# Patient Record
Sex: MALE | Race: WHITE | NOT HISPANIC OR LATINO | Employment: OTHER | URBAN - METROPOLITAN AREA
[De-identification: names, ages, dates, MRNs, and addresses within clinical notes are randomized per-mention and may not be internally consistent; named-entity substitution may affect disease eponyms.]

---

## 2017-04-12 DIAGNOSIS — Z12.5 ENCOUNTER FOR SCREENING FOR MALIGNANT NEOPLASM OF PROSTATE: ICD-10-CM

## 2017-04-12 DIAGNOSIS — E78.00 PURE HYPERCHOLESTEROLEMIA: ICD-10-CM

## 2017-04-12 DIAGNOSIS — I10 ESSENTIAL (PRIMARY) HYPERTENSION: ICD-10-CM

## 2017-04-12 DIAGNOSIS — M25.572 PAIN IN LEFT ANKLE: ICD-10-CM

## 2017-04-12 DIAGNOSIS — J45.909 UNCOMPLICATED ASTHMA: ICD-10-CM

## 2017-04-12 DIAGNOSIS — M25.571 PAIN IN RIGHT ANKLE: ICD-10-CM

## 2017-04-18 ENCOUNTER — APPOINTMENT (OUTPATIENT)
Dept: LAB | Facility: HOSPITAL | Age: 67
End: 2017-04-18
Attending: INTERNAL MEDICINE
Payer: MEDICARE

## 2017-04-18 ENCOUNTER — TRANSCRIBE ORDERS (OUTPATIENT)
Dept: ADMINISTRATIVE | Facility: HOSPITAL | Age: 67
End: 2017-04-18

## 2017-04-18 DIAGNOSIS — J45.909 UNCOMPLICATED ASTHMA: ICD-10-CM

## 2017-04-18 DIAGNOSIS — Z12.5 ENCOUNTER FOR SCREENING FOR MALIGNANT NEOPLASM OF PROSTATE: ICD-10-CM

## 2017-04-18 DIAGNOSIS — E78.00 PURE HYPERCHOLESTEROLEMIA: ICD-10-CM

## 2017-04-18 DIAGNOSIS — I10 ESSENTIAL (PRIMARY) HYPERTENSION: ICD-10-CM

## 2017-04-18 LAB
ALBUMIN SERPL BCP-MCNC: 4 G/DL (ref 3.5–5)
ALP SERPL-CCNC: 56 U/L (ref 46–116)
ALT SERPL W P-5'-P-CCNC: 35 U/L (ref 12–78)
ANION GAP SERPL CALCULATED.3IONS-SCNC: 8 MMOL/L (ref 4–13)
AST SERPL W P-5'-P-CCNC: 24 U/L (ref 5–45)
BASOPHILS # BLD AUTO: 0.02 THOUSANDS/ΜL (ref 0–0.1)
BASOPHILS NFR BLD AUTO: 0 % (ref 0–1)
BILIRUB SERPL-MCNC: 0.8 MG/DL (ref 0.2–1)
BUN SERPL-MCNC: 11 MG/DL (ref 5–25)
CALCIUM SERPL-MCNC: 8.8 MG/DL (ref 8.3–10.1)
CHLORIDE SERPL-SCNC: 102 MMOL/L (ref 100–108)
CHOLEST SERPL-MCNC: 173 MG/DL (ref 50–200)
CO2 SERPL-SCNC: 30 MMOL/L (ref 21–32)
CREAT SERPL-MCNC: 0.82 MG/DL (ref 0.6–1.3)
EOSINOPHIL # BLD AUTO: 0.24 THOUSAND/ΜL (ref 0–0.61)
EOSINOPHIL NFR BLD AUTO: 4 % (ref 0–6)
ERYTHROCYTE [DISTWIDTH] IN BLOOD BY AUTOMATED COUNT: 12.4 % (ref 11.6–15.1)
GFR SERPL CREATININE-BSD FRML MDRD: >60 ML/MIN/1.73SQ M
GLUCOSE P FAST SERPL-MCNC: 99 MG/DL (ref 65–99)
HCT VFR BLD AUTO: 43.2 % (ref 36.5–49.3)
HDLC SERPL-MCNC: 48 MG/DL (ref 40–60)
HGB BLD-MCNC: 15 G/DL (ref 12–17)
LDLC SERPL CALC-MCNC: 96 MG/DL (ref 0–100)
LYMPHOCYTES # BLD AUTO: 1.86 THOUSANDS/ΜL (ref 0.6–4.47)
LYMPHOCYTES NFR BLD AUTO: 33 % (ref 14–44)
MCH RBC QN AUTO: 28.2 PG (ref 26.8–34.3)
MCHC RBC AUTO-ENTMCNC: 34.7 G/DL (ref 31.4–37.4)
MCV RBC AUTO: 81 FL (ref 82–98)
MONOCYTES # BLD AUTO: 0.65 THOUSAND/ΜL (ref 0.17–1.22)
MONOCYTES NFR BLD AUTO: 11 % (ref 4–12)
NEUTROPHILS # BLD AUTO: 2.93 THOUSANDS/ΜL (ref 1.85–7.62)
NEUTS SEG NFR BLD AUTO: 52 % (ref 43–75)
PLATELET # BLD AUTO: 227 THOUSANDS/UL (ref 149–390)
PMV BLD AUTO: 8.5 FL (ref 8.9–12.7)
POTASSIUM SERPL-SCNC: 3.7 MMOL/L (ref 3.5–5.3)
PROT SERPL-MCNC: 7.5 G/DL (ref 6.4–8.2)
PSA SERPL-MCNC: 2.9 NG/ML (ref 0–4)
RBC # BLD AUTO: 5.32 MILLION/UL (ref 3.88–5.62)
SODIUM SERPL-SCNC: 140 MMOL/L (ref 136–145)
TRIGL SERPL-MCNC: 143 MG/DL
WBC # BLD AUTO: 5.7 THOUSAND/UL (ref 4.31–10.16)

## 2017-04-18 PROCEDURE — 85025 COMPLETE CBC W/AUTO DIFF WBC: CPT

## 2017-04-18 PROCEDURE — 80061 LIPID PANEL: CPT

## 2017-04-18 PROCEDURE — 36415 COLL VENOUS BLD VENIPUNCTURE: CPT

## 2017-04-18 PROCEDURE — G0103 PSA SCREENING: HCPCS

## 2017-04-18 PROCEDURE — 80053 COMPREHEN METABOLIC PANEL: CPT

## 2017-04-20 ENCOUNTER — ALLSCRIPTS OFFICE VISIT (OUTPATIENT)
Dept: OTHER | Facility: OTHER | Age: 67
End: 2017-04-20

## 2017-10-26 ENCOUNTER — EMERGENCY (EMERGENCY)
Facility: HOSPITAL | Age: 67
LOS: 1 days | Discharge: HOME | End: 2017-10-26
Attending: EMERGENCY MEDICINE | Admitting: EMERGENCY MEDICINE
Payer: MEDICARE

## 2017-10-26 VITALS
RESPIRATION RATE: 18 BRPM | DIASTOLIC BLOOD PRESSURE: 80 MMHG | TEMPERATURE: 98 F | HEART RATE: 55 BPM | OXYGEN SATURATION: 97 % | SYSTOLIC BLOOD PRESSURE: 148 MMHG

## 2017-10-26 VITALS
SYSTOLIC BLOOD PRESSURE: 183 MMHG | WEIGHT: 190.04 LBS | DIASTOLIC BLOOD PRESSURE: 100 MMHG | HEIGHT: 69 IN | HEART RATE: 72 BPM | RESPIRATION RATE: 20 BRPM | OXYGEN SATURATION: 97 % | TEMPERATURE: 97 F

## 2017-10-26 DIAGNOSIS — Z87.19 PERSONAL HISTORY OF OTHER DISEASES OF THE DIGESTIVE SYSTEM: Chronic | ICD-10-CM

## 2017-10-26 DIAGNOSIS — Z98.890 OTHER SPECIFIED POSTPROCEDURAL STATES: Chronic | ICD-10-CM

## 2017-10-26 DIAGNOSIS — N23 UNSPECIFIED RENAL COLIC: ICD-10-CM

## 2017-10-26 DIAGNOSIS — G56.00 CARPAL TUNNEL SYNDROME, UNSPECIFIED UPPER LIMB: Chronic | ICD-10-CM

## 2017-10-26 DIAGNOSIS — Z88.8 ALLERGY STATUS TO OTHER DRUGS, MEDICAMENTS AND BIOLOGICAL SUBSTANCES STATUS: ICD-10-CM

## 2017-10-26 DIAGNOSIS — I10 ESSENTIAL (PRIMARY) HYPERTENSION: ICD-10-CM

## 2017-10-26 LAB
ALBUMIN SERPL ELPH-MCNC: 4 G/DL — SIGNIFICANT CHANGE UP (ref 3.3–5)
ALP SERPL-CCNC: 54 U/L — SIGNIFICANT CHANGE UP (ref 40–120)
ALT FLD-CCNC: 36 U/L — SIGNIFICANT CHANGE UP (ref 12–78)
ANION GAP SERPL CALC-SCNC: 8 MMOL/L — SIGNIFICANT CHANGE UP (ref 5–17)
APPEARANCE UR: ABNORMAL
APTT BLD: 31 SEC — SIGNIFICANT CHANGE UP (ref 27.5–37.4)
AST SERPL-CCNC: 24 U/L — SIGNIFICANT CHANGE UP (ref 15–37)
BASOPHILS # BLD AUTO: 0.1 K/UL — SIGNIFICANT CHANGE UP (ref 0–0.2)
BASOPHILS NFR BLD AUTO: 0.7 % — SIGNIFICANT CHANGE UP (ref 0–2)
BILIRUB SERPL-MCNC: 0.5 MG/DL — SIGNIFICANT CHANGE UP (ref 0.2–1.2)
BILIRUB UR-MCNC: NEGATIVE — SIGNIFICANT CHANGE UP
BUN SERPL-MCNC: 14 MG/DL — SIGNIFICANT CHANGE UP (ref 7–23)
CALCIUM SERPL-MCNC: 8.8 MG/DL — SIGNIFICANT CHANGE UP (ref 8.5–10.1)
CHLORIDE SERPL-SCNC: 104 MMOL/L — SIGNIFICANT CHANGE UP (ref 96–108)
CO2 SERPL-SCNC: 28 MMOL/L — SIGNIFICANT CHANGE UP (ref 22–31)
COLOR SPEC: YELLOW — SIGNIFICANT CHANGE UP
CREAT SERPL-MCNC: 0.91 MG/DL — SIGNIFICANT CHANGE UP (ref 0.5–1.3)
DIFF PNL FLD: ABNORMAL
EOSINOPHIL # BLD AUTO: 0.1 K/UL — SIGNIFICANT CHANGE UP (ref 0–0.5)
EOSINOPHIL NFR BLD AUTO: 1.8 % — SIGNIFICANT CHANGE UP (ref 0–6)
GLUCOSE SERPL-MCNC: 102 MG/DL — HIGH (ref 70–99)
GLUCOSE UR QL: NEGATIVE — SIGNIFICANT CHANGE UP
HCT VFR BLD CALC: 44.1 % — SIGNIFICANT CHANGE UP (ref 39–50)
HGB BLD-MCNC: 15 G/DL — SIGNIFICANT CHANGE UP (ref 13–17)
INR BLD: 1.29 RATIO — HIGH (ref 0.88–1.16)
KETONES UR-MCNC: NEGATIVE — SIGNIFICANT CHANGE UP
LEUKOCYTE ESTERASE UR-ACNC: ABNORMAL
LYMPHOCYTES # BLD AUTO: 1.1 K/UL — SIGNIFICANT CHANGE UP (ref 1–3.3)
LYMPHOCYTES # BLD AUTO: 13.4 % — SIGNIFICANT CHANGE UP (ref 13–44)
MCHC RBC-ENTMCNC: 29.2 PG — SIGNIFICANT CHANGE UP (ref 27–34)
MCHC RBC-ENTMCNC: 33.9 GM/DL — SIGNIFICANT CHANGE UP (ref 32–36)
MCV RBC AUTO: 86.2 FL — SIGNIFICANT CHANGE UP (ref 80–100)
MONOCYTES # BLD AUTO: 0.7 K/UL — SIGNIFICANT CHANGE UP (ref 0–0.9)
MONOCYTES NFR BLD AUTO: 8.5 % — SIGNIFICANT CHANGE UP (ref 1–9)
NEUTROPHILS # BLD AUTO: 6.1 K/UL — SIGNIFICANT CHANGE UP (ref 1.8–7.4)
NEUTROPHILS NFR BLD AUTO: 75.5 % — SIGNIFICANT CHANGE UP (ref 43–77)
NITRITE UR-MCNC: NEGATIVE — SIGNIFICANT CHANGE UP
PH UR: 6 — SIGNIFICANT CHANGE UP (ref 5–8)
PLATELET # BLD AUTO: 235 K/UL — SIGNIFICANT CHANGE UP (ref 150–400)
POTASSIUM SERPL-MCNC: 3.5 MMOL/L — SIGNIFICANT CHANGE UP (ref 3.5–5.3)
POTASSIUM SERPL-SCNC: 3.5 MMOL/L — SIGNIFICANT CHANGE UP (ref 3.5–5.3)
PROT SERPL-MCNC: 7.6 G/DL — SIGNIFICANT CHANGE UP (ref 6–8.3)
PROT UR-MCNC: 25 MG/DL
PROTHROM AB SERPL-ACNC: 14.1 SEC — HIGH (ref 9.8–12.7)
RBC # BLD: 5.12 M/UL — SIGNIFICANT CHANGE UP (ref 4.2–5.8)
RBC # FLD: 10.9 % — SIGNIFICANT CHANGE UP (ref 10.3–14.5)
RBC CASTS # UR COMP ASSIST: ABNORMAL /HPF (ref 0–4)
SODIUM SERPL-SCNC: 140 MMOL/L — SIGNIFICANT CHANGE UP (ref 135–145)
SP GR SPEC: 1.02 — SIGNIFICANT CHANGE UP (ref 1.01–1.02)
UROBILINOGEN FLD QL: NEGATIVE — SIGNIFICANT CHANGE UP
WBC # BLD: 8.1 K/UL — SIGNIFICANT CHANGE UP (ref 3.8–10.5)
WBC # FLD AUTO: 8.1 K/UL — SIGNIFICANT CHANGE UP (ref 3.8–10.5)
WBC UR QL: SIGNIFICANT CHANGE UP

## 2017-10-26 PROCEDURE — 80053 COMPREHEN METABOLIC PANEL: CPT

## 2017-10-26 PROCEDURE — 36000 PLACE NEEDLE IN VEIN: CPT

## 2017-10-26 PROCEDURE — 85730 THROMBOPLASTIN TIME PARTIAL: CPT

## 2017-10-26 PROCEDURE — 74176 CT ABD & PELVIS W/O CONTRAST: CPT | Mod: 26

## 2017-10-26 PROCEDURE — 85027 COMPLETE CBC AUTOMATED: CPT

## 2017-10-26 PROCEDURE — 87086 URINE CULTURE/COLONY COUNT: CPT

## 2017-10-26 PROCEDURE — 85610 PROTHROMBIN TIME: CPT

## 2017-10-26 PROCEDURE — 74176 CT ABD & PELVIS W/O CONTRAST: CPT

## 2017-10-26 PROCEDURE — 81001 URINALYSIS AUTO W/SCOPE: CPT

## 2017-10-26 PROCEDURE — 99284 EMERGENCY DEPT VISIT MOD MDM: CPT

## 2017-10-26 PROCEDURE — 99284 EMERGENCY DEPT VISIT MOD MDM: CPT | Mod: 25

## 2017-10-26 RX ORDER — SIMVASTATIN 20 MG/1
1 TABLET, FILM COATED ORAL
Qty: 0 | Refills: 0 | COMMUNITY

## 2017-10-26 RX ORDER — LOSARTAN/HYDROCHLOROTHIAZIDE 100MG-25MG
1 TABLET ORAL
Qty: 0 | Refills: 0 | COMMUNITY

## 2017-10-26 RX ORDER — SODIUM CHLORIDE 9 MG/ML
1000 INJECTION INTRAMUSCULAR; INTRAVENOUS; SUBCUTANEOUS ONCE
Qty: 0 | Refills: 0 | Status: COMPLETED | OUTPATIENT
Start: 2017-10-26 | End: 2017-10-26

## 2017-10-26 RX ORDER — SODIUM CHLORIDE 9 MG/ML
3 INJECTION INTRAMUSCULAR; INTRAVENOUS; SUBCUTANEOUS ONCE
Qty: 0 | Refills: 0 | Status: COMPLETED | OUTPATIENT
Start: 2017-10-26 | End: 2017-10-26

## 2017-10-26 RX ADMIN — SODIUM CHLORIDE 3 MILLILITER(S): 9 INJECTION INTRAMUSCULAR; INTRAVENOUS; SUBCUTANEOUS at 15:23

## 2017-10-26 RX ADMIN — SODIUM CHLORIDE 2000 MILLILITER(S): 9 INJECTION INTRAMUSCULAR; INTRAVENOUS; SUBCUTANEOUS at 15:23

## 2017-10-26 NOTE — ED ADULT NURSE NOTE - PSH
Carpal tunnel syndrome    H/O arthroscopic knee surgery  bilat  H/O hiatal hernia    H/O shoulder surgery  bilat  History of back surgery

## 2017-10-26 NOTE — ED PROVIDER NOTE - PROGRESS NOTE DETAILS
results reviewed with patient. He's been pain free since arrival. here working in area til sunday. has a urologist at home that he hasn't seen in years. instructed if fever, vomiting, to return immediately.

## 2017-10-26 NOTE — ED PROVIDER NOTE - OBJECTIVE STATEMENT
Patient came into the ED c/o right flank pain around 1p.m. +N/V, sweaty. after vomiting patient now feels better. states it feels similar to previous kidney stones. no fever. no abdominal pain. no FUD.

## 2017-10-26 NOTE — ED ADULT NURSE NOTE - OBJECTIVE STATEMENT
pt states he developed right flank pain this morning. pt also with nausea and vomiting and one episode of diarrhea. pt states he is voiding well.

## 2017-10-27 LAB
CULTURE RESULTS: NO GROWTH — SIGNIFICANT CHANGE UP
SPECIMEN SOURCE: SIGNIFICANT CHANGE UP

## 2017-12-01 ENCOUNTER — TRANSCRIBE ORDERS (OUTPATIENT)
Dept: ADMINISTRATIVE | Facility: HOSPITAL | Age: 67
End: 2017-12-01

## 2017-12-01 ENCOUNTER — ALLSCRIPTS OFFICE VISIT (OUTPATIENT)
Dept: OTHER | Facility: OTHER | Age: 67
End: 2017-12-01

## 2017-12-01 DIAGNOSIS — R10.12 LEFT UPPER QUADRANT PAIN: ICD-10-CM

## 2017-12-01 DIAGNOSIS — R10.12 ABDOMINAL PAIN, LEFT UPPER QUADRANT: Primary | ICD-10-CM

## 2017-12-01 DIAGNOSIS — M54.50 LOW BACK PAIN: ICD-10-CM

## 2017-12-01 DIAGNOSIS — N20.0 CALCULUS OF KIDNEY: ICD-10-CM

## 2017-12-02 ENCOUNTER — GENERIC CONVERSION - ENCOUNTER (OUTPATIENT)
Dept: FAMILY MEDICINE CLINIC | Facility: HOSPITAL | Age: 67
End: 2017-12-02

## 2017-12-02 ENCOUNTER — HOSPITAL ENCOUNTER (OUTPATIENT)
Dept: MRI IMAGING | Facility: HOSPITAL | Age: 67
Discharge: HOME | End: 2017-12-02
Attending: INTERNAL MEDICINE
Payer: MEDICARE

## 2017-12-02 DIAGNOSIS — R10.12 ABDOMINAL PAIN, LEFT UPPER QUADRANT: ICD-10-CM

## 2017-12-02 PROCEDURE — 72148 MRI LUMBAR SPINE W/O DYE: CPT

## 2017-12-04 ENCOUNTER — TRANSCRIBE ORDERS (OUTPATIENT)
Dept: ADMINISTRATIVE | Facility: HOSPITAL | Age: 67
End: 2017-12-04

## 2017-12-04 ENCOUNTER — HOSPITAL ENCOUNTER (OUTPATIENT)
Dept: RADIOLOGY | Facility: HOSPITAL | Age: 67
Discharge: HOME | End: 2017-12-04
Attending: UROLOGY
Payer: MEDICARE

## 2017-12-04 ENCOUNTER — LAB REQUISITION (OUTPATIENT)
Dept: LAB | Facility: HOSPITAL | Age: 67
End: 2017-12-04
Payer: MEDICARE

## 2017-12-04 ENCOUNTER — ALLSCRIPTS OFFICE VISIT (OUTPATIENT)
Dept: OTHER | Facility: OTHER | Age: 67
End: 2017-12-04

## 2017-12-04 DIAGNOSIS — N20.0 CALCULUS OF KIDNEY: ICD-10-CM

## 2017-12-04 LAB
CLARITY UR: NORMAL
COLOR UR: YELLOW
GLUCOSE (HISTORICAL): NORMAL
HGB UR QL STRIP.AUTO: NORMAL
KETONES UR STRIP-MCNC: NORMAL MG/DL
LEUKOCYTE ESTERASE UR QL STRIP: NORMAL
NITRITE UR QL STRIP: NORMAL
PH UR STRIP.AUTO: 6.5 [PH]
PROT UR STRIP-MCNC: NORMAL MG/DL
SP GR UR STRIP.AUTO: 1.02

## 2017-12-04 PROCEDURE — 74000 HB X-RAY EXAM OF ABDOMEN (SINGLE ANTEROPOSTERIOR VIEW): CPT

## 2017-12-04 PROCEDURE — 87086 URINE CULTURE/COLONY COUNT: CPT | Performed by: UROLOGY

## 2017-12-05 ENCOUNTER — GENERIC CONVERSION - ENCOUNTER (OUTPATIENT)
Dept: OTHER | Facility: OTHER | Age: 67
End: 2017-12-05

## 2017-12-05 LAB — BACTERIA UR CULT: NORMAL
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
## 2017-12-05 NOTE — CONSULTS
Assessment  1  Kidney stone (592 0) (N20 0)    Plan  Benign nodular prostatic hyperplasia with lower urinary tract symptoms    · Tamsulosin HCl - 0 4 MG Oral Capsule; take 1 capsule by mouth at bedtime   Rx By: Aleta Heredia; Dispense: 30 Days ; #:30 Capsule; Refill: 1;Benign nodular prostatic hyperplasia with lower urinary tract symptoms; MEHRDAD = N; Verified Transmission to [facility]; Last Updated By: System, [employer]Script[employer]; 12/4/2017 8:59:35 AM  Kidney stone    · (1) URINE CULTURE; Source:Urine, Clean Catch; Status:Active - Retrospective ByProtocol Authorization; Requested for:[id number];    Perform:[facility]; WOU:82TDB7442; Last Updated By:Mary Varghese; 12/4/2017 8:58:48 AM;Ordered; For:Kidney stone; Ordered By:Isidro Musa;   · * XR ABDOMEN 1 VIEW KUB; Status:Active; Requested for:13Pms5314;    Perform:[facility]; EYM:14LQQ8189; Ordered;stone; Ordered By:Gilbert Musa;   · Urine Dip Non-Automated- POC; Status:Complete - Retrospective By ProtocolAuthorization;   Done: 79XAI8323 08:39AM   Performed: In Office; [phone]; Last Updated By:Kailey Anderson; 12/4/2017 8:39:37 AM;Ordered;stone; Anup Robes By:Gilbert Musa;   · Schedule Surgery Treatment  Procedure left ESWL  Standard PATs  Status: Hold For -Scheduling  Requested for: 44DQK2925   Ordered;Kidney stone; Ordered By: Aleta Heredia Performed:  Due: 39CRX0903    Discussion/Summary  Discussion Summary:   56-year-old male with nephrolithiasis and BPH  reviewed the imaging results with the patient  Unfortunately I do not have the actual images  We discussed options for the left intra pelvic kidney stone including ESWL and ureteroscopy  Patient wishes to proceed with ESWL of possible  I will obtain a KUB to make sure we can see the stone  Risks and benefits of the procedure were discussed and he was consented  I will start him on tamsulosin for his BPH  Side effects of the medication were discussed   We can follow up with him at the time of surgery to see how well this medication is working  Self Referrals:   Self Referrals: No Ref by Abdirahman Kaur      Chief Complaint  Chief Complaint Free Text Note Form: Kidney stones      History of Present Illness  HPI: 47-year-old male presents for evaluation of nephrolithiasis  The patient states that he had an episode of severe right-sided flank pain with emesis in [place] in the end of October  He went to the emergency department where CT scan was performed that showed some residual right-sided hydronephrosis but that there was no stone  He did have bilateral intrarenal calculi including a 6 mm right lower pole calculus and a 7 mm left renal pelvis calculus  The patient does admit to some intermittent left-sided testicular pain but denies any severe flank pain  He did have a couple more episodes of nausea and vomiting around [facility] but without any associated pain  He denies any dysuria or gross hematuria  He denies any fevers or chills  He is having lower urinary tract symptoms which have been going on for years  He admits to poor stream quality, urinary frequency, and a feeling that he is not completely empty  Review of Systems  Complete-Male Urology:  Constitutional: No fever or chills, feels well, no tiredness, no recent weight gain or weight loss  Respiratory: No complaints of shortness of breath, no wheezing, no cough, no SOB on exertion, no orthopnea or PND  Cardiovascular: No complaints of slow heart rate, no fast heart rate, no chest pain, no palpitations, no leg claudication, no lower extremity  Gastrointestinal: No complaints of abdominal pain, no constipation, no nausea or vomiting, no diarrhea or bloody stools  Genitourinary: stream quality fair, but-- no hematuria,-- no empty sensation,-- no incontinence-- and-- no feelings of urinary urgency--   The patient presents with complaints of dysuria (occ)    The patient presents with complaints of urinary hesitancy (occ)  The patient presents with complaints of nocturia (occ)  Musculoskeletal: No complaints of arthralgia, no myalgias, no joint swelling or stiffness, no limb pain or swelling  Integumentary: No complaints of skin rash or skin lesions, no itching, no skin wound, no dry skin  Hematologic/Lymphatic: No complaints of swollen glands, no swollen glands in the neck, does not bleed easily, no easy bruising  Neurological: No compliants of headache, no confusion, no convulsions, no numbness or tingling, no dizziness or fainting, no limb weakness, no difficulty walking  ROS Reviewed:   ROS reviewed  Active Problems  1  Abdominal pain, LUQ (789 02) (R10 12)   2  Allergic rhinitis (477 9) (J30 9)   3  Asthma (493 90) (J45 909)   4  Benign essential hypertension (401 1) (I10)   5  Benign nodular prostatic hyperplasia with lower urinary tract symptoms (600 10) (N40 1)   6  Chronic pain of both ankles (719 47,338 29) (M25 571,M25 572,G89 29)   7  Eczema of both external ears (380 22) (H60 543)   8  Encounter for prostate cancer screening (V76 44) (Z12 5)   9  GERD (gastroesophageal reflux disease) (530 81) (K21 9)   10  Hypercholesterolemia (272 0) (E78 00)   11  Insomnia (780 52) (G47 00)   12  Kidney stone (592 0) (N20 0)   13  Low back pain (724 2) (M54 5)   14  Need for Tdap vaccination (V06 1) (Z23)   15  Pain (780 96) (R52)   16  Pain in joint of right shoulder (719 41) (M25 511)   17  Renal lithiasis (592 0) (N20 0)   18  Screening for genitourinary condition (V81 6) (Z13 89)   19  Sinusitis, bacterial (473 9,041 9) (J32 9,B96 89)   20  Trigger finger (727 03) (M65 30)    Past Medical History  1  History of arthritis (V13 4) (Z87 39)   2  History of hypertension (V12 59) (Z86 79)   3  Personal history of asthma (V12 69) (Z87 09)  Active Problems And Past Medical History Reviewed: The active problems and past medical history were reviewed and updated today  Surgical History  1  History of Arthroscopy Shoulder   2  History of Complete Colonoscopy   3  History of Hallux Rigidus Correction W/ Cheilectomy 1st MTP Joint   4  History of Hernia Repair   5  History of Hernia Repair   6  History of Knee Arthroscopy (Therapeutic)   7  History of Lower Back Surgery   8  History of Neuroplasty Left Middle Finger   9  History of Rotator Cuff Repair  Surgical History Reviewed: The surgical history was reviewed and updated today  Family History  Mother    1  Family history of malignant neoplasm of brain (V16 8) (Z80 8)  Father    2  Family history of chronic obstructive pulmonary disease (V17 6) (Z82 5)  Maternal Grandmother    3  Family history of malignant neoplasm (V16 9) (Z80 9)  Maternal Uncle    4  Family history of Abdominal aortic aneurysm, not a candidate for repair   5  Family history of abdominal aortic aneurysm (V17 49) (Z82 49)  Family History Reviewed: The family history was reviewed and updated today  Social History     · Dental care, regularly   · Denied: History of Drug use   · Lives with spouse   · Living Situation: Supportive and safe   ·    · Never A Smoker   · No advance directive on file (V49 89) (Z78 9)   · No drug use   · Occasional alcohol use  Social History Reviewed: The social history was reviewed and updated today  Current Meds   1  CoQ-10 10 MG CAPS; take 1 capsule daily; Therapy: () to Recorded   2  Diclofenac Potassium 50 MG Oral Tablet; take 1 tablet by mouth twice a day; Therapy: 56HTG8417 to (Evaluate:47Cak1746)  Requested for: 95Jqa4252; Last Rx:25Sym6060 Ordered   3  Hydrocodone-Acetaminophen 5-325 MG Oral Tablet; TAKE 1 TABLET TWICE DAILY PRN for Pain; Therapy: 40EVF3371 to (Evaluate:81Crn0169)  Requested for: 91HVD6409; Last Rx:62Sgj3458 Ordered   4  Losartan Potassium-HCTZ 50-12 5 MG Oral Tablet; Take 1 tablet daily; Therapy: 02HKQ0417 to (Last Rx:15Umw9130)  Requested for: 05OXP3806 Ordered   5   Mometasone Furoate 0 1 % External Cream; APPLY SPARINGLY TO AFFECTED AREAS TWICE DAILY  (AM AND PM); Therapy: 23RJT0378 to (Emporia Pencil)  Requested for: 54CCS2248; Last Rx:02Oct2015 Ordered   6  Mometasone Furoate 50 MCG/ACT Nasal Suspension; USE 2 SPRAYS IN EACH NOSTRIL ONCE DAILY; Therapy: 40HYU9708 to (Last Rx:09Jun2016)  Requested for: 10HJK1859 Ordered   7  ProAir  (90 Base) MCG/ACT Inhalation Aerosol Solution; INHALE 2 PUFFS 4 times daily PRN  Requested for: 98PYF1915; Last Rx:02Oct2015 Ordered   8  Simvastatin 40 MG Oral Tablet; Take 1 tablet daily; Therapy: 70ZUK1541 to (Last Rx:12Ckg7524)  Requested for: 75FCF7918 Ordered   9  Zolpidem Tartrate 10 MG Oral Tablet; TAKE 1 TABLET AT BEDTIME AS NEEDED FOR SLEEP; Last Rx:94Zew9977 Ordered  Medication List Reviewed: The medication list was reviewed and updated today  Allergies  1  Demerol TABS   2  Nasarel   3  Prinivil TABS  4  No Known Environmental Allergies   5  No Known Food Allergies    Vitals  Vital Signs    Recorded: 89PRE0850 08:27AM   Heart Rate 60   Systolic 342   Diastolic 72   Height 5 ft 7 5 in   Weight 191 lb    BMI Calculated 29 47   BSA Calculated 1 99       Physical Exam   Constitutional  General appearance: No acute distress, well appearing and well nourished  Pulmonary  Respiratory effort: No increased work of breathing or signs of respiratory distress  Cardiovascular  Examination of extremities for edema and/or varicosities: Normal    Abdomen  Abdomen: Non-tender, no masses  Liver and spleen: No hepatomegaly or splenomegaly  Genitourinary  Anus and perineum: Normal    Scrotum contents: Normal size, no masses  Epididymis: Normal, no masses  Testes: Normal testes, no masses  Urethral meatus: Normal, no lesions  Penis: Normal, no lesions  Digital rectal exam of prostate: Abnormal  -- 45 g, smooth, no nodules, nontender  Digital rectal exam of seminal vesicles: Normal size, no masses     Anus, perineum, and rectum: Normal    Musculoskeletal  Gait and station: Normal  Lymphatic  Palpation of lymph nodes in groin: Normal    Additional Exam:  Neuro exam nonfocal       Results/Data  Urine Dip Non-Automated- POC 81UIX0228 08:39AM Milton Beatris     Test Name Result Flag Reference   Color Yellow       Clarity Transparent     Leukocytes -     Nitrite -     Blood trace     Protein -     Ph 6 5     Specific Gravity 1 020     Ketone -     Glucose -       (1) PSA (SCREEN) (Dx V76 44 Screen for Prostate Cancer) 18Apr2017 07:48AM Eric Abdullahi    Order Number: [id number]     Test Name Result Flag Reference   PROSTATE SPECIFIC ANTIGEN 2 9 ng/mL  0 0-4 0     American Urological Association Guidelines define biochemical recurrence of prostate cancer as a detectable or rising PSA value post-radical prostatectomy that is greater than or equal to 0 2 ng/mL with a second confirmatory level of greater than or equal to 0 2 ng/mL  - Patient Instructions: This test is non-fasting  Please drink two glasses of water morning of bloodwork         Signatures   Electronically signed by : SRINI Velarde ; Dec  4 2017  9:09AM EST                       (Author)

## 2017-12-13 ENCOUNTER — GENERIC CONVERSION - ENCOUNTER (OUTPATIENT)
Dept: OTHER | Facility: OTHER | Age: 67
End: 2017-12-13

## 2017-12-13 ENCOUNTER — GENERIC CONVERSION - ENCOUNTER (OUTPATIENT)
Dept: FAMILY MEDICINE CLINIC | Facility: HOSPITAL | Age: 67
End: 2017-12-13

## 2017-12-15 ENCOUNTER — GENERIC CONVERSION - ENCOUNTER (OUTPATIENT)
Dept: FAMILY MEDICINE CLINIC | Facility: HOSPITAL | Age: 67
End: 2017-12-15

## 2017-12-19 ENCOUNTER — GENERIC CONVERSION - ENCOUNTER (OUTPATIENT)
Dept: OTHER | Facility: OTHER | Age: 67
End: 2017-12-19

## 2017-12-19 ENCOUNTER — ALLSCRIPTS OFFICE VISIT (OUTPATIENT)
Dept: OTHER | Facility: OTHER | Age: 67
End: 2017-12-19
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
## 2017-12-21 ENCOUNTER — ALLSCRIPTS OFFICE VISIT (OUTPATIENT)
Dept: RADIOLOGY | Facility: CLINIC | Age: 67
End: 2017-12-21
Payer: MEDICARE

## 2017-12-27 ENCOUNTER — GENERIC CONVERSION - ENCOUNTER (OUTPATIENT)
Dept: OTHER | Facility: OTHER | Age: 67
End: 2017-12-27

## 2018-01-02 ENCOUNTER — APPOINTMENT (EMERGENCY)
Dept: CT IMAGING | Facility: HOSPITAL | Age: 68
DRG: 666 | End: 2018-01-02
Payer: MEDICARE

## 2018-01-02 ENCOUNTER — HOSPITAL ENCOUNTER (INPATIENT)
Facility: HOSPITAL | Age: 68
LOS: 2 days | Discharge: HOME | DRG: 666 | End: 2018-01-04
Attending: INTERNAL MEDICINE | Admitting: INTERNAL MEDICINE
Payer: MEDICARE

## 2018-01-02 DIAGNOSIS — N20.0 KIDNEY STONE ON LEFT SIDE: Primary | ICD-10-CM

## 2018-01-02 DIAGNOSIS — R52 INTRACTABLE PAIN: ICD-10-CM

## 2018-01-02 DIAGNOSIS — M54.50 LOW BACK PAIN: ICD-10-CM

## 2018-01-02 DIAGNOSIS — M51.26 HERNIATED LUMBAR INTERVERTEBRAL DISC: ICD-10-CM

## 2018-01-02 PROBLEM — K21.9 GASTROESOPHAGEAL REFLUX DISEASE WITHOUT ESOPHAGITIS: Chronic | Status: ACTIVE | Noted: 2018-01-02

## 2018-01-02 PROBLEM — M51.9 LUMBAR DISC DISEASE: Chronic | Status: ACTIVE | Noted: 2018-01-02

## 2018-01-02 PROBLEM — K44.9 LARGE HIATAL HERNIA: Chronic | Status: ACTIVE | Noted: 2018-01-02

## 2018-01-02 PROBLEM — E87.6 HYPOKALEMIA: Status: ACTIVE | Noted: 2018-01-02

## 2018-01-02 PROBLEM — N20.1 URETEROLITHIASIS: Status: ACTIVE | Noted: 2018-01-02

## 2018-01-02 PROBLEM — R10.12 LEFT UPPER QUADRANT PAIN: Chronic | Status: ACTIVE | Noted: 2018-01-02

## 2018-01-02 LAB
ALBUMIN SERPL BCP-MCNC: 4.3 G/DL (ref 3.5–5)
ALP SERPL-CCNC: 56 U/L (ref 46–116)
ALT SERPL W P-5'-P-CCNC: 49 U/L (ref 12–78)
ANION GAP SERPL CALCULATED.3IONS-SCNC: 10 MMOL/L (ref 4–13)
AST SERPL W P-5'-P-CCNC: 18 U/L (ref 5–45)
BACTERIA UR QL AUTO: ABNORMAL /HPF
BASOPHILS # BLD AUTO: 0.03 THOUSANDS/ΜL (ref 0–0.1)
BASOPHILS NFR BLD AUTO: 0 % (ref 0–1)
BILIRUB SERPL-MCNC: 0.8 MG/DL (ref 0.2–1)
BILIRUB UR QL STRIP: NEGATIVE
BUN SERPL-MCNC: 13 MG/DL (ref 5–25)
CALCIUM SERPL-MCNC: 9.2 MG/DL (ref 8.3–10.1)
CHLORIDE SERPL-SCNC: 101 MMOL/L (ref 100–108)
CLARITY UR: CLEAR
CLARITY, POC: CLEAR
CO2 SERPL-SCNC: 29 MMOL/L (ref 21–32)
COLOR UR: YELLOW
COLOR, POC: YELLOW
CREAT SERPL-MCNC: 0.9 MG/DL (ref 0.6–1.3)
EOSINOPHIL # BLD AUTO: 0.09 THOUSAND/ΜL (ref 0–0.61)
EOSINOPHIL NFR BLD AUTO: 1 % (ref 0–6)
ERYTHROCYTE [DISTWIDTH] IN BLOOD BY AUTOMATED COUNT: 13.1 % (ref 11.6–15.1)
EXT BILIRUBIN, UA: NEGATIVE
EXT BLOOD URINE: NORMAL
EXT GLUCOSE, UA: NEGATIVE
EXT KETONES: NEGATIVE
EXT NITRITE, UA: NEGATIVE
EXT PH, UA: 6
EXT PROTEIN, UA: 300
EXT SPECIFIC GRAVITY, UA: 1.01
EXT UROBILINOGEN: 0.2
GFR SERPL CREATININE-BSD FRML MDRD: 87 ML/MIN/1.73SQ M
GLUCOSE SERPL-MCNC: 151 MG/DL (ref 65–140)
GLUCOSE UR STRIP-MCNC: NEGATIVE MG/DL
HCT VFR BLD AUTO: 47.5 % (ref 36.5–49.3)
HGB BLD-MCNC: 16.7 G/DL (ref 12–17)
HGB UR QL STRIP.AUTO: ABNORMAL
KETONES UR STRIP-MCNC: NEGATIVE MG/DL
LEUKOCYTE ESTERASE UR QL STRIP: ABNORMAL
LIPASE SERPL-CCNC: 120 U/L (ref 73–393)
LYMPHOCYTES # BLD AUTO: 1.73 THOUSANDS/ΜL (ref 0.6–4.47)
LYMPHOCYTES NFR BLD AUTO: 17 % (ref 14–44)
MCH RBC QN AUTO: 28.6 PG (ref 26.8–34.3)
MCHC RBC AUTO-ENTMCNC: 35.2 G/DL (ref 31.4–37.4)
MCV RBC AUTO: 81 FL (ref 82–98)
MONOCYTES # BLD AUTO: 0.74 THOUSAND/ΜL (ref 0.17–1.22)
MONOCYTES NFR BLD AUTO: 7 % (ref 4–12)
NEUTROPHILS # BLD AUTO: 7.48 THOUSANDS/ΜL (ref 1.85–7.62)
NEUTS SEG NFR BLD AUTO: 75 % (ref 43–75)
NITRITE UR QL STRIP: NEGATIVE
NON-SQ EPI CELLS URNS QL MICRO: ABNORMAL /HPF
OTHER STN SPEC: ABNORMAL
PH UR STRIP.AUTO: 6 [PH] (ref 4.5–8)
PLATELET # BLD AUTO: 257 THOUSANDS/UL (ref 149–390)
PMV BLD AUTO: 8.4 FL (ref 8.9–12.7)
POTASSIUM SERPL-SCNC: 3.3 MMOL/L (ref 3.5–5.3)
PROT SERPL-MCNC: 8.1 G/DL (ref 6.4–8.2)
PROT UR STRIP-MCNC: ABNORMAL MG/DL
RBC # BLD AUTO: 5.84 MILLION/UL (ref 3.88–5.62)
RBC #/AREA URNS AUTO: ABNORMAL /HPF
SODIUM SERPL-SCNC: 140 MMOL/L (ref 136–145)
SP GR UR STRIP.AUTO: 1.02 (ref 1–1.03)
UROBILINOGEN UR QL STRIP.AUTO: 0.2 E.U./DL
WBC # BLD AUTO: 10.07 THOUSAND/UL (ref 4.31–10.16)
WBC # BLD EST: POSITIVE 10*3/UL
WBC #/AREA URNS AUTO: ABNORMAL /HPF

## 2018-01-02 PROCEDURE — 96375 TX/PRO/DX INJ NEW DRUG ADDON: CPT

## 2018-01-02 PROCEDURE — 99285 EMERGENCY DEPT VISIT HI MDM: CPT

## 2018-01-02 PROCEDURE — 96374 THER/PROPH/DIAG INJ IV PUSH: CPT

## 2018-01-02 PROCEDURE — 85025 COMPLETE CBC W/AUTO DIFF WBC: CPT | Performed by: PHYSICIAN ASSISTANT

## 2018-01-02 PROCEDURE — 81001 URINALYSIS AUTO W/SCOPE: CPT | Performed by: PHYSICIAN ASSISTANT

## 2018-01-02 PROCEDURE — 81002 URINALYSIS NONAUTO W/O SCOPE: CPT | Performed by: PHYSICIAN ASSISTANT

## 2018-01-02 PROCEDURE — 87086 URINE CULTURE/COLONY COUNT: CPT | Performed by: INTERNAL MEDICINE

## 2018-01-02 PROCEDURE — 36415 COLL VENOUS BLD VENIPUNCTURE: CPT | Performed by: PHYSICIAN ASSISTANT

## 2018-01-02 PROCEDURE — 80053 COMPREHEN METABOLIC PANEL: CPT | Performed by: PHYSICIAN ASSISTANT

## 2018-01-02 PROCEDURE — 87040 BLOOD CULTURE FOR BACTERIA: CPT | Performed by: PHYSICIAN ASSISTANT

## 2018-01-02 PROCEDURE — 83690 ASSAY OF LIPASE: CPT | Performed by: PHYSICIAN ASSISTANT

## 2018-01-02 PROCEDURE — 74177 CT ABD & PELVIS W/CONTRAST: CPT

## 2018-01-02 RX ORDER — PANTOPRAZOLE SODIUM 40 MG/1
40 TABLET, DELAYED RELEASE ORAL
Status: DISCONTINUED | OUTPATIENT
Start: 2018-01-03 | End: 2018-01-04 | Stop reason: HOSPADM

## 2018-01-02 RX ORDER — DOCUSATE SODIUM 100 MG/1
100 CAPSULE, LIQUID FILLED ORAL 2 TIMES DAILY
Status: DISCONTINUED | OUTPATIENT
Start: 2018-01-02 | End: 2018-01-04 | Stop reason: HOSPADM

## 2018-01-02 RX ORDER — ZOLPIDEM TARTRATE 5 MG/1
5 TABLET ORAL
Status: DISCONTINUED | OUTPATIENT
Start: 2018-01-02 | End: 2018-01-04 | Stop reason: HOSPADM

## 2018-01-02 RX ORDER — ALBUTEROL SULFATE 90 UG/1
AEROSOL, METERED RESPIRATORY (INHALATION) EVERY 4 HOURS PRN
COMMUNITY
End: 2021-10-19 | Stop reason: SDUPTHER

## 2018-01-02 RX ORDER — KETOROLAC TROMETHAMINE 30 MG/ML
15 INJECTION, SOLUTION INTRAMUSCULAR; INTRAVENOUS ONCE
Status: COMPLETED | OUTPATIENT
Start: 2018-01-02 | End: 2018-01-02

## 2018-01-02 RX ORDER — ZOLPIDEM TARTRATE 10 MG/1
TABLET ORAL
COMMUNITY
End: 2019-04-29 | Stop reason: SDUPTHER

## 2018-01-02 RX ORDER — SIMVASTATIN 40 MG
TABLET ORAL
COMMUNITY
Start: 2015-02-02 | End: 2018-07-25 | Stop reason: SDUPTHER

## 2018-01-02 RX ORDER — DICLOFENAC POTASSIUM 50 MG/1
TABLET, FILM COATED ORAL AS NEEDED
COMMUNITY
Start: 2017-08-25 | End: 2018-05-18 | Stop reason: ALTCHOICE

## 2018-01-02 RX ORDER — ATORVASTATIN CALCIUM 40 MG/1
40 TABLET, FILM COATED ORAL
Status: DISCONTINUED | OUTPATIENT
Start: 2018-01-02 | End: 2018-01-04 | Stop reason: HOSPADM

## 2018-01-02 RX ORDER — ALBUTEROL SULFATE 90 UG/1
2 AEROSOL, METERED RESPIRATORY (INHALATION) EVERY 6 HOURS PRN
Status: DISCONTINUED | OUTPATIENT
Start: 2018-01-02 | End: 2018-01-04 | Stop reason: HOSPADM

## 2018-01-02 RX ORDER — LEVOFLOXACIN 5 MG/ML
500 INJECTION, SOLUTION INTRAVENOUS ONCE
Status: COMPLETED | OUTPATIENT
Start: 2018-01-02 | End: 2018-01-02

## 2018-01-02 RX ORDER — ACETAMINOPHEN 160 MG/5ML
650 SOLUTION ORAL EVERY 6 HOURS PRN
Status: DISCONTINUED | OUTPATIENT
Start: 2018-01-02 | End: 2018-01-04 | Stop reason: HOSPADM

## 2018-01-02 RX ORDER — TRAMADOL HYDROCHLORIDE 50 MG/1
50 TABLET ORAL EVERY 6 HOURS PRN
Status: DISCONTINUED | OUTPATIENT
Start: 2018-01-02 | End: 2018-01-04 | Stop reason: HOSPADM

## 2018-01-02 RX ORDER — HYDROCODONE BITARTRATE AND ACETAMINOPHEN 5; 325 MG/1; MG/1
TABLET ORAL
COMMUNITY
Start: 2014-10-03 | End: 2018-01-09

## 2018-01-02 RX ORDER — ONDANSETRON 2 MG/ML
4 INJECTION INTRAMUSCULAR; INTRAVENOUS EVERY 6 HOURS PRN
Status: DISCONTINUED | OUTPATIENT
Start: 2018-01-02 | End: 2018-01-04 | Stop reason: HOSPADM

## 2018-01-02 RX ORDER — ONDANSETRON 2 MG/ML
4 INJECTION INTRAMUSCULAR; INTRAVENOUS ONCE
Status: COMPLETED | OUTPATIENT
Start: 2018-01-02 | End: 2018-01-02

## 2018-01-02 RX ORDER — MORPHINE SULFATE 2 MG/ML
2 INJECTION, SOLUTION INTRAMUSCULAR; INTRAVENOUS
Status: DISCONTINUED | OUTPATIENT
Start: 2018-01-02 | End: 2018-01-04 | Stop reason: HOSPADM

## 2018-01-02 RX ORDER — TAMSULOSIN HYDROCHLORIDE 0.4 MG/1
CAPSULE ORAL
COMMUNITY
Start: 2017-12-04 | End: 2018-02-08 | Stop reason: SDUPTHER

## 2018-01-02 RX ORDER — ACETAMINOPHEN 160 MG/5ML
650 SUSPENSION, ORAL (FINAL DOSE FORM) ORAL EVERY 6 HOURS PRN
Status: DISCONTINUED | OUTPATIENT
Start: 2018-01-02 | End: 2018-01-02 | Stop reason: RX

## 2018-01-02 RX ORDER — NICOTINE POLACRILEX 4 MG/1
GUM, CHEWING ORAL DAILY
COMMUNITY
Start: 2017-12-14 | End: 2019-04-29 | Stop reason: SDUPTHER

## 2018-01-02 RX ORDER — LOSARTAN POTASSIUM AND HYDROCHLOROTHIAZIDE 12.5; 5 MG/1; MG/1
TABLET ORAL DAILY
COMMUNITY
Start: 2015-05-08 | End: 2018-07-25 | Stop reason: SDUPTHER

## 2018-01-02 RX ORDER — SODIUM CHLORIDE AND POTASSIUM CHLORIDE .9; .15 G/100ML; G/100ML
125 SOLUTION INTRAVENOUS CONTINUOUS
Status: DISCONTINUED | OUTPATIENT
Start: 2018-01-02 | End: 2018-01-03

## 2018-01-02 RX ORDER — TAMSULOSIN HYDROCHLORIDE 0.4 MG/1
0.4 CAPSULE ORAL
Status: DISCONTINUED | OUTPATIENT
Start: 2018-01-02 | End: 2018-01-04 | Stop reason: HOSPADM

## 2018-01-02 RX ORDER — HYDROCODONE BITARTRATE AND ACETAMINOPHEN 5; 325 MG/1; MG/1
1 TABLET ORAL EVERY 4 HOURS PRN
Status: DISCONTINUED | OUTPATIENT
Start: 2018-01-02 | End: 2018-01-04 | Stop reason: HOSPADM

## 2018-01-02 RX ADMIN — KETOROLAC TROMETHAMINE 15 MG: 30 INJECTION, SOLUTION INTRAMUSCULAR at 15:09

## 2018-01-02 RX ADMIN — ONDANSETRON 4 MG: 2 INJECTION INTRAMUSCULAR; INTRAVENOUS at 15:09

## 2018-01-02 RX ADMIN — IOHEXOL 100 ML: 350 INJECTION, SOLUTION INTRAVENOUS at 15:51

## 2018-01-02 RX ADMIN — DOCUSATE SODIUM 100 MG: 100 CAPSULE, LIQUID FILLED ORAL at 19:53

## 2018-01-02 RX ADMIN — TAMSULOSIN HYDROCHLORIDE 0.4 MG: 0.4 CAPSULE ORAL at 19:53

## 2018-01-02 RX ADMIN — KETOROLAC TROMETHAMINE 15 MG: 30 INJECTION, SOLUTION INTRAMUSCULAR at 18:14

## 2018-01-02 RX ADMIN — SODIUM CHLORIDE AND POTASSIUM CHLORIDE 125 ML/HR: .9; .15 SOLUTION INTRAVENOUS at 19:53

## 2018-01-02 RX ADMIN — LEVOFLOXACIN 500 MG: 5 INJECTION, SOLUTION INTRAVENOUS at 18:14

## 2018-01-02 NOTE — ED PROVIDER NOTES
History  Chief Complaint   Patient presents with    Abdominal Pain     Patient has been having pain in his L lower anterior rib cage since the begining of December  Pt has seen Dr Esvin Guevara for same, she refered him to Dr Amy Gracia  Patient states there is a lump on his rib cage and has been having increased pain in the area  Pt is currently seeing Dr Rosamaria Zapata for chronic back pain, patient has a knowm kidney stone and being treated  Patient presents to the ED with LUQ and LLQ abdominal pain that started 1 month ago  He was seen by PCP for this problem and was referred to Dr Fidencio Avitia for possible hernia  Patient has an appointment on 1/12, but the pain has been worsening  He states pain is worse when walking and better when sitting  He has had nausea, but denies vomiting, diarrhea, or constipation  He denies fevers/chills  History provided by:  Patient  Abdominal Pain   Pain location:  LUQ and LLQ  Pain quality: aching    Pain radiates to:  Does not radiate  Pain severity:  Moderate  Onset quality:  Gradual  Duration:  4 weeks  Timing:  Constant  Progression:  Worsening  Chronicity:  New  Context: not sick contacts, not suspicious food intake and not trauma    Relieved by: sitting down  Worsened by: Movement (ambulation)  Associated symptoms: nausea    Associated symptoms: no chest pain, no chills, no constipation, no diarrhea, no dysuria, no fever, no hematuria and no vomiting        Prior to Admission Medications   Prescriptions Last Dose Informant Patient Reported? Taking?    Coenzyme Q10 (COQ-10) 10 MG CAPS   Yes No   Sig: Take by mouth   Coenzyme Q10 (COQ-10) 10 MG CAPS   Yes No   Sig: Take 1 capsule by mouth daily   HYDROcodone-acetaminophen (NORCO) 5-325 mg per tablet   Yes Yes   Sig: Take by mouth   Omeprazole 20 MG TBEC   Yes Yes   Sig: Take by mouth   albuterol (PROAIR HFA) 90 mcg/act inhaler   Yes No   Sig: Inhale   diclofenac potassium (CATAFLAM) 50 mg tablet   Yes Yes   Sig: Take by mouth losartan-hydrochlorothiazide (HYZAAR) 50-12 5 mg per tablet   Yes Yes   Sig: Take by mouth   simvastatin (ZOCOR) 40 mg tablet   Yes Yes   Sig: Take by mouth   tamsulosin (FLOMAX) 0 4 mg   Yes Yes   Sig: Take by mouth   zolpidem (AMBIEN) 10 mg tablet   Yes No   Sig: Take by mouth      Facility-Administered Medications: None       Past Medical History:   Diagnosis Date    Chronic pain     back    Kidney stone        Past Surgical History:   Procedure Laterality Date    BACK SURGERY      CARPAL TUNNEL RELEASE      HERNIA REPAIR      KNEE SURGERY Bilateral     SHOULDER SURGERY         History reviewed  No pertinent family history  I have reviewed and agree with the history as documented  Social History   Substance Use Topics    Smoking status: Never Smoker    Smokeless tobacco: Never Used    Alcohol use Yes        Review of Systems   Constitutional: Negative for chills and fever  HENT: Negative for facial swelling and trouble swallowing  Cardiovascular: Negative for chest pain  Gastrointestinal: Positive for abdominal pain and nausea  Negative for blood in stool, constipation, diarrhea and vomiting  Genitourinary: Negative for dysuria and hematuria  Skin: Negative for color change and rash  Neurological: Negative for dizziness, weakness and numbness  Psychiatric/Behavioral: Negative for confusion  All other systems reviewed and are negative        Physical Exam  ED Triage Vitals   Temperature Pulse Respirations Blood Pressure SpO2   01/02/18 1204 01/02/18 1204 01/02/18 1204 01/02/18 1204 01/02/18 1204   (!) 96 7 °F (35 9 °C) 56 20 119/77 96 %      Temp src Heart Rate Source Patient Position - Orthostatic VS BP Location FiO2 (%)   -- 01/02/18 1439 01/02/18 1439 01/02/18 1439 --    Monitor Lying Left arm       Pain Score       01/02/18 1204       Worst Possible Pain           Orthostatic Vital Signs  Vitals:    01/02/18 1204 01/02/18 1439   BP: 119/77 115/59   Pulse: 56 56   Patient Position - Orthostatic VS:  Lying       Physical Exam   Constitutional: He is oriented to person, place, and time  He appears well-developed and well-nourished  He is active and cooperative  He does not appear ill  No distress  HENT:   Head: Normocephalic and atraumatic  Right Ear: Hearing normal    Left Ear: Hearing normal    Nose: Nose normal    Mouth/Throat: Oropharynx is clear and moist    Eyes: Conjunctivae are normal    Neck: Normal range of motion  Cardiovascular: Regular rhythm and normal heart sounds  Bradycardia present  No murmur heard  Pulmonary/Chest: Effort normal and breath sounds normal  He has no wheezes  He has no rhonchi  He has no rales  Abdominal: Soft  Normal appearance and bowel sounds are normal  There is tenderness in the left upper quadrant and left lower quadrant  There is no rigidity, no rebound and no guarding  Musculoskeletal: Normal range of motion  He exhibits no edema or deformity  Neurological: He is alert and oriented to person, place, and time  He has normal strength  No sensory deficit  Gait normal    Skin: Skin is warm and dry  No rash noted  He is not diaphoretic  No pallor  Psychiatric: He has a normal mood and affect  His speech is normal    Nursing note and vitals reviewed        ED Medications  Medications   levofloxacin (LEVAQUIN) IVPB (premix) 500 mg (not administered)   ketorolac (TORADOL) injection 15 mg (not administered)   ketorolac (TORADOL) injection 15 mg (15 mg Intravenous Given 1/2/18 1509)   ondansetron (ZOFRAN) injection 4 mg (4 mg Intravenous Given 1/2/18 1509)   iohexol (OMNIPAQUE) 350 MG/ML injection (MULTI-DOSE) 100 mL (100 mL Intravenous Given 1/2/18 1551)       Diagnostic Studies  Results Reviewed     Procedure Component Value Units Date/Time    Blood culture #1 [[id number]]     Lab Status:  No result Specimen:  Blood     Blood culture #2 [[id number]]     Lab Status:  No result Specimen:  Blood     Urine Microscopic [[id number]]  (Abnormal) Collected:  01/02/18 1532    Lab Status:  Final result Specimen:  Urine from Urine, Clean Catch Updated:  01/02/18 1603     RBC, UA 4-10 (A) /hpf      WBC, UA 2-4 (A) /hpf      Epithelial Cells Occasional /hpf      Bacteria, UA Occasional /hpf      OTHER OBSERVATIONS Yeast Cells Present  Renal Epithelial Cells Present    UA w Reflex to Microscopic w Reflex to Culture [[id number]]  (Abnormal) Collected:  01/02/18 1532    Lab Status:  Final result Specimen:  Urine from Urine, Clean Catch Updated:  01/02/18 1546     Color, UA Yellow     Clarity, UA Clear     Specific [place], UA 1 020     pH, UA 6 0     Leukocytes, UA Small (A)     Nitrite, UA Negative     Protein, UA Trace (A) mg/dl      Glucose, UA Negative mg/dl      Ketones, UA Negative mg/dl      Urobilinogen, UA 0 2 E U /dl      Bilirubin, UA Negative     Blood, UA Small (A)    POCT urinalysis dipstick [[id number]]  (Normal) Resulted:  01/02/18 1516    Lab Status:  Final result Specimen:  Urine Updated:  01/02/18 1517     Color, UA yellow     Clarity, UA clear     EXT Glucose, UA negative     EXT Bilirubin, UA (Ref: Negative) negative     EXT Ketones, UA (Ref: Negative) negative     EXT Spec Grav, UA 1 015     EXT Blood, UA (Ref: Negative) Moderate     EXT pH, UA 6 0     EXT Protein, UA (Ref: Negative) 300     EXT Urobilinogen, UA (Ref: 0 2- 1 0) 0 2     EXT Leukocytes, UA (Ref: Negative) Positive     EXT Nitrite, UA (Ref: Negative) negative    Comprehensive metabolic panel [[id number]]  (Abnormal) Collected:  01/02/18 1440    Lab Status:  Final result Specimen:  Blood from Arm, Left Updated:  01/02/18 1511     Sodium 140 mmol/L      Potassium 3 3 (L) mmol/L      Chloride 101 mmol/L      CO2 29 mmol/L      Anion Gap 10 mmol/L      BUN 13 mg/dL      Creatinine 0 90 mg/dL      Glucose 151 (H) mg/dL      Calcium 9 2 mg/dL      AST 18 U/L      ALT 49 U/L      Alkaline Phosphatase 56 U/L      Total Protein 8 1 g/dL      Albumin 4 3 g/dL      Total Bilirubin 0 80 mg/dL eGFR 87 ml/min/1 73sq m     Narrative:         National Kidney Disease Education Program recommendations are as follows:  GFR calculation is accurate only with a steady state creatinine  Chronic Kidney disease less than 60 ml/min/1 73 sq  meters  Kidney failure less than 15 ml/min/1 73 sq  meters  Lipase [[id number]]  (Normal) Collected:  01/02/18 1440    Lab Status:  Final result Specimen:  Blood from Arm, Left Updated:  01/02/18 1511     Lipase 120 u/L     CBC and differential [[id number]]  (Abnormal) Collected:  01/02/18 1440    Lab Status:  Final result Specimen:  Blood from Arm, Left Updated:  01/02/18 1453     WBC 10 07 Thousand/uL      RBC 5 84 (H) Million/uL      Hemoglobin 16 7 g/dL      Hematocrit 47 5 %      MCV 81 (L) fL      MCH 28 6 pg      MCHC 35 2 g/dL      RDW 13 1 %      MPV 8 4 (L) fL      Platelets 055 Thousands/uL      Neutrophils Relative 75 %      Lymphocytes Relative 17 %      Monocytes Relative 7 %      Eosinophils Relative 1 %      Basophils Relative 0 %      Neutrophils Absolute 7 48 Thousands/µL      Lymphocytes Absolute 1 73 Thousands/µL      Monocytes Absolute 0 74 Thousand/µL      Eosinophils Absolute 0 09 Thousand/µL      Basophils Absolute 0 03 Thousands/µL                  CT abdomen pelvis with contrast   Final Result by Jah Peoples MD (01/02 1620)      There is a 7 mm calculus in the left renal collecting system with thickening and enhancement of the adjacent renal collecting system walls  Although there is no hydronephrosis on the current examination, findings suggest intermittent obstruction  Additional bilateral renal calculi as described above  Recurrent hiatal hernia  Hepatic steatosis  Marked prostatomegaly           Workstation performed: [id number]                    Procedures  Procedures       Phone Contacts  ED Phone Contact    ED Course  ED Course as of Jan 02 1716   Tue Jan 02, 2018 1652 SPoke with Dr Valentino Cecil, since patient unable to control pain at home, will admit for pain control and surgery  Will consult Dr Herve Alcantara for admission  MDM  Number of Diagnoses or Management Options  Intractable pain: established and worsening  Kidney stone on left side: established and worsening  Diagnosis management comments: Patient with left sided pain, worsening over the past month, will order CT scan to r/o abdominal abnormality  Patient still uncomfortable after toradol, will admit for pain control and surgery  Amount and/or Complexity of Data Reviewed  Clinical lab tests: ordered and reviewed  Tests in the radiology section of CPT®: ordered and reviewed    Patient Progress  Patient progress: stable    CritCare Time    Disposition  Final diagnoses:   Kidney stone on left side   Intractable pain     Time reflects when diagnosis was documented in both MDM as applicable and the Disposition within this note     Time User Action Codes Description Comment    1/2/2018  5:13 PM Cuauhtemoc iBlly Add [N20 0] Kidney stone on left side     1/2/2018  5:13 PM Cuauhtemoc Billy Add [R52] Intractable pain       ED Disposition     ED Disposition Condition Comment    Admit  Case was discussed with Dr Nirali Palmer and the patient's admission status was agreed to be Admission Status: inpatient status to the service of Dr Herve Alcantara   Follow-up Information    None       Patient's Medications   Discharge Prescriptions    No medications on file     No discharge procedures on file      ED Provider  Electronically Signed by           Kenneth Ivory PA-C  01/02/18 0678

## 2018-01-02 NOTE — H&P
H&P Exam - Herrera Hernandezr 69 y o  male MRN: [id number]    Unit/Bed#: [address] [place] [address] Encounter: [id number]      Assessment/Plan   1  Left ureterolithiasis with intractable pain- admit =iv fluids and pain meds- will give iv levaquin and check urine cx  2  chornic back pain- recent injection with pain management - dr Sanjay Brian  3  Gerd/ large hiatal hernia- had prior nissen fundiplication  4  Mild hypokalemia- will add in iv fluids- repat in am      OBSERVATION  <2 Midnights    History of Present Illness     HPI:  Herrera Hernandezr is a 69 y o  male who presents with intractable back pain and lateral anterior abdominal wall pain which has worsened in recent days  He saw Dr Pilo Waldron 12/4 after undergoing evaluation while in [place] with severe pain at due to renal lithiasis at the  End of October  He also has bph and was started on tamsulosin      Review of Systems   Constitutional: Positive for fatigue  Negative for fever  HENT: Negative  Eyes: Negative  Respiratory: Negative  Cardiovascular: Negative  Gastrointestinal: Positive for abdominal pain and nausea  Had recent egd with [facility]- has recurrent hiatal hernia- prior nissen fundiplication years ago  Has appt to see Dr Meagan Avery this month to evaluate   Genitourinary: Negative for dysuria, frequency, hematuria and penile swelling  Musculoskeletal: Positive for back pain  Neurological: Negative  Psychiatric/Behavioral: Negative  All other systems reviewed and are negative        Historical Information   Past Medical History:   Diagnosis Date    Chronic pain     back    Kidney stone      Past Surgical History:   Procedure Laterality Date    BACK SURGERY      CARPAL TUNNEL RELEASE      HERNIA REPAIR      KNEE SURGERY Bilateral     SHOULDER SURGERY       Social History   History   Alcohol Use    Yes     History   Drug Use No     History   Smoking Status    Never Smoker   Smokeless Tobacco    Never Used     Family History: History reviewed  No pertinent family history  Prescriptions Prior to Admission   Medication    diclofenac potassium (CATAFLAM) 50 mg tablet    HYDROcodone-acetaminophen (NORCO) 5-325 mg per tablet    losartan-hydrochlorothiazide (HYZAAR) 50-12 5 mg per tablet    Omeprazole 20 MG TBEC    simvastatin (ZOCOR) 40 mg tablet    tamsulosin (FLOMAX) 0 4 mg    albuterol (PROAIR HFA) 90 mcg/act inhaler    Coenzyme Q10 (COQ-10) 10 MG CAPS    Coenzyme Q10 (COQ-10) 10 MG CAPS    zolpidem (AMBIEN) 10 mg tablet       Meds/Allergies   Prescriptions Prior to Admission   Medication Sig Dispense Refill Last Dose    diclofenac potassium (CATAFLAM) 50 mg tablet Take by mouth       HYDROcodone-acetaminophen (NORCO) 5-325 mg per tablet Take by mouth       losartan-hydrochlorothiazide (HYZAAR) 50-12 5 mg per tablet Take by mouth       Omeprazole 20 MG TBEC Take by mouth       simvastatin (ZOCOR) 40 mg tablet Take by mouth       tamsulosin (FLOMAX) 0 4 mg Take by mouth       albuterol (PROAIR HFA) 90 mcg/act inhaler Inhale       Coenzyme Q10 (COQ-10) 10 MG CAPS Take by mouth       Coenzyme Q10 (COQ-10) 10 MG CAPS Take 1 capsule by mouth daily       zolpidem (AMBIEN) 10 mg tablet Take by mouth        Allergies   Allergen Reactions    Lisinopril      Other reaction(s): Cough    Meperidine GI Intolerance       Objective   Vitals: Blood pressure 111/72, pulse (!) 51, temperature 99 1 °F (37 3 °C), temperature source Oral, resp  rate 16, height 5' 9" (1 753 m), weight 90 1 kg (198 lb 10 2 oz), SpO2 95 %  No intake or output data in the 24 hours ending 01/02/18 1839    Invasive Devices          No matching active lines, drains, or airways          Physical Exam   Constitutional: He is oriented to person, place, and time  He appears distressed  HENT:   Head: Normocephalic and atraumatic  Eyes: Right eye exhibits no discharge  Left eye exhibits no discharge  No scleral icterus  Neck: No JVD present  Cardiovascular: Normal rate and regular rhythm  Exam reveals no friction rub  No murmur heard  Pulmonary/Chest: Effort normal and breath sounds normal  No respiratory distress  He has no wheezes  Abdominal: Soft  There is tenderness  luq and left anterior abdominal tenderness noted   Musculoskeletal: He exhibits no edema or tenderness  Lymphadenopathy:     He has no cervical adenopathy  Neurological: He is alert and oriented to person, place, and time  Skin: Skin is warm and dry  He is not diaphoretic  No erythema  Psychiatric: He has a normal mood and affect  His behavior is normal  Judgment and thought content normal    Nursing note and vitals reviewed  Study Result     CT ABDOMEN AND PELVIS WITH IV CONTRAST     INDICATION:  Abdominal pain  Left upper and left lower quadrant abdominal pain      COMPARISON: None      TECHNIQUE:  CT examination of the abdomen and pelvis was performed  Reformatted images were created in axial, sagittal, and coronal planes        Radiation dose length product (DLP) for this visit:  411 72 mGy-cm   This examination, like all CT scans performed in the [facility], was performed utilizing techniques to minimize radiation dose exposure, including the use of iterative   reconstruction and automated exposure control      IV Contrast:  100 mL of iohexol (OMNIPAQUE)           Enteric Contrast:  Enteric contrast was not administered      FINDINGS:     ABDOMEN     LOWER CHEST:  There is a recurrent hiatal hernia  Lung bases are clear      LIVER/BILIARY TREE:  Liver is diffusely decreased in density consistent with fatty change  No CT evidence of suspicious hepatic mass  Normal hepatic contours  No biliary dilatation      GALLBLADDER:  No calcified gallstones   No pericholecystic inflammatory change      SPLEEN:  Unremarkable      PANCREAS:  Unremarkable      ADRENAL GLANDS:  Unremarkable      KIDNEYS/URETERS:  Subcentimeter fat density cortical lesion in the posterior lower pole 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  Small exophytic cyst at the medial lower pole the left kidney  No suspicious solid renal mass        There are bilateral renal calculi  On the right, largest calculi measure 8 mm at the upper pole and 6 mm at the lower pole  In the left kidney, there is a 7 mm calculus in the central collecting system which demonstrates associated thickening and   enhancement of the adjacent renal collecting system walls  Smaller renal calculi are noted bilaterally  There is no hydronephrosis      STOMACH AND BOWEL:  Unremarkable      APPENDIX:  No findings to suggest appendicitis      ABDOMINOPELVIC CAVITY:  No ascites or free intraperitoneal air  No lymphadenopathy      VESSELS:  Unremarkable for patient's age      PELVIS     REPRODUCTIVE ORGANS:  Prostate is heterogeneous and significantly enlarged measuring 6 2 x 6 0 x 5 5 cm and demonstrating invagination into the urinary bladder base      URINARY BLADDER:  Diffusely thick-walled possibly related to chronic bladder obstruction in this patient with marked prostatomegaly      ABDOMINAL WALL/INGUINAL REGIONS:  There is a small fat-containing umbilical hernia      OSSEOUS STRUCTURES:  No acute fracture or destructive osseous lesion  Lumbar postsurgical changes at L5-S1      IMPRESSION:     There is a 7 mm calculus in the left renal collecting system with thickening and enhancement of the adjacent renal collecting system walls  Although there is no hydronephrosis on the current examination, findings suggest intermittent obstruction      Additional bilateral renal calculi as described above      Recurrent hiatal hernia  Hepatic steatosis    Marked prostatomegaly           Admission on 01/02/2018   Component Date Value    WBC 01/02/2018 10 07     RBC 01/02/2018 5 84*    Hemoglobin 01/02/2018 16 7     Hematocrit 01/02/2018 47 5     MCV 01/02/2018 81*    MCH 01/02/2018 28 6     MCHC 01/02/2018 35 2     RDW 01/02/2018 13 1     MPV 01/02/2018 8 4*    Platelets 27/11/2748 257     Neutrophils Relative 01/02/2018 75     Lymphocytes Relative 01/02/2018 17     Monocytes Relative 01/02/2018 7     Eosinophils Relative 01/02/2018 1     Basophils Relative 01/02/2018 0     Neutrophils Absolute 01/02/2018 7 48     Lymphocytes Absolute 01/02/2018 1 73     Monocytes Absolute 01/02/2018 0 74     Eosinophils Absolute 01/02/2018 0 09     Basophils Absolute 01/02/2018 0 03     Sodium 01/02/2018 140     Potassium 01/02/2018 3 3*    Chloride 01/02/2018 101     CO2 01/02/2018 29     Anion Gap 01/02/2018 10     BUN 01/02/2018 13     Creatinine 01/02/2018 0 90     Glucose 01/02/2018 151*    Calcium 01/02/2018 9 2     AST 01/02/2018 18     ALT 01/02/2018 49     Alkaline Phosphatase 01/02/2018 56     Total Protein 01/02/2018 8 1     Albumin 01/02/2018 4 3     Total Bilirubin 01/02/2018 0 80     eGFR 01/02/2018 87     Lipase 01/02/2018 120     Color, UA 01/02/2018 yellow     Clarity, UA 01/02/2018 clear     EXT Glucose, UA 01/02/2018 negative     EXT Bilirubin, UA (Ref: * 01/02/2018 negative     EXT Ketones, UA (Ref: Ne* 01/02/2018 negative     EXT Spec Grav, UA 01/02/2018 1 015     EXT Blood, UA (Ref: Nega* 01/02/2018 Moderate     EXT pH, UA 01/02/2018 6 0     EXT Protein, UA (Ref: Ne* 01/02/2018 300     EXT Urobilinogen, UA (Re* 01/02/2018 0 2     EXT Leukocytes, UA (Ref:* 01/02/2018 Positive     EXT Nitrite, UA (Ref: Ne* 01/02/2018 negative     Color, UA 01/02/2018 Yellow     Clarity, UA 01/02/2018 Clear     Specific Gravity, UA 01/02/2018 1 020     pH, UA 01/02/2018 6 0     Leukocytes, UA 01/02/2018 Small*    Nitrite, UA 01/02/2018 Negative     Protein, UA 01/02/2018 Trace*    Glucose, UA 01/02/2018 Negative     Ketones, UA 01/02/2018 Negative     Urobilinogen, UA 01/02/2018 0 2     Bilirubin, UA 01/02/2018 Negative     Blood, UA 01/02/2018 Small*    RBC, UA 01/02/2018 4-10*    WBC, UA 01/02/2018 2-4*    Epithelial Cells 01/02/2018 Occasional     Bacteria, UA 01/02/2018 Occasional     OTHER OBSERVATIONS 01/02/2018 Yeast Cells Present  Renal Epithelial Cells Present      Lab Results: I have personally reviewed pertinent reports  Imaging: I have personally reviewed pertinent reports  EKG, Pathology, and Other Studies: I have personally reviewed pertinent reports  Code Status: Level 1 - Full Code  Advance Directive and Living Will:      Power of :    POLST:      Counseling / Coordination of Care  Total floor / unit time spent today 30 minutes  Greater than 50% of total time was spent with the patient and / or family counseling and / or coordination of care    A description of the counseling / coordination of care: review of records in [facility] system

## 2018-01-03 ENCOUNTER — APPOINTMENT (INPATIENT)
Dept: RADIOLOGY | Facility: HOSPITAL | Age: 68
DRG: 666 | End: 2018-01-03
Attending: UROLOGY
Payer: MEDICARE

## 2018-01-03 ENCOUNTER — ANESTHESIA EVENT (INPATIENT)
Dept: PERIOP | Facility: HOSPITAL | Age: 68
DRG: 666 | End: 2018-01-03
Payer: MEDICARE

## 2018-01-03 ENCOUNTER — ANESTHESIA (INPATIENT)
Dept: PERIOP | Facility: HOSPITAL | Age: 68
DRG: 666 | End: 2018-01-03
Payer: MEDICARE

## 2018-01-03 LAB
ANION GAP SERPL CALCULATED.3IONS-SCNC: 8 MMOL/L (ref 4–13)
BACTERIA UR CULT: NORMAL
BUN SERPL-MCNC: 17 MG/DL (ref 5–25)
CALCIUM SERPL-MCNC: 7.7 MG/DL (ref 8.3–10.1)
CHLORIDE SERPL-SCNC: 105 MMOL/L (ref 100–108)
CO2 SERPL-SCNC: 26 MMOL/L (ref 21–32)
CREAT SERPL-MCNC: 0.97 MG/DL (ref 0.6–1.3)
ERYTHROCYTE [DISTWIDTH] IN BLOOD BY AUTOMATED COUNT: 12.9 % (ref 11.6–15.1)
GFR SERPL CREATININE-BSD FRML MDRD: 79 ML/MIN/1.73SQ M
GLUCOSE SERPL-MCNC: 86 MG/DL (ref 65–140)
HCT VFR BLD AUTO: 42.3 % (ref 36.5–49.3)
HGB BLD-MCNC: 14.4 G/DL (ref 12–17)
MCH RBC QN AUTO: 28.2 PG (ref 26.8–34.3)
MCHC RBC AUTO-ENTMCNC: 34 G/DL (ref 31.4–37.4)
MCV RBC AUTO: 83 FL (ref 82–98)
PLATELET # BLD AUTO: 217 THOUSANDS/UL (ref 149–390)
PMV BLD AUTO: 8.7 FL (ref 8.9–12.7)
POTASSIUM SERPL-SCNC: 4.1 MMOL/L (ref 3.5–5.3)
RBC # BLD AUTO: 5.1 MILLION/UL (ref 3.88–5.62)
SODIUM SERPL-SCNC: 139 MMOL/L (ref 136–145)
WBC # BLD AUTO: 7.82 THOUSAND/UL (ref 4.31–10.16)

## 2018-01-03 PROCEDURE — 80048 BASIC METABOLIC PNL TOTAL CA: CPT | Performed by: INTERNAL MEDICINE

## 2018-01-03 PROCEDURE — 0T5C8ZZ DESTRUCTION OF BLADDER NECK, VIA NATURAL OR ARTIFICIAL OPENING ENDOSCOPIC: ICD-10-PCS | Performed by: INTERNAL MEDICINE

## 2018-01-03 PROCEDURE — 0T778DZ DILATION OF LEFT URETER WITH INTRALUMINAL DEVICE, VIA NATURAL OR ARTIFICIAL OPENING ENDOSCOPIC: ICD-10-PCS | Performed by: INTERNAL MEDICINE

## 2018-01-03 PROCEDURE — BT1F1ZZ FLUOROSCOPY OF LEFT KIDNEY, URETER AND BLADDER USING LOW OSMOLAR CONTRAST: ICD-10-PCS | Performed by: INTERNAL MEDICINE

## 2018-01-03 PROCEDURE — 85027 COMPLETE CBC AUTOMATED: CPT | Performed by: INTERNAL MEDICINE

## 2018-01-03 PROCEDURE — C1769 GUIDE WIRE: HCPCS | Performed by: UROLOGY

## 2018-01-03 PROCEDURE — 0T9B70Z DRAINAGE OF BLADDER WITH DRAINAGE DEVICE, VIA NATURAL OR ARTIFICIAL OPENING: ICD-10-PCS | Performed by: INTERNAL MEDICINE

## 2018-01-03 PROCEDURE — 74450 X-RAY URETHRA/BLADDER: CPT

## 2018-01-03 PROCEDURE — 0V508ZZ DESTRUCTION OF PROSTATE, VIA NATURAL OR ARTIFICIAL OPENING ENDOSCOPIC: ICD-10-PCS | Performed by: INTERNAL MEDICINE

## 2018-01-03 PROCEDURE — C2617 STENT, NON-COR, TEM W/O DEL: HCPCS | Performed by: UROLOGY

## 2018-01-03 DEVICE — INLAY OPTIMA URETERAL STENT W/O GUIDEWIRE
Type: IMPLANTABLE DEVICE | Site: URETER | Status: FUNCTIONAL
Brand: BARD® INLAY OPTIMA® URETERAL STENT

## 2018-01-03 RX ORDER — SODIUM CHLORIDE, SODIUM LACTATE, POTASSIUM CHLORIDE, CALCIUM CHLORIDE 600; 310; 30; 20 MG/100ML; MG/100ML; MG/100ML; MG/100ML
125 INJECTION, SOLUTION INTRAVENOUS CONTINUOUS
Status: ACTIVE | OUTPATIENT
Start: 2018-01-03 | End: 2018-01-03

## 2018-01-03 RX ORDER — MAGNESIUM HYDROXIDE 1200 MG/15ML
LIQUID ORAL AS NEEDED
Status: DISCONTINUED | OUTPATIENT
Start: 2018-01-03 | End: 2018-01-03 | Stop reason: HOSPADM

## 2018-01-03 RX ORDER — FENTANYL CITRATE/PF 50 MCG/ML
50 SYRINGE (ML) INJECTION
Status: DISCONTINUED | OUTPATIENT
Start: 2018-01-03 | End: 2018-01-03 | Stop reason: HOSPADM

## 2018-01-03 RX ORDER — METOCLOPRAMIDE HYDROCHLORIDE 5 MG/ML
10 INJECTION INTRAMUSCULAR; INTRAVENOUS ONCE AS NEEDED
Status: DISCONTINUED | OUTPATIENT
Start: 2018-01-03 | End: 2018-01-03 | Stop reason: HOSPADM

## 2018-01-03 RX ORDER — PROPOFOL 10 MG/ML
INJECTION, EMULSION INTRAVENOUS AS NEEDED
Status: DISCONTINUED | OUTPATIENT
Start: 2018-01-03 | End: 2018-01-03 | Stop reason: SURG

## 2018-01-03 RX ORDER — SODIUM CHLORIDE, SODIUM LACTATE, POTASSIUM CHLORIDE, CALCIUM CHLORIDE 600; 310; 30; 20 MG/100ML; MG/100ML; MG/100ML; MG/100ML
INJECTION, SOLUTION INTRAVENOUS CONTINUOUS PRN
Status: DISCONTINUED | OUTPATIENT
Start: 2018-01-03 | End: 2018-01-03

## 2018-01-03 RX ORDER — KETOROLAC TROMETHAMINE 30 MG/ML
INJECTION, SOLUTION INTRAMUSCULAR; INTRAVENOUS AS NEEDED
Status: DISCONTINUED | OUTPATIENT
Start: 2018-01-03 | End: 2018-01-03 | Stop reason: SURG

## 2018-01-03 RX ORDER — GLYCOPYRROLATE 0.2 MG/ML
INJECTION INTRAMUSCULAR; INTRAVENOUS AS NEEDED
Status: DISCONTINUED | OUTPATIENT
Start: 2018-01-03 | End: 2018-01-03 | Stop reason: SURG

## 2018-01-03 RX ORDER — MIDAZOLAM HYDROCHLORIDE 1 MG/ML
INJECTION INTRAMUSCULAR; INTRAVENOUS AS NEEDED
Status: DISCONTINUED | OUTPATIENT
Start: 2018-01-03 | End: 2018-01-03 | Stop reason: SURG

## 2018-01-03 RX ORDER — FENTANYL CITRATE 50 UG/ML
INJECTION, SOLUTION INTRAMUSCULAR; INTRAVENOUS AS NEEDED
Status: DISCONTINUED | OUTPATIENT
Start: 2018-01-03 | End: 2018-01-03 | Stop reason: SURG

## 2018-01-03 RX ORDER — SODIUM CHLORIDE 9 MG/ML
100 INJECTION, SOLUTION INTRAVENOUS CONTINUOUS
Status: DISCONTINUED | OUTPATIENT
Start: 2018-01-03 | End: 2018-01-04

## 2018-01-03 RX ORDER — GLYCINE 1.5 G/100ML
SOLUTION IRRIGATION AS NEEDED
Status: DISCONTINUED | OUTPATIENT
Start: 2018-01-03 | End: 2018-01-03 | Stop reason: HOSPADM

## 2018-01-03 RX ADMIN — HYDROCODONE BITARTRATE AND ACETAMINOPHEN 1 TABLET: 5; 325 TABLET ORAL at 17:45

## 2018-01-03 RX ADMIN — MORPHINE SULFATE 2 MG: 2 INJECTION, SOLUTION INTRAMUSCULAR; INTRAVENOUS at 03:37

## 2018-01-03 RX ADMIN — KETOROLAC TROMETHAMINE 15 MG: 30 INJECTION, SOLUTION INTRAMUSCULAR at 16:44

## 2018-01-03 RX ADMIN — FENTANYL CITRATE 50 MCG: 50 INJECTION, SOLUTION INTRAMUSCULAR; INTRAVENOUS at 16:40

## 2018-01-03 RX ADMIN — FENTANYL CITRATE 50 MCG: 50 INJECTION INTRAMUSCULAR; INTRAVENOUS at 17:06

## 2018-01-03 RX ADMIN — HYDROCODONE BITARTRATE AND ACETAMINOPHEN 1 TABLET: 5; 325 TABLET ORAL at 21:44

## 2018-01-03 RX ADMIN — SODIUM CHLORIDE AND POTASSIUM CHLORIDE 125 ML/HR: .9; .15 SOLUTION INTRAVENOUS at 03:40

## 2018-01-03 RX ADMIN — DOCUSATE SODIUM 100 MG: 100 CAPSULE, LIQUID FILLED ORAL at 18:41

## 2018-01-03 RX ADMIN — CEFAZOLIN SODIUM 2000 MG: 2 SOLUTION INTRAVENOUS at 16:03

## 2018-01-03 RX ADMIN — SODIUM CHLORIDE AND POTASSIUM CHLORIDE 125 ML/HR: .9; .15 SOLUTION INTRAVENOUS at 13:00

## 2018-01-03 RX ADMIN — GLYCOPYRROLATE 0.2 MG: 0.2 INJECTION, SOLUTION INTRAMUSCULAR; INTRAVENOUS at 15:53

## 2018-01-03 RX ADMIN — TAMSULOSIN HYDROCHLORIDE 0.4 MG: 0.4 CAPSULE ORAL at 18:41

## 2018-01-03 RX ADMIN — MORPHINE SULFATE 2 MG: 2 INJECTION, SOLUTION INTRAMUSCULAR; INTRAVENOUS at 18:41

## 2018-01-03 RX ADMIN — PANTOPRAZOLE SODIUM 40 MG: 40 TABLET, DELAYED RELEASE ORAL at 18:41

## 2018-01-03 RX ADMIN — SODIUM CHLORIDE, SODIUM LACTATE, POTASSIUM CHLORIDE, AND CALCIUM CHLORIDE: .6; .31; .03; .02 INJECTION, SOLUTION INTRAVENOUS at 15:42

## 2018-01-03 RX ADMIN — ONDANSETRON 4 MG: 2 INJECTION INTRAMUSCULAR; INTRAVENOUS at 16:43

## 2018-01-03 RX ADMIN — PROPOFOL 150 MG: 10 INJECTION, EMULSION INTRAVENOUS at 15:58

## 2018-01-03 RX ADMIN — MORPHINE SULFATE 2 MG: 2 INJECTION, SOLUTION INTRAMUSCULAR; INTRAVENOUS at 21:43

## 2018-01-03 RX ADMIN — SODIUM CHLORIDE 125 ML/HR: 0.9 INJECTION, SOLUTION INTRAVENOUS at 18:42

## 2018-01-03 RX ADMIN — MIDAZOLAM HYDROCHLORIDE 2 MG: 1 INJECTION, SOLUTION INTRAMUSCULAR; INTRAVENOUS at 15:53

## 2018-01-03 RX ADMIN — LOSARTAN POTASSIUM: 50 TABLET ORAL at 18:30

## 2018-01-03 RX ADMIN — FENTANYL CITRATE 50 MCG: 50 INJECTION, SOLUTION INTRAMUSCULAR; INTRAVENOUS at 15:53

## 2018-01-03 RX ADMIN — FENTANYL CITRATE 50 MCG: 50 INJECTION INTRAMUSCULAR; INTRAVENOUS at 17:25

## 2018-01-03 NOTE — SOCIAL WORK
Met with patient  Explained role of care management  Patient lives in a two story home with spouse  No steps to enter  He is independent adl's and ambulation, drives  DME - denies  Past services - denies  He plans on returning home at discharge and does not anticipate any discharge needs  States spouse is a nurse  Will follow

## 2018-01-03 NOTE — OP NOTE
OPERATIVE REPORT  PATIENT NAME: Uyen Mancilla    :  1950  MRN: [id number]  Pt Location:  OR ROOM 01    SURGERY DATE: 1/3/2018    Surgeon(s) and Role:     * Analy Napier MD - Primary    Preop Diagnosis:  Kidney stone on left side [N20 0]    Post-Op Diagnosis Codes:     * Kidney stone on left side [N20 0]    Procedure(s) (LRB):  CYSTOSCOPY , RETROGRADE PYELOGRAM AND INSERTION STENT URETERAL, FULGERATION (Left)    Specimen(s):  * No specimens in log *    Estimated Blood Loss:   Minimal    Drains:  Left ureteral stent, 20 Ukrainian Esteban       Anesthesia Type:   General    Operative Indications:  Kidney stone on left side [N20 0]      Operative Findings:  Massively enlarged prostate, friable  J hooking ureter, Left renal calculus  Complications:   None    Procedure and Technique:  The patient was identified, brought to the operating room, and placed on the table in supine position  After induction of general anesthesia, the patient was placed in dorsal lithotomy position and prepped and draped in the usual sterile fashion  A complete formal timeout was performed  The 25 Ukrainian rigid cystoscope was placed per urethra and cystoscopy was performed  A massive prostate was visualized  Great care was taken to passed the scope atraumatically into the bladder, however despite this, the bladder neck began to bleed relatively significantly  Visibility was difficult throughout  I was able to eventually identify the left ureter  Passage of the guidewire was difficult due to J hooking ureter  With the help of a 5 Ukrainian catheter, was able to pass the wire into the renal pelvis passed stone  I did perform a retrograde pyelogram revealing a calculus and hydronephrosis  The wire was replaced the renal pelvis    Due to the J hooking ureter and the difficulty with the procedure thus far, I decided it was reasonable to simply place a stent at this time and plan for a second-stage procedure, either ESWL or ureteroscopy  A 26 cm x 6 Tajik double-J stent was placed without string  Bugbee cautery was used to cauterize the bladder neck area and the roof of the prostate  Regardless there was still some oozing noted  A 20 Tajik Esteban catheter was placed due to the persistent oozing from the prostate  The patient tolerated the procedure well and was transferred to the recovery room awake alert and in stable condition         I was present for the entire procedure    Patient Disposition:  PACU     SIGNATURE: Dunia Dowd MD  DATE: January 3, 2018  TIME: 5:05 PM

## 2018-01-03 NOTE — CASE MANAGEMENT
Initial Clinical Review    Admission: Date/Time/Statement: 1/2/18 @ 1714     Orders Placed This Encounter   Procedures    Inpatient Admission (expected length of stay for this patient is greater than two midnights)     Standing Status:   Standing     Number of Occurrences:   1     Order Specific Question:   Admitting Physician     Answer:   Wandalee Phoenix [[id number]]     Order Specific Question:   Level of Care     Answer:   Med Surg [[id number]]     Order Specific Question:   Estimated length of stay     Answer:   More than 2 Midnights     Order Specific Question:   Certification     Answer:   I certify that inpatient services are medically necessary for this patient for a duration of greater than two midnights  See H&P and MD Progress Notes for additional information about the patient's course of treatment  ED: Date/Time/Mode of Arrival:   ED Arrival Information     Expected Arrival Acuity Means of Arrival Escorted By Service Admission Type    - 1/2/2018 11:46 Urgent Walk-In Spouse General Medicine Urgent    Arrival Complaint    flank pain          Chief Complaint:   Chief Complaint   Patient presents with    Abdominal Pain     Patient has been having pain in his L lower anterior rib cage since the begining of December  Pt has seen Dr Radha Schwab for same, she refered him to [facility]  Patient states there is a lump on his rib cage and has been having increased pain in the area  Pt is currently seeing Dr Marissa Pires for chronic back pain, patient has a knowm kidney stone and being treated  History of Illness: 69 y o  male who presents with intractable back pain and lateral anterior abdominal wall pain which has worsened in recent days  He saw Dr Chepe Cordero 12/4 after undergoing evaluation while in [place] with severe pain at due to renal lithiasis at the  End of October   He also has bph and was started on tamsulosin    ED Vital Signs:   ED Triage Vitals   Temperature Pulse Respirations Blood Pressure SpO2   01/02/18 1204 01/02/18 1204 01/02/18 1204 01/02/18 1204 01/02/18 1204   (!) 96 7 °F (35 9 °C) 56 20 119/77 96 %      Temp Source Heart Rate Source Patient Position - Orthostatic VS BP Location FiO2 (%)   01/02/18 1826 01/02/18 1439 01/02/18 1439 01/02/18 1439 --   Oral Monitor Lying Left arm       Pain Score       01/02/18 1204       Worst Possible Pain        Wt Readings from Last 1 Encounters:   01/03/18 90 7 kg (199 lb 15 3 oz)       Vital Signs (abnormal):  Temperature 96 7  Pulse 50 - 51  Abnormal Labs/Diagnostic Test Results:   UA small leukocytes  Trace protein  Small blood  K 3 3  Glucose 151  Wbc 10 07    Ct abdomen - There is a 7 mm calculus in the left renal collecting system with thickening and enhancement of the adjacent renal collecting system walls   Although there is no hydronephrosis on the current examination, findings suggest intermittent obstruction  Additional bilateral renal calculi as described above  Recurrent hiatal hernia   Hepatic steatosis   Marked prostatomegaly  Labs 1/3 am calcium 7 7  ED Treatment: blood cultures  Urine culture  Medication Administration from 01/02/2018 1146 to 01/02/2018 1824       Date/Time Order Dose Route Action Action by Comments     01/02/2018 1509 ketorolac (TORADOL) injection 15 mg 15 mg Intravenous Given Alban Bernheim, RN      01/02/2018 1509 ondansetron (ZOFRAN) injection 4 mg 4 mg Intravenous Given Alban Bernheim, RN      01/02/2018 1551 iohexol (OMNIPAQUE) 350 MG/ML injection (MULTI-DOSE) 100 mL 100 mL Intravenous Given Sandy Perera [facility] er     01/02/2018 1815 levofloxacin (LEVAQUIN) IVPB (premix) 500 mg 0 mg Intravenous Stopped Lars Man RN      01/02/2018 1814 levofloxacin (LEVAQUIN) IVPB (premix) 500 mg 500 mg Intravenous [address] Yulisa Lopez RN      01/02/2018 1814 ketorolac (TORADOL) injection 15 mg 15 mg Intravenous Given Yulisa Lopez RN           Past Medical/Surgical History:    Active Ambulatory Problems Diagnosis Date Noted    No Active Ambulatory Problems     Resolved Ambulatory Problems     Diagnosis Date Noted    No Resolved Ambulatory Problems     Past Medical History:   Diagnosis Date    Chronic pain     Kidney stone        Admitting Diagnosis: Flank pain [R10 9]  Kidney stone on left side [N20 0]  Intractable pain [R52]    Age/Sex: 69 y o  male    Assessment/Plan: Left ureterolithiasis with intractable pain- admit =iv fluids and pain meds- will give iv levaquin and check urine cx  2  chornic back pain- recent injection with pain management - dr Leonie Kimble  3  Gerd/ large hiatal hernia- had prior nissen fundiplication  4  Mild hypokalemia- will add in iv fluids- repat in am       Admission Orders: 1/2/2018 1715 INPATIENT   Scheduled Meds:   atorvastatin 40 mg Oral Daily With Dinner   docusate sodium 100 mg Oral BID   losartan potassium-hydrochlorothiazide (HYZAAR 50/12  5) combination  Oral Daily   pantoprazole 40 mg Oral Early Morning   tamsulosin 0 4 mg Oral Daily With Dinner     Continuous Infusions:   sodium chloride 0 9 % with KCl 20 mEq/L 125 mL/hr Last Rate: 125 mL/hr (01/03/18 1300)     PRN Meds:   acetaminophen    albuterol    HYDROcodone-acetaminophen    morphine injection 2 mg iv- used x 1      ondansetron    traMADol    Zolpidem    OTHER ORDERS: scds  Npo after midnight  Consult urology    Per urology: 7 mm calculus in the left renal collecting system, no hydronephrosis but finding suggests intermittent obstruction  Will discuss with attending possible surgical intervention today  Urine cultures pending  Tentatively scheduled for cystoscopy, ureteroscopy with lithotripsy, holmium laser, retrograde pyelogram and insertion ureteral stent

## 2018-01-03 NOTE — ANESTHESIA PREPROCEDURE EVALUATION
Review of Systems/Medical History  Patient summary reviewed  Chart reviewed      Cardiovascular  Negative cardio ROS Exercise tolerance: good,     Pulmonary  Negative pulmonary ROS ,        GI/Hepatic    GERD well controlled,  Hiatal hernia,        Kidney stones,        Endo/Other  Negative endo/other ROS      GYN       Hematology  Negative hematology ROS      Musculoskeletal  Back pain , chronic back pain and lumbar pain,        Neurology  Negative neurology ROS      Psychology           Physical Exam    Airway    Mallampati score: I  TM Distance: >3 FB  Neck ROM: full     Dental   No notable dental hx     Cardiovascular  Comment: Negative ROS, Cardiovascular exam normal    Pulmonary  Pulmonary exam normal     Other Findings        Anesthesia Plan  ASA Score- 2     Anesthesia Type- general with ASA Monitors  Additional Monitors:   Airway Plan: LMA  Plan Factors-    Induction- intravenous  Postoperative Plan- Plan for postoperative opioid use  Informed Consent- Anesthetic plan and risks discussed with patient

## 2018-01-03 NOTE — PROGRESS NOTES
Progress Note - Yonis Putnam 69 y o  male MRN: [id number]    Unit/Bed#: [address] [place] [address] Encounter: [id number]      Assessment:  Patient Active Problem List    Diagnosis Date Noted    Ureterolithiasis 01/02/2018    Left upper quadrant pain 01/02/2018    Lumbar disc disease 01/02/2018    Gastroesophageal reflux disease without esophagitis 01/02/2018    Large hiatal hernia 01/02/2018    Hypokalemia 01/02/2018    Kidney stone on left side 01/02/2018       Plan:  Patient still has significant hematuria postprocedure  Will monitor his hemoglobin with serial blood work  Will continue Esteban catheter  Will continue Flomax    Subjective:     Patient complains of penile pain after procedures that he underwent today  Denies nausea, chest pain, abdominal pain, shortness of breath  All other ROS are negative  Objective:     Vitals: Blood pressure 144/73, pulse 65, temperature 97 6 °F (36 4 °C), temperature source Oral, resp  rate 18, height 5' 9" (1 753 m), weight 90 7 kg (199 lb 15 3 oz), SpO2 94 %  ,Body mass index is 29 53 kg/m²  Intake/Output Summary (Last 24 hours) at 01/03/18 1858  Last data filed at 01/03/18 1746   Gross per 24 hour   Intake             2560 ml   Output              675 ml   Net             1885 ml       Physical Exam:    Alert and awake in no acute distress on room air  Head normocephalic, PERRLA   Oral membranes are moist,   Neck supple, no lymphadenopathy  Lungs clear to auscultation bilaterally  Heart regular rate and rythm, normal heart sounds  Abdomen soft, active bowel sounds, non-tender, non-distended  Extremities: there is no joint effusion or warmth  Skin: warm, no hives seen, no cellulitis seen there is no lower extremity edema        Lab, Imaging and other studies: I have personally reviewed pertinent reports   See below         Current Facility-Administered Medications   Medication Dose Route Frequency    acetaminophen (TYLENOL) oral solution 650 mg  650 mg Oral Q6H PRN  albuterol (PROVENTIL HFA,VENTOLIN HFA) inhaler 2 puff  2 puff Inhalation Q6H PRN    atorvastatin (LIPITOR) tablet 40 mg  40 mg Oral Daily With Dinner    docusate sodium (COLACE) capsule 100 mg  100 mg Oral BID    HYDROcodone-acetaminophen (NORCO) 5-325 mg per tablet 1 tablet  1 tablet Oral Q4H PRN    losartan potassium-hydrochlorothiazide (HYZAAR 50/12  5) combination   Oral Daily    morphine injection 2 mg  2 mg Intravenous Q3H PRN    ondansetron (ZOFRAN) injection 4 mg  4 mg Intravenous Q6H PRN    pantoprazole (PROTONIX) EC tablet 40 mg  40 mg Oral Early Morning    sodium chloride 0 9 % infusion  100 mL/hr Intravenous Continuous    tamsulosin (FLOMAX) capsule 0 4 mg  0 4 mg Oral Daily With Dinner    traMADol (ULTRAM) tablet 50 mg  50 mg Oral Q6H PRN    zolpidem (AMBIEN) tablet 5 mg  5 mg Oral HS PRN       Admission on 01/02/2018   Component Date Value    WBC 01/02/2018 10 07     RBC 01/02/2018 5 84*    Hemoglobin 01/02/2018 16 7     Hematocrit 01/02/2018 47 5     MCV 01/02/2018 81*    MCH 01/02/2018 28 6     MCHC 01/02/2018 35 2     RDW 01/02/2018 13 1     MPV 01/02/2018 8 4*    Platelets 89/75/4709 257     Neutrophils Relative 01/02/2018 75     Lymphocytes Relative 01/02/2018 17     Monocytes Relative 01/02/2018 7     Eosinophils Relative 01/02/2018 1     Basophils Relative 01/02/2018 0     Neutrophils Absolute 01/02/2018 7 48     Lymphocytes Absolute 01/02/2018 1 73     Monocytes Absolute 01/02/2018 0 74     Eosinophils Absolute 01/02/2018 0 09     Basophils Absolute 01/02/2018 0 03     Sodium 01/02/2018 140     Potassium 01/02/2018 3 3*    Chloride 01/02/2018 101     CO2 01/02/2018 29     Anion Gap 01/02/2018 10     BUN 01/02/2018 13     Creatinine 01/02/2018 0 90     Glucose 01/02/2018 151*    Calcium 01/02/2018 9 2     AST 01/02/2018 18     ALT 01/02/2018 49     Alkaline Phosphatase 01/02/2018 56     Total Protein 01/02/2018 8 1     Albumin 01/02/2018 4 3     Total Bilirubin 01/02/2018 0 80     eGFR 01/02/2018 87     Lipase 01/02/2018 120     Color, UA 01/02/2018 yellow     Clarity, UA 01/02/2018 clear     EXT Glucose, UA 01/02/2018 negative     EXT Bilirubin, UA (Ref: * 01/02/2018 negative     EXT Ketones, UA (Ref: Ne* 01/02/2018 negative     EXT Spec Grav, UA 01/02/2018 1 015     EXT Blood, UA (Ref: Nega* 01/02/2018 Moderate     EXT pH, UA 01/02/2018 6 0     EXT Protein, UA (Ref: Ne* 01/02/2018 300     EXT Urobilinogen, UA (Re* 01/02/2018 0 2     EXT Leukocytes, UA (Ref:* 01/02/2018 Positive     EXT Nitrite, UA (Ref: Ne* 01/02/2018 negative     Color, UA 01/02/2018 Yellow     Clarity, UA 01/02/2018 Clear     Specific Gravity, UA 01/02/2018 1 020     pH, UA 01/02/2018 6 0     Leukocytes, UA 01/02/2018 Small*    Nitrite, UA 01/02/2018 Negative     Protein, UA 01/02/2018 Trace*    Glucose, UA 01/02/2018 Negative     Ketones, UA 01/02/2018 Negative     Urobilinogen, UA 01/02/2018 0 2     Bilirubin, UA 01/02/2018 Negative     Blood, UA 01/02/2018 Small*    RBC, UA 01/02/2018 4-10*    WBC, UA 01/02/2018 2-4*    Epithelial Cells 01/02/2018 Occasional     Bacteria, UA 01/02/2018 Occasional     OTHER OBSERVATIONS 01/02/2018 Yeast Cells Present  Renal Epithelial Cells Present     Urine Culture 01/02/2018 No Growth <1000 cfu/mL     Sodium 01/03/2018 139     Potassium 01/03/2018 4 1     Chloride 01/03/2018 105     CO2 01/03/2018 26     Anion Gap 01/03/2018 8     BUN 01/03/2018 17     Creatinine 01/03/2018 0 97     Glucose 01/03/2018 86     Calcium 01/03/2018 7 7*    eGFR 01/03/2018 79     WBC 01/03/2018 7 82     RBC 01/03/2018 5 10     Hemoglobin 01/03/2018 14 4     Hematocrit 01/03/2018 42 3     MCV 01/03/2018 83     MCH 01/03/2018 28 2     MCHC 01/03/2018 34 0     RDW 01/03/2018 12 9     Platelets 83/96/5489 217     MPV 01/03/2018 8 7*       Prior to Admission Medications   Prescriptions Last Dose Informant Patient Reported? Taking?    Coenzyme Q10 (COQ-10) 10 MG CAPS   Yes No   Sig: Take by mouth   Coenzyme Q10 (COQ-10) 10 MG CAPS   Yes No   Sig: Take 1 capsule by mouth daily   HYDROcodone-acetaminophen (NORCO) 5-325 mg per tablet   Yes Yes   Sig: Take by mouth   Omeprazole 20 MG TBEC   Yes Yes   Sig: Take by mouth   albuterol (PROAIR HFA) 90 mcg/act inhaler   Yes No   Sig: Inhale   diclofenac potassium (CATAFLAM) 50 mg tablet   Yes Yes   Sig: Take by mouth   losartan-hydrochlorothiazide (HYZAAR) 50-12 5 mg per tablet   Yes Yes   Sig: Take by mouth   simvastatin (ZOCOR) 40 mg tablet   Yes Yes   Sig: Take by mouth   tamsulosin (FLOMAX) 0 4 mg   Yes Yes   Sig: Take by mouth   zolpidem (AMBIEN) 10 mg tablet   Yes No   Sig: Take by mouth      Facility-Administered Medications: None         Kolby Pantoja MD

## 2018-01-03 NOTE — CONSULTS
Consultation - Urology   Lary Bryan 69 y o  male MRN: [id number]  Unit/Bed#: [address] [place] [address] Encounter: [id number]      Assessment/Plan      Assessment:  Left ureterolithiasis with intractable pain  Chronic back pain  GERD/hiatal hernia  Hypertension  Hyperlipidemia  BPH    Plan:  7 mm calculus in the left renal collecting system, no hydronephrosis but finding suggests intermittent obstruction  Will discuss with attending possible surgical intervention today  Urine cultures pending  Tentatively scheduled for cystoscopy, ureteroscopy with lithotripsy, holmium laser, retrograde pyelogram and insertion ureteral stent    History of Present Illness   Attending: Ana Duran DO  Reason for Consult / Principal Problem:  Intractable pain  HPI: Lary Bryan is a 69y o  year old male who presents with known history of ureterolithiasis complaining of left abdominal wall/flank pain  Pain has progressively worsened over the past several days  Patient denies any associated symptoms  Patient seen by Dr Wesly Mariano in early December due to previous episode of similar attack  Patient was scheduled for surgical intervention later this month  Patient has known BPH and was started on Flomax at outpatient evaluation with some improvement  Patient admits to decreased urine output  Denies hematuria, increased frequency, pain with urination, fevers, chills or shortness of breath  Inpatient consult to Urology  Consult performed by: Jennifer Willoughby  Consult ordered by: Stu Hearing          Review of Systems   Constitutional: Negative for appetite change, chills, fatigue and fever  HENT: Negative for sore throat, trouble swallowing and voice change  Eyes: Negative for photophobia and visual disturbance  Respiratory: Negative for cough, chest tightness and shortness of breath  Cardiovascular: Negative for chest pain and leg swelling  Gastrointestinal: Positive for abdominal pain   Negative for blood in stool, constipation, nausea and vomiting  Genitourinary: Positive for decreased urine volume, difficulty urinating and flank pain  Negative for frequency, hematuria and urgency  Musculoskeletal: Positive for back pain  Negative for arthralgias  Skin: Negative for color change  Neurological: Negative for dizziness, light-headedness and numbness  Hematological: Negative for adenopathy  Psychiatric/Behavioral: Negative for agitation  Historical Information   Past Medical History:   Diagnosis Date    Chronic pain     back    Kidney stone      Past Surgical History:   Procedure Laterality Date    BACK SURGERY      CARPAL TUNNEL RELEASE      HERNIA REPAIR      KNEE SURGERY Bilateral     SHOULDER SURGERY       Social History   History   Alcohol Use    Yes     History   Drug Use No     History   Smoking Status    Never Smoker   Smokeless Tobacco    Never Used     Family History: non-contributory    Meds/Allergies   all current active meds have been reviewed  Allergies   Allergen Reactions    Lisinopril      Other reaction(s): Cough    Meperidine GI Intolerance       Objective   Vitals: Blood pressure 118/67, pulse (!) 50, temperature 97 7 °F (36 5 °C), temperature source Oral, resp  rate 18, height 5' 9" (1 753 m), weight 90 7 kg (199 lb 15 3 oz), SpO2 95 %  I/O last 24 hours: In: 980 [I V :980]  Out: -     Invasive Devices     Peripheral Intravenous Line            Peripheral IV 01/02/18 Left Antecubital less than 1 day                Physical Exam   Constitutional: He appears well-developed and well-nourished  HENT:   Head: Normocephalic and atraumatic  Eyes: Conjunctivae are normal    Neck: Normal range of motion  Neck supple  Cardiovascular: Normal rate and regular rhythm  Pulmonary/Chest: Effort normal and breath sounds normal  No respiratory distress  Abdominal: Soft  Bowel sounds are normal  There is tenderness (LLQ/flank)  There is rebound  There is no CVA tenderness  No hernia         Lab Results: I have personally reviewed pertinent reports  Imaging Studies: I have personally reviewed pertinent reports  EKG, Pathology, and Other Studies: I have personally reviewed pertinent reports  VTE Prophylaxis: Sequential compression device (Venodyne)     Code Status: Level 1 - Full Code  Advance Directive and Living Will:      Power of :    POLST:      Counseling / Coordination of Care  Total floor / unit time spent today 5 minutes

## 2018-01-04 VITALS
HEART RATE: 70 BPM | RESPIRATION RATE: 20 BRPM | OXYGEN SATURATION: 95 % | HEIGHT: 69 IN | DIASTOLIC BLOOD PRESSURE: 69 MMHG | WEIGHT: 194 LBS | BODY MASS INDEX: 28.73 KG/M2 | TEMPERATURE: 98.1 F | SYSTOLIC BLOOD PRESSURE: 147 MMHG

## 2018-01-04 PROBLEM — E87.6 HYPOKALEMIA: Status: RESOLVED | Noted: 2018-01-02 | Resolved: 2018-01-04

## 2018-01-04 LAB
ERYTHROCYTE [DISTWIDTH] IN BLOOD BY AUTOMATED COUNT: 12.6 % (ref 11.6–15.1)
HCT VFR BLD AUTO: 39.6 % (ref 36.5–49.3)
HGB BLD-MCNC: 13.4 G/DL (ref 12–17)
MCH RBC QN AUTO: 28.4 PG (ref 26.8–34.3)
MCHC RBC AUTO-ENTMCNC: 33.8 G/DL (ref 31.4–37.4)
MCV RBC AUTO: 84 FL (ref 82–98)
PLATELET # BLD AUTO: 175 THOUSANDS/UL (ref 149–390)
PMV BLD AUTO: 8.4 FL (ref 8.9–12.7)
RBC # BLD AUTO: 4.72 MILLION/UL (ref 3.88–5.62)
WBC # BLD AUTO: 15.52 THOUSAND/UL (ref 4.31–10.16)

## 2018-01-04 PROCEDURE — 85027 COMPLETE CBC AUTOMATED: CPT | Performed by: INTERNAL MEDICINE

## 2018-01-04 RX ORDER — PROMETHAZINE HYDROCHLORIDE 25 MG/1
25 TABLET ORAL EVERY 8 HOURS PRN
Qty: 20 TABLET | Refills: 0 | Status: SHIPPED | OUTPATIENT
Start: 2018-01-04 | End: 2018-05-11

## 2018-01-04 RX ORDER — PROMETHAZINE HYDROCHLORIDE 25 MG/1
25 TABLET ORAL EVERY 6 HOURS PRN
Status: DISCONTINUED | OUTPATIENT
Start: 2018-01-04 | End: 2018-01-04 | Stop reason: HOSPADM

## 2018-01-04 RX ORDER — LEVOFLOXACIN 500 MG/1
500 TABLET, FILM COATED ORAL EVERY 24 HOURS
Qty: 5 TABLET | Refills: 0 | Status: SHIPPED | OUTPATIENT
Start: 2018-01-04 | End: 2018-01-09

## 2018-01-04 RX ORDER — PHENAZOPYRIDINE HYDROCHLORIDE 100 MG/1
100 TABLET, FILM COATED ORAL
Qty: 10 TABLET | Refills: 0 | Status: ON HOLD | OUTPATIENT
Start: 2018-01-04 | End: 2018-01-18 | Stop reason: ALTCHOICE

## 2018-01-04 RX ORDER — PHENAZOPYRIDINE HYDROCHLORIDE 100 MG/1
100 TABLET, FILM COATED ORAL
Status: DISCONTINUED | OUTPATIENT
Start: 2018-01-04 | End: 2018-01-04 | Stop reason: HOSPADM

## 2018-01-04 RX ORDER — CALCIUM CARBONATE 200(500)MG
500 TABLET,CHEWABLE ORAL DAILY PRN
Status: DISCONTINUED | OUTPATIENT
Start: 2018-01-04 | End: 2018-01-04 | Stop reason: HOSPADM

## 2018-01-04 RX ADMIN — PHENAZOPYRIDINE 100 MG: 100 TABLET ORAL at 13:18

## 2018-01-04 RX ADMIN — PANTOPRAZOLE SODIUM 40 MG: 40 TABLET, DELAYED RELEASE ORAL at 06:27

## 2018-01-04 RX ADMIN — MORPHINE SULFATE 2 MG: 2 INJECTION, SOLUTION INTRAMUSCULAR; INTRAVENOUS at 06:26

## 2018-01-04 RX ADMIN — PROMETHAZINE HYDROCHLORIDE 25 MG: 25 TABLET ORAL at 13:17

## 2018-01-04 RX ADMIN — DOCUSATE SODIUM 100 MG: 100 CAPSULE, LIQUID FILLED ORAL at 09:53

## 2018-01-04 RX ADMIN — SODIUM CHLORIDE 100 ML/HR: 0.9 INJECTION, SOLUTION INTRAVENOUS at 13:20

## 2018-01-04 RX ADMIN — ONDANSETRON 4 MG: 2 INJECTION INTRAMUSCULAR; INTRAVENOUS at 09:53

## 2018-01-04 RX ADMIN — CALCIUM CARBONATE (ANTACID) CHEW TAB 500 MG 1000 MG: 500 CHEW TAB at 02:07

## 2018-01-04 RX ADMIN — MORPHINE SULFATE 2 MG: 2 INJECTION, SOLUTION INTRAMUSCULAR; INTRAVENOUS at 01:53

## 2018-01-04 RX ADMIN — TRAMADOL HYDROCHLORIDE 50 MG: 50 TABLET, FILM COATED ORAL at 01:54

## 2018-01-04 RX ADMIN — HYDROCODONE BITARTRATE AND ACETAMINOPHEN 1 TABLET: 5; 325 TABLET ORAL at 01:53

## 2018-01-04 RX ADMIN — HYDROCODONE BITARTRATE AND ACETAMINOPHEN 1 TABLET: 5; 325 TABLET ORAL at 06:25

## 2018-01-04 RX ADMIN — LOSARTAN POTASSIUM: 50 TABLET ORAL at 09:53

## 2018-01-04 NOTE — DISCHARGE SUMMARY
Discharge Summary - Uyen Mancilla 69 y o  male MRN: [id number]    Unit/Bed#: [address] [place] [address] Encounter: [id number]    Admission Date: 1/2/2018     Admitting Diagnosis: Flank pain [R10 9]  Kidney stone on left side [N20 0]  Intractable pain [R52]    HPI:  20-year-old male presented with left flank pain and found to have ureteral lithiasis        Consults    Procedures Performed: No orders of the defined types were placed in this encounter  Hospital Course:  Patient was admitted and placed on IV fluid hydration and IV pain meds for control of his intractable pain  He was also placed empirically on IV antibiotics with Levaquin although culture then did not show any significant growth  underwent urologic procedure with attempt at stone removal and stent placement on 1/3/18  He then had postoperative clots in bleeding and Esteban was maintained  It was pulled on the a m  of January 4, 2018 and after some initial hesitancy did have voiding  He does have significantly elevated prostate and will need to have this followed by Urology as well  Patient had elevated [place] of [address] was discharged on a 5 day course of Levaquin  He will need to be followed with lithotripsy as per the Urology team     Significant Findings, Care, Treatment and Services Provided:     IMPRESSION:     There is a 7 mm calculus in the left renal collecting system with thickening and enhancement of the adjacent renal collecting system walls  Although there is no hydronephrosis on the current examination, findings suggest intermittent obstruction      Additional bilateral renal calculi as described above      Recurrent hiatal hernia  Hepatic steatosis    Marked prostatomegaly             Complications: none    Discharge Diagnosis: Principal Problem:    Ureterolithiasis  Active Problems:    Kidney stone on left side    Left upper quadrant pain    Lumbar disc disease    Gastroesophageal reflux disease without esophagitis    Large hiatal hernia        Condition at Discharge: good     Discharge instructions/Information to patient and family:   See after visit summary for information provided to patient and family  Provisions for Follow-Up Care:  See after visit summary for information related to follow-up care and any pertinent home health orders  Disposition: Home    Planned Readmission: No    Discharge Statement   I spent 30 minutes discharging the patient  This time was spent on the day of discharge  I had direct contact with the patient on the day of discharge    Discharge Medications:  See after visit summary for reconciled discharge medications provided to patient and family          Ubaldo Garcia DO

## 2018-01-04 NOTE — PROGRESS NOTES
Progress Note - Mariza Polanco 69 y o  male MRN: [id number]    Unit/Bed#: [address] [place] [address] [id number] Encounter: [id number]      Assessment:  Principal Problem:    Ureterolithiasis  Active Problems:    Left upper quadrant pain    Lumbar disc disease    Gastroesophageal reflux disease without esophagitis    Large hiatal hernia    Hypokalemia    Kidney stone on left side  Resolved Problems:    * No resolved hospital problems  *        Plan:  · Left ureterolithiasis-Esteban was removed at 0 600 he has not yet voided-had bleeding after the procedure followed by clearing of the urine this morning prior to Esteban be removed  If Stable may be able to discharge later today  · Nausea vomiting  Patient had episode of vomiting approximately 0 700 is now eating a small amount for breakfast  · GERD without esophagitis  · BPH-now on Flomax-await Urology opinion  · Leukocytosis-white count up to 15,000-patient had been given Levaquin in the ER but then switched to preoperative cefazolin x1 dose yesterday with his urologic procedure urine culture not showing any growth from January 2, 2018-will repeat UA today given recent manipulation  Subjective:   Patient had episode of vomiting at 0 700  Denies abdominal pain currently and is eating a small amount of breakfast   He still has some ongoing flank pain on the left  He reports he will need lithotripsy for the stone on the left and does have a follow-up appointment scheduled for this month with Dr Aundrea RASCON  Comprehensive system review negative other than noted above    Objective:     Vitals: Blood pressure 141/71, pulse 58, temperature 98 6 °F (37 °C), temperature source Oral, resp  rate 18, height 5' 9" (1 753 m), weight 88 kg (194 lb 0 1 oz), SpO2 93 %  ,Body mass index is 28 65 kg/m²    Current Facility-Administered Medications   Medication Dose Route Frequency Provider Last Rate Last Dose    acetaminophen (TYLENOL) oral solution 650 mg  650 mg Oral Q6H PRN Caitie Fly, DO        albuterol (PROVENTIL HFA,VENTOLIN HFA) inhaler 2 puff  2 puff Inhalation Q6H PRN Caitie Fly, DO        atorvastatin (LIPITOR) tablet 40 mg  40 mg Oral Daily With Dex Howard,         calcium carbonate (TUMS) chewable tablet 500 mg  500 mg Oral Daily PRN Shahid Musa, CRNP   1,000 mg at 01/04/18 0207    docusate sodium (COLACE) capsule 100 mg  100 mg Oral BID Caitie Fly, DO   100 mg at 01/03/18 1841    HYDROcodone-acetaminophen (NORCO) 5-325 mg per tablet 1 tablet  1 tablet Oral Q4H PRN Humera Lummi, DO   1 tablet at 01/04/18 0625    losartan potassium-hydrochlorothiazide (HYZAAR 50/12  5) combination   Oral Daily Caitie Fly, DO        morphine injection 2 mg  2 mg Intravenous Q3H PRN Caitie Fly, DO   2 mg at 01/04/18 0626    ondansetron (ZOFRAN) injection 4 mg  4 mg Intravenous Q6H PRN Caitie Fly, DO   4 mg at 01/03/18 1643    pantoprazole (PROTONIX) EC tablet 40 mg  40 mg Oral Early Morning Caitie Fly, DO   40 mg at 01/04/18 5029    sodium chloride 0 9 % infusion  100 mL/hr Intravenous Continuous Ivette Hinkle  mL/hr at 01/03/18 2018 100 mL/hr at 01/03/18 2018    tamsulosin (FLOMAX) capsule 0 4 mg  0 4 mg Oral Daily With Dex Howard, DO   0 4 mg at 01/03/18 1841    traMADol (ULTRAM) tablet 50 mg  50 mg Oral Q6H PRN Caitie Fly, DO   50 mg at 01/04/18 0154    zolpidem (AMBIEN) tablet 5 mg  5 mg Oral HS PRN Humera Lummi, DO         Prescriptions Prior to Admission   Medication    diclofenac potassium (CATAFLAM) 50 mg tablet    HYDROcodone-acetaminophen (NORCO) 5-325 mg per tablet    losartan-hydrochlorothiazide (HYZAAR) 50-12 5 mg per tablet    Omeprazole 20 MG TBEC    simvastatin (ZOCOR) 40 mg tablet    tamsulosin (FLOMAX) 0 4 mg    albuterol (PROAIR HFA) 90 mcg/act inhaler    Coenzyme Q10 (COQ-10) 10 MG CAPS    Coenzyme Q10 (COQ-10) 10 MG CAPS    zolpidem (AMBIEN) 10 mg tablet         Intake/Output Summary (Last 24 hours) at 01/04/18 0909  Last data filed at 01/04/18 0601   Gross per 24 hour   Intake 1580 ml   Output             2550 ml   Net             -970 ml       Physical Exam:  General appearance: alert, cooperative and mild distress  Neck: no adenopathy, no JVD, supple, symmetrical, trachea midline and thyroid not enlarged, symmetric, no tenderness/mass/nodules  Lungs: clear to auscultation bilaterally  Heart: regular rate and rhythm, S1, S2 normal, no murmur, click, rub or gallop  Abdomen: Some tenderness over the left lateral abdominal wall and the upper quadrant  Minimal flank tenderness  Extremities: extremities normal, atraumatic, no cyanosis or edema  Skin: Skin color, texture, turgor normal  No rashes or lesions  Neurologic: Grossly normal       Lab, Imaging and other studies: I have personally reviewed pertinent reports  Results from last 7 days  Lab Units 01/04/18  0523 01/03/18  0522 01/02/18  1440   WBC Thousand/uL 15 52* 7 82 10 07   HEMOGLOBIN g/dL 13 4 14 4 16 7   HEMATOCRIT % 39 6 42 3 47 5   PLATELETS Thousands/uL 175 217 257   NEUTROS PCT %  --   --  75   LYMPHS PCT %  --   --  17   MONOS PCT %  --   --  7   EOS PCT %  --   --  1       Results from last 7 days  Lab Units 01/03/18  0522 01/02/18  1440   SODIUM mmol/L 139 140   POTASSIUM mmol/L 4 1 3 3*   CHLORIDE mmol/L 105 101   CO2 mmol/L 26 29   BUN mg/dL 17 13   CREATININE mg/dL 0 97 0 90   CALCIUM mg/dL 7 7* 9 2   TOTAL PROTEIN g/dL  --  8 1   BILIRUBIN TOTAL mg/dL  --  0 80   ALK PHOS U/L  --  56   ALT U/L  --  49   AST U/L  --  18   GLUCOSE RANDOM mg/dL 86 151*     No results found for: TROPONINI, CKMB, CKTOTAL      Lab Results   Component Value Date    BLOODCX No Growth at 24 hrs  01/02/2018    BLOODCX No Growth at 24 hrs  01/02/2018    URINECX No Growth <1000 cfu/mL 01/02/2018    URINECX No Growth <1000 cfu/mL 12/04/2017       Imaging:  No results found for this or any previous visit  No results found for this or any previous visit      PATIENT CENTERED ROUNDS: I have performed rounds with the nursing staff           Janeth Victor DO

## 2018-01-04 NOTE — PHYSICIAN ADVISOR
Current patient class: Inpatient  The patient is currently on Hospital Day: 3      The patient was admitted to the hospital at  on 1/2/18 for the following diagnosis:  Flank pain [R10 9]  Kidney stone on left side [N20 0]  Intractable pain [R52]       There is documentation in the medical record of an expected length of stay of at least 2 midnights  The patient is therefore expected to satisfy the 2 midnight benchmark and given the 2 midnight presumption is appropriate for INPATIENT ADMISSION  Given this expectation of a satisfying stay, CMS instructs us that the patient is most often appropriate for inpatient admission under part A provided medical necessity is documented in the chart  After review of the relevant documentation, labs, vital signs and test results, the patient is appropriate for INPATIENT ADMISSION  Admission to the hospital as an inpatient is a complex decision making process which requires the practitioner to consider the patients presenting complaint, history and physical examination and all relevant testing  With this in mind, in this case, the patient was deemed appropriate for INPATIENT ADMISSION  After review of the documentation and testing available at the time of the admission I concur with this clinical determination of medical necessity  Rationale is as follows: The patient is a 69 yrs old Male who presented to the ED at 1/2/2018  2:02 PM with a chief complaint of Abdominal Pain (Patient has been having pain in his L lower anterior rib cage since the begining of December  Pt has seen Dr Adi Glover for same, she refered him to Dr Aries Giles  Patient states there is a lump on his rib cage and has been having increased pain in the area  Pt is currently seeing Dr Chio Palma for chronic back pain, patient has a knowm kidney stone and being treated )     Patient was admitted to the hospital with intractable pain secondary to a left ureterolithiasis    Patient was admitted for intractable pain with IV fluids, pain medications  Patient was also to be placed on IV antibiotics  Urology was consulted, and the patient at present time continues to remain hospitalized postprocedure  The patient will require at least a 2nd midnight in the hospital for continued management  Given the patient's significant amount of discomfort, need for parental narcotics, the patient is appropriate for inpatient admission      The patients vitals on arrival were ED Triage Vitals   Temperature Pulse Respirations Blood Pressure SpO2   01/02/18 1204 01/02/18 1204 01/02/18 1204 01/02/18 1204 01/02/18 1204   (!) 96 7 °F (35 9 °C) 56 20 119/77 96 %      Temp Source Heart Rate Source Patient Position - Orthostatic VS BP Location FiO2 (%)   01/02/18 1826 01/02/18 1439 01/02/18 1439 01/02/18 1439 --   Oral Monitor Lying Left arm       Pain Score       01/02/18 1204       Worst Possible Pain           Past Medical History:   Diagnosis Date    Chronic pain     back    Kidney stone      Past Surgical History:   Procedure Laterality Date    BACK SURGERY      CARPAL TUNNEL RELEASE      HERNIA REPAIR      KNEE SURGERY Bilateral     SHOULDER SURGERY             Consults have been placed to:   IP CONSULT TO INTERNAL MEDICINE  IP CONSULT TO UROLOGY  IP CONSULT TO CASE MANAGEMENT    Vitals:    01/03/18 1944 01/03/18 2017 01/03/18 2116 01/03/18 2225   BP: 101/62 114/67 113/66 111/66   Pulse: (!) 51 (!) 49 (!) 54 (!) 53   Resp: 18 16 16 16   Temp: 97 5 °F (36 4 °C) 97 5 °F (36 4 °C) 97 5 °F (36 4 °C) (!) 97 4 °F (36 3 °C)   TempSrc: Oral Oral Oral Oral   SpO2: 95% 93% 95% 96%   Weight:       Height:           Most recent labs:    Recent Labs      01/02/18   1440  01/03/18   0522   WBC  10 07  7 82   HGB  16 7  14 4   HCT  47 5  42 3   PLT  257  217   K  3 3*  4 1   NA  140  139   CALCIUM  9 2  7 7*   BUN  13  17   CREATININE  0 90  0 97   LIPASE  120   --    AST  18   --    ALT  49   --    ALKPHOS  56   --    BILITOT  0 80   -- Scheduled Meds:  atorvastatin 40 mg Oral Daily With Dinner   docusate sodium 100 mg Oral BID   losartan potassium-hydrochlorothiazide (HYZAAR 50/12  5) combination  Oral Daily   pantoprazole 40 mg Oral Early Morning   tamsulosin 0 4 mg Oral Daily With Dinner     Continuous Infusions:  sodium chloride 100 mL/hr Last Rate: 100 mL/hr (01/03/18 2018)     PRN Meds:   acetaminophen    albuterol    HYDROcodone-acetaminophen    morphine injection    ondansetron    traMADol    zolpidem    Surgical procedures (if appropriate):  Procedure(s):  CYSTOSCOPY , RETROGRADE PYELOGRAM AND INSERTION STENT URETERAL, Tristen Cirri

## 2018-01-05 ENCOUNTER — ANESTHESIA EVENT (OUTPATIENT)
Dept: PERIOP | Facility: AMBULARY SURGERY CENTER | Age: 68
End: 2018-01-05
Payer: MEDICARE

## 2018-01-05 NOTE — PLAN OF CARE

## 2018-01-07 LAB
BACTERIA BLD CULT: NORMAL
BACTERIA BLD CULT: NORMAL

## 2018-01-08 ENCOUNTER — GENERIC CONVERSION - ENCOUNTER (OUTPATIENT)
Dept: OTHER | Facility: OTHER | Age: 68
End: 2018-01-08

## 2018-01-09 ENCOUNTER — TRANSCRIBE ORDERS (OUTPATIENT)
Dept: ADMINISTRATIVE | Facility: HOSPITAL | Age: 68
End: 2018-01-09

## 2018-01-09 DIAGNOSIS — N20.0 URIC ACID NEPHROLITHIASIS: Primary | ICD-10-CM

## 2018-01-09 NOTE — PERIOPERATIVE NURSING NOTE
Patient's spouse is a nurse who reports that the patients Heart rate has a history of going down during anesthesia

## 2018-01-09 NOTE — PRE-PROCEDURE INSTRUCTIONS
Pre-Surgery Instructions:   Medication Instructions    albuterol (PROAIR HFA) 90 mcg/act inhaler Instructed patient per Anesthesia Guidelines   Coenzyme Q10 (COQ-10) 10 MG CAPS Instructed patient per Anesthesia Guidelines   diclofenac potassium (CATAFLAM) 50 mg tablet Instructed patient per Anesthesia Guidelines   losartan-hydrochlorothiazide (HYZAAR) 50-12 5 mg per tablet Instructed patient per Anesthesia Guidelines   Omeprazole 20 MG TBEC Instructed patient per Anesthesia Guidelines   simvastatin (ZOCOR) 40 mg tablet Instructed patient per Anesthesia Guidelines   tamsulosin (FLOMAX) 0 4 mg Instructed patient per Anesthesia Guidelines   zolpidem (AMBIEN) 10 mg tablet Instructed patient per Anesthesia Guidelines      Bathing and pre-op instructions given

## 2018-01-11 ENCOUNTER — GENERIC CONVERSION - ENCOUNTER (OUTPATIENT)
Dept: OTHER | Facility: OTHER | Age: 68
End: 2018-01-11

## 2018-01-11 ENCOUNTER — ALLSCRIPTS OFFICE VISIT (OUTPATIENT)
Dept: OTHER | Facility: OTHER | Age: 68
End: 2018-01-11

## 2018-01-11 ENCOUNTER — APPOINTMENT (OUTPATIENT)
Dept: LAB | Facility: HOSPITAL | Age: 68
End: 2018-01-11
Attending: UROLOGY
Payer: MEDICARE

## 2018-01-11 DIAGNOSIS — N20.0 URIC ACID NEPHROLITHIASIS: ICD-10-CM

## 2018-01-11 LAB
APTT PPP: 28 SECONDS (ref 23–35)
INR PPP: 1.04 (ref 0.86–1.16)
PROTHROMBIN TIME: 13.4 SECONDS (ref 12.1–14.4)

## 2018-01-11 PROCEDURE — 36415 COLL VENOUS BLD VENIPUNCTURE: CPT

## 2018-01-11 PROCEDURE — 85730 THROMBOPLASTIN TIME PARTIAL: CPT

## 2018-01-11 PROCEDURE — 85610 PROTHROMBIN TIME: CPT

## 2018-01-12 ENCOUNTER — ALLSCRIPTS OFFICE VISIT (OUTPATIENT)
Dept: OTHER | Facility: OTHER | Age: 68
End: 2018-01-12

## 2018-01-12 ENCOUNTER — GENERIC CONVERSION - ENCOUNTER (OUTPATIENT)
Dept: OTHER | Facility: OTHER | Age: 68
End: 2018-01-12

## 2018-01-13 NOTE — CONSULTS
Assessment   1  Large hiatal hernia (553 3) (K44 9)   2  Renal lithiasis (592 0) (N20 0)   3  Lipoma of abdominal wall (214 1) (D17 1)   4  Left inguinal hernia (550 90) (K40 90)   5  Obesity (BMI 30-39 9) (278 00) (E66 9)   6  Multiple pigmented nevi (216 9) (D22 9)   7  Seborrhea (706 3) (L21 9)   8  Hemangioma of skin (228 01) (D18 01)   9  Cutaneous skin tags (701 9) (L91 8)    Discussion/Summary   Discussion Summary:    Jessica Perales is a 69year old male who presents today for constant abdominal pain for a recurrent hiatal hernia  EGD showed he has a hiatal hernia  Discussed risks, benefits, and alternatives to laparoscopic hiatal hernia repair  After he has his kidney stone treated he will call back to discuss scheduling surgery  He knows to contact our office if any concerns or problems arise  lesion- The bump he feels is not a hiatal hernia  5 x 6 cm subcutaneous lesion to left abdomen  Smooth, mobile, likely a lipoma  As it does not bother him he should continue to monitor it  inguinal hernia- Physical exam revealed a left inguinal hernia  As it is asymptomatic, he should monitor it  He knows the symptoms to watch for and will call back if it becomes symptomatic  Encouraged him to have an annual skin exam performed by his dermatologist or PCP for continued monitoring of his small skin tags, multiple seborrhea of neck and scattered hemangiomas, pigmented nevi, and seborrhea of back  Goals and Barriers: The patient has the current Goals: Follow up as needed  The patent has the current Barriers: No barriers  Patient's Capacity to Self-Care: Patient is able to Self-Care  Chief Complaint   Chief Complaint Free Text Note Form: Left flank bulge      History of Present Illness   HPI: Jessica Perales is a 69year old male who presents today for constant abdominal pain for a recurrent hiatal hernia  EGD showed he has a hiatal hernia  He takes omeprazole and is able to eat well  He denies acid reflux   He has no chest pain  Sometimes feels like a bubble is in his throat  He also reports he can feel a lump in his left side  It has caused him pain before but it does not hurt him now   had episodes of nausea and vomiting  has a kidney stone  has a stent  It will be removed in February  Review of Systems   Complete-Male:      Constitutional: No fever or chills, feels well, no tiredness, no recent weight gain or weight loss  Eyes: No complaints of eye pain, no red eyes, no discharge from eyes, no itchy eyes  ENT: no complaints of earache, no hearing loss, no nosebleeds, no nasal discharge, no sore throat, no hoarseness  Cardiovascular: No complaints of slow heart rate, no fast heart rate, no chest pain, no palpitations, no leg claudication, no lower extremity  Respiratory: No complaints of shortness of breath, no wheezing, no cough, no SOB on exertion, no orthopnea or PND  Gastrointestinal: nausea-- and-- vomiting, but-- no abdominal pain,-- no constipation,-- no diarrhea-- and-- no blood in stools  Genitourinary: No complaints of dysuria, no incontinence, no hesitancy, no nocturia, no genital lesion, no testicular pain  Musculoskeletal: No complaints of arthralgia, no myalgias, no joint swelling or stiffness, no limb pain or swelling  Integumentary: skin lesion, but-- as noted in HPI  Neurological: No compliants of headache, no confusion, no convulsions, no numbness or tingling, no dizziness or fainting, no limb weakness, no difficulty walking  Psychiatric: Is not suicidal, no sleep disturbances, no anxiety or depression, no change in personality, no emotional problems  Endocrine: No complaints of proptosis, no hot flashes, no muscle weakness, no erectile dysfunction, no deepening of the voice, no feelings of weakness  Hematologic/Lymphatic: No complaints of swollen glands, no swollen glands in the neck, does not bleed easily, no easy bruising      ROS Reviewed:    ROS reviewed  Active Problems   1  Allergic rhinitis (477 9) (J30 9)   2  Bulging lumbar disc (722 10) (M51 26)   3  Chronic GERD (530 81) (K21 9)   4  Enlarged prostate (600 00) (N40 0)   5  Large hiatal hernia (553 3) (K44 9)   6  Low back pain (724 2) (M54 5)   7  LUQ discomfort (789 02) (R10 12)   8  Need for Tdap vaccination (V06 1) (Z23)   9  Pain (780 96) (R52)   10  Renal lithiasis (592 0) (N20 0)    Past Medical History   1  History of arthritis (V13 4) (Z87 39)   2  History of gastroesophageal reflux (GERD) (V12 79) (Z87 19)   3  History of hypercholesterolemia (V12 29) (Z86 39)   4  History of hypertension (V12 59) (Z86 79)   5  Personal history of asthma (V12 69) (Z87 09)  Active Problems And Past Medical History Reviewed: The active problems and past medical history were reviewed and updated today  Surgical History   1  History of Arthroscopy Shoulder   2  History of Complete Colonoscopy   3  History of Hallux Rigidus Correction W/ Cheilectomy 1st MTP Joint   4  History of Hernia Repair   5  History of Hernia Repair   6  History of Knee Arthroscopy (Therapeutic)   7  History of Lower Back Surgery   8  History of Neuroplasty Left Middle Finger   9  History of Rotator Cuff Repair  Surgical History Reviewed: The surgical history was reviewed and updated today  Family History   Mother    1  Family history of malignant neoplasm of brain (V16 8) (Z80 8)  Father    2  Family history of chronic obstructive pulmonary disease (V17 6) (Z82 5)  Maternal Grandmother    3  Family history of malignant neoplasm (V16 9) (Z80 9)  Maternal Uncle    4  Family history of Abdominal aortic aneurysm, not a candidate for repair   5  Family history of abdominal aortic aneurysm (V17 49) (Z82 49)  Family History Reviewed: The family history was reviewed and updated today         Social History    · Dental care, regularly   · Denied: History of Drug use   · Lives with spouse   · Living Situation: Supportive and safe   ·    · Never A Smoker   · No advance directive on file (V49 89) (Z78 9)   · No drug use   · Occasional alcohol use  Social History Reviewed: The social history was reviewed and updated today  The social history was reviewed and is unchanged  Current Meds    1  Co Q 10 10 MG Oral Capsule; TAKE 1 CAPSULE 2-3 TIMES DAILY AS DIRECTED; Therapy: ([phone]) to Recorded   2  Diclofenac Potassium 50 MG Oral Tablet; take 1 tablet by mouth twice a day; Therapy: 99YEA3899 to (Evaluate:13Rjb7973)  Requested for: 09Hru1759; Last     Rx:[id number] Ordered   3  Hydrocodone-Acetaminophen 5-325 MG Oral Tablet; TAKE 1 TABLET TWICE DAILY PRN     for Pain; Therapy: 74LIK4944 to (Evaluate:48Cri6450)  Requested for: 31UKS3728; Last     Rx:[id number] Ordered   4  Losartan Potassium-HCTZ 50-12 5 MG Oral Tablet; Take 1 tablet daily; Therapy: 70HKP2498 to (Last Rx:78Pcc0403)  Requested for: 79QXI3898 Ordered   5  Omeprazole 20 MG Oral Capsule Delayed Release; take 1 capsule daily; Therapy: 74CIC4340 to (Evaluate:93Qcl6095) Recorded   6  Phenazopyridine HCl - 100 MG Oral Tablet; TAKE 1 TABLET 3 TIMES DAILY AFTER     MEALS AS NEEDED; Therapy: 11MDA0774 to Recorded   7  ProAir  (90 Base) MCG/ACT Inhalation Aerosol Solution; INHALE 2 PUFFS 4     times daily PRN  Requested for: 19ECZ1117; Last Rx:10Zfq0017 Ordered   8  Simvastatin 40 MG Oral Tablet; Take 1 tablet daily; Therapy: 35GBO8327 to (Last Rx:63Qck1441)  Requested for: 82BIF9948 Ordered   9  Tamsulosin HCl - 0 4 MG Oral Capsule; take 1 capsule by mouth at bedtime; Therapy: 54RXK3817 to (0487 72 23 66)  Requested for: 07NNP8396; Last     Rx:[id number] Ordered   10  Zolpidem Tartrate 10 MG Oral Tablet; TAKE 1 TABLET AT BEDTIME AS NEEDED FOR      SLEEP; Last Rx:[id number] Ordered  Medication List Reviewed: The medication list was reviewed and updated today  Allergies   1  Demerol TABS   2  Nasarel   3  Prinivil TABS  4  No Known Environmental Allergies   5  No Known Food Allergies    Vitals   Vital Signs    Recorded: 25FWT8813 11:08AM   Temperature 98 4 F, Tympanic   Heart Rate 76, L Radial   Pulse Quality Normal, L Radial   Respiration Quality Normal   Respiration 16   Systolic 274, LUE, Sitting   Diastolic 80, LUE, Sitting   Height 5 ft 7 5 in   Weight 193 lb    BMI Calculated 29 78   BSA Calculated 2     Physical Exam        Constitutional      General appearance: No acute distress, well appearing and well nourished  Eyes      Conjunctiva and lids: No swelling, erythema, or discharge  Pupils and irises: Equal, round and reactive to light  Sclera non-icteric  Ears, Nose, Mouth, and Throat      External inspection of ears and nose: Normal        Neck      Supple, symmetric, trachea midline, no masses      Pulmonary      Respiratory effort: No increased work of breathing or signs of respiratory distress  Auscultation of lungs: Clear to auscultation, equal breath sounds bilaterally, no wheezes, no rales, no rhonci  Cardiovascular      Auscultation of heart: Normal rate and rhythm, normal S1 and S2, without murmurs  Examination of extremities for edema and/or varicosities: Normal        Carotid pulses: Normal        Abdomen      Abdomen: Abnormal  -- Left inguinal hernia  Liver and spleen: No hepatomegaly or splenomegaly  Lymphatic      Palpation of lymph nodes in neck: No lymphadenopathy  Musculoskeletal      Digits and nails: Normal without clubbing or cyanosis  Extremities: No clubbing, no cyanosis, no edema      Skin      Skin and subcutaneous tissue: Normal without rashes or lesions  -- Small skin tags, multiple seborrhea of neck  Scattered hemangiomas, pigmented nevi, and seborrhea of back  Examination of the skin for lesions: Abnormal  -- 5 x 6 cm subcutaneous lesion to left abdomen  Smooth, mobile, likely a lipoma        Neurologic Sensation: Motor and sensory grossly intact  Psychiatric      Orientation to person, place and time: Normal        Mood and affect: Normal        Attending Note   Scribe Attestation:      Scribe Attestation Jerrod WATKINS am acting as a scribe in the presence of the attending physician while the attending physician examines the patient  Physician Attestation:      Mary Smith personally performed the services described in this documentation as scribed in my presence, and it is both accurate and complete        Future Appointments      Date/Time Provider Specialty Site   01/18/2018 07:30 AM Gavin Jacob MD Urology [address] [place] [address]    Electronically signed by : Sourav Landrum MD; Jan 12 2018  2:04PM EST                       (Author)
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
## 2018-01-17 ENCOUNTER — GENERIC CONVERSION - ENCOUNTER (OUTPATIENT)
Dept: FAMILY MEDICINE CLINIC | Facility: HOSPITAL | Age: 68
End: 2018-01-17

## 2018-01-18 ENCOUNTER — ANESTHESIA (OUTPATIENT)
Dept: PERIOP | Facility: AMBULARY SURGERY CENTER | Age: 68
End: 2018-01-18
Payer: MEDICARE

## 2018-01-18 ENCOUNTER — HOSPITAL ENCOUNTER (OUTPATIENT)
Facility: AMBULARY SURGERY CENTER | Age: 68
Setting detail: OUTPATIENT SURGERY
Discharge: HOME | End: 2018-01-18
Attending: UROLOGY | Admitting: UROLOGY
Payer: MEDICARE

## 2018-01-18 VITALS
HEIGHT: 69 IN | RESPIRATION RATE: 18 BRPM | SYSTOLIC BLOOD PRESSURE: 123 MMHG | WEIGHT: 195 LBS | HEART RATE: 65 BPM | TEMPERATURE: 97.4 F | OXYGEN SATURATION: 97 % | DIASTOLIC BLOOD PRESSURE: 69 MMHG | BODY MASS INDEX: 28.88 KG/M2

## 2018-01-18 RX ORDER — ONDANSETRON 2 MG/ML
INJECTION INTRAMUSCULAR; INTRAVENOUS AS NEEDED
Status: DISCONTINUED | OUTPATIENT
Start: 2018-01-18 | End: 2018-01-18 | Stop reason: SURG

## 2018-01-18 RX ORDER — HYDROCODONE BITARTRATE AND ACETAMINOPHEN 5; 325 MG/1; MG/1
2 TABLET ORAL EVERY 6 HOURS PRN
Qty: 10 TABLET | Refills: 0 | Status: SHIPPED | OUTPATIENT
Start: 2018-01-18 | End: 2018-01-28

## 2018-01-18 RX ORDER — SODIUM CHLORIDE 9 MG/ML
INJECTION, SOLUTION INTRAVENOUS CONTINUOUS PRN
Status: DISCONTINUED | OUTPATIENT
Start: 2018-01-18 | End: 2018-01-18 | Stop reason: SURG

## 2018-01-18 RX ORDER — LIDOCAINE HYDROCHLORIDE 10 MG/ML
INJECTION, SOLUTION INFILTRATION; PERINEURAL AS NEEDED
Status: DISCONTINUED | OUTPATIENT
Start: 2018-01-18 | End: 2018-01-18 | Stop reason: SURG

## 2018-01-18 RX ORDER — CEPHALEXIN 500 MG/1
500 CAPSULE ORAL 3 TIMES DAILY
Qty: 9 CAPSULE | Refills: 0 | Status: SHIPPED | OUTPATIENT
Start: 2018-01-18 | End: 2018-01-21

## 2018-01-18 RX ORDER — SODIUM CHLORIDE 9 MG/ML
100 INJECTION, SOLUTION INTRAVENOUS CONTINUOUS
Status: CANCELLED | OUTPATIENT
Start: 2018-01-18 | End: 2018-01-18

## 2018-01-18 RX ORDER — PROPOFOL 10 MG/ML
INJECTION, EMULSION INTRAVENOUS AS NEEDED
Status: DISCONTINUED | OUTPATIENT
Start: 2018-01-18 | End: 2018-01-18 | Stop reason: SURG

## 2018-01-18 RX ORDER — ONDANSETRON 2 MG/ML
4 INJECTION INTRAMUSCULAR; INTRAVENOUS ONCE AS NEEDED
Status: DISCONTINUED | OUTPATIENT
Start: 2018-01-18 | End: 2018-01-18 | Stop reason: HOSPADM

## 2018-01-18 RX ORDER — FENTANYL CITRATE/PF 50 MCG/ML
25 SYRINGE (ML) INJECTION AS NEEDED
Status: DISCONTINUED | OUTPATIENT
Start: 2018-01-18 | End: 2018-01-18 | Stop reason: HOSPADM

## 2018-01-18 RX ORDER — EPHEDRINE SULFATE 50 MG/ML
INJECTION, SOLUTION INTRAVENOUS AS NEEDED
Status: DISCONTINUED | OUTPATIENT
Start: 2018-01-18 | End: 2018-01-18 | Stop reason: SURG

## 2018-01-18 RX ORDER — HYDROCODONE BITARTRATE AND ACETAMINOPHEN 5; 325 MG/1; MG/1
2 TABLET ORAL EVERY 4 HOURS PRN
Status: DISCONTINUED | OUTPATIENT
Start: 2018-01-18 | End: 2018-01-18 | Stop reason: HOSPADM

## 2018-01-18 RX ORDER — FENTANYL CITRATE 50 UG/ML
INJECTION, SOLUTION INTRAMUSCULAR; INTRAVENOUS AS NEEDED
Status: DISCONTINUED | OUTPATIENT
Start: 2018-01-18 | End: 2018-01-18 | Stop reason: SURG

## 2018-01-18 RX ADMIN — ONDANSETRON 4 MG: 2 INJECTION INTRAMUSCULAR; INTRAVENOUS at 10:29

## 2018-01-18 RX ADMIN — EPHEDRINE SULFATE 10 MG: 50 INJECTION, SOLUTION INTRAMUSCULAR; INTRAVENOUS; SUBCUTANEOUS at 10:36

## 2018-01-18 RX ADMIN — LIDOCAINE HYDROCHLORIDE 50 MG: 10 INJECTION, SOLUTION INFILTRATION; PERINEURAL at 10:24

## 2018-01-18 RX ADMIN — PROPOFOL 200 MG: 10 INJECTION, EMULSION INTRAVENOUS at 10:24

## 2018-01-18 RX ADMIN — FENTANYL CITRATE 50 MCG: 50 INJECTION, SOLUTION INTRAMUSCULAR; INTRAVENOUS at 10:29

## 2018-01-18 RX ADMIN — FENTANYL CITRATE 50 MCG: 50 INJECTION, SOLUTION INTRAMUSCULAR; INTRAVENOUS at 10:42

## 2018-01-18 RX ADMIN — EPHEDRINE SULFATE 5 MG: 50 INJECTION, SOLUTION INTRAMUSCULAR; INTRAVENOUS; SUBCUTANEOUS at 10:32

## 2018-01-18 RX ADMIN — SODIUM CHLORIDE: 0.9 INJECTION, SOLUTION INTRAVENOUS at 07:19

## 2018-01-18 RX ADMIN — CEFAZOLIN SODIUM 2000 MG: 2 SOLUTION INTRAVENOUS at 10:27

## 2018-01-18 NOTE — ANESTHESIA PREPROCEDURE EVALUATION
Review of Systems/Medical History  Patient summary reviewed    No history of anesthetic complications     Cardiovascular  Exercise tolerance: good,  Hyperlipidemia, Hypertension controlled,    Pulmonary  Asthma: well controlled/ stable ,        GI/Hepatic    GERD well controlled,  Hiatal hernia,        Kidney stones,        Endo/Other  Negative endo/other ROS      GYN  Negative gynecology ROS          Hematology  Negative hematology ROS      Musculoskeletal  Back pain , lumbar pain,   Arthritis     Neurology    Neuromuscular disease ,    Psychology   Negative psychology ROS              Physical Exam    Airway    Mallampati score: I  TM Distance: >3 FB  Neck ROM: full     Dental       Cardiovascular  Rhythm: regular, Rate: normal, Cardiovascular exam normal    Pulmonary  Pulmonary exam normal     Other Findings        Anesthesia Plan  ASA Score- 2     Anesthesia Type- general with ASA Monitors  Additional Monitors:   Airway Plan: LMA  Comment: GA with LMA, IV discussed risks and benefits with patient and spouse, time allotted for all questions        Plan Factors- Instructed to abstain from smoking on day of procedure: non smoker      Induction- intravenous  Postoperative Plan-     Informed Consent- Anesthetic plan and risks discussed with patient and spouse  I personally reviewed this patient with the CRNA  Discussed and agreed on the Anesthesia Plan with the CRNA  Neetu Horta
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
## 2018-01-18 NOTE — ANESTHESIA POSTPROCEDURE EVALUATION
Post-Op Assessment Note      CV Status:  Stable    Mental Status:  Alert and awake    Hydration Status:  Euvolemic    PONV Controlled:  Controlled    Airway Patency:  Patent    Post Op Vitals Reviewed: Yes          Staff: CRNA           BP   150/76   Temp   97 4   Pulse  58   Resp   16   SpO2   99

## 2018-01-18 NOTE — DISCHARGE INSTRUCTIONS
Today you underwent left-sided lithotripsy  There was excellent fragmentation of year stone  Please call [phone] to arrange follow-up with Dr Aviva Langston for left stent removal     Lithotripsy   WHAT YOU NEED TO KNOW:   Lithotripsy is a procedure that uses sound waves to break up stones in the kidney, ureter, or bladder  The stone pieces then pass out of your body through your urine  You may have blood in your urine for 1 to 2 days after the procedure  You may also have bruising and discomfort in your back or abdomen  You may have pain whenever you pass pieces of your kidney stone  This may happen over a few weeks  DISCHARGE INSTRUCTIONS:   Call 911 for any of the following:   · You have chest pain  · You have trouble thinking clearly  · You have severe lower back pain  Seek care immediately if:   · You urinate bright red blood  · You have severe vomiting  · You cannot urinate  Contact your healthcare provider if:   · You have a fever and chills  · You feel burning when you urinate  · You feel the urge to urinate often and immediately  · You have questions or concerns about your condition or care  Medicines:   · Medicines  can help decrease pain or prevent an infection  Medicines may also help you pass the stones or prevent more stones from forming  · Take your medicine as directed  Contact your healthcare provider if you think your medicine is not helping or if you have side effects  Tell him or her if you are allergic to any medicine  Keep a list of the medicines, vitamins, and herbs you take  Include the amounts, and when and why you take them  Bring the list or the pill bottles to follow-up visits  Carry your medicine list with you in case of an emergency  Self-care:   · Strain your urine every time you go to the bathroom  Urinate through a strainer or a piece of thin cloth to catch the stones  Take the pieces to your follow-up visits             · If you have a stent, do not pull on it  This can cause pain and bleeding  · Apply heat  on your lower back for 20 to 30 minutes every 2 hours for as many days as directed  Heat helps decrease pain and muscle spasms  · Drink liquids as directed  You may need to drink more liquid than usual  This will help flush any remaining small pieces of stone  Liquids can also help prevent more stones from forming  Ask how much liquid to drink each day and which liquids are best for you  Do not drink caffeine  · Rest  when you feel it is needed  Slowly start to do more each day  · Eat a variety of healthy foods  Ask if you need to make changes to the foods you eat  Healthy foods include fruits, vegetables, whole-grain breads, low-fat dairy products, beans, and fish  You may need to limit nuts, chocolate, coffee, and certain green leafy vegetables  You may also need to limit meat and salt  Follow up with your healthcare provider as directed: You may need to return to have your stent removed  Write down your questions so you remember to ask them during your visits  © 2017 [facility] [facility] Information is for End User's use only and may not be sold, redistributed or otherwise used for commercial purposes  All illustrations and images included in CareNotes® are the copyrighted property of A D A M , Inc  or Reymundo Whitehead  The above information is an  only  It is not intended as medical advice for individual conditions or treatments  Talk to your doctor, nurse or pharmacist before following any medical regimen to see if it is safe and effective for you

## 2018-01-22 ENCOUNTER — HOSPITAL ENCOUNTER (OUTPATIENT)
Dept: RADIOLOGY | Facility: CLINIC | Age: 68
Discharge: HOME | End: 2018-01-22
Attending: ANESTHESIOLOGY
Payer: MEDICARE

## 2018-01-22 ENCOUNTER — APPOINTMENT (OUTPATIENT)
Dept: RADIOLOGY | Facility: CLINIC | Age: 68
End: 2018-01-22
Payer: MEDICARE

## 2018-01-22 ENCOUNTER — GENERIC CONVERSION - ENCOUNTER (OUTPATIENT)
Dept: OTHER | Facility: OTHER | Age: 68
End: 2018-01-22

## 2018-01-22 VITALS
TEMPERATURE: 98 F | HEIGHT: 68 IN | BODY MASS INDEX: 29.74 KG/M2 | SYSTOLIC BLOOD PRESSURE: 122 MMHG | WEIGHT: 196.25 LBS | HEART RATE: 60 BPM | DIASTOLIC BLOOD PRESSURE: 80 MMHG

## 2018-01-22 VITALS
HEART RATE: 64 BPM | HEIGHT: 68 IN | SYSTOLIC BLOOD PRESSURE: 128 MMHG | BODY MASS INDEX: 29.7 KG/M2 | DIASTOLIC BLOOD PRESSURE: 76 MMHG | WEIGHT: 196 LBS

## 2018-01-22 VITALS
SYSTOLIC BLOOD PRESSURE: 122 MMHG | HEIGHT: 68 IN | HEART RATE: 76 BPM | BODY MASS INDEX: 29.25 KG/M2 | WEIGHT: 193 LBS | DIASTOLIC BLOOD PRESSURE: 80 MMHG | TEMPERATURE: 98.4 F | RESPIRATION RATE: 16 BRPM

## 2018-01-22 VITALS
OXYGEN SATURATION: 98 % | TEMPERATURE: 97.8 F | SYSTOLIC BLOOD PRESSURE: 129 MMHG | DIASTOLIC BLOOD PRESSURE: 82 MMHG | RESPIRATION RATE: 18 BRPM | HEART RATE: 57 BPM

## 2018-01-22 DIAGNOSIS — M51.26 DISPLACEMENT OF LUMBAR INTERVERTEBRAL DISC WITHOUT MYELOPATHY: ICD-10-CM

## 2018-01-22 DIAGNOSIS — N20.0 CALCULUS OF KIDNEY: ICD-10-CM

## 2018-01-22 DIAGNOSIS — M54.50 LOW BACK PAIN: ICD-10-CM

## 2018-01-22 RX ORDER — LIDOCAINE HYDROCHLORIDE 10 MG/ML
5 INJECTION, SOLUTION EPIDURAL; INFILTRATION; INTRACAUDAL; PERINEURAL ONCE
Status: COMPLETED | OUTPATIENT
Start: 2018-01-22 | End: 2018-01-22

## 2018-01-22 RX ORDER — METHYLPREDNISOLONE ACETATE 80 MG/ML
120 INJECTION, SUSPENSION INTRA-ARTICULAR; INTRALESIONAL; INTRAMUSCULAR; SOFT TISSUE ONCE
Status: COMPLETED | OUTPATIENT
Start: 2018-01-22 | End: 2018-01-22

## 2018-01-22 RX ADMIN — IOHEXOL 1 ML: 300 INJECTION, SOLUTION INTRAVENOUS at 10:33

## 2018-01-22 RX ADMIN — METHYLPREDNISOLONE ACETATE 120 MG: 80 INJECTION, SUSPENSION INTRA-ARTICULAR; INTRALESIONAL; INTRAMUSCULAR; SOFT TISSUE at 10:34

## 2018-01-22 RX ADMIN — LIDOCAINE HYDROCHLORIDE 4 ML: 10 INJECTION, SOLUTION EPIDURAL; INFILTRATION; INTRACAUDAL; PERINEURAL at 10:33
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
## 2018-01-22 NOTE — DISCHARGE INSTRUCTIONS
Epidural Steroid Injection   WHAT YOU NEED TO KNOW:   An epidural steroid injection (CHRISTIAN) is a procedure to inject steroid medicine into the epidural space  The epidural space is between your spinal cord and vertebrae  Steroids reduce inflammation and fluid buildup in your spine that may be causing pain  You may be given pain medicine along with the steroids  ACTIVITY  · Do not drive or operate machinery today  · No strenuous activity today - bending, lifting, etc   · You may resume normal activites starting tomorrow - start slowly and as tolerated  · You may shower today, but no tub baths or hot tubs  · You may have numbness for several hours from the local anesthetic  Please use caution and common sense, especially with weight-bearing activities  CARE OF THE INJECTION SITE  · If you have soreness or pain, apply ice to the area today (20 minutes on/20 minutes off)  · Starting tomorrow, you may use warm, moist heat or ice if needed  · You may have an increase or change in your discomfort for 36-48 hours after your treatment  · Apply ice and continue with any pain medication you have been prescribed  · Notify [facility] if you have any of the following: redness, drainage, swelling, headache, stiff neck or fever above 100°F     SPECIAL INSTRUCTIONS  · Our office will contact you in approximately 7 days for a progress report  MEDICATIONS  · Continue to take all routine medications  · Our office may have instructed you to hold some medications  If you have a problem specifically related to your procedure, please call our office at [phone]  Problems not related to your procedure should be directed to your primary care physician

## 2018-01-23 VITALS
HEIGHT: 68 IN | BODY MASS INDEX: 29.55 KG/M2 | WEIGHT: 195 LBS | RESPIRATION RATE: 16 BRPM | TEMPERATURE: 97.8 F | SYSTOLIC BLOOD PRESSURE: 128 MMHG | HEART RATE: 61 BPM | DIASTOLIC BLOOD PRESSURE: 78 MMHG

## 2018-01-23 VITALS
BODY MASS INDEX: 28.95 KG/M2 | HEART RATE: 60 BPM | DIASTOLIC BLOOD PRESSURE: 72 MMHG | HEIGHT: 68 IN | WEIGHT: 191 LBS | SYSTOLIC BLOOD PRESSURE: 118 MMHG

## 2018-01-23 NOTE — MISCELLANEOUS
History of Present Illness  TCM Communication [facility]: The patient is being contacted for follow-up after hospitalization and Spoke to PT's spouse - Michael Slater on Monday Jan 8,2018  He was hospitalized at [facility]  The date of admission: Jan 2,2018, date of discharge: Jan 4,2018  Diagnosis: Flank Pain, Kidney stone on left side  He was discharged to home, Ureterolithiasis  Medications reviewed and updated today  He scheduled a follow up appointment  Follow-up appointments with other specialists: Luciana Lawrence, Leslie Barrios  Communication performed and completed by SHAMEKA Longoria   HPI: According to PT's spouse - Terri - PT is doing terrible - he's still in a lot of pain when he urinates  Yesterday PT was passing clots  He did have stent placement  PT has no VNA coming in  Medications were reviewed and chart updated  PT will be following up with urology for the removal of PT's stent and also to review if PT passed that kidney stone or if he will need surgery  PT also has an appointment with Kaylene Barrios due to his left flank pain  No other problems or concerns at this time  Active Problems    1  Allergic rhinitis (477 9) (J30 9)   2  Bulging lumbar disc (722 10) (M51 26)   3  Chronic GERD (530 81) (K21 9)   4  Large hiatal hernia (553 3) (K44 9)   5  Low back pain (724 2) (M54 5)   6  LUQ discomfort (789 02) (R10 12)   7  Need for Tdap vaccination (V06 1) (Z23)   8  Pain (780 96) (R52)   9  Renal lithiasis (592 0) (N20 0)    Past Medical History    1  History of arthritis (V13 4) (Z87 39)   2  History of gastroesophageal reflux (GERD) (V12 79) (Z87 19)   3  History of hypercholesterolemia (V12 29) (Z86 39)   4  History of hypertension (V12 59) (Z86 79)   5  Personal history of asthma (V12 69) (Z87 09)    Surgical History    1  History of Arthroscopy Shoulder   2  History of Complete Colonoscopy   3  History of Hallux Rigidus Correction W/ Cheilectomy 1st MTP Joint   4  History of Hernia Repair   5   History of Hernia Repair   6  History of Knee Arthroscopy (Therapeutic)   7  History of Lower Back Surgery   8  History of Neuroplasty Left Middle Finger   9  History of Rotator Cuff Repair    Family History  Mother    1  Family history of malignant neoplasm of brain (V16 8) (Z80 8)  Father    2  Family history of chronic obstructive pulmonary disease (V17 6) (Z82 5)  Maternal Grandmother    3  Family history of malignant neoplasm (V16 9) (Z80 9)  Maternal Uncle    4  Family history of Abdominal aortic aneurysm, not a candidate for repair   5  Family history of abdominal aortic aneurysm (V17 49) (Z82 49)    Social History    · Dental care, regularly   · Denied: History of Drug use   · Lives with spouse   · Living Situation: Supportive and safe   ·    · Never A Smoker   · No advance directive on file (V48 89) (Z78 9)   · No drug use   · Occasional alcohol use    Current Meds   1  Co Q 10 10 MG Oral Capsule; TAKE 1 CAPSULE 2-3 TIMES DAILY AS DIRECTED; Therapy: ([phone]) to Recorded   2  Diclofenac Potassium 50 MG Oral Tablet; take 1 tablet by mouth twice a day; Therapy: 64IJI9661 to (Evaluate:63Syj9544)  Requested for: 48Grp3906; Last   Rx:53Qbz6098 Ordered   3  Hydrocodone-Acetaminophen 5-325 MG Oral Tablet; TAKE 1 TABLET TWICE DAILY PRN   for Pain; Therapy: 42BFQ5073 to (Evaluate:05Vsi7917)  Requested for: 83UCS1267; Last   Rx:32Bgr3597 Ordered   4  Losartan Potassium-HCTZ 50-12 5 MG Oral Tablet; Take 1 tablet daily; Therapy: 66JDD5072 to (Last Rx:51Ycq4497)  Requested for: 52PZW8736 Ordered   5  Mometasone Furoate 0 1 % External Cream; APPLY SPARINGLY TO AFFECTED AREAS   TWICE DAILY  (AM AND PM); Therapy: 59IDJ4072 to (Shan Caul)  Requested for: 29VRA9529; Last   Rx:02Oct2015 Ordered   6  Mometasone Furoate 50 MCG/ACT Nasal Suspension; USE 2 SPRAYS IN EACH   NOSTRIL ONCE DAILY; Therapy: 00MSQ5269 to (Last Rx:77Zjz1970)  Requested for: 29PBZ6973 Ordered   7   Omeprazole 20 MG Oral Capsule Delayed Release; take 1 capsule daily; Therapy: 06XYJ6862 to (Evaluate:22Aaf1328) Recorded   8  Phenazopyridine HCl - 100 MG Oral Tablet; TAKE 1 TABLET 3 TIMES DAILY AFTER   MEALS AS NEEDED; Therapy: 44RUG6187 to Recorded   9  ProAir  (90 Base) MCG/ACT Inhalation Aerosol Solution; INHALE 2 PUFFS 4   times daily PRN  Requested for: 44TVK1281; Last Rx:55Kmk8474 Ordered   10  Promethazine HCl - 25 MG Oral Tablet; TAKE 1 TABLET EVERY 6 HOURS AS NEEDED    FOR NAUSEA; Therapy: 76VLA7433 to (EXFYQSLK:25NAR7597) Recorded   11  Simvastatin 40 MG Oral Tablet; Take 1 tablet daily; Therapy: 98LVZ5117 to (Last Rx:87Xad0726)  Requested for: 56BTQ2302 Ordered   12  Tamsulosin HCl - 0 4 MG Oral Capsule; take 1 capsule by mouth at bedtime; Therapy: 82KLB5786 to (William Mcwilliams)  Requested for: 21KDL5351; Last    Rx:42Dqd9863 Ordered   13  Zolpidem Tartrate 10 MG Oral Tablet; TAKE 1 TABLET AT BEDTIME AS NEEDED FOR    SLEEP; Last Rx:49Tni2350 Ordered    Allergies    1  Demerol TABS   2  Nasarel   3  Prinivil TABS    4  No Known Environmental Allergies   5   No Known Food Allergies    Future Appointments    Date/Time Provider Specialty Site   01/11/2018 10:30 AM Rajan Dickinson DO Internal Medicine [facility] INTERNAL MED   01/22/2018 09:00 AM Sherryle Lurie, DO Pain Management [facility] SPINE   01/12/2018 10:45 AM Melanie Mathew MD General Surgery [facility]GICAL [facility]   01/18/2018 07:30 AM Gavin Jacob MD Urology [facility] OR     Signatures   Electronically signed by : Victoria Chanel DO; Jan 9 2018 12:37PM EST                       (Author)
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
## 2018-01-23 NOTE — RESULT NOTES
Message   Recorded as Task   Date: 12/28/2017 09:42 AM, Created By: Toma Lozano   Task Name: Follow Up   Assigned To: [facility] [place] procedure,Team   Regarding Patient: CED RODARTE, Status: Active   [facility] - 28 Dec 2017 9:42 AM     TASK CREATED  S/P LESI #1 on 12/21/2017 w/SL Allison Arnold  LESI #2 scheduled on 01/22/2018    Please call patient on 12/28/2017   Toma Lozano - 28 Dec 2017 9:45 AM     TASK EDITED  Patient states he is doing good  Patient got 75-80% relief  Conf next injeciton scheduled on 01/22/2018     Solomon Glasgow - 28 Dec 2017 10:14 AM     TASK REPLIED TO: Previously Assigned To Solomon Glasgow  aware

## 2018-01-24 VITALS
BODY MASS INDEX: 28.95 KG/M2 | HEART RATE: 65 BPM | WEIGHT: 191 LBS | HEIGHT: 68 IN | OXYGEN SATURATION: 97 % | SYSTOLIC BLOOD PRESSURE: 128 MMHG | DIASTOLIC BLOOD PRESSURE: 80 MMHG

## 2018-01-24 VITALS
SYSTOLIC BLOOD PRESSURE: 120 MMHG | OXYGEN SATURATION: 94 % | BODY MASS INDEX: 29.1 KG/M2 | HEIGHT: 68 IN | DIASTOLIC BLOOD PRESSURE: 70 MMHG | TEMPERATURE: 97.5 F | RESPIRATION RATE: 18 BRPM | HEART RATE: 60 BPM | WEIGHT: 192 LBS

## 2018-01-25 ENCOUNTER — HOSPITAL ENCOUNTER (OUTPATIENT)
Dept: RADIOLOGY | Facility: HOSPITAL | Age: 68
Discharge: HOME | End: 2018-01-25
Attending: UROLOGY
Payer: MEDICARE

## 2018-01-25 DIAGNOSIS — N20.0 CALCULUS OF KIDNEY: ICD-10-CM

## 2018-01-25 PROCEDURE — 74018 RADEX ABDOMEN 1 VIEW: CPT

## 2018-01-26 ENCOUNTER — PROCEDURE VISIT (OUTPATIENT)
Dept: UROLOGY | Facility: AMBULATORY SURGERY CENTER | Age: 68
End: 2018-01-26
Payer: MEDICARE

## 2018-01-26 VITALS
HEART RATE: 80 BPM | HEIGHT: 69 IN | WEIGHT: 197.6 LBS | DIASTOLIC BLOOD PRESSURE: 86 MMHG | SYSTOLIC BLOOD PRESSURE: 140 MMHG | BODY MASS INDEX: 29.27 KG/M2

## 2018-01-26 DIAGNOSIS — N20.0 NEPHROLITHIASIS: Primary | ICD-10-CM

## 2018-01-26 PROCEDURE — 52310 CYSTOSCOPY AND TREATMENT: CPT | Performed by: UROLOGY

## 2018-01-26 RX ORDER — CEPHALEXIN 500 MG/1
500 CAPSULE ORAL EVERY 8 HOURS SCHEDULED
Qty: 9 CAPSULE | Refills: 0 | Status: SHIPPED | OUTPATIENT
Start: 2018-01-26 | End: 2018-01-29

## 2018-01-26 NOTE — PROGRESS NOTES
Juanjo Paulino is a 66-year-old male with a left renal calculus  He previously underwent left ureteral stent insertion by Dr Dieudonne Blanchard   He then underwent left ESWL with myself  He was scheduled for left stent removal with Dr Lakshmi Keith, however, based on appointment availability, the patient followed in [place] for stent removal today  Cystoscopy with stent removal procedure note: The patient returns to the office today to undergo cystoscopy with stent removal  Risk and benefits of the procedure were discussed and informed consent was obtained  The patient was placed in the modified supine position  Genitalia was prepped and draped in a sterile fashion  Viscous lidocaine was used for local anesthesia  The flexible cystoscope was passed  The bladder was inspected  The stent was identified coming from the left ureteral orifice  The stent was grasped and easily removed without difficulty  Overall the patient tolerated the procedure

## 2018-02-08 DIAGNOSIS — N13.8 ENLARGED PROSTATE WITH URINARY OBSTRUCTION: Primary | ICD-10-CM

## 2018-02-08 DIAGNOSIS — N40.1 ENLARGED PROSTATE WITH URINARY OBSTRUCTION: Primary | ICD-10-CM

## 2018-02-08 RX ORDER — TAMSULOSIN HYDROCHLORIDE 0.4 MG/1
CAPSULE ORAL
Qty: 90 CAPSULE | Refills: 1 | Status: SHIPPED | OUTPATIENT
Start: 2018-02-08 | End: 2018-08-28 | Stop reason: SDUPTHER

## 2018-03-07 NOTE — PROCEDURES
Procedure    Procedure: Lumbar epidural steroid injection under fluoroscopic guidance  L3-4 directed towards the left  Diagnosis: Lumbar disc herniation with low back pain  Indication for Fluoroscopy: This procedure requires the precise placement of the Tuohy needle into the epidural space  It is the only way to accurately perform the injection  Medical Necessity: Failure of conservative management  Procedure Note: After fully informed written consent was obtained, the patient was brought into the Procedure Room and placed prone on the fluoroscopy table  A pillow was placed under the abdomen to lessen the lumbar lordosis  After Chloraprep and sterile drapes, C-arm fluoroscopy was utilized to identify the proper interspace  Using a 25 gauge needle 1% Lidocaine was used for local anesthetic  A 20 gauge Tuohy needle was then placed by loss of resistance technique on the first attempt  After negative aspiration for heme and CSF, Omnipaque 300 was injected confirming proper placement in the epidural space  AP projections were obtained  Again, after negative aspiration methylprednisolone diluted in preservative free saline was then injected  The needle was then withdrawn, the back cleansed and a band-aid placed  Complications: None  Disposition: Motor function was intact  Vital signs stable  The patient was discharged home with a   The discharge instruction sheet was given to the patient  The patient was discharged with instructions to call immediately if there are any complications        Signatures   Electronically signed by : Mariana Corral DO; Dec 21 2017 10:03AM EST                       (Author)

## 2018-04-09 ENCOUNTER — TELEPHONE (OUTPATIENT)
Dept: RADIOLOGY | Facility: CLINIC | Age: 68
End: 2018-04-09

## 2018-04-09 NOTE — TELEPHONE ENCOUNTER
Pt calling states he just returned from [place] and would like to get another back injection  Pt was seen in consult 12/19/2017 then had an LESI on 12/21/17, pt calling c/o LBP more on the right as he states he has hardware on that side and the pain is right above that area, Non-radiating  Advised pt will need to discuss w/provider and rtc  Pt denies bld thinners, denies antbx, states he only takes the Diclofenac as needed

## 2018-04-10 NOTE — TELEPHONE ENCOUNTER
Proc scheduled for 4/13/2018, pt aware need for , npo 1 hr prior, wear sweatpants, if becomes ill/fever/antbx call to r/s, pt verbalized understanding

## 2018-04-13 ENCOUNTER — HOSPITAL ENCOUNTER (OUTPATIENT)
Dept: RADIOLOGY | Facility: CLINIC | Age: 68
Discharge: HOME | End: 2018-04-13
Attending: ANESTHESIOLOGY | Admitting: ANESTHESIOLOGY
Payer: MEDICARE

## 2018-04-13 VITALS
TEMPERATURE: 98.7 F | HEART RATE: 60 BPM | SYSTOLIC BLOOD PRESSURE: 119 MMHG | DIASTOLIC BLOOD PRESSURE: 76 MMHG | RESPIRATION RATE: 20 BRPM | OXYGEN SATURATION: 96 %

## 2018-04-13 DIAGNOSIS — M25.511 CHRONIC RIGHT SHOULDER PAIN: Primary | ICD-10-CM

## 2018-04-13 DIAGNOSIS — M54.50 LOW BACK PAIN: ICD-10-CM

## 2018-04-13 DIAGNOSIS — M51.26 DISPLACEMENT OF LUMBAR INTERVERTEBRAL DISC WITHOUT MYELOPATHY: ICD-10-CM

## 2018-04-13 DIAGNOSIS — G89.29 CHRONIC RIGHT SHOULDER PAIN: Primary | ICD-10-CM

## 2018-04-13 PROCEDURE — 73030 X-RAY EXAM OF SHOULDER: CPT

## 2018-04-13 PROCEDURE — 62282 TREAT SPINAL CANAL LESION: CPT | Performed by: ANESTHESIOLOGY

## 2018-04-13 RX ORDER — LIDOCAINE HYDROCHLORIDE 10 MG/ML
5 INJECTION, SOLUTION EPIDURAL; INFILTRATION; INTRACAUDAL; PERINEURAL ONCE
Status: COMPLETED | OUTPATIENT
Start: 2018-04-13 | End: 2018-04-13

## 2018-04-13 RX ORDER — METHYLPREDNISOLONE ACETATE 80 MG/ML
160 INJECTION, SUSPENSION INTRA-ARTICULAR; INTRALESIONAL; INTRAMUSCULAR; PARENTERAL; SOFT TISSUE ONCE
Status: COMPLETED | OUTPATIENT
Start: 2018-04-13 | End: 2018-04-13

## 2018-04-13 RX ADMIN — IOHEXOL 1 ML: 300 INJECTION, SOLUTION INTRAVENOUS at 10:45

## 2018-04-13 RX ADMIN — METHYLPREDNISOLONE ACETATE 160 MG: 80 INJECTION, SUSPENSION INTRA-ARTICULAR; INTRALESIONAL; INTRAMUSCULAR; SOFT TISSUE at 10:36

## 2018-04-13 RX ADMIN — LIDOCAINE HYDROCHLORIDE 3 ML: 10 INJECTION, SOLUTION EPIDURAL; INFILTRATION; INTRACAUDAL; PERINEURAL at 10:36

## 2018-04-13 NOTE — H&P
History of Present Illness: The patient is a 69 y o  male who presents with complaints of low back pain      Patient Active Problem List   Diagnosis    Ureterolithiasis    Left upper quadrant pain    Lumbar disc disease    Gastroesophageal reflux disease without esophagitis    Large hiatal hernia    Nephrolithiasis       Past Medical History:   Diagnosis Date    Arthritis     Asthma     Chronic pain     back    GERD (gastroesophageal reflux disease)     Hiatal hernia     Hyperlipidemia     Kidney stone        Past Surgical History:   Procedure Laterality Date    BACK SURGERY      x2    CARPAL TUNNEL RELEASE      COLONOSCOPY      CYSTOSCOPY W/ URETERAL STENT REMOVAL  01/26/2018    KNEE SURGERY Bilateral     LITHOTRIPSY      PERIPHERAL NEUROPLASTY OF FINGER / HAND / ARM      left middle finger    AL CYSTO/URETERO W/LITHOTRIPSY &INDWELL STENT INSRT Left 1/3/2018    Procedure: CYSTOSCOPY , RETROGRADE PYELOGRAM AND INSERTION STENT URETERAL, FULGERATION;  Surgeon: Tim Strickland MD;  [facility];  Service: Urology    AL FRAGMENT KIDNEY STONE/ ESWL Left 1/18/2018    Procedure: ESWL;  Surgeon: Dee Dee Méndez MD;  [facility];  Service: Urology    SHOULDER SURGERY           Current Outpatient Prescriptions:     albuterol (PROAIR HFA) 90 mcg/act inhaler, Inhale every 4 (four) hours as needed  , Disp: , Rfl:     Coenzyme Q10 (COQ-10) 10 MG CAPS, Take by mouth daily  , Disp: , Rfl:     diclofenac potassium (CATAFLAM) 50 mg tablet, Take by mouth as needed  , Disp: , Rfl:     losartan-hydrochlorothiazide (HYZAAR) 50-12 5 mg per tablet, Take by mouth daily  , Disp: , Rfl:     Omeprazole 20 MG TBEC, Take by mouth daily  , Disp: , Rfl:     promethazine (PHENERGAN) 25 mg tablet, Take 1 tablet by mouth every 8 (eight) hours as needed for nausea or vomiting, Disp: 20 tablet, Rfl: 0    simvastatin (ZOCOR) 40 mg tablet, Take by mouth, Disp: , Rfl:     tamsulosin (FLOMAX) 0 4 mg, I capsule daily, Disp: 90 capsule, Rfl: 1    zolpidem (AMBIEN) 10 mg tablet, Take by mouth daily at bedtime as needed  , Disp: , Rfl:     Allergies   Allergen Reactions    Meperidine GI Intolerance    Lisinopril      Other reaction(s): Cough       Physical Exam:   Vitals:    04/13/18 1012   BP: 118/77   Pulse: 73   Resp: 20   Temp: 98 7 °F (37 1 °C)   SpO2: 96%     General: Awake, Alert, Oriented x 3, Mood and affect appropriate  Respiratory: Respirations even and unlabored  Cardiovascular: Peripheral pulses intact; no edema  Musculoskeletal Exam:  Decreased range of motion lumbar spine    ASA Score: II    Assessment:   1  Chronic right shoulder pain    2  Displacement of lumbar intervertebral disc without myelopathy    3   Low back pain        Plan: LESI

## 2018-04-13 NOTE — DISCHARGE INSTRUCTIONS
Epidural Steroid Injection   WHAT YOU NEED TO KNOW:   An epidural steroid injection (CHRISTIAN) is a procedure to inject steroid medicine into the epidural space  The epidural space is between your spinal cord and vertebrae  Steroids reduce inflammation and fluid buildup in your spine that may be causing pain  You may be given pain medicine along with the steroids  ACTIVITY  · Do not drive or operate machinery today  · No strenuous activity today - bending, lifting, etc   · You may resume normal activites starting tomorrow - start slowly and as tolerated  · You may shower today, but no tub baths or hot tubs  · You may have numbness for several hours from the local anesthetic  Please use caution and common sense, especially with weight-bearing activities  CARE OF THE INJECTION SITE  · If you have soreness or pain, apply ice to the area today (20 minutes on/20 minutes off)  · Starting tomorrow, you may use warm, moist heat or ice if needed  · You may have an increase or change in your discomfort for 36-48 hours after your treatment  · Apply ice and continue with any pain medication you have been prescribed  · Notify [facility] if you have any of the following: redness, drainage, swelling, headache, stiff neck or fever above 100°F     SPECIAL INSTRUCTIONS  · Our office will contact you in approximately 7 days for a progress report  MEDICATIONS  · Continue to take all routine medications  · Our office may have instructed you to hold some medications  If you have a problem specifically related to your procedure, please call our office at [phone]  Problems not related to your procedure should be directed to your primary care physician

## 2018-04-16 ENCOUNTER — TELEPHONE (OUTPATIENT)
Dept: PAIN MEDICINE | Facility: CLINIC | Age: 68
End: 2018-04-16

## 2018-04-16 NOTE — TELEPHONE ENCOUNTER
----- Message from Nicole Allen DO sent at 4/16/2018  8:12 AM EDT -----  Patient's shoulder reveals severe degenerative changes, continue to follow-up with orthopedics as planned

## 2018-04-16 NOTE — TELEPHONE ENCOUNTER
S/w pt, advised of above  Pt verbalized understanding and appreciation  Will fu w/ Dr Lakshmi Cordova as discussed

## 2018-04-19 ENCOUNTER — OFFICE VISIT (OUTPATIENT)
Dept: OBGYN CLINIC | Facility: OTHER | Age: 68
End: 2018-04-19
Payer: MEDICARE

## 2018-04-19 VITALS
DIASTOLIC BLOOD PRESSURE: 88 MMHG | SYSTOLIC BLOOD PRESSURE: 138 MMHG | HEART RATE: 55 BPM | WEIGHT: 200.4 LBS | BODY MASS INDEX: 29.59 KG/M2

## 2018-04-19 DIAGNOSIS — M19.011 GLENOHUMERAL ARTHRITIS, RIGHT: Primary | ICD-10-CM

## 2018-04-19 PROCEDURE — 20610 DRAIN/INJ JOINT/BURSA W/O US: CPT | Performed by: ORTHOPAEDIC SURGERY

## 2018-04-19 PROCEDURE — 99204 OFFICE O/P NEW MOD 45 MIN: CPT | Performed by: ORTHOPAEDIC SURGERY

## 2018-04-19 RX ORDER — BUPIVACAINE HYDROCHLORIDE 2.5 MG/ML
2 INJECTION, SOLUTION INFILTRATION; PERINEURAL
Status: COMPLETED | OUTPATIENT
Start: 2018-04-19 | End: 2018-04-19

## 2018-04-19 RX ORDER — HYDROCODONE BITARTRATE AND ACETAMINOPHEN 5; 325 MG/1; MG/1
1 TABLET ORAL EVERY 6 HOURS PRN
COMMUNITY
End: 2019-04-29 | Stop reason: SDUPTHER

## 2018-04-19 RX ORDER — BETAMETHASONE SODIUM PHOSPHATE AND BETAMETHASONE ACETATE 3; 3 MG/ML; MG/ML
6 INJECTION, SUSPENSION INTRA-ARTICULAR; INTRALESIONAL; INTRAMUSCULAR; SOFT TISSUE
Status: COMPLETED | OUTPATIENT
Start: 2018-04-19 | End: 2018-04-19

## 2018-04-19 RX ADMIN — BETAMETHASONE SODIUM PHOSPHATE AND BETAMETHASONE ACETATE 6 MG: 3; 3 INJECTION, SUSPENSION INTRA-ARTICULAR; INTRALESIONAL; INTRAMUSCULAR; SOFT TISSUE at 09:04

## 2018-04-19 RX ADMIN — BUPIVACAINE HYDROCHLORIDE 2 ML: 2.5 INJECTION, SOLUTION INFILTRATION; PERINEURAL at 09:04

## 2018-04-19 NOTE — PATIENT INSTRUCTIONS
Weight bearing as tolerated right upper extremity    Follow up in office in 2 months for repeat exam and further discussion regarding treatment options

## 2018-04-19 NOTE — PROGRESS NOTES
Assessment/Plan:  Assessment/Plan     67-year-old male with right shoulder pain secondary to right glenohumeral osteoarthritis  Patient has significant pain likely related to his osteoarthritis however there is also concern for possible rotator cuff deficiency based on his clinical exam and history of rotator cuff repair with right shoulder pain  Extensive discussion was had with patient what concerning possible future arthroplasty options  Patient was provided with right glenohumeral corticosteroid injection at today's office visit in attempt to relieve his symptoms  This was performed without complication patient tolerated procedure well  Patient will be seen back in office in 2 months if his symptoms persist at which time further discussion will be had regarding further treatment options and advanced imaging such as CT arthrogram     Subjective:   Patient ID: Olvin Umana is a 69 y o  male  HPI    67-year-old right-hand-dominant male presents for evaluation of right shoulder pain  Patient states that he has had pain in her shoulder for many years however his pain has progressed recently  Pain is well localized to the anterior aspect of the shoulder  Pain is described as deep and occasionally sharp  Pain is made worse with direct palpation affected area and with increased activity particularly when riding his motorcycle or reaching for overhead objects  Pain subsides somewhat at rest and with administration of vicodin  Patient denies any numbness or tingling  Patient has a history of open right rotator cuff repair performed in 2002 and multiple subsequent arthroscopic debridements  He has not had any recent corticosteroid injections or physical therapy  Patient is a retired       The following portions of the patient's history were reviewed and updated as appropriate: allergies, current medications, past family history, past medical history, past social history, past surgical history and problem list     Review of Systems   All other systems reviewed and are negative  Objective:  Ortho Exam   Right upper extremity  Skin intact,well-healed shoulder surgical incision, no erythema, no ecchymosis  Tenderness to palpation over anterior shoulder  Active range of motion:  120 degrees forward flexion, 100 degrees abduction, 60 degrees external rotation  4/5 strength with AB duction, 5/5 strength external rotation  Negative Kaya's, negative hornblower's, negative bear hug test  Distal extremity warm sensate    Physical Exam   Constitutional: He is oriented to person, place, and time  He appears well-developed and well-nourished  HENT:   Head: Normocephalic and atraumatic  Eyes: Pupils are equal, round, and reactive to light  Cardiovascular: Normal rate  Pulmonary/Chest: Effort normal and breath sounds normal    Neurological: He is alert and oriented to person, place, and time  Skin: Skin is warm and dry  Psychiatric: He has a normal mood and affect  I have personally reviewed pertinent films in PACS  X-rays of right shoulder taken reviewed at today's visit    They reveal degenerative changes of glenohumeral joint with osteophyte formation along inferior medial humeral head, no superior migration of femoral head    Large joint arthrocentesis  Date/Time: 4/19/2018 9:04 AM  Consent given by: patient  Timeout: Immediately prior to procedure a time out was called to verify the correct patient, procedure, equipment, support staff and site/side marked as required   Supporting Documentation  Indications: pain   Procedure Details  Location: shoulder - R glenohumeral  Preparation: Patient was prepped and draped in the usual sterile fashion  Needle size: 22 G  Ultrasound guidance: no  Approach: posterior  Medications administered: 2 mL bupivacaine 0 25 %; 6 mg betamethasone acetate-betamethasone sodium phosphate 6 (3-3) mg/mL    Patient tolerance: patient tolerated the procedure well with no immediate complications  Dressing:  Sterile dressing applied

## 2018-04-20 ENCOUNTER — TELEPHONE (OUTPATIENT)
Dept: PAIN MEDICINE | Facility: CLINIC | Age: 68
End: 2018-04-20

## 2018-04-20 NOTE — TELEPHONE ENCOUNTER
Patient states he got 50% relief and his pain level is a 0/10   Conf f/u       S/P LESI on 04/13/18 w/SL [place]  4 wk f/u scheduled on 05/11/18

## 2018-05-11 ENCOUNTER — OFFICE VISIT (OUTPATIENT)
Dept: PAIN MEDICINE | Facility: CLINIC | Age: 68
End: 2018-05-11
Payer: MEDICARE

## 2018-05-11 VITALS — WEIGHT: 197 LBS | SYSTOLIC BLOOD PRESSURE: 130 MMHG | BODY MASS INDEX: 29.09 KG/M2 | DIASTOLIC BLOOD PRESSURE: 70 MMHG

## 2018-05-11 DIAGNOSIS — M47.816 LUMBAR SPONDYLOSIS: Primary | ICD-10-CM

## 2018-05-11 PROCEDURE — 99214 OFFICE O/P EST MOD 30 MIN: CPT | Performed by: ANESTHESIOLOGY
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
## 2018-05-18 ENCOUNTER — HOSPITAL ENCOUNTER (OUTPATIENT)
Dept: RADIOLOGY | Facility: CLINIC | Age: 68
Discharge: HOME | End: 2018-05-18
Admitting: ANESTHESIOLOGY
Payer: MEDICARE

## 2018-05-18 VITALS
OXYGEN SATURATION: 97 % | DIASTOLIC BLOOD PRESSURE: 89 MMHG | TEMPERATURE: 97.6 F | HEART RATE: 58 BPM | RESPIRATION RATE: 18 BRPM | SYSTOLIC BLOOD PRESSURE: 138 MMHG

## 2018-05-18 DIAGNOSIS — M47.816 LUMBAR SPONDYLOSIS: ICD-10-CM

## 2018-05-18 PROCEDURE — 64493 INJ PARAVERT F JNT L/S 1 LEV: CPT | Performed by: ANESTHESIOLOGY

## 2018-05-18 PROCEDURE — 64494 INJ PARAVERT F JNT L/S 2 LEV: CPT | Performed by: ANESTHESIOLOGY

## 2018-05-18 RX ORDER — BUPIVACAINE HCL/PF 2.5 MG/ML
10 VIAL (ML) INJECTION ONCE
Status: COMPLETED | OUTPATIENT
Start: 2018-05-18 | End: 2018-05-18

## 2018-05-18 RX ADMIN — BUPIVACAINE HYDROCHLORIDE 7 ML: 2.5 INJECTION, SOLUTION EPIDURAL; INFILTRATION; INTRACAUDAL at 10:41

## 2018-05-18 NOTE — H&P
History of Present Illness: The patient is a 69 y o  male who presents with complaints of low back pain      Patient Active Problem List   Diagnosis    Ureterolithiasis    Left upper quadrant pain    Lumbar disc disease    Gastroesophageal reflux disease without esophagitis    Large hiatal hernia    Nephrolithiasis       Past Medical History:   Diagnosis Date    Arthritis     Asthma     Chronic pain     back    GERD (gastroesophageal reflux disease)     Hiatal hernia     Hyperlipidemia     Kidney stone        Past Surgical History:   Procedure Laterality Date    BACK SURGERY      x2    CARPAL TUNNEL RELEASE      COLONOSCOPY      CYSTOSCOPY W/ URETERAL STENT REMOVAL  01/26/2018    KNEE SURGERY Bilateral     LITHOTRIPSY      PERIPHERAL NEUROPLASTY OF FINGER / HAND / ARM      left middle finger    [place] CYSTO/URETERO W/LITHOTRIPSY &INDWELL STENT INSRT Left 1/3/2018    Procedure: CYSTOSCOPY , RETROGRADE PYELOGRAM AND INSERTION STENT URETERAL, FULGERATION;  Surgeon: Beto Andre MD;  [facility];  Service: Urology    [place] FRAGMENT KIDNEY STONE/ ESWL Left 1/18/2018    Procedure: ESWL;  Surgeon: Remington Kwan MD;  [facility];  Service: Urology    SHOULDER SURGERY           Current Outpatient Prescriptions:     albuterol (PROAIR HFA) 90 mcg/act inhaler, Inhale every 4 (four) hours as needed  , Disp: , Rfl:     Coenzyme Q10 (COQ-10) 10 MG CAPS, Take by mouth daily  , Disp: , Rfl:     HYDROcodone-acetaminophen (NORCO) 5-325 mg per tablet, Take 1 tablet by mouth every 6 (six) hours as needed for pain, Disp: , Rfl:     losartan-hydrochlorothiazide (HYZAAR) 50-12 5 mg per tablet, Take by mouth daily  , Disp: , Rfl:     Omeprazole 20 MG TBEC, Take by mouth daily  , Disp: , Rfl:     simvastatin (ZOCOR) 40 mg tablet, Take by mouth, Disp: , Rfl:     tamsulosin (FLOMAX) 0 4 mg, I capsule daily, Disp: 90 capsule, Rfl: 1    zolpidem (AMBIEN) 10 mg tablet, Take by mouth daily at bedtime as needed  , Disp: , Rfl:   No current facility-administered medications for this encounter  Allergies   Allergen Reactions    Meperidine GI Intolerance    Lisinopril      Other reaction(s): Cough       Physical Exam:   Vitals:    05/18/18 1033   BP: 129/77   Pulse: (!) 54   Resp: 18   Temp: 97 6 °F (36 4 °C)   SpO2: 97%     General: Awake, Alert, Oriented x 3, Mood and affect appropriate  Respiratory: Respirations even and unlabored  Cardiovascular: Peripheral pulses intact; no edema  Musculoskeletal Exam:  Decreased range of motion lumbar spine    ASA Score: II    Assessment:   1   Lumbar spondylosis        Plan: Bilateral L3, L4, and L5 dorsal ramus medial branch block #1

## 2018-05-18 NOTE — DISCHARGE INSTRUCTIONS

## 2018-05-22 ENCOUNTER — TELEPHONE (OUTPATIENT)
Dept: RADIOLOGY | Facility: MEDICAL CENTER | Age: 68
End: 2018-05-22

## 2018-05-23 NOTE — TELEPHONE ENCOUNTER
Left message for patient to call back to schedule; please transfer to [facility] procedure scheduling line

## 2018-05-23 NOTE — TELEPHONE ENCOUNTER
Pt returned call, scheduled B/L L3-4 MBB#2 on 5/31/18  Went over pre procedure instructions, NPO 1 hr prior, if sick or on abx needs to call to rs, wear loose, comf clothing- no buttons/zippers, needs   Pt verbalized understanding

## 2018-05-24 ENCOUNTER — OFFICE VISIT (OUTPATIENT)
Dept: UROLOGY | Facility: AMBULATORY SURGERY CENTER | Age: 68
End: 2018-05-24
Payer: MEDICARE

## 2018-05-24 VITALS
WEIGHT: 199.8 LBS | HEART RATE: 66 BPM | HEIGHT: 69 IN | BODY MASS INDEX: 29.59 KG/M2 | DIASTOLIC BLOOD PRESSURE: 86 MMHG | SYSTOLIC BLOOD PRESSURE: 130 MMHG

## 2018-05-24 DIAGNOSIS — Z12.5 SCREENING FOR PROSTATE CANCER: Primary | ICD-10-CM

## 2018-05-24 DIAGNOSIS — N20.0 CALCULUS OF KIDNEY: ICD-10-CM

## 2018-05-24 PROCEDURE — 99213 OFFICE O/P EST LOW 20 MIN: CPT | Performed by: UROLOGY

## 2018-05-24 NOTE — PROGRESS NOTES
5/24/2018    Ike Wang  1950  [id number]        Assessment  Status post left ESWL, new onset right-sided flank pain, prostate cancer screening      Discussion  The patient reports right-sided flank pain suspicious for new onset nephrolithiasis  I recommend obtaining a KUB as well as a retroperitoneal ultrasound  I stressed the importance of hydration  He will return in follow-up in the next 2 weeks after the KUB and ultrasound have been performed  I will also obtain a PSA for prostate cancer screening  History of Present Illness  69 y o  male with a history of left-sided nephrolithiasis status post left stent/ESW L  His left stent has since been removed  Yesterday the patient developed mild right-sided flank pain and felt very nauseous  He did not vomit  The sensation is since resolved  He is concerned that he could be passing a right-sided stone  He did not have imaging since January 2018  His last PSA level from April 2017 is 2 9  There is no family history of prostate cancer  AUA Symptom Score      Review of Systems  Review of Systems   Constitutional: Negative  HENT: Negative  Eyes: Negative  Respiratory: Negative  Cardiovascular: Negative  Gastrointestinal: Negative  Endocrine: Negative  Genitourinary: Positive for flank pain  Skin: Negative  Allergic/Immunologic: Negative  Neurological: Negative  Hematological: Negative  Psychiatric/Behavioral: Negative            Past Medical History  Past Medical History:   Diagnosis Date    Arthritis     Asthma     Chronic pain     back    GERD (gastroesophageal reflux disease)     Hiatal hernia     Hyperlipidemia     Kidney stone        Past Social History  Past Surgical History:   Procedure Laterality Date    BACK SURGERY      x2    CARPAL TUNNEL RELEASE      COLONOSCOPY      CYSTOSCOPY W/ URETERAL STENT REMOVAL  01/26/2018    KNEE SURGERY Bilateral     LITHOTRIPSY      PERIPHERAL NEUROPLASTY OF FINGER / HAND / Pinkie Everetterill      left middle finger    NH CYSTO/URETERO W/LITHOTRIPSY &INDWELL STENT INSRT Left 1/3/2018    Procedure: CYSTOSCOPY , RETROGRADE PYELOGRAM AND INSERTION STENT Janett Poole;  Surgeon: Marlene Dela Cruz MD;  [facility];  Service: Urology    NH FRAGMENT KIDNEY STONE/ ESWL Left 1/18/2018    Procedure: ESWL;  Surgeon: Matt Lee MD;  [facility];  Service: Urology    SHOULDER SURGERY         Past Family History  Family History   Problem Relation Age of Onset    Diabetes Mother     Hypertension Mother     Brain cancer Mother     Hypertension Sister     Nephrolithiasis Sister        Past Social history  Social History     Social History    Marital status: /Civil Union     Spouse name: N/A    Number of children: N/A    Years of education: N/A     Occupational History    Not on file  Social History Main Topics    Smoking status: Never Smoker    Smokeless tobacco: Never Used    Alcohol use Yes      Comment: social drinker    Drug use: No    Sexual activity: Yes     Other Topics Concern    Not on file     Social History Narrative    No narrative on file       Current Medications  Current Outpatient Prescriptions   Medication Sig Dispense Refill    albuterol (PROAIR HFA) 90 mcg/act inhaler Inhale every 4 (four) hours as needed        Coenzyme Q10 (COQ-10) 10 MG CAPS Take by mouth daily        HYDROcodone-acetaminophen (NORCO) 5-325 mg per tablet Take 1 tablet by mouth every 6 (six) hours as needed for pain      losartan-hydrochlorothiazide (HYZAAR) 50-12 5 mg per tablet Take by mouth daily        Omeprazole 20 MG TBEC Take by mouth daily        simvastatin (ZOCOR) 40 mg tablet Take by mouth      tamsulosin (FLOMAX) 0 4 mg I capsule daily 90 capsule 1    zolpidem (AMBIEN) 10 mg tablet Take by mouth daily at bedtime as needed         No current facility-administered medications for this visit          Allergies  Allergies Allergen Reactions    Meperidine GI Intolerance    Lisinopril      Other reaction(s): Cough       Past Medical History, Social History, Family History, medications and allergies were reviewed  Vitals  Vitals:    05/24/18 0932   BP: 130/86   BP Location: Left arm   Patient Position: Sitting   Cuff Size: Adult   Pulse: 66   Weight: 90 6 kg (199 lb 12 8 oz)   Height: 5' 9" (1 753 m)       Physical Exam  Physical Exam    On examination he is in no acute distress  His abdomen is soft nontender nondistended   examination reveals no CVA tenderness  Skin is warm  Extremities without edema    Neurologic is grossly intact and nonfocal   Gait normal   Affect normal    Results  Lab Results   Component Value Date    PSA 2 9 04/18/2017    PSA 2 2 10/21/2015    PSA 2 0 09/24/2014     Lab Results   Component Value Date    GLUCOSE 86 01/03/2018    CALCIUM 7 7 (L) 01/03/2018     01/03/2018    K 4 1 01/03/2018    CO2 26 01/03/2018     01/03/2018    BUN 17 01/03/2018    CREATININE 0 97 01/03/2018     Lab Results   Component Value Date    WBC 15 52 (H) 01/04/2018    HGB 13 4 01/04/2018    HCT 39 6 01/04/2018    MCV 84 01/04/2018     01/04/2018         Office Urine Dip  No results found for this or any previous visit (from the past 1 hour(s)) ]

## 2018-05-30 ENCOUNTER — HOSPITAL ENCOUNTER (OUTPATIENT)
Dept: RADIOLOGY | Facility: HOSPITAL | Age: 68
Discharge: HOME | End: 2018-05-30
Attending: UROLOGY
Payer: MEDICARE

## 2018-05-30 ENCOUNTER — TELEPHONE (OUTPATIENT)
Dept: UROLOGY | Facility: MEDICAL CENTER | Age: 68
End: 2018-05-30

## 2018-05-30 ENCOUNTER — TRANSCRIBE ORDERS (OUTPATIENT)
Dept: ADMINISTRATIVE | Facility: HOSPITAL | Age: 68
End: 2018-05-30

## 2018-05-30 ENCOUNTER — HOSPITAL ENCOUNTER (OUTPATIENT)
Dept: ULTRASOUND IMAGING | Facility: HOSPITAL | Age: 68
Discharge: HOME | End: 2018-05-30
Attending: UROLOGY
Payer: MEDICARE

## 2018-05-30 ENCOUNTER — APPOINTMENT (OUTPATIENT)
Dept: LAB | Facility: HOSPITAL | Age: 68
End: 2018-05-30
Attending: UROLOGY
Payer: MEDICARE

## 2018-05-30 DIAGNOSIS — Z12.5 SCREENING FOR PROSTATE CANCER: ICD-10-CM

## 2018-05-30 DIAGNOSIS — N20.0 CALCULUS OF KIDNEY: ICD-10-CM

## 2018-05-30 LAB — PSA SERPL-MCNC: 2.5 NG/ML (ref 0–4)

## 2018-05-30 PROCEDURE — 74018 RADEX ABDOMEN 1 VIEW: CPT

## 2018-05-30 PROCEDURE — 36415 COLL VENOUS BLD VENIPUNCTURE: CPT

## 2018-05-30 PROCEDURE — 76770 US EXAM ABDO BACK WALL COMP: CPT

## 2018-05-30 PROCEDURE — G0103 PSA SCREENING: HCPCS

## 2018-05-30 NOTE — TELEPHONE ENCOUNTER
Spoke with patient  Patient made aware of U/S findings  Per patient, he has been experiencing off and on mild-moderate pain  Reports episode of nausea last week, nothing recent  Patient scheduled for appointment tomorrow with Sae Rodríguez at 3:30 in the [place] office to discuss stone treatment  Patient given ER precautions

## 2018-05-30 NOTE — TELEPHONE ENCOUNTER
Dr Nash Noriega aware of significant findings noted on patient's recent U/S  Attempted to call patient to triage symptoms, if any  Left message for patient to return phone call  Per Dr Nash Noriega patient should be seen sooner to set up surgical intervention for stone  Patient can be schedule with either Dr Nash Noriega or an AP

## 2018-05-31 ENCOUNTER — HOSPITAL ENCOUNTER (OUTPATIENT)
Dept: RADIOLOGY | Facility: CLINIC | Age: 68
Discharge: HOME | End: 2018-05-31
Payer: MEDICARE

## 2018-05-31 ENCOUNTER — OFFICE VISIT (OUTPATIENT)
Dept: UROLOGY | Facility: AMBULATORY SURGERY CENTER | Age: 68
End: 2018-05-31
Payer: MEDICARE

## 2018-05-31 VITALS — HEIGHT: 69 IN | WEIGHT: 201 LBS | BODY MASS INDEX: 29.77 KG/M2

## 2018-05-31 VITALS
TEMPERATURE: 98.7 F | OXYGEN SATURATION: 99 % | RESPIRATION RATE: 18 BRPM | SYSTOLIC BLOOD PRESSURE: 129 MMHG | HEART RATE: 60 BPM | DIASTOLIC BLOOD PRESSURE: 82 MMHG

## 2018-05-31 DIAGNOSIS — N20.0 NEPHROLITHIASIS: Primary | ICD-10-CM

## 2018-05-31 DIAGNOSIS — N20.1 URETERAL CALCULUS: ICD-10-CM

## 2018-05-31 DIAGNOSIS — M47.816 LUMBAR SPONDYLOSIS: ICD-10-CM

## 2018-05-31 PROCEDURE — 64494 INJ PARAVERT F JNT L/S 2 LEV: CPT | Performed by: ANESTHESIOLOGY

## 2018-05-31 PROCEDURE — 99213 OFFICE O/P EST LOW 20 MIN: CPT | Performed by: NURSE PRACTITIONER

## 2018-05-31 PROCEDURE — 64493 INJ PARAVERT F JNT L/S 1 LEV: CPT | Performed by: ANESTHESIOLOGY

## 2018-05-31 RX ORDER — BUPIVACAINE HYDROCHLORIDE 7.5 MG/ML
10 INJECTION, SOLUTION EPIDURAL; RETROBULBAR ONCE
Status: COMPLETED | OUTPATIENT
Start: 2018-05-31 | End: 2018-05-31

## 2018-05-31 RX ADMIN — BUPIVACAINE HYDROCHLORIDE 6 ML: 7.5 INJECTION, SOLUTION EPIDURAL; RETROBULBAR at 11:20

## 2018-05-31 NOTE — H&P
History of Present Illness: The patient is a 69 y o  male who presents with complaints of bilateral low back pain      Patient Active Problem List   Diagnosis    Ureterolithiasis    Left upper quadrant pain    Lumbar disc disease    Gastroesophageal reflux disease without esophagitis    Large hiatal hernia    Nephrolithiasis       Past Medical History:   Diagnosis Date    Arthritis     Asthma     Chronic pain     back    GERD (gastroesophageal reflux disease)     Hiatal hernia     Hyperlipidemia     Kidney stone        Past Surgical History:   Procedure Laterality Date    BACK SURGERY      x2    CARPAL TUNNEL RELEASE      COLONOSCOPY      CYSTOSCOPY W/ URETERAL STENT REMOVAL  01/26/2018    KNEE SURGERY Bilateral     LITHOTRIPSY      PERIPHERAL NEUROPLASTY OF FINGER / HAND / ARM      left middle finger    CT CYSTO/URETERO W/LITHOTRIPSY &INDWELL STENT INSRT Left 1/3/2018    Procedure: CYSTOSCOPY , RETROGRADE PYELOGRAM AND INSERTION STENT URETERAL, FULGERATION;  Surgeon: Khoi Mujica MD;  [facility];  Service: Urology    CT FRAGMENT KIDNEY STONE/ ESWL Left 1/18/2018    Procedure: ESWL;  Surgeon: Suzette Alejandro MD;  [facility];  Service: Urology    SHOULDER SURGERY           Current Outpatient Prescriptions:     albuterol (PROAIR HFA) 90 mcg/act inhaler, Inhale every 4 (four) hours as needed  , Disp: , Rfl:     Coenzyme Q10 (COQ-10) 10 MG CAPS, Take by mouth daily  , Disp: , Rfl:     HYDROcodone-acetaminophen (NORCO) 5-325 mg per tablet, Take 1 tablet by mouth every 6 (six) hours as needed for pain, Disp: , Rfl:     losartan-hydrochlorothiazide (HYZAAR) 50-12 5 mg per tablet, Take by mouth daily  , Disp: , Rfl:     Omeprazole 20 MG TBEC, Take by mouth daily  , Disp: , Rfl:     simvastatin (ZOCOR) 40 mg tablet, Take by mouth, Disp: , Rfl:     tamsulosin (FLOMAX) 0 4 mg, I capsule daily, Disp: 90 capsule, Rfl: 1    zolpidem (AMBIEN) 10 mg tablet, Take by mouth daily at bedtime as needed  , Disp: , Rfl:     Allergies   Allergen Reactions    Meperidine GI Intolerance    Lisinopril      Other reaction(s): Cough       Physical Exam:   Vitals:    05/31/18 1107   BP: 115/76   Pulse: 89   Resp: 20   Temp: 98 7 °F (37 1 °C)   SpO2: 96%     General: Awake, Alert, Oriented x 3, Mood and affect appropriate  Respiratory: Respirations even and unlabored  Cardiovascular: Peripheral pulses intact; no edema  Musculoskeletal Exam:  Decreased range of motion lumbar spine    ASA Score: II    Assessment:   1   Lumbar spondylosis        Plan: ULI L3-L5 DORSAL RAMUS MBB #2

## 2018-05-31 NOTE — DISCHARGE INSTRUCTIONS

## 2018-05-31 NOTE — PROGRESS NOTES
5/31/2018    Best Julien  1950  [id number]      Assessment  -Ureteral calculus  -Nephrolithiasis s/p ESWL and left ureteral stent (1/18/18)    Zeenat Avendaño is a 69 y o  male being managed by Dr Enmanuel Henderson        -We reviewed results of recent KUB and ultrasound, which reveals 9 mm proximal right ureteral calculus with additional nonobstructing 8 mm calculus in right upper pole  Mild hydronephrosis noted  Additional finding of prostatomegaly  We discussed proceeding with ureteroscopy, retrograde pyelogram, laser lithotripsy, and ureteral stent, as patient remains symptomatic  We reviewed procedure in great detail, as well as all risks and benefits  Patient expressed concern, due to previous complications during last cystoscopy 4 months ago  Will review with Dr Enmanuel Henderson  Informed patient that consent will be obtained day of surgery, and will be notified by surgery scheduler     -We reviewed results of recent PSA which remains stable at 2 5  PSA has ranged from 2 0-2 9  -All questions answered, patient agrees with plan      History of Present Illness  69 y o  male with a history of ureteral calculus presents today for follow up  Patient recently seen on 5/24/18 for new onset right sided flank pain  He has since called office stating he has had intermittent mild-moderate pain with associated nausea  Last cystoscopy and LEFT ureteral stent performed by Dr Brigido Pérez on [birth year]/49, complicated by massively, enlarged friable prostate and J hooking ureter  Patient required cautery, and only stent was placed  He subsequently underwent ESWL on 1/18/18 by Dr Enmanuel Henderson, and stent was removed in office on 1/26/18  Patient states having severe stent colic and dysuria at that time  He denies any current fever, chills, gross hematuria, or dysuria  Review of Systems  Review of Systems   Constitutional: Negative  HENT: Negative  Respiratory: Negative  Cardiovascular: Negative  Gastrointestinal: Negative  Genitourinary: Positive for flank pain (right)  Negative for difficulty urinating, dysuria, frequency, hematuria and urgency  Decreased urine volume: weak stream    Skin: Negative  Neurological: Negative  Psychiatric/Behavioral: Negative  AUA SYMPTOM SCORE      Most Recent Value   AUA SYMPTOM SCORE   How often have you had a sensation of not emptying your bladder completely after you finished urinating? 3   How often have you had to urinate again less than two hours after you finished urinating? 3   How often have you found you stopped and started again several times when you urinate? 3   How often have you found it difficult to postpone urination? 3   How often have you had a weak urinary stream?  4   How often have you had to push or strain to begin urination? 3   How many times did you most typically get up to urinate from the time you went to bed at night until the time you got up in the morning?   1   Quality of Life: If you were to spend the rest of your life with your urinary condition just the way it is now, how would you feel about that?  3   AUA SYMPTOM SCORE  20          Past Medical History  Past Medical History:   Diagnosis Date    Arthritis     Asthma     Chronic pain     back    GERD (gastroesophageal reflux disease)     Hiatal hernia     Hyperlipidemia     Kidney stone        Past Social History  Past Surgical History:   Procedure Laterality Date    BACK SURGERY      x2    CARPAL TUNNEL RELEASE      COLONOSCOPY      CYSTOSCOPY W/ URETERAL STENT REMOVAL  01/26/2018    KNEE SURGERY Bilateral     LITHOTRIPSY      PERIPHERAL NEUROPLASTY OF FINGER / HAND / Sandy Belfry      left middle finger    NE CYSTO/URETERO W/LITHOTRIPSY &INDWELL STENT INSRT Left 1/3/2018    Procedure: CYSTOSCOPY , RETROGRADE PYELOGRAM AND INSERTION STENT Alver Sour;  Surgeon: Prashanth Herring MD;  Location:  MAIN OR;  Service: Urology    [address] [place] [address]  ESWL Left 1/18/2018    Procedure: ESWL;  Surgeon: Jamin Burton MD;  [facility];  Service: Urology    SHOULDER SURGERY         Past Family History  Family History   Problem Relation Age of Onset    Diabetes Mother     Hypertension Mother     Brain cancer Mother     Hypertension Sister     Nephrolithiasis Sister        Past Social history  Social History     Social History    Marital status: /Civil Union     Spouse name: N/A    Number of children: N/A    Years of education: N/A     Occupational History    Not on file  Social History Main Topics    Smoking status: Never Smoker    Smokeless tobacco: Never Used    Alcohol use Yes      Comment: social drinker    Drug use: No    Sexual activity: Yes     Other Topics Concern    Not on file     Social History Narrative    No narrative on file       Current Medications  Current Outpatient Prescriptions   Medication Sig Dispense Refill    albuterol (PROAIR HFA) 90 mcg/act inhaler Inhale every 4 (four) hours as needed        Coenzyme Q10 (COQ-10) 10 MG CAPS Take by mouth daily        HYDROcodone-acetaminophen (NORCO) 5-325 mg per tablet Take 1 tablet by mouth every 6 (six) hours as needed for pain      losartan-hydrochlorothiazide (HYZAAR) 50-12 5 mg per tablet Take by mouth daily        Omeprazole 20 MG TBEC Take by mouth daily        simvastatin (ZOCOR) 40 mg tablet Take by mouth      tamsulosin (FLOMAX) 0 4 mg I capsule daily 90 capsule 1    zolpidem (AMBIEN) 10 mg tablet Take by mouth daily at bedtime as needed         No current facility-administered medications for this visit  Allergies  Allergies   Allergen Reactions    Meperidine GI Intolerance    Lisinopril      Other reaction(s): Cough       Past Medical History, Social History, Family History, medications and allergies were reviewed  Vitals  There were no vitals filed for this visit      Physical Exam  Physical Exam   Constitutional: He is oriented to person, place, and time  He appears well-developed and well-nourished  HENT:   Head: Normocephalic  Eyes: Pupils are equal, round, and reactive to light  Neck: Normal range of motion  Cardiovascular: Normal rate, regular rhythm, normal heart sounds and intact distal pulses  Pulmonary/Chest: Effort normal and breath sounds normal    Abdominal: Soft  Normal appearance  There is no CVA tenderness  Genitourinary:   Genitourinary Comments: + RIGHT CVA tenderness     Musculoskeletal: Normal range of motion  Neurological: He is alert and oriented to person, place, and time  He has normal reflexes  Skin: Skin is warm and dry  Psychiatric: He has a normal mood and affect  His behavior is normal  Judgment and thought content normal        Results    I have personally reviewed all pertinent lab results and reviewed with patient  Lab Results   Component Value Date    PSA 2 5 05/30/2018    PSA 2 9 04/18/2017    PSA 2 2 10/21/2015     Lab Results   Component Value Date    GLUCOSE 86 01/03/2018    CALCIUM 7 7 (L) 01/03/2018     01/03/2018    K 4 1 01/03/2018    CO2 26 01/03/2018     01/03/2018    BUN 17 01/03/2018    CREATININE 0 97 01/03/2018     Lab Results   Component Value Date    WBC 15 52 (H) 01/04/2018    HGB 13 4 01/04/2018    HCT 39 6 01/04/2018    MCV 84 01/04/2018     01/04/2018     No results found for this or any previous visit (from the past 1 hour(s))

## 2018-06-01 ENCOUNTER — TELEPHONE (OUTPATIENT)
Dept: PAIN MEDICINE | Facility: CLINIC | Age: 68
End: 2018-06-01

## 2018-06-01 PROBLEM — N20.1 URETERAL CALCULUS: Status: ACTIVE | Noted: 2018-06-01

## 2018-06-01 NOTE — TELEPHONE ENCOUNTER
s/p b/l L3, L4, L5 mbb #2 on 5/31  S/w pt, confirmed no significant relief s/p procedure of 5/31  Advised pt, will d/w Dr Leeann Horn  Anticipate someone from this office will be calling back to schedule an ov w/ DG or SL to discuss the next step  Pt verbalized understanding  Stated that urology has discovered a 9mm kidney stone on the R side, is awaiting a cb to schedule a procedure for that  Advised pt, this office will work around urology appts  Will make SL aware  Pt verbalized understanding and appreciation

## 2018-06-06 NOTE — PRE-PROCEDURE INSTRUCTIONS
Pre-Surgery Instructions:   Medication Instructions    albuterol (PROAIR HFA) 90 mcg/act inhaler Instructed patient per Anesthesia Guidelines   Coenzyme Q10 (COQ-10) 10 MG CAPS Instructed patient per Anesthesia Guidelines   HYDROcodone-acetaminophen (NORCO) 5-325 mg per tablet Instructed patient per Anesthesia Guidelines   losartan-hydrochlorothiazide (HYZAAR) 50-12 5 mg per tablet Instructed patient per Anesthesia Guidelines   Omeprazole 20 MG TBEC Instructed patient per Anesthesia Guidelines   simvastatin (ZOCOR) 40 mg tablet Instructed patient per Anesthesia Guidelines   tamsulosin (FLOMAX) 0 4 mg Instructed patient per Anesthesia Guidelines   zolpidem (AMBIEN) 10 mg tablet Instructed patient per Anesthesia Guidelines  ACE/ARB Med Class     Do not take this medication the day before and the morning of the day of surgery/procedure  Acetaminophen Med Class     Continue to take this medication on your normal schedule  If this is an oral medication and you take it in the morning, then you may take this medicine with a sip of water  Alpha-1 adrenergic blocker Med Class     Continue to take this medication on your normal schedule  If this is an oral medication and you take it in the morning, then you may take this medicine with a sip of water  Herbal Med Class     Stop taking this herbal medications at least one week prior to surgery/procedure  Inhalational Med Class     Continue to take these inhaler medications on your normal schedule up to and including the day of surgery  Opioid Med Class     Continue to take this medication on your normal schedule  If this is an oral medication and you take it in the morning, then you may take this medicine with a sip of water  Statin Med Class     Continue to take this medication on your normal schedule  If this is an oral medication and you take it in the morning, then you may take this medicine with a sip of water    Zolpidem Med Class Continue this medication up to the evening before surgery/procedure, but do not take the morning of the day of surgery

## 2018-06-06 NOTE — PRE-PROCEDURE INSTRUCTIONS
Pre-Surgery Instructions:   Medication Instructions    albuterol (PROAIR HFA) 90 mcg/act inhaler Instructed patient per Anesthesia Guidelines   Coenzyme Q10 (COQ-10) 10 MG CAPS Instructed patient per Anesthesia Guidelines   HYDROcodone-acetaminophen (NORCO) 5-325 mg per tablet Instructed patient per Anesthesia Guidelines   losartan-hydrochlorothiazide (HYZAAR) 50-12 5 mg per tablet Instructed patient per Anesthesia Guidelines   Omeprazole 20 MG TBEC Instructed patient per Anesthesia Guidelines   simvastatin (ZOCOR) 40 mg tablet Instructed patient per Anesthesia Guidelines   tamsulosin (FLOMAX) 0 4 mg Instructed patient per Anesthesia Guidelines   zolpidem (AMBIEN) 10 mg tablet Instructed patient per Anesthesia Guidelines

## 2018-06-07 ENCOUNTER — ANESTHESIA EVENT (OUTPATIENT)
Dept: PERIOP | Facility: HOSPITAL | Age: 68
End: 2018-06-07
Payer: MEDICARE

## 2018-06-07 NOTE — ANESTHESIA PREPROCEDURE EVALUATION
Review of Systems/Medical History  Patient summary reviewed  Chart reviewed  No history of anesthetic complications     Cardiovascular  Hyperlipidemia, Hypertension ,    Pulmonary  Asthma , well controlled/ stable Asthma type of rescue: PRN inhaler,        GI/Hepatic    GERD well controlled,  Hiatal hernia,        Kidney stones, Prostatic disorder, benign prostatic hyperplasia  Comment: Ureteral calculus     Endo/Other  Negative endo/other ROS      GYN  Negative gynecology ROS          Hematology  Negative hematology ROS      Musculoskeletal  Osteoarthritis,        Neurology  Negative neurology ROS      Psychology     Chronic pain (Back),            Physical Exam    Airway    Mallampati score: II  TM Distance: >3 FB       Dental   No notable dental hx     Cardiovascular  Rhythm: regular,     Pulmonary  Breath sounds clear to auscultation,     Other Findings        Anesthesia Plan  ASA Score- 3     Anesthesia Type- general with ASA Monitors  Additional Monitors:   Airway Plan: LMA  Plan Factors-    Induction- intravenous  Postoperative Plan- Plan for postoperative opioid use  Informed Consent- Anesthetic plan and risks discussed with patient  I personally reviewed this patient with the CRNA  Discussed and agreed on the Anesthesia Plan with the CRNA  Renard Blankenship

## 2018-06-08 ENCOUNTER — ANESTHESIA (OUTPATIENT)
Dept: PERIOP | Facility: HOSPITAL | Age: 68
End: 2018-06-08
Payer: MEDICARE

## 2018-06-08 ENCOUNTER — APPOINTMENT (OUTPATIENT)
Dept: RADIOLOGY | Facility: HOSPITAL | Age: 68
End: 2018-06-08
Payer: MEDICARE

## 2018-06-08 ENCOUNTER — HOSPITAL ENCOUNTER (OUTPATIENT)
Facility: HOSPITAL | Age: 68
Setting detail: OUTPATIENT SURGERY
Discharge: HOME | End: 2018-06-08
Attending: UROLOGY | Admitting: UROLOGY
Payer: MEDICARE

## 2018-06-08 VITALS
WEIGHT: 195 LBS | HEIGHT: 69 IN | DIASTOLIC BLOOD PRESSURE: 76 MMHG | TEMPERATURE: 97 F | BODY MASS INDEX: 28.88 KG/M2 | RESPIRATION RATE: 16 BRPM | OXYGEN SATURATION: 99 % | HEART RATE: 62 BPM | SYSTOLIC BLOOD PRESSURE: 146 MMHG

## 2018-06-08 DIAGNOSIS — N20.1 URETERAL CALCULUS: ICD-10-CM

## 2018-06-08 DIAGNOSIS — N20.0 NEPHROLITHIASIS: ICD-10-CM

## 2018-06-08 PROCEDURE — C1769 GUIDE WIRE: HCPCS | Performed by: UROLOGY

## 2018-06-08 PROCEDURE — C1758 CATHETER, URETERAL: HCPCS | Performed by: UROLOGY

## 2018-06-08 PROCEDURE — 87086 URINE CULTURE/COLONY COUNT: CPT | Performed by: UROLOGY

## 2018-06-08 PROCEDURE — 52356 CYSTO/URETERO W/LITHOTRIPSY: CPT | Performed by: UROLOGY

## 2018-06-08 PROCEDURE — 74420 UROGRAPHY RTRGR +-KUB: CPT

## 2018-06-08 PROCEDURE — C2617 STENT, NON-COR, TEM W/O DEL: HCPCS | Performed by: UROLOGY

## 2018-06-08 DEVICE — STENT URETERAL 6 FR 26CM INLAY OPTIMA: Type: IMPLANTABLE DEVICE | Status: FUNCTIONAL

## 2018-06-08 RX ORDER — HYDROCODONE BITARTRATE AND ACETAMINOPHEN 5; 325 MG/1; MG/1
2 TABLET ORAL EVERY 6 HOURS PRN
Qty: 10 TABLET | Refills: 0 | Status: SHIPPED | OUTPATIENT
Start: 2018-06-08 | End: 2018-06-18

## 2018-06-08 RX ORDER — ONDANSETRON 2 MG/ML
4 INJECTION INTRAMUSCULAR; INTRAVENOUS ONCE AS NEEDED
Status: COMPLETED | OUTPATIENT
Start: 2018-06-08 | End: 2018-06-08

## 2018-06-08 RX ORDER — MAGNESIUM HYDROXIDE 1200 MG/15ML
LIQUID ORAL AS NEEDED
Status: DISCONTINUED | OUTPATIENT
Start: 2018-06-08 | End: 2018-06-08 | Stop reason: HOSPADM

## 2018-06-08 RX ORDER — HYDROCODONE BITARTRATE AND ACETAMINOPHEN 5; 325 MG/1; MG/1
2 TABLET ORAL EVERY 4 HOURS PRN
Status: DISCONTINUED | OUTPATIENT
Start: 2018-06-08 | End: 2018-06-08 | Stop reason: HOSPADM

## 2018-06-08 RX ORDER — PROPOFOL 10 MG/ML
INJECTION, EMULSION INTRAVENOUS AS NEEDED
Status: DISCONTINUED | OUTPATIENT
Start: 2018-06-08 | End: 2018-06-08 | Stop reason: SURG

## 2018-06-08 RX ORDER — FENTANYL CITRATE/PF 50 MCG/ML
25 SYRINGE (ML) INJECTION
Status: DISCONTINUED | OUTPATIENT
Start: 2018-06-08 | End: 2018-06-08 | Stop reason: HOSPADM

## 2018-06-08 RX ORDER — ONDANSETRON 2 MG/ML
INJECTION INTRAMUSCULAR; INTRAVENOUS AS NEEDED
Status: DISCONTINUED | OUTPATIENT
Start: 2018-06-08 | End: 2018-06-08 | Stop reason: SURG

## 2018-06-08 RX ORDER — METOCLOPRAMIDE HYDROCHLORIDE 5 MG/ML
10 INJECTION INTRAMUSCULAR; INTRAVENOUS ONCE AS NEEDED
Status: COMPLETED | OUTPATIENT
Start: 2018-06-08 | End: 2018-06-08

## 2018-06-08 RX ORDER — FENTANYL CITRATE 50 UG/ML
INJECTION, SOLUTION INTRAMUSCULAR; INTRAVENOUS AS NEEDED
Status: DISCONTINUED | OUTPATIENT
Start: 2018-06-08 | End: 2018-06-08 | Stop reason: SURG

## 2018-06-08 RX ORDER — KETOROLAC TROMETHAMINE 30 MG/ML
INJECTION, SOLUTION INTRAMUSCULAR; INTRAVENOUS AS NEEDED
Status: DISCONTINUED | OUTPATIENT
Start: 2018-06-08 | End: 2018-06-08

## 2018-06-08 RX ORDER — SODIUM CHLORIDE, SODIUM LACTATE, POTASSIUM CHLORIDE, CALCIUM CHLORIDE 600; 310; 30; 20 MG/100ML; MG/100ML; MG/100ML; MG/100ML
125 INJECTION, SOLUTION INTRAVENOUS CONTINUOUS
Status: DISCONTINUED | OUTPATIENT
Start: 2018-06-08 | End: 2018-06-08 | Stop reason: HOSPADM

## 2018-06-08 RX ORDER — SODIUM CHLORIDE, SODIUM LACTATE, POTASSIUM CHLORIDE, CALCIUM CHLORIDE 600; 310; 30; 20 MG/100ML; MG/100ML; MG/100ML; MG/100ML
50 INJECTION, SOLUTION INTRAVENOUS CONTINUOUS
Status: DISCONTINUED | OUTPATIENT
Start: 2018-06-08 | End: 2018-06-08 | Stop reason: HOSPADM

## 2018-06-08 RX ORDER — MIDAZOLAM HYDROCHLORIDE 1 MG/ML
INJECTION INTRAMUSCULAR; INTRAVENOUS AS NEEDED
Status: DISCONTINUED | OUTPATIENT
Start: 2018-06-08 | End: 2018-06-08 | Stop reason: SURG

## 2018-06-08 RX ORDER — CEPHALEXIN 500 MG/1
500 CAPSULE ORAL 3 TIMES DAILY
Qty: 9 CAPSULE | Refills: 0 | Status: SHIPPED | OUTPATIENT
Start: 2018-06-08 | End: 2018-06-11

## 2018-06-08 RX ORDER — LIDOCAINE HYDROCHLORIDE 10 MG/ML
INJECTION, SOLUTION INFILTRATION; PERINEURAL AS NEEDED
Status: DISCONTINUED | OUTPATIENT
Start: 2018-06-08 | End: 2018-06-08 | Stop reason: SURG

## 2018-06-08 RX ADMIN — PROPOFOL 200 MG: 10 INJECTION, EMULSION INTRAVENOUS at 13:30

## 2018-06-08 RX ADMIN — HYDROCODONE BITARTRATE AND ACETAMINOPHEN 2 TABLET: 5; 325 TABLET ORAL at 15:52

## 2018-06-08 RX ADMIN — METOCLOPRAMIDE 10 MG: 5 INJECTION, SOLUTION INTRAMUSCULAR; INTRAVENOUS at 15:50

## 2018-06-08 RX ADMIN — MIDAZOLAM 2 MG: 1 INJECTION INTRAMUSCULAR; INTRAVENOUS at 13:24

## 2018-06-08 RX ADMIN — LIDOCAINE HYDROCHLORIDE 50 MG: 10 INJECTION, SOLUTION INFILTRATION; PERINEURAL at 13:30

## 2018-06-08 RX ADMIN — SODIUM CHLORIDE, SODIUM LACTATE, POTASSIUM CHLORIDE, AND CALCIUM CHLORIDE: .6; .31; .03; .02 INJECTION, SOLUTION INTRAVENOUS at 13:07

## 2018-06-08 RX ADMIN — DEXAMETHASONE SODIUM PHOSPHATE 10 MG: 10 INJECTION INTRAMUSCULAR; INTRAVENOUS at 13:40

## 2018-06-08 RX ADMIN — PROPOFOL 30 MG: 10 INJECTION, EMULSION INTRAVENOUS at 13:39

## 2018-06-08 RX ADMIN — CEFAZOLIN SODIUM 1000 MG: 1 SOLUTION INTRAVENOUS at 13:25

## 2018-06-08 RX ADMIN — FENTANYL CITRATE 100 MCG: 50 INJECTION, SOLUTION INTRAMUSCULAR; INTRAVENOUS at 13:36

## 2018-06-08 RX ADMIN — ONDANSETRON 4 MG: 2 INJECTION INTRAMUSCULAR; INTRAVENOUS at 13:40

## 2018-06-08 RX ADMIN — SODIUM CHLORIDE, SODIUM LACTATE, POTASSIUM CHLORIDE, AND CALCIUM CHLORIDE 50 ML/HR: .6; .31; .03; .02 INJECTION, SOLUTION INTRAVENOUS at 15:36

## 2018-06-08 RX ADMIN — ONDANSETRON 4 MG: 2 INJECTION INTRAMUSCULAR; INTRAVENOUS at 17:41

## 2018-06-08 NOTE — H&P (VIEW-ONLY)
5/31/2018    Bruno Sen  1950  [id number]      Assessment  -Ureteral calculus  -Nephrolithiasis s/p ESWL and left ureteral stent (1/18/18)    Marylou Cooks is a 69 y o  male being managed by Dr Josias Padilla        -We reviewed results of recent KUB and ultrasound, which reveals 9 mm proximal right ureteral calculus with additional nonobstructing 8 mm calculus in right upper pole  Mild hydronephrosis noted  Additional finding of prostatomegaly  We discussed proceeding with ureteroscopy, retrograde pyelogram, laser lithotripsy, and ureteral stent, as patient remains symptomatic  We reviewed procedure in great detail, as well as all risks and benefits  Patient expressed concern, due to previous complications during last cystoscopy 4 months ago  Will review with Dr Josias Padilla  Informed patient that consent will be obtained day of surgery, and will be notified by surgery scheduler     -We reviewed results of recent PSA which remains stable at 2 5  PSA has ranged from 2 0-2 9  -All questions answered, patient agrees with plan      History of Present Illness  69 y o  male with a history of ureteral calculus presents today for follow up  Patient recently seen on 5/24/18 for new onset right sided flank pain  He has since called office stating he has had intermittent mild-moderate pain with associated nausea  Last cystoscopy and LEFT ureteral stent performed by Dr Jessica Dong on 0/8/29, complicated by massively, enlarged friable prostate and J hooking ureter  Patient required cautery, and only stent was placed  He subsequently underwent ESWL on 1/18/18 by Dr Josias Padilla, and stent was removed in office on 1/26/18  Patient states having severe stent colic and dysuria at that time  He denies any current fever, chills, gross hematuria, or dysuria  Review of Systems  Review of Systems   Constitutional: Negative  HENT: Negative  Respiratory: Negative  Cardiovascular: Negative  Gastrointestinal: Negative  Genitourinary: Positive for flank pain (right)  Negative for difficulty urinating, dysuria, frequency, hematuria and urgency  Decreased urine volume: weak stream    Skin: Negative  Neurological: Negative  Psychiatric/Behavioral: Negative  AUA SYMPTOM SCORE      Most Recent Value   AUA SYMPTOM SCORE   How often have you had a sensation of not emptying your bladder completely after you finished urinating? 3   How often have you had to urinate again less than two hours after you finished urinating? 3   How often have you found you stopped and started again several times when you urinate? 3   How often have you found it difficult to postpone urination? 3   How often have you had a weak urinary stream?  4   How often have you had to push or strain to begin urination? 3   How many times did you most typically get up to urinate from the time you went to bed at night until the time you got up in the morning?   1   Quality of Life: If you were to spend the rest of your life with your urinary condition just the way it is now, how would you feel about that?  3   AUA SYMPTOM SCORE  20          Past Medical History  Past Medical History:   Diagnosis Date    Arthritis     Asthma     Chronic pain     back    GERD (gastroesophageal reflux disease)     Hiatal hernia     Hyperlipidemia     Kidney stone        Past Social History  Past Surgical History:   Procedure Laterality Date    BACK SURGERY      x2    CARPAL TUNNEL RELEASE      COLONOSCOPY      CYSTOSCOPY W/ URETERAL STENT REMOVAL  01/26/2018    KNEE SURGERY Bilateral     LITHOTRIPSY      PERIPHERAL NEUROPLASTY OF FINGER / HAND / Freeman Barbone      left middle finger    [place] CYSTO/URETERO W/LITHOTRIPSY &INDWELL STENT INSRT Left 1/3/2018    Procedure: CYSTOSCOPY , RETROGRADE PYELOGRAM AND INSERTION STENT Deisi Alfaro;  Surgeon: Cristine Mosley MD;  [facility];  Service: Urology    [address]  ESWL Left 1/18/2018    Procedure: ESWL;  Surgeon: Javon Roblero MD;  [facility];  Service: Urology    SHOULDER SURGERY         Past Family History  Family History   Problem Relation Age of Onset    Diabetes Mother     Hypertension Mother     Brain cancer Mother     Hypertension Sister     Nephrolithiasis Sister        Past Social history  Social History     Social History    Marital status: /Civil Union     Spouse name: N/A    Number of children: N/A    Years of education: N/A     Occupational History    Not on file  Social History Main Topics    Smoking status: Never Smoker    Smokeless tobacco: Never Used    Alcohol use Yes      Comment: social drinker    Drug use: No    Sexual activity: Yes     Other Topics Concern    Not on file     Social History Narrative    No narrative on file       Current Medications  Current Outpatient Prescriptions   Medication Sig Dispense Refill    albuterol (PROAIR HFA) 90 mcg/act inhaler Inhale every 4 (four) hours as needed        Coenzyme Q10 (COQ-10) 10 MG CAPS Take by mouth daily        HYDROcodone-acetaminophen (NORCO) 5-325 mg per tablet Take 1 tablet by mouth every 6 (six) hours as needed for pain      losartan-hydrochlorothiazide (HYZAAR) 50-12 5 mg per tablet Take by mouth daily        Omeprazole 20 MG TBEC Take by mouth daily        simvastatin (ZOCOR) 40 mg tablet Take by mouth      tamsulosin (FLOMAX) 0 4 mg I capsule daily 90 capsule 1    zolpidem (AMBIEN) 10 mg tablet Take by mouth daily at bedtime as needed         No current facility-administered medications for this visit  Allergies  Allergies   Allergen Reactions    Meperidine GI Intolerance    Lisinopril      Other reaction(s): Cough       Past Medical History, Social History, Family History, medications and allergies were reviewed  Vitals  There were no vitals filed for this visit      Physical Exam  Physical Exam   Constitutional: He is oriented to person, place, and time  He appears well-developed and well-nourished  HENT:   Head: Normocephalic  Eyes: Pupils are equal, round, and reactive to light  Neck: Normal range of motion  Cardiovascular: Normal rate, regular rhythm, normal heart sounds and intact distal pulses  Pulmonary/Chest: Effort normal and breath sounds normal    Abdominal: Soft  Normal appearance  There is no CVA tenderness  Genitourinary:   Genitourinary Comments: + RIGHT CVA tenderness     Musculoskeletal: Normal range of motion  Neurological: He is alert and oriented to person, place, and time  He has normal reflexes  Skin: Skin is warm and dry  Psychiatric: He has a normal mood and affect  His behavior is normal  Judgment and thought content normal        Results    I have personally reviewed all pertinent lab results and reviewed with patient  Lab Results   Component Value Date    PSA 2 5 05/30/2018    PSA 2 9 04/18/2017    PSA 2 2 10/21/2015     Lab Results   Component Value Date    GLUCOSE 86 01/03/2018    CALCIUM 7 7 (L) 01/03/2018     01/03/2018    K 4 1 01/03/2018    CO2 26 01/03/2018     01/03/2018    BUN 17 01/03/2018    CREATININE 0 97 01/03/2018     Lab Results   Component Value Date    WBC 15 52 (H) 01/04/2018    HGB 13 4 01/04/2018    HCT 39 6 01/04/2018    MCV 84 01/04/2018     01/04/2018     No results found for this or any previous visit (from the past 1 hour(s))

## 2018-06-08 NOTE — OP NOTE
OPERATIVE REPORT  PATIENT NAME: Fausto Malave    :  1950  MRN: [id number]  Pt Location:  CYSTO ROOM 01    SURGERY DATE: 2018    Surgeon(s) and Role:     Christina Burrows MD - Primary    Preop Diagnosis:  Ureteral calculus [N20 1]  Nephrolithiasis [N20 0]    Post-Op Diagnosis Codes:     * Ureteral calculus [N20 1]     * Nephrolithiasis [N20 0]    Procedure(s) (LRB):  CYSTOSCOPY URETEROSCOPY WITH LITHOTRIPSY HOLMIUM LASER, RETROGRADE PYELOGRAM AND INSERTION STENT URETERAL (Right)    Specimen(s):  ID Type Source Tests Collected by Time Destination   A :  Urine Urine, Cystoscopic URINE CULTURE Patt Saleem MD 2018 [id number]        Estimated Blood Loss:   Minimal    Drains:  Ureteral Drain/Stent Left ureter 6 Fr  (Active)   Number of days: 156       Anesthesia Type:   General    Operative Indications:  Ureteral calculus [N20 1]  Nephrolithiasis [N20 0]      Operative Findings:  Two large stones in the right kidney measuring up to 1 cm each  Both stones decimated with holmium laser lithotripsy until fragments were small enough to be passable  Right ureteral stent left in place without a string  Complications:   None    Procedure and Technique:  Bambi Serrano is a 55-year-old male with right-sided nephrolithiasis  He is known to have an enlarged prostate with J hooking of the distal right ureter  He previously had a right ureteral stent placed for ureteral calculus  The stone appeared to be pushed back into the kidney and ultimately had right ESWL performed  More recently he developed flank pain and ultrasound and KUB were concerning for a stone in the right proximal ureter as well as a stone in the kidney  Each stone measured approximately 9 mm  Risk and benefits of right-sided ureteroscopy were discussed and reviewed  Informed consent was obtained  The patient was brought to the operating room on 2018    After the smooth induction of general LMA anesthesia, the patient was placed in the dorsal lithotomy position  His genitalia was prepped and draped in sterile fashion  Intravenous antibiotics were administered  A time-out was performed with all members of the operative team confirming the patient's identity, procedure to be performed, laterality of the case  A 22 Frisian rigid cystoscope with 30 degree lens was inserted  The bladder was thoroughly inspected  There were no stones identified  A large prostate was identified with a median lobe  The right ureteral orifice was difficult to identify  The right ureteral orifice was difficult to cannulate secondary to J hooking  Ultimately I was able to place an angled sensor tip wire into the right renal pelvis  I then passed a dual-lumen catheter followed by a 2nd wire  I then placed a ureteral access sheath  I was then able to pass the Olympus high-definition flexible ureteral scope into the right kidney  There were no stones identified within the ureter  Two stones were identified within the kidney each measuring approximately 9-10 mm  The 2 stones were decimated with holmium laser lithotripsy until fragments were small enough to be passable  Contrast was injected and the kidney was thoroughly re-inspected all stones were deemed to be passable  The scope was slowly withdrawn through the entire length of the ureter  There were no stones remaining  The sheath and the scope were removed in tandem  The wire was backloaded through the cystoscope and a right 26-6 [employer] double-J ureteral stent was then placed in the standard fashion  The proximal coil was appreciated within the right renal pelvis and the distal coil was visualized within the bladder  No string was left in place  The bladder was emptied and the cystoscope was removed  Overall the patient tolerated the procedure well number no complications    Patient was extubated in the operating room and transferred to PACU in stable condition at the conclusion of the case     Patient Disposition:  PACU stable and extubated    SIGNATURE: Angel Moy MD  DATE: June 8, 2018  TIME: 2:39 PM

## 2018-06-08 NOTE — ANESTHESIA POSTPROCEDURE EVALUATION
Post-Op Assessment Note      CV Status:  Stable    Mental Status:  Alert and awake    Hydration Status:  Euvolemic    PONV Controlled:  Controlled    Airway Patency:  Patent    Post Op Vitals Reviewed: Yes          Staff: Anesthesiologist, CRNA           BP   117/89   Temp   97 4   Pulse  47   Resp   18   SpO2   100%

## 2018-06-08 NOTE — DISCHARGE INSTRUCTIONS
Today you underwent right-sided ureteroscopy  You have a stent in place  Please call the office to arrange for follow-up in stent removal   [phone]  Ureteroscopy   WHAT YOU NEED TO KNOW:   A ureteroscopy is a procedure to examine in the inside of your urinary tract, which includes your urethra, bladder, ureters, and kidneys  A ureteroscope is a small, thin tube with a light and camera on the end  Ureteroscopy can help your healthcare provider diagnose and treat problems in your urinary tract, such as kidney stones  DISCHARGE INSTRUCTIONS:   Medicine:   · Antibiotics  may be given to treat or prevent an infection  · Take your medicine as directed  Contact your healthcare provider if you think your medicine is not helping or if you have side effects  Tell him or her if you are allergic to any medicine  Keep a list of the medicines, vitamins, and herbs you take  Include the amounts, and when and why you take them  Bring the list or the pill bottles to follow-up visits  Carry your medicine list with you in case of an emergency  Follow up with your healthcare provider as directed:  Write down your questions so you remember to ask them during your visits  Drink liquids as directed  Liquids can help prevent kidney stones and urinary tract infections  Drink water and limit the amount of caffeine you drink  Caffeine may be found in coffee, tea, soda, sports drinks, and foods  Ask your healthcare provider how much liquid to drink each day  Contact your healthcare provider if:   · You have a fever  · You cannot urinate  · You have blood in your urine  · You are vomiting  · You have pain in your abdomen or side  · You have questions or concerns about your condition or care  © 2017 [employer] Information is for End User's use only and may not be sold, redistributed or otherwise used for commercial purposes   All illustrations and images included in CareNotes® are the copyrighted property of [employer]  or Reymundo Whitehead  The above information is an  only  It is not intended as medical advice for individual conditions or treatments  Talk to your doctor, nurse or pharmacist before following any medical regimen to see if it is safe and effective for you

## 2018-06-08 NOTE — INTERVAL H&P NOTE
H&P reviewed  After examining the patient I find no changes in the patients condition since the H&P had been written  Status post right ESW L  Patient now has a 8-9 mm right proximal ureteral calculus as well as a stone within the right upper pole  Plan for cystoscopy with right retrograde pyelography, right ureteroscopy, holmium laser lithotripsy, right ureteral stent insertion

## 2018-06-10 LAB — BACTERIA UR CULT: NORMAL

## 2018-06-27 ENCOUNTER — OFFICE VISIT (OUTPATIENT)
Dept: PAIN MEDICINE | Facility: CLINIC | Age: 68
End: 2018-06-27
Payer: MEDICARE

## 2018-06-27 VITALS
DIASTOLIC BLOOD PRESSURE: 76 MMHG | HEART RATE: 61 BPM | HEIGHT: 69 IN | BODY MASS INDEX: 29.33 KG/M2 | SYSTOLIC BLOOD PRESSURE: 110 MMHG | WEIGHT: 198 LBS | TEMPERATURE: 98.6 F

## 2018-06-27 DIAGNOSIS — G89.4 CHRONIC PAIN SYNDROME: Primary | ICD-10-CM

## 2018-06-27 DIAGNOSIS — M79.18 MYOFASCIAL PAIN SYNDROME: ICD-10-CM

## 2018-06-27 DIAGNOSIS — M96.1 POSTLAMINECTOMY SYNDROME, LUMBAR: ICD-10-CM

## 2018-06-27 PROBLEM — M25.572 CHRONIC PAIN OF BOTH ANKLES: Status: ACTIVE | Noted: 2017-04-20

## 2018-06-27 PROBLEM — M25.571 CHRONIC PAIN OF BOTH ANKLES: Status: ACTIVE | Noted: 2017-04-20

## 2018-06-27 PROBLEM — D17.1 LIPOMA OF ABDOMINAL WALL: Status: ACTIVE | Noted: 2018-01-12

## 2018-06-27 PROBLEM — N40.0 ENLARGED PROSTATE: Status: ACTIVE | Noted: 2018-01-11

## 2018-06-27 PROBLEM — D18.01 HEMANGIOMA OF SKIN: Status: ACTIVE | Noted: 2018-01-12

## 2018-06-27 PROBLEM — G89.29 CHRONIC PAIN OF BOTH ANKLES: Status: ACTIVE | Noted: 2017-04-20

## 2018-06-27 PROBLEM — L91.8 CUTANEOUS SKIN TAGS: Status: ACTIVE | Noted: 2018-01-12

## 2018-06-27 PROBLEM — K40.90 LEFT INGUINAL HERNIA: Status: ACTIVE | Noted: 2018-01-12

## 2018-06-27 PROBLEM — N40.1 BENIGN NODULAR PROSTATIC HYPERPLASIA WITH LOWER URINARY TRACT SYMPTOMS: Status: ACTIVE | Noted: 2017-04-20

## 2018-06-27 PROCEDURE — 99213 OFFICE O/P EST LOW 20 MIN: CPT | Performed by: ANESTHESIOLOGY

## 2018-06-27 NOTE — PROGRESS NOTES
Assessment:  1  Chronic pain syndrome    2  Myofascial pain syndrome    3  Postlaminectomy syndrome, lumbar        Plan: At this point in time the patient has undergone lumbar spine surgery, has underwent diagnostic medial branch blocks which were negative, underwent epidural steroid injections has tried multiple therapy but his pain persists  We briefly discussed spinal cord stimulation at this point he is not interested in pursuing that at this time  I think this point would be worth an evaluation with Dr France Wong for consideration of trigger point treatments  Patient is in agreement with the plan  My impressions and treatment recommendations were discussed in detail with the patient who verbalized understanding and had no further questions  Discharge instructions were provided  I personally saw and examined the patient and I agree with the above discussed plan of care  Orders Placed This Encounter   Procedures    Ambulatory referral to Neurology     Standing Status:   Future     Standing Expiration Date:   12/27/2018     Referral Priority:   Routine     Referral Type:   Consult - AMB     Referral Reason:   Specialty Services Required     Referred to Provider:   Itz Green     Requested Specialty:   Neurology     Number of Visits Requested:   1     Expiration Date:   6/27/2019     No orders of the defined types were placed in this encounter  History of Present Illness:    Meena Lee is a 69 y o  male who presents after 1 diagnostic medial branch blocks was negative for relief of his symptoms  In review patient has will similar symptoms in his low back without radiation or worse in the morning intermittent described dull achy and shooting sensation  He has recently diagnosed with kidney stones and has a renal stent in place  His pain is worse with standing and walking is interfering with his daily living activities      I have personally reviewed and/or updated the patient's past medical history, past surgical history, family history, social history, current medications, allergies, and vital signs today  Review of Systems:    Review of Systems   Constitutional: Negative for fever and unexpected weight change  HENT: Negative for trouble swallowing  Eyes: Negative for visual disturbance  Respiratory: Negative for shortness of breath and wheezing  Cardiovascular: Negative for chest pain and palpitations  Gastrointestinal: Positive for constipation  Negative for diarrhea, nausea and vomiting  Endocrine: Negative for cold intolerance, heat intolerance and polydipsia  Genitourinary: Negative for difficulty urinating and frequency  Musculoskeletal: Positive for gait problem  Negative for arthralgias, joint swelling and myalgias  Skin: Negative for rash  Neurological: Negative for dizziness, seizures, syncope, weakness and headaches  Hematological: Does not bruise/bleed easily  Psychiatric/Behavioral: Negative for dysphoric mood  All other systems reviewed and are negative        Patient Active Problem List   Diagnosis    Ureterolithiasis    Left upper quadrant pain    Lumbar disc disease    Gastroesophageal reflux disease without esophagitis    Large hiatal hernia    Nephrolithiasis    Ureteral calculus    Benign essential hypertension    Benign nodular prostatic hyperplasia with lower urinary tract symptoms    Chronic pain of both ankles    Cutaneous skin tags    Eczema of both external ears    Enlarged prostate    Hemangioma of skin    Hypercholesterolemia    Insomnia    Left inguinal hernia    Lipoma of abdominal wall    Chronic pain syndrome    Postlaminectomy syndrome, lumbar       Past Medical History:   Diagnosis Date    Arthritis     Asthma     Chronic pain     back    GERD (gastroesophageal reflux disease)     Hiatal hernia     Hyperlipidemia     Hypertension     Kidney stone        Past Surgical History:   Procedure Laterality Date    BACK SURGERY      x2    CARPAL TUNNEL RELEASE      COLONOSCOPY      CYSTOSCOPY W/ URETERAL STENT REMOVAL  01/26/2018    KNEE SURGERY Bilateral     LITHOTRIPSY      PERIPHERAL NEUROPLASTY OF FINGER / HAND / [facility]      left middle finger    [place] CYSTO/URETERO W/LITHOTRIPSY &INDWELL STENT INSRT Left 1/3/2018    Procedure: Abi Roys AND INSERTION STENT Jarold Lori;  Surgeon: Diamond Giles MD;  [facility];  Service: Urology    [place] CYSTO/URETERO W/LITHOTRIPSY &INDWELL STENT INSRT Right 6/8/2018    Procedure: CYSTOSCOPY URETEROSCOPY WITH LITHOTRIPSY HOLMIUM LASER, RETROGRADE PYELOGRAM AND INSERTION STENT URETERAL;  Surgeon: Milly Ceidllo MD;  [facility];  Service: Urology    WI FRAGMENT KIDNEY STONE/ ESWL Left 1/18/2018    Procedure: ESWL;  Surgeon: Milly Cedillo MD;  [facility];  Service: Urology    SHOULDER SURGERY         Family History   Problem Relation Age of Onset    Diabetes Mother     Hypertension Mother     Brain cancer Mother     Hypertension Sister     Nephrolithiasis Sister        Social History     Occupational History    Not on file       Social History Main Topics    Smoking status: Never Smoker    Smokeless tobacco: Never Used    Alcohol use No    Drug use: No    Sexual activity: Yes       Current Outpatient Prescriptions on File Prior to Visit   Medication Sig    albuterol (PROAIR HFA) 90 mcg/act inhaler Inhale every 4 (four) hours as needed      Coenzyme Q10 (COQ-10) 10 MG CAPS Take by mouth daily      HYDROcodone-acetaminophen (NORCO) 5-325 mg per tablet Take 1 tablet by mouth every 6 (six) hours as needed for pain    losartan-hydrochlorothiazide (HYZAAR) 50-12 5 mg per tablet Take by mouth daily      Omeprazole 20 MG TBEC Take by mouth daily      simvastatin (ZOCOR) 40 mg tablet Take by mouth daily at bedtime      tamsulosin (FLOMAX) 0 4 mg I capsule daily (Patient taking differently: daily with dinner I capsule daily )    zolpidem (AMBIEN) 10 mg tablet Take by mouth daily at bedtime as needed       No current facility-administered medications on file prior to visit  Allergies   Allergen Reactions    Meperidine GI Intolerance    Lisinopril      Other reaction(s): Cough       Physical Exam:    /76   Pulse 61   Temp 98 6 °F (37 °C)   Ht 5' 9" (1 753 m)   Wt 89 8 kg (198 lb)   BMI 29 24 kg/m²     Constitutional: normal, well developed, well nourished, alert, in no distress and non-toxic and no overt pain behavior    Eyes: anicteric  HEENT: grossly intact  Neck: supple, symmetric, trachea midline and no masses   Pulmonary:even and unlabored  Cardiovascular:No edema or pitting edema present  Skin:Normal without rashes or lesions and well hydrated  Psychiatric:Mood and affect appropriate  Neurologic:Cranial Nerves II-XII grossly intact  Musculoskeletal:normal

## 2018-06-28 ENCOUNTER — OFFICE VISIT (OUTPATIENT)
Dept: OBGYN CLINIC | Facility: OTHER | Age: 68
End: 2018-06-28
Payer: MEDICARE

## 2018-06-28 VITALS
WEIGHT: 198.8 LBS | SYSTOLIC BLOOD PRESSURE: 120 MMHG | BODY MASS INDEX: 29.36 KG/M2 | DIASTOLIC BLOOD PRESSURE: 77 MMHG | HEART RATE: 54 BPM

## 2018-06-28 DIAGNOSIS — M19.011 GLENOHUMERAL ARTHRITIS, RIGHT: Primary | ICD-10-CM

## 2018-06-28 PROCEDURE — 99213 OFFICE O/P EST LOW 20 MIN: CPT | Performed by: ORTHOPAEDIC SURGERY

## 2018-06-28 NOTE — PROGRESS NOTES
I personally examined the patient and reviewed the history provided  I agree with the note and the assessment and plan by Dr Lorena Dawkins MD    Briefly the patient is a 69 y o  male with a chief complaint of right shoulder pain who presents to the office for evaluation and treatment  Please refer to the documented HPI in the body of the note for details  He states the glenohumeral injection lasted for only a couple weeks and he continues to have pain     Physical Exam: Blood pressure 120/77, pulse (!) 54, weight 90 2 kg (198 lb 12 8 oz)  Right shoulder limited range of motion secondary to pain and crepitation with weakness with abduction external rotation strength testing    Radiology: I have personally reviewed the following images and my read follows  Three views right shoulder show glenohumeral osteoarthritis    Assessment:    Right glenohumeral osteoarthritis    Plan:    Treatment options were discussed including total shoulder arthroplasty as the surgical option for his symptoms and prior to deciding whether an anatomic replacement or reverse replacement is indicated CT arthrogram would be required, he does not wish to proceed forward with arthroplasty at this time so we will defer on any further imaging and I did not provide him with another injection given the limited benefit of the previous injection and he agrees with that  He will contact the office if he wishes to be re-evaluated or to consider a CT arthrogram in preparation for arthroplasty of the right shoulder, the details of the recovery as well as risks and benefits of that procedure were discussed at length today          Assessment  Diagnoses and all orders for this visit:    Glenohumeral arthritis, right      Discussion and Plan:    Extensive discussion had with patient concerning his treatment options  At this point he has failed conservative management including corticosteroid injections   His symptoms would benefit from shoulder arthroplasty given his underlying glenohumeral osteoarthritis  There is concern for compromise of rotator cuff given his shoulder weakness and history of open rotator cuff repair  After discussion with patient, patient would like to hold off on any arthroplasty interventions  He will contact the office in the future if he desires to proceed with shoulder arthroplasty  At that time CT arthrogram will be obtained to further evaluate the integrity of rotator cuff  Subjective:   Patient ID: Olivier Esquivel is a 69 y o  male      78-year-old right-hand-dominant male presents for a follow-up with regards to his right shoulder pain  Patient had been previously seen in office and diagnosed with right glenohumeral osteoarthritis  Patient recently obtained an intra-articular right shoulder corticosteroid injection  He states that this provided him with approximately 2 weeks of pain relief  He is currently endorsing pain over the anterior aspect of the shoulder that is made worse with increased activities such as riding his motorcycle or reaching for overhead objects  Pain subsided somewhat at rest and with administration of anti-inflammatory medication  Patient denies any numbness or tingling  Patient states that he is unsure whether not he would like to proceed with a shoulder arthroplasty options  The following portions of the patient's history were reviewed and updated as appropriate: allergies, current medications, past family history, past medical history, past social history, past surgical history and problem list     Review of Systems   Constitutional: Negative  HENT: Negative  Eyes: Negative  Respiratory: Negative  Cardiovascular: Negative  Gastrointestinal: Negative  Endocrine: Negative  Genitourinary: Negative  Musculoskeletal:        As stated in HPI   Skin: Negative  Allergic/Immunologic: Negative  Neurological:        As stated in HPI   Hematological: Negative  Psychiatric/Behavioral: Negative  Objective:  Right Shoulder Exam     Tenderness   The patient is experiencing tenderness in the anterior shoulder  Range of Motion   Active Abduction:                       150  Forward Flexion:                        160  External Rotation:                      70  Internal Rotation 0 degrees:      Lumbar    Muscle Strength   Abduction:            4/5  External Rotation: 4/5    Comments:  Crepitus with range of motion of shoulder   +jobes, negative bear hugger, negative hornblowers          Physical Exam   Constitutional: He is oriented to person, place, and time  He appears well-developed and well-nourished  HENT:   Head: Normocephalic and atraumatic  Eyes: Pupils are equal, round, and reactive to light  Cardiovascular: Normal rate  Pulmonary/Chest: Effort normal    Abdominal: Soft  Neurological: He is alert and oriented to person, place, and time  Skin: Skin is warm and dry  Psychiatric: He has a normal mood and affect  His behavior is normal          I have personally reviewed pertinent films in PACS and my interpretation is as follows  X-rays of right shoulder reviewed from previous visit    They reveal glenohumeral degenerative changes including inferior osteophyte formation

## 2018-07-09 ENCOUNTER — LAB (OUTPATIENT)
Dept: LAB | Facility: HOSPITAL | Age: 68
End: 2018-07-09
Attending: SURGERY
Payer: MEDICARE

## 2018-07-09 ENCOUNTER — ANESTHESIA EVENT (OUTPATIENT)
Dept: PERIOP | Facility: HOSPITAL | Age: 68
End: 2018-07-09
Payer: MEDICARE

## 2018-07-09 ENCOUNTER — TRANSCRIBE ORDERS (OUTPATIENT)
Dept: ADMINISTRATIVE | Facility: HOSPITAL | Age: 68
End: 2018-07-09

## 2018-07-09 ENCOUNTER — HOSPITAL ENCOUNTER (OUTPATIENT)
Dept: NON INVASIVE DIAGNOSTICS | Facility: HOSPITAL | Age: 68
Discharge: HOME | End: 2018-07-09
Attending: SURGERY
Payer: MEDICARE

## 2018-07-09 ENCOUNTER — HOSPITAL ENCOUNTER (OUTPATIENT)
Dept: RADIOLOGY | Facility: HOSPITAL | Age: 68
Discharge: HOME | End: 2018-07-09
Attending: UROLOGY
Payer: MEDICARE

## 2018-07-09 ENCOUNTER — OFFICE VISIT (OUTPATIENT)
Dept: SURGERY | Facility: HOSPITAL | Age: 68
End: 2018-07-09
Payer: MEDICARE

## 2018-07-09 ENCOUNTER — HOSPITAL ENCOUNTER (OUTPATIENT)
Dept: RADIOLOGY | Facility: HOSPITAL | Age: 68
Discharge: HOME | End: 2018-07-09
Attending: SURGERY
Payer: MEDICARE

## 2018-07-09 VITALS
WEIGHT: 196.5 LBS | BODY MASS INDEX: 29.1 KG/M2 | DIASTOLIC BLOOD PRESSURE: 80 MMHG | TEMPERATURE: 98.7 F | HEIGHT: 69 IN | HEART RATE: 76 BPM | RESPIRATION RATE: 16 BRPM | SYSTOLIC BLOOD PRESSURE: 118 MMHG

## 2018-07-09 DIAGNOSIS — N20.0 URIC ACID NEPHROLITHIASIS: ICD-10-CM

## 2018-07-09 DIAGNOSIS — D18.01 HEMANGIOMA OF SKIN: ICD-10-CM

## 2018-07-09 DIAGNOSIS — K44.9 PARAESOPHAGEAL HERNIA: Primary | ICD-10-CM

## 2018-07-09 DIAGNOSIS — K44.9 PARAESOPHAGEAL HERNIA: ICD-10-CM

## 2018-07-09 DIAGNOSIS — L21.9 SEBORRHEA: ICD-10-CM

## 2018-07-09 DIAGNOSIS — N20.0 NEPHROLITHIASIS: ICD-10-CM

## 2018-07-09 DIAGNOSIS — N20.0 URIC ACID NEPHROLITHIASIS: Primary | ICD-10-CM

## 2018-07-09 DIAGNOSIS — K42.9 UMBILICAL HERNIA WITHOUT OBSTRUCTION AND WITHOUT GANGRENE: ICD-10-CM

## 2018-07-09 DIAGNOSIS — K40.90 LEFT INGUINAL HERNIA: ICD-10-CM

## 2018-07-09 LAB
ANION GAP SERPL CALCULATED.3IONS-SCNC: 6 MMOL/L (ref 4–13)
BASOPHILS # BLD AUTO: 0.03 THOUSANDS/ΜL (ref 0–0.1)
BASOPHILS NFR BLD AUTO: 0 % (ref 0–1)
BUN SERPL-MCNC: 12 MG/DL (ref 5–25)
CALCIUM SERPL-MCNC: 9.1 MG/DL (ref 8.3–10.1)
CHLORIDE SERPL-SCNC: 104 MMOL/L (ref 100–108)
CO2 SERPL-SCNC: 32 MMOL/L (ref 21–32)
CREAT SERPL-MCNC: 0.95 MG/DL (ref 0.6–1.3)
EOSINOPHIL # BLD AUTO: 0.34 THOUSAND/ΜL (ref 0–0.61)
EOSINOPHIL NFR BLD AUTO: 5 % (ref 0–6)
ERYTHROCYTE [DISTWIDTH] IN BLOOD BY AUTOMATED COUNT: 12 % (ref 11.6–15.1)
EST. AVERAGE GLUCOSE BLD GHB EST-MCNC: 108 MG/DL
GFR SERPL CREATININE-BSD FRML MDRD: 81 ML/MIN/1.73SQ M
GLUCOSE SERPL-MCNC: 86 MG/DL (ref 65–140)
HBA1C MFR BLD: 5.4 % (ref 4.2–6.3)
HCT VFR BLD AUTO: 42.9 % (ref 36.5–49.3)
HGB BLD-MCNC: 14.8 G/DL (ref 12–17)
IMM GRANULOCYTES # BLD AUTO: 0.02 THOUSAND/UL (ref 0–0.2)
IMM GRANULOCYTES NFR BLD AUTO: 0 % (ref 0–2)
LYMPHOCYTES # BLD AUTO: 1.81 THOUSANDS/ΜL (ref 0.6–4.47)
LYMPHOCYTES NFR BLD AUTO: 27 % (ref 14–44)
MCH RBC QN AUTO: 28.6 PG (ref 26.8–34.3)
MCHC RBC AUTO-ENTMCNC: 34.5 G/DL (ref 31.4–37.4)
MCV RBC AUTO: 83 FL (ref 82–98)
MONOCYTES # BLD AUTO: 0.83 THOUSAND/ΜL (ref 0.17–1.22)
MONOCYTES NFR BLD AUTO: 12 % (ref 4–12)
NEUTROPHILS # BLD AUTO: 3.78 THOUSANDS/ΜL (ref 1.85–7.62)
NEUTS SEG NFR BLD AUTO: 56 % (ref 43–75)
NRBC BLD AUTO-RTO: 0 /100 WBCS
PLATELET # BLD AUTO: 245 THOUSANDS/UL (ref 149–390)
PMV BLD AUTO: 8.9 FL (ref 8.9–12.7)
POTASSIUM SERPL-SCNC: 3.6 MMOL/L (ref 3.5–5.3)
RBC # BLD AUTO: 5.18 MILLION/UL (ref 3.88–5.62)
SODIUM SERPL-SCNC: 142 MMOL/L (ref 136–145)
WBC # BLD AUTO: 6.81 THOUSAND/UL (ref 4.31–10.16)

## 2018-07-09 PROCEDURE — 74018 RADEX ABDOMEN 1 VIEW: CPT

## 2018-07-09 PROCEDURE — 36415 COLL VENOUS BLD VENIPUNCTURE: CPT

## 2018-07-09 PROCEDURE — 71046 X-RAY EXAM CHEST 2 VIEWS: CPT

## 2018-07-09 PROCEDURE — 80048 BASIC METABOLIC PNL TOTAL CA: CPT

## 2018-07-09 PROCEDURE — 85025 COMPLETE CBC W/AUTO DIFF WBC: CPT

## 2018-07-09 PROCEDURE — 99214 OFFICE O/P EST MOD 30 MIN: CPT | Performed by: SURGERY

## 2018-07-09 PROCEDURE — 83036 HEMOGLOBIN GLYCOSYLATED A1C: CPT

## 2018-07-09 PROCEDURE — 93005 ELECTROCARDIOGRAM TRACING: CPT

## 2018-07-09 NOTE — PRE-PROCEDURE INSTRUCTIONS
Pre-Surgery Instructions:   Medication Instructions    albuterol (PROAIR HFA) 90 mcg/act inhaler Instructed patient per Anesthesia Guidelines   Coenzyme Q10 (CO Q 10) 100 MG CAPS Instructed patient per Anesthesia Guidelines   HYDROcodone-acetaminophen (NORCO) 5-325 mg per tablet Instructed patient per Anesthesia Guidelines   losartan-hydrochlorothiazide (HYZAAR) 50-12 5 mg per tablet Instructed patient per Anesthesia Guidelines   Omeprazole 20 MG TBEC Instructed patient per Anesthesia Guidelines   simvastatin (ZOCOR) 40 mg tablet Instructed patient per Anesthesia Guidelines   tamsulosin (FLOMAX) 0 4 mg Instructed patient per Anesthesia Guidelines   zolpidem (AMBIEN) 10 mg tablet Instructed patient per Anesthesia Guidelines  Before your operation, you play an important role in decreasing your risk for infection by washing with special antiseptic soap  This is an effective way to reduce bacteria on the skin which may help to prevent infections at the surgical site  Please read the following directions in advance  1  In the week before your operation purchase a 4 ounce bottle of antiseptic soap containing chlorhexidine gluconate 4%  Some brand names include: Aplicare, Endure, and Hibiclens  The cost is usually less than $5 00  · For your convenience, the [address] [place] [address] carries the soap  · It may also be available at your doctor's office or pre-admission testing center, and at most retail pharmacies  · If you are allergic or sensitive to soaps containing chlorhexidine gluconate (CHG), please let your doctor know so another antiseptic soap can be suggested  · CHG antiseptic soap is for external use only  2      The day before your operation follow these directions carefully to get ready  · Place clean lines (sheets) on your bed; you should sleep on clean sheets after your evening shower    · Get clean towels and washcloths ready - you need enough for 2 showers  · Set aside clean underwear, pajamas, and clothing to wear after the shower  Reminders:  · DO NOT use any other soap or body rinse on your skin during or after the antiseptic showers  · DO NOT use lotion , powder, deodorant, or perfume/aftershave of any kind on your skin after your antiseptic shower  · DO NOT shave any body parts in the 24 hours/the day before your operation  · DO NOT get the antiseptic soap in your eyes, ears, nose, mouth, or vaginal area  3      You will need to shower the night before AND the morning of your Surgery  Shower 1:  · The evening before your operation, take the fist shower  · First, shampoo your hair with regular shampoo and rinse it completely before you use the anitseptic soap  After washing and rinsing your hair, rinse your body  · Next, use a clean wash cloth to apply the antiseptic soap and wash your body from the neck down to your toes using 1/2 bottle of the antiseptic soap  You will use the other 1/2 bottle for the second shower  · Clean the area where your incision will be; later this area well for about 2 minutes  · If you ar having head or neck surgery, wash areas with the antiseptic soap  · Rinse yourself completely with running water  · Use a clean towel to dry off  · Wear clean underwear and clothing/pajamas  Shower 2:  · The Morning of your operation, take the second shower following the same steps as Shower 1 using the second 1/2 of the bottle of antiseptic soap  · Use clean cloths and towels to was and dry yourself off  · Wear clean underwear and clothing

## 2018-07-09 NOTE — PROGRESS NOTES
Assessment/Plan:    Jessica Hall is a 69 y o  male who presents today as a follow-up regarding scheduling surgery for recurrent paraesophageal hernia repair  Physical examination revealed small umbilical hernia and left inguinal hernia as well as lipoma of the LUQ  Discussed risks, benefits, and alternatives to laparoscopic recurrent hiatal hernia repair along with pre- and postoperative protocol  Discussed the possibility of pooja gastroplasty  Discussed diet restrictions for two weeks after surgery  Post-surgery restrictions were reviewed with patient: No cardiac activity or heavy lifting greater than 15 pounds for the first 2 weeks  For weeks 3 and 4, no heavy lifting greater than 25 pounds, but patient can introduce light cardiac activity, e g   elliptical and stationary bike  Following the fourth week there are no restrictions but the patient should have common sense and be cognizant of pain, reducing activity as needed  Patient should be off of pain medication before driving or going back to work  Patient verbalized understanding and has determined he would like to proceed with hiatal hernia repair  Patient is able to return to our office for reevaluation of his umbilical hernia and left inguinal hernia after this hiatal hernia operation is completed  He knows to contact our office should he have any questions or concerns prior to his surgery  Skin - Of note were multiple nevi, seborrhea, and scattered hemangiomas of the back  Instructions were given to follow with PCP or dermatologist for annual skin examinations  S/P ureteral stent plaement- Patient is scheduled to have his stent removed outpatient on 7/13 with Dr Landon Drafts  There are no diagnoses linked to this encounter  Subjective:      Patient ID: Jessica Hall is a 69 y o  male who presents today as a follow-up regarding scheduling surgery for hiatal hernia repair       He was last seen on 1/12/18 after EGD was performed which confirmed a recurrent hiatal hernia  His first hiatal hernia surgery was performed laparoscopically  He had had kidney stones at that time and wanted to wait to schedule surgery  Kidney stones are now resolved and he wishes to schedule hiatal hernia repair surgery  The hernia has been causing him more significant discomfort and reflux  He does have dysphagia with certain foods  The following portions of the patient's history were reviewed and updated as appropriate: allergies, current medications, past family history, past medical history, past social history, past surgical history and problem list     Review of Systems   Constitutional: Negative  HENT: Negative  Eyes: Negative  Respiratory: Negative  Cardiovascular: Negative  Gastrointestinal: Positive for vomiting  GERD   Endocrine: Negative  Genitourinary: Positive for dysuria  Musculoskeletal: Negative  Skin: Negative  Allergic/Immunologic: Negative  Neurological: Negative  Hematological: Negative  Psychiatric/Behavioral: Negative  All other systems reviewed and are negative  Objective:      /80 (BP Location: Left arm, Patient Position: Sitting, Cuff Size: Standard)   Pulse 76   Temp 98 7 °F (37 1 °C) (Tympanic)   Resp 16   Ht 5' 9" (1 753 m)   Wt 89 1 kg (196 lb 8 oz)   BMI 29 02 kg/m²          Physical Exam   Constitutional: He is oriented to person, place, and time  He appears well-developed and well-nourished  No distress  HENT:   Head: Normocephalic and atraumatic  Right Ear: External ear normal    Left Ear: External ear normal    Nose: Nose normal    Mouth/Throat: Oropharynx is clear and moist  No oropharyngeal exudate  Eyes: Conjunctivae and EOM are normal  Right eye exhibits no discharge  Left eye exhibits no discharge  No scleral icterus  Neck: Normal range of motion  Neck supple  No JVD present  No tracheal deviation present  No thyromegaly present     Cardiovascular: Normal rate, regular rhythm, normal heart sounds and intact distal pulses  Exam reveals no gallop and no friction rub  No murmur heard  Pulmonary/Chest: Effort normal and breath sounds normal  No stridor  No respiratory distress  He has no wheezes  He has no rales  He exhibits no tenderness  Abdominal: Soft  Bowel sounds are normal  He exhibits no distension and no mass  There is no tenderness  There is no rebound and no guarding  small umbilical hernia and left inguinal hernia as well as lipoma of the LUQ   Musculoskeletal: Normal range of motion  He exhibits no edema, tenderness or deformity  Lymphadenopathy:     He has no cervical adenopathy  Neurological: He is alert and oriented to person, place, and time  No cranial nerve deficit  Coordination normal    Skin: Skin is dry  No rash noted  He is not diaphoretic  No erythema  No pallor  multiple nevi, seborrhea, and scattered hemangiomas of the back   Psychiatric: He has a normal mood and affect  His behavior is normal  Thought content normal    Nursing note and vitals reviewed  Attestation:   By signing my name below, Karen Spence, attest that this documentation has been prepared under the direction and in the presence of Emmanuel Khan MD  Electronically Signed: Devaughn Clements  07/09/18     I, Emmanuel Khan, personally performed the services described in this documentation  All medical record entries made by the devaughn were at my direction and in my presence  I have reviewed the chart and discharge instructions and agree that the record reflects my personal performance and is accurate and complete    Emmanuel Khan MD  07/09/18

## 2018-07-10 LAB
ATRIAL RATE: 56 BPM
P AXIS: 49 DEGREES
PR INTERVAL: 168 MS
QRS AXIS: -2 DEGREES
QRSD INTERVAL: 80 MS
QT INTERVAL: 402 MS
QTC INTERVAL: 387 MS
T WAVE AXIS: 3 DEGREES
VENTRICULAR RATE: 56 BPM

## 2018-07-10 PROCEDURE — 93010 ELECTROCARDIOGRAM REPORT: CPT | Performed by: INTERNAL MEDICINE

## 2018-07-10 RX ORDER — SODIUM CHLORIDE, SODIUM LACTATE, POTASSIUM CHLORIDE, CALCIUM CHLORIDE 600; 310; 30; 20 MG/100ML; MG/100ML; MG/100ML; MG/100ML
125 INJECTION, SOLUTION INTRAVENOUS CONTINUOUS
Status: CANCELLED | OUTPATIENT
Start: 2018-07-11 | End: 2019-07-11

## 2018-07-11 ENCOUNTER — HOSPITAL ENCOUNTER (OUTPATIENT)
Facility: HOSPITAL | Age: 68
Setting detail: OUTPATIENT SURGERY
Discharge: HOME | End: 2018-07-12
Attending: SURGERY | Admitting: SURGERY
Payer: MEDICARE

## 2018-07-11 ENCOUNTER — ANESTHESIA (OUTPATIENT)
Dept: PERIOP | Facility: HOSPITAL | Age: 68
End: 2018-07-11
Payer: MEDICARE

## 2018-07-11 DIAGNOSIS — N20.1 URETERAL CALCULUS: ICD-10-CM

## 2018-07-11 DIAGNOSIS — K44.9 PARAESOPHAGEAL HERNIA: Primary | ICD-10-CM

## 2018-07-11 PROCEDURE — 49560 PR REPAIR INCISIONAL HERNIA,REDUCIBLE: CPT | Performed by: SURGERY

## 2018-07-11 PROCEDURE — 49560 PR REPAIR INCISIONAL HERNIA,REDUCIBLE: CPT | Performed by: PHYSICIAN ASSISTANT

## 2018-07-11 PROCEDURE — C1781 MESH (IMPLANTABLE): HCPCS | Performed by: SURGERY

## 2018-07-11 PROCEDURE — 43282 LAP PARAESOPH HER RPR W/MESH: CPT | Performed by: PHYSICIAN ASSISTANT

## 2018-07-11 PROCEDURE — 43282 LAP PARAESOPH HER RPR W/MESH: CPT | Performed by: SURGERY

## 2018-07-11 PROCEDURE — 99024 POSTOP FOLLOW-UP VISIT: CPT | Performed by: SURGERY

## 2018-07-11 DEVICE — BIO-A TISSUE REINFORCEMENT 7CMX10CM
Type: IMPLANTABLE DEVICE | Site: ESOPHAGUS | Status: FUNCTIONAL
Brand: GORE BIO-A TISSUE REINFORCEMENT

## 2018-07-11 RX ORDER — MAGNESIUM HYDROXIDE 1200 MG/15ML
LIQUID ORAL AS NEEDED
Status: DISCONTINUED | OUTPATIENT
Start: 2018-07-11 | End: 2018-07-11 | Stop reason: HOSPADM

## 2018-07-11 RX ORDER — FENTANYL CITRATE 50 UG/ML
INJECTION, SOLUTION INTRAMUSCULAR; INTRAVENOUS AS NEEDED
Status: DISCONTINUED | OUTPATIENT
Start: 2018-07-11 | End: 2018-07-11 | Stop reason: SURG

## 2018-07-11 RX ORDER — MORPHINE SULFATE 2 MG/ML
1 INJECTION, SOLUTION INTRAMUSCULAR; INTRAVENOUS EVERY 2 HOUR PRN
Status: DISCONTINUED | OUTPATIENT
Start: 2018-07-11 | End: 2018-07-12 | Stop reason: HOSPADM

## 2018-07-11 RX ORDER — ROCURONIUM BROMIDE 10 MG/ML
INJECTION, SOLUTION INTRAVENOUS AS NEEDED
Status: DISCONTINUED | OUTPATIENT
Start: 2018-07-11 | End: 2018-07-11 | Stop reason: SURG

## 2018-07-11 RX ORDER — EPHEDRINE SULFATE 50 MG/ML
INJECTION, SOLUTION INTRAVENOUS AS NEEDED
Status: DISCONTINUED | OUTPATIENT
Start: 2018-07-11 | End: 2018-07-11 | Stop reason: SURG

## 2018-07-11 RX ORDER — MIDAZOLAM HYDROCHLORIDE 1 MG/ML
INJECTION INTRAMUSCULAR; INTRAVENOUS AS NEEDED
Status: DISCONTINUED | OUTPATIENT
Start: 2018-07-11 | End: 2018-07-11 | Stop reason: SURG

## 2018-07-11 RX ORDER — SODIUM CHLORIDE, SODIUM LACTATE, POTASSIUM CHLORIDE, CALCIUM CHLORIDE 600; 310; 30; 20 MG/100ML; MG/100ML; MG/100ML; MG/100ML
75 INJECTION, SOLUTION INTRAVENOUS CONTINUOUS
Status: DISCONTINUED | OUTPATIENT
Start: 2018-07-11 | End: 2018-07-11

## 2018-07-11 RX ORDER — ONDANSETRON 2 MG/ML
INJECTION INTRAMUSCULAR; INTRAVENOUS AS NEEDED
Status: DISCONTINUED | OUTPATIENT
Start: 2018-07-11 | End: 2018-07-11 | Stop reason: SURG

## 2018-07-11 RX ORDER — KETOROLAC TROMETHAMINE 30 MG/ML
15 INJECTION, SOLUTION INTRAMUSCULAR; INTRAVENOUS ONCE
Status: COMPLETED | OUTPATIENT
Start: 2018-07-11 | End: 2018-07-11

## 2018-07-11 RX ORDER — PROMETHAZINE HYDROCHLORIDE 25 MG/ML
12.5 INJECTION, SOLUTION INTRAMUSCULAR; INTRAVENOUS EVERY 6 HOURS PRN
Status: DISCONTINUED | OUTPATIENT
Start: 2018-07-11 | End: 2018-07-12 | Stop reason: HOSPADM

## 2018-07-11 RX ORDER — ONDANSETRON 2 MG/ML
4 INJECTION INTRAMUSCULAR; INTRAVENOUS EVERY 6 HOURS PRN
Status: DISCONTINUED | OUTPATIENT
Start: 2018-07-11 | End: 2018-07-12 | Stop reason: HOSPADM

## 2018-07-11 RX ORDER — PANTOPRAZOLE SODIUM 40 MG/1
40 INJECTION, POWDER, FOR SOLUTION INTRAVENOUS
Status: DISCONTINUED | OUTPATIENT
Start: 2018-07-12 | End: 2018-07-12 | Stop reason: HOSPADM

## 2018-07-11 RX ORDER — GLYCOPYRROLATE 0.2 MG/ML
INJECTION INTRAMUSCULAR; INTRAVENOUS AS NEEDED
Status: DISCONTINUED | OUTPATIENT
Start: 2018-07-11 | End: 2018-07-11 | Stop reason: SURG

## 2018-07-11 RX ORDER — FENTANYL CITRATE/PF 50 MCG/ML
25 SYRINGE (ML) INJECTION
Status: DISCONTINUED | OUTPATIENT
Start: 2018-07-11 | End: 2018-07-11 | Stop reason: HOSPADM

## 2018-07-11 RX ORDER — ALBUTEROL SULFATE 90 UG/1
2 AEROSOL, METERED RESPIRATORY (INHALATION) EVERY 4 HOURS PRN
Status: DISCONTINUED | OUTPATIENT
Start: 2018-07-11 | End: 2018-07-12 | Stop reason: HOSPADM

## 2018-07-11 RX ORDER — PROPOFOL 10 MG/ML
INJECTION, EMULSION INTRAVENOUS AS NEEDED
Status: DISCONTINUED | OUTPATIENT
Start: 2018-07-11 | End: 2018-07-11 | Stop reason: SURG

## 2018-07-11 RX ORDER — OXYCODONE HCL 5 MG/5 ML
5 SOLUTION, ORAL ORAL EVERY 4 HOURS PRN
Status: DISCONTINUED | OUTPATIENT
Start: 2018-07-11 | End: 2018-07-12 | Stop reason: HOSPADM

## 2018-07-11 RX ORDER — HEPARIN SODIUM 5000 [USP'U]/ML
5000 INJECTION, SOLUTION INTRAVENOUS; SUBCUTANEOUS EVERY 8 HOURS SCHEDULED
Status: DISCONTINUED | OUTPATIENT
Start: 2018-07-12 | End: 2018-07-12 | Stop reason: HOSPADM

## 2018-07-11 RX ORDER — SODIUM CHLORIDE, SODIUM LACTATE, POTASSIUM CHLORIDE, CALCIUM CHLORIDE 600; 310; 30; 20 MG/100ML; MG/100ML; MG/100ML; MG/100ML
125 INJECTION, SOLUTION INTRAVENOUS CONTINUOUS
Status: DISCONTINUED | OUTPATIENT
Start: 2018-07-11 | End: 2018-07-12 | Stop reason: HOSPADM

## 2018-07-11 RX ADMIN — HYDROMORPHONE HYDROCHLORIDE 0.25 MG: 1 INJECTION, SOLUTION INTRAMUSCULAR; INTRAVENOUS; SUBCUTANEOUS at 17:34

## 2018-07-11 RX ADMIN — MIDAZOLAM HYDROCHLORIDE 1 MG: 1 INJECTION, SOLUTION INTRAMUSCULAR; INTRAVENOUS at 17:09

## 2018-07-11 RX ADMIN — SODIUM CHLORIDE, SODIUM LACTATE, POTASSIUM CHLORIDE, AND CALCIUM CHLORIDE: .6; .31; .03; .02 INJECTION, SOLUTION INTRAVENOUS at 12:20

## 2018-07-11 RX ADMIN — ONDANSETRON 4 MG: 2 INJECTION INTRAMUSCULAR; INTRAVENOUS at 16:41

## 2018-07-11 RX ADMIN — KETOROLAC TROMETHAMINE 15 MG: 30 INJECTION, SOLUTION INTRAMUSCULAR at 20:37

## 2018-07-11 RX ADMIN — ROCURONIUM BROMIDE 50 MG: 10 SOLUTION INTRAVENOUS at 12:45

## 2018-07-11 RX ADMIN — FENTANYL CITRATE 25 MCG: 50 INJECTION, SOLUTION INTRAMUSCULAR; INTRAVENOUS at 15:06

## 2018-07-11 RX ADMIN — PROPOFOL 200 MG: 10 INJECTION, EMULSION INTRAVENOUS at 12:45

## 2018-07-11 RX ADMIN — HYDROMORPHONE HYDROCHLORIDE 0.25 MG: 1 INJECTION, SOLUTION INTRAMUSCULAR; INTRAVENOUS; SUBCUTANEOUS at 17:59

## 2018-07-11 RX ADMIN — SODIUM CHLORIDE, SODIUM LACTATE, POTASSIUM CHLORIDE, AND CALCIUM CHLORIDE: .6; .31; .03; .02 INJECTION, SOLUTION INTRAVENOUS at 13:20

## 2018-07-11 RX ADMIN — FENTANYL CITRATE 50 MCG: 50 INJECTION, SOLUTION INTRAMUSCULAR; INTRAVENOUS at 13:00

## 2018-07-11 RX ADMIN — HYDROMORPHONE HYDROCHLORIDE 0.25 MG: 1 INJECTION, SOLUTION INTRAMUSCULAR; INTRAVENOUS; SUBCUTANEOUS at 17:50

## 2018-07-11 RX ADMIN — FENTANYL CITRATE 25 MCG: 50 INJECTION, SOLUTION INTRAMUSCULAR; INTRAVENOUS at 17:45

## 2018-07-11 RX ADMIN — FENTANYL CITRATE 25 MCG: 50 INJECTION, SOLUTION INTRAMUSCULAR; INTRAVENOUS at 16:05

## 2018-07-11 RX ADMIN — MIDAZOLAM HYDROCHLORIDE 1 MG: 1 INJECTION, SOLUTION INTRAMUSCULAR; INTRAVENOUS at 17:13

## 2018-07-11 RX ADMIN — FENTANYL CITRATE 25 MCG: 50 INJECTION, SOLUTION INTRAMUSCULAR; INTRAVENOUS at 16:47

## 2018-07-11 RX ADMIN — FENTANYL CITRATE 100 MCG: 50 INJECTION, SOLUTION INTRAMUSCULAR; INTRAVENOUS at 12:30

## 2018-07-11 RX ADMIN — METRONIDAZOLE 500 MG: 500 INJECTION, SOLUTION INTRAVENOUS at 12:35

## 2018-07-11 RX ADMIN — GLYCOPYRROLATE 0.2 MG: 0.2 INJECTION, SOLUTION INTRAMUSCULAR; INTRAVENOUS at 12:30

## 2018-07-11 RX ADMIN — SODIUM CHLORIDE, SODIUM LACTATE, POTASSIUM CHLORIDE, AND CALCIUM CHLORIDE 125 ML/HR: .6; .31; .03; .02 INJECTION, SOLUTION INTRAVENOUS at 20:37

## 2018-07-11 RX ADMIN — ONDANSETRON 4 MG: 2 INJECTION, SOLUTION INTRAMUSCULAR; INTRAVENOUS at 21:30

## 2018-07-11 RX ADMIN — HYDROMORPHONE HYDROCHLORIDE 0.25 MG: 1 INJECTION, SOLUTION INTRAMUSCULAR; INTRAVENOUS; SUBCUTANEOUS at 17:39

## 2018-07-11 RX ADMIN — FENTANYL CITRATE 25 MCG: 50 INJECTION, SOLUTION INTRAMUSCULAR; INTRAVENOUS at 17:30

## 2018-07-11 RX ADMIN — HYDROMORPHONE HYDROCHLORIDE 1 MG: 1 INJECTION, SOLUTION INTRAMUSCULAR; INTRAVENOUS; SUBCUTANEOUS at 18:37

## 2018-07-11 RX ADMIN — CEFAZOLIN SODIUM 2000 MG: 2 SOLUTION INTRAVENOUS at 16:35

## 2018-07-11 RX ADMIN — NEOSTIGMINE METHYLSULFATE 3 MG: 1 INJECTION, SOLUTION INTRAMUSCULAR; INTRAVENOUS; SUBCUTANEOUS at 16:45

## 2018-07-11 RX ADMIN — FENTANYL CITRATE 25 MCG: 50 INJECTION, SOLUTION INTRAMUSCULAR; INTRAVENOUS at 17:25

## 2018-07-11 RX ADMIN — MORPHINE SULFATE 1 MG: 2 INJECTION, SOLUTION INTRAMUSCULAR; INTRAVENOUS at 20:38

## 2018-07-11 RX ADMIN — ROCURONIUM BROMIDE 15 MG: 10 SOLUTION INTRAVENOUS at 15:06

## 2018-07-11 RX ADMIN — ROCURONIUM BROMIDE 10 MG: 10 SOLUTION INTRAVENOUS at 16:08

## 2018-07-11 RX ADMIN — FENTANYL CITRATE 25 MCG: 50 INJECTION, SOLUTION INTRAMUSCULAR; INTRAVENOUS at 16:45

## 2018-07-11 RX ADMIN — FENTANYL CITRATE 50 MCG: 50 INJECTION, SOLUTION INTRAMUSCULAR; INTRAVENOUS at 13:15

## 2018-07-11 RX ADMIN — GLYCOPYRROLATE 0.6 MG: 0.2 INJECTION, SOLUTION INTRAMUSCULAR; INTRAVENOUS at 16:45

## 2018-07-11 RX ADMIN — CEFAZOLIN SODIUM 2000 MG: 2 SOLUTION INTRAVENOUS at 12:35

## 2018-07-11 RX ADMIN — ROCURONIUM BROMIDE 20 MG: 10 SOLUTION INTRAVENOUS at 14:13

## 2018-07-11 RX ADMIN — SODIUM CHLORIDE, SODIUM LACTATE, POTASSIUM CHLORIDE, AND CALCIUM CHLORIDE 75 ML/HR: .6; .31; .03; .02 INJECTION, SOLUTION INTRAVENOUS at 10:54

## 2018-07-11 RX ADMIN — MIDAZOLAM HYDROCHLORIDE 2 MG: 1 INJECTION, SOLUTION INTRAMUSCULAR; INTRAVENOUS at 12:30

## 2018-07-11 RX ADMIN — EPHEDRINE SULFATE 10 MG: 50 INJECTION, SOLUTION INTRAMUSCULAR; INTRAVENOUS; SUBCUTANEOUS at 14:15

## 2018-07-11 NOTE — ANESTHESIA PREPROCEDURE EVALUATION
Review of Systems/Medical History  Patient summary reviewed  Chart reviewed  History of anesthetic complications PONV    Cardiovascular  EKG reviewed, Exercise tolerance (METS): >4,  Hyperlipidemia, Hypertension controlled,    Pulmonary  Asthma , well controlled/ stable Asthma type of rescue: PRN inhaler,        GI/Hepatic    GERD poorly controlled,  Hiatal hernia,        Kidney stones,        Endo/Other  Negative endo/other ROS      GYN  Negative gynecology ROS          Hematology  Negative hematology ROS      Musculoskeletal  Back pain , lumbar pain,   Arthritis     Neurology  Negative neurology ROS      Psychology     Chronic pain,            Physical Exam    Airway    Mallampati score: II  TM Distance: >3 FB  Neck ROM: full     Dental   No notable dental hx     Cardiovascular  Rhythm: regular, Rate: normal, Cardiovascular exam normal    Pulmonary  Pulmonary exam normal Breath sounds clear to auscultation,     Other Findings        Anesthesia Plan  ASA Score- 2     Anesthesia Type- general with ASA Monitors  Additional Monitors:   Airway Plan: ETT  Comment: Good ex tolerance, Hx PONV with prev surgeries  Plan Gen Endo  Case d/w Dr Nataliia Babin - no RICARDO/Lung Isolation needed        Plan Factors-    Induction- intravenous  Postoperative Plan- Plan for postoperative opioid use  Informed Consent- Anesthetic plan and risks discussed with patient

## 2018-07-11 NOTE — H&P (VIEW-ONLY)
Assessment/Plan:    Jeny Del Rio is a 69 y o  male who presents today as a follow-up regarding scheduling surgery for recurrent paraesophageal hernia repair  Physical examination revealed small umbilical hernia and left inguinal hernia as well as lipoma of the LUQ  Discussed risks, benefits, and alternatives to laparoscopic recurrent hiatal hernia repair along with pre- and postoperative protocol  Discussed the possibility of pooja gastroplasty  Discussed diet restrictions for two weeks after surgery  Post-surgery restrictions were reviewed with patient: No cardiac activity or heavy lifting greater than 15 pounds for the first 2 weeks  For weeks 3 and 4, no heavy lifting greater than 25 pounds, but patient can introduce light cardiac activity, e g   elliptical and stationary bike  Following the fourth week there are no restrictions but the patient should have common sense and be cognizant of pain, reducing activity as needed  Patient should be off of pain medication before driving or going back to work  Patient verbalized understanding and has determined he would like to proceed with hiatal hernia repair  Patient is able to return to our office for reevaluation of his umbilical hernia and left inguinal hernia after this hiatal hernia operation is completed  He knows to contact our office should he have any questions or concerns prior to his surgery  Skin - Of note were multiple nevi, seborrhea, and scattered hemangiomas of the back  Instructions were given to follow with PCP or dermatologist for annual skin examinations  S/P ureteral stent plaement- Patient is scheduled to have his stent removed outpatient on 7/13 with Dr Cally Reyes  There are no diagnoses linked to this encounter  Subjective:      Patient ID: Jeny Del Rio is a 69 y o  male who presents today as a follow-up regarding scheduling surgery for hiatal hernia repair       He was last seen on 1/12/18 after EGD was performed which confirmed a recurrent hiatal hernia  His first hiatal hernia surgery was performed laparoscopically  He had had kidney stones at that time and wanted to wait to schedule surgery  Kidney stones are now resolved and he wishes to schedule hiatal hernia repair surgery  The hernia has been causing him more significant discomfort and reflux  He does have dysphagia with certain foods  The following portions of the patient's history were reviewed and updated as appropriate: allergies, current medications, past family history, past medical history, past social history, past surgical history and problem list     Review of Systems   Constitutional: Negative  HENT: Negative  Eyes: Negative  Respiratory: Negative  Cardiovascular: Negative  Gastrointestinal: Positive for vomiting  GERD   Endocrine: Negative  Genitourinary: Positive for dysuria  Musculoskeletal: Negative  Skin: Negative  Allergic/Immunologic: Negative  Neurological: Negative  Hematological: Negative  Psychiatric/Behavioral: Negative  All other systems reviewed and are negative  Objective:      /80 (BP Location: Left arm, Patient Position: Sitting, Cuff Size: Standard)   Pulse 76   Temp 98 7 °F (37 1 °C) (Tympanic)   Resp 16   Ht 5' 9" (1 753 m)   Wt 89 1 kg (196 lb 8 oz)   BMI 29 02 kg/m²          Physical Exam   Constitutional: He is oriented to person, place, and time  He appears well-developed and well-nourished  No distress  HENT:   Head: Normocephalic and atraumatic  Right Ear: External ear normal    Left Ear: External ear normal    Nose: Nose normal    Mouth/Throat: Oropharynx is clear and moist  No oropharyngeal exudate  Eyes: Conjunctivae and EOM are normal  Right eye exhibits no discharge  Left eye exhibits no discharge  No scleral icterus  Neck: Normal range of motion  Neck supple  No JVD present  No tracheal deviation present  No thyromegaly present     Cardiovascular: Normal rate, regular rhythm, normal heart sounds and intact distal pulses  Exam reveals no gallop and no friction rub  No murmur heard  Pulmonary/Chest: Effort normal and breath sounds normal  No stridor  No respiratory distress  He has no wheezes  He has no rales  He exhibits no tenderness  Abdominal: Soft  Bowel sounds are normal  He exhibits no distension and no mass  There is no tenderness  There is no rebound and no guarding  small umbilical hernia and left inguinal hernia as well as lipoma of the LUQ   Musculoskeletal: Normal range of motion  He exhibits no edema, tenderness or deformity  Lymphadenopathy:     He has no cervical adenopathy  Neurological: He is alert and oriented to person, place, and time  No cranial nerve deficit  Coordination normal    Skin: Skin is dry  No rash noted  He is not diaphoretic  No erythema  No pallor  multiple nevi, seborrhea, and scattered hemangiomas of the back   Psychiatric: He has a normal mood and affect  His behavior is normal  Thought content normal    Nursing note and vitals reviewed  Attestation:   By signing my name below, Katelynn Alfaro, attest that this documentation has been prepared under the direction and in the presence of Dayday Martinez MD  Electronically Signed: Devaughn Hardwick  07/09/18     I, Dayday Martinez, personally performed the services described in this documentation  All medical record entries made by the devaughn were at my direction and in my presence  I have reviewed the chart and discharge instructions and agree that the record reflects my personal performance and is accurate and complete    Dayday Martinez MD  07/09/18

## 2018-07-11 NOTE — ANESTHESIA POSTPROCEDURE EVALUATION
Post-Op Assessment Note      CV Status:  Stable    Mental Status:  Awake and agitated    Hydration Status:  Stable    PONV Controlled:  None    Airway Patency:  Patent    Post Op Vitals Reviewed: Yes          Staff: Anesthesiologist           BP (!) 191/99 (07/11/18 1719)    Temp 98 °F (36 7 °C) (07/11/18 1719)    Pulse 104 (07/11/18 1719)   Resp 20 (07/11/18 1719)    SpO2 (!) 89 % (07/11/18 1719)

## 2018-07-11 NOTE — INTERIM OP NOTE
REPAIR RECURRENT HERNIA PARAESOPHAGEAL  LAPAROSCOPIC;TOUPE FUNDIPLICATION, ESOPHAGOGASTRODUODENOSCOPY (EGD) INTRAOPERATIVE , LYSIS ADHESIONS EXTENSIVE, REPAIR HERNIA INCISIONAL X2  Postoperative Note  PATIENT NAME: Best Julien  : 1950  MRN: [id number]  [facility] OR ROOM 02    Surgery Date: 2018    Preop Diagnosis:  Paraesophageal hernia [K44 9]    Post-Op Diagnosis Codes:     * Paraesophageal hernia [K44 9]    Procedure(s) (LRB):  REPAIR RECURRENT HERNIA PARAESOPHAGEAL  LAPAROSCOPIC;TOUPE FUNDIPLICATION (N/A)  ESOPHAGOGASTRODUODENOSCOPY (EGD) INTRAOPERATIVE  (N/A)  LYSIS ADHESIONS EXTENSIVE (N/A)  REPAIR HERNIA INCISIONAL X2 (N/A)    Surgeon(s) and Role:     * Abdi Romero MD - Primary     * Katie Ronquillo PA-C - Assisting    Specimens:  * No specimens in log *    Estimated Blood Loss:   Minimal    Anesthesia Type:   General     Findings:    incisional hernias x 2, recurrent paraesophageal hernia  Complications:   None    SIGNATURE: Taran Morrow PA-C   DATE: 2018   TIME: 5:11 PM

## 2018-07-12 VITALS
DIASTOLIC BLOOD PRESSURE: 71 MMHG | OXYGEN SATURATION: 94 % | HEIGHT: 69 IN | SYSTOLIC BLOOD PRESSURE: 122 MMHG | TEMPERATURE: 98.3 F | WEIGHT: 193.78 LBS | HEART RATE: 64 BPM | RESPIRATION RATE: 16 BRPM | BODY MASS INDEX: 28.7 KG/M2

## 2018-07-12 LAB
ANION GAP SERPL CALCULATED.3IONS-SCNC: 7 MMOL/L (ref 4–13)
BUN SERPL-MCNC: 12 MG/DL (ref 5–25)
CALCIUM SERPL-MCNC: 7.7 MG/DL (ref 8.3–10.1)
CHLORIDE SERPL-SCNC: 104 MMOL/L (ref 100–108)
CO2 SERPL-SCNC: 27 MMOL/L (ref 21–32)
CREAT SERPL-MCNC: 1.07 MG/DL (ref 0.6–1.3)
ERYTHROCYTE [DISTWIDTH] IN BLOOD BY AUTOMATED COUNT: 12.1 % (ref 11.6–15.1)
GFR SERPL CREATININE-BSD FRML MDRD: 70 ML/MIN/1.73SQ M
GLUCOSE P FAST SERPL-MCNC: 138 MG/DL (ref 65–99)
GLUCOSE SERPL-MCNC: 138 MG/DL (ref 65–140)
HCT VFR BLD AUTO: 39 % (ref 36.5–49.3)
HGB BLD-MCNC: 13.3 G/DL (ref 12–17)
MCH RBC QN AUTO: 28.3 PG (ref 26.8–34.3)
MCHC RBC AUTO-ENTMCNC: 34.1 G/DL (ref 31.4–37.4)
MCV RBC AUTO: 83 FL (ref 82–98)
PLATELET # BLD AUTO: 195 THOUSANDS/UL (ref 149–390)
PMV BLD AUTO: 8.9 FL (ref 8.9–12.7)
POTASSIUM SERPL-SCNC: 3.8 MMOL/L (ref 3.5–5.3)
RBC # BLD AUTO: 4.7 MILLION/UL (ref 3.88–5.62)
SODIUM SERPL-SCNC: 138 MMOL/L (ref 136–145)
WBC # BLD AUTO: 12.99 THOUSAND/UL (ref 4.31–10.16)

## 2018-07-12 PROCEDURE — 80048 BASIC METABOLIC PNL TOTAL CA: CPT | Performed by: PHYSICIAN ASSISTANT

## 2018-07-12 PROCEDURE — C9113 INJ PANTOPRAZOLE SODIUM, VIA: HCPCS | Performed by: PHYSICIAN ASSISTANT

## 2018-07-12 PROCEDURE — 94760 N-INVAS EAR/PLS OXIMETRY 1: CPT

## 2018-07-12 PROCEDURE — 85027 COMPLETE CBC AUTOMATED: CPT | Performed by: PHYSICIAN ASSISTANT

## 2018-07-12 RX ORDER — HYDROCODONE BITARTRATE AND ACETAMINOPHEN 5; 325 MG/1; MG/1
2 TABLET ORAL EVERY 6 HOURS PRN
Qty: 20 TABLET | Refills: 0 | Status: SHIPPED | OUTPATIENT
Start: 2018-07-12 | End: 2018-07-22

## 2018-07-12 RX ORDER — KETOROLAC TROMETHAMINE 30 MG/ML
15 INJECTION, SOLUTION INTRAMUSCULAR; INTRAVENOUS ONCE
Status: COMPLETED | OUTPATIENT
Start: 2018-07-12 | End: 2018-07-12

## 2018-07-12 RX ADMIN — PANTOPRAZOLE SODIUM 40 MG: 40 INJECTION, POWDER, FOR SOLUTION INTRAVENOUS at 08:56

## 2018-07-12 RX ADMIN — KETOROLAC TROMETHAMINE 15 MG: 30 INJECTION, SOLUTION INTRAMUSCULAR at 09:55

## 2018-07-12 RX ADMIN — HEPARIN SODIUM 5000 UNITS: 5000 INJECTION, SOLUTION INTRAVENOUS; SUBCUTANEOUS at 00:07

## 2018-07-12 RX ADMIN — HYDROMORPHONE HYDROCHLORIDE 1 MG: 1 INJECTION, SOLUTION INTRAMUSCULAR; INTRAVENOUS; SUBCUTANEOUS at 04:35

## 2018-07-12 RX ADMIN — OXYCODONE HYDROCHLORIDE 5 MG: 5 SOLUTION ORAL at 06:37

## 2018-07-12 RX ADMIN — SODIUM CHLORIDE, SODIUM LACTATE, POTASSIUM CHLORIDE, AND CALCIUM CHLORIDE 1000 ML: .6; .31; .03; .02 INJECTION, SOLUTION INTRAVENOUS at 01:40

## 2018-07-12 RX ADMIN — MORPHINE SULFATE 1 MG: 2 INJECTION, SOLUTION INTRAMUSCULAR; INTRAVENOUS at 00:08

## 2018-07-12 RX ADMIN — SODIUM CHLORIDE 1000 MG: 900 INJECTION INTRAVENOUS at 00:06

## 2018-07-12 RX ADMIN — HEPARIN SODIUM 5000 UNITS: 5000 INJECTION, SOLUTION INTRAVENOUS; SUBCUTANEOUS at 06:36

## 2018-07-12 RX ADMIN — HYDROMORPHONE HYDROCHLORIDE 1 MG: 1 INJECTION, SOLUTION INTRAMUSCULAR; INTRAVENOUS; SUBCUTANEOUS at 08:58

## 2018-07-12 RX ADMIN — SODIUM CHLORIDE, SODIUM LACTATE, POTASSIUM CHLORIDE, AND CALCIUM CHLORIDE 125 ML/HR: .6; .31; .03; .02 INJECTION, SOLUTION INTRAVENOUS at 06:36

## 2018-07-12 RX ADMIN — PROMETHAZINE HYDROCHLORIDE 12.5 MG: 25 INJECTION INTRAMUSCULAR; INTRAVENOUS at 00:07

## 2018-07-12 NOTE — CASE MANAGEMENT
Initial Clinical Review    Age/Sex: 69 y o  male    Surgery Date: 7/11/18    Procedure: REPAIR RECURRENT HERNIA PARAESOPHAGEAL  LAPAROSCOPIC;TOUPE FUNDIPLICATION (N/A)  ESOPHAGOGASTRODUODENOSCOPY (EGD) INTRAOPERATIVE  (N/A)  LYSIS ADHESIONS EXTENSIVE (N/A)  REPAIR HERNIA INCISIONAL X2 (N/A)    Anesthesia: GENERAL    Admission Orders: Date/Time/Statement: N/A @ N/A     Vital Signs: /71   Pulse 64   Temp 98 3 °F (36 8 °C)   Resp 16   Ht 5' 9" (1 753 m)   Wt 87 9 kg (193 lb 12 6 oz)   SpO2 95%   BMI 28 62 kg/m²     Diet:        Diet Orders            Start     Ordered    07/11/18 1833  Diet Clear Liquid  Diet effective now     Question Answer Comment   Diet Type Clear Liquid    RD to adjust diet per protocol?  Yes        07/11/18 1832        ADMISSION ORDERS  CONSULT NUTRITION  VENODYNES  HECTOR CATH  INCENTIVE SPIROMETRY    Scheduled Meds:  Current Facility-Administered Medications:  albuterol 2 puff Inhalation Q4H PRN Katie Ronquillo PA-C    heparin (porcine) 5,000 Units Subcutaneous Q8H [address] Katie Ronquillo PA-C    HYDROmorphone 0 5 mg Intravenous Q3H PRN Katie Ronquillo PA-C    HYDROmorphone 1 mg Intravenous Q3H PRN Anette Osborn MD    ketorolac 15 mg Intravenous Once Anette Osborn MD    lactated ringers 125 mL/hr Intravenous Continuous Anette Osborn MD Last Rate: 125 mL/hr (07/12/18 0636)   morphine injection 1 mg Intravenous Q2H PRN Sukhdeep Kimball MD    ondansetron 4 mg Intravenous Q6H PRN Katie Ronquillo PA-C    oxyCODONE 5 mg Oral Q4H PRN Katie Ronquillo PA-C    pantoprazole 40 mg Intravenous Q24H [address] Katie Ronquillo PA-C    promethazine 12 5 mg Intravenous Q6H PRN Anette Osborn MD      Continuous Infusions:  lactated ringers 125 mL/hr Last Rate: 125 mL/hr (07/12/18 0636)     PRN Meds:   albuterol    HYDROmorphone    HYDROmorphone    morphine injection    ondansetron    oxyCODONE    promethazine  Pain Control:   Pain Medications HYDROcodone-acetaminophen (NORCO) 5-325 mg per tablet Take 1 tablet by mouth every 6 (six) hours as needed for pain

## 2018-07-12 NOTE — RESPIRATORY THERAPY NOTE
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     Asthma     Chronic pain     back    GERD (gastroesophageal reflux disease)     Hiatal hernia 1994    Hyperlipidemia     Hypertension     Kidney stone     r stent- suppose to be removed Fri 7/13     Social History     Social History    Marital status: /Civil Union     Spouse name: N/A    Number of children: N/A    Years of education: N/A     Social History Main Topics    Smoking status: Never Smoker    Smokeless tobacco: Never Used    Alcohol use 0 6 oz/week     1 Cans of beer per week      Comment: once a month     Drug use: No    Sexual activity: Not Currently      Comment: flomax is helping      Other Topics Concern    None     Social History Narrative    None       Subjective         Objective    Physical Exam:   Assessment Type: Assess only  General Appearance: Alert, Awake  Respiratory Pattern: Normal  Bilateral Breath Sounds: Clear    Vitals:  Blood pressure 122/71, pulse 64, temperature 98 3 °F (36 8 °C), resp  rate 16, height 5' 9" (1 753 m), weight 87 9 kg (193 lb 12 6 oz), SpO2 94 %  Imaging and other studies: I have personally reviewed pertinent reports              Plan    Respiratory Plan: Home Bronchodilator Patient pathway        Resp Comments: pt tx not needed

## 2018-07-12 NOTE — PROGRESS NOTES
Progress Note - General Surgery   Bruno Sen 69 y o  male MRN: [id number]  Unit/Bed#: -02 Encounter: [id number]    Assessment:  Recurrent paraesophageal hernia POD #1 s/p laparoscopic repair recurrent paraesophageal hernia, intraop EGD, ABBY, repair incisional hernia x2  -pain control  -continue clear liquid diet  -urinary catheter placed for urinary retention  -encourage out of bed, heparin, SCDs      Subjective/Objective     Subjective:  Patient complaining of pain rated at a 7/10 with nausea  Denies vomiting  Denies flatus  He is up out of bed in chair and has ambulated in the halls this morning  He had difficulty urinating and an indwelling urinary catheter was placed  He has a history of nephrolithiasis and urinary stents had been in place for approximately 1 month  He is scheduled for stent removal doctor tomorrow  Objective:    Blood pressure 122/71, pulse 64, temperature 98 3 °F (36 8 °C), resp  rate 16, height 5' 9" (1 753 m), weight 87 9 kg (193 lb 12 6 oz), SpO2 95 %  ,Body mass index is 28 62 kg/m²        Intake/Output Summary (Last 24 hours) at 07/12/18 0859  Last data filed at 07/12/18 0636   Gross per 24 hour   Intake             5380 ml   Output             2150 ml   Net             3230 ml       Invasive Devices     Peripheral Intravenous Line            Peripheral IV 07/11/18 Left Wrist less than 1 day    Peripheral IV 07/11/18 Right Forearm less than 1 day          Drain            Ureteral Drain/Stent Left ureter 6 Fr  189 days    Urethral Catheter Other (Comment) 16 Fr  less than 1 day                Physical Exam: General appearance: WDWN, resting in chair  Lungs: clear to auscultation bilaterally, without rales, rhonchi, or wheezes  Heart: regular rate and rhythm, S1, S2 normal, no murmur  Abdomen: soft, tender to palpation surrounding incisions; bowel sounds scattered; non distended   Extremities: without cyanosis, clubbing, or edema, motor/sensation grossly intact Lab, Imaging and other studies:  Admission on 07/11/2018   Component Date Value    Sodium 07/12/2018 138     Potassium 07/12/2018 3 8     Chloride 07/12/2018 104     CO2 07/12/2018 27     Anion Gap 07/12/2018 7     BUN 07/12/2018 12     Creatinine 07/12/2018 1 07     Glucose 07/12/2018 138     Glucose, Fasting 07/12/2018 138*    Calcium 07/12/2018 7 7*    eGFR 07/12/2018 70     WBC 07/12/2018 12 99*    RBC 07/12/2018 4 70     Hemoglobin 07/12/2018 13 3     Hematocrit 07/12/2018 39 0     MCV 07/12/2018 83     MCH 07/12/2018 28 3     MCHC 07/12/2018 34 1     RDW 07/12/2018 12 1     Platelets 94/14/0298 195     MPV 07/12/2018 8 9      VTE Pharmacologic Prophylaxis: Heparin  VTE Mechanical Prophylaxis: sequential compression device

## 2018-07-12 NOTE — PROGRESS NOTES
2330- Patient requested more water  Re-assessed if patient able to void  Says last time voided was "this morning a few times " Pt attempted to void in urinal unsuccessfully  Pt attempted to void on bedside commode unsuccessful  Bladder non-distended  Bladder scan resulted 370ml  Pt says he does have urge to void  Did not take flomax, pt says "i'm not to take medications"  Ordered clear liquids  0004-Order received on telephone from Dr Adarsh Lr to initiate urinary retention protocol  Attempted straight cath sterile technique observed  Performed with assistance from RN, Ana Umana  Pt visibly uncomfortable during procedure, but catheter did not meet resistance, nor did it curl  36- Dr Adarsh Lr paged to update her on patient's status, and inability to void  0045-Dr Tomlinson returned call  Dr questioned removal of catheter if he hasn't voided  She also questioned if I straight cathed patient, reported that patient had been straight cath'd without success  (2 other licensed professionals at bedside) with no urine return  Supervisor Oxana Harper was also on unit and made aware of patient situation  Explained RN's rationale for why pt just straight cath'd x1 and catheter removed d/t protocol, and also s/p surgery Dr Susana Quevedo not have wanted a garcias in immediately  Dr wanted garcias to be inserted and LR bolus over 2 hours  Dr asked if there were post-op labs, which there were none  Reviewed orders and labs that are ordered for the AM, which no further labs ordered at this time  Dr Adarsh Lr did ask if patient's pain is more managed now  Pain is under control  Dr aware Medicine is not consulted and discussed if she wanted medicine on-board  Discussion went back and forth about whether to consult NP on call, but ultimately decided to hold off on medicine consult  Dr also asked what accomodation code she wanted patient to be  Dr Magnus Garcia will have attending address in the morning    Orders repeated back to Dr Adarsh Lr and carried out

## 2018-07-12 NOTE — PROGRESS NOTES
Dr Cabrera Common called to be updated on patient status  Aware liter bolus was infused, and garcias placed and urine output more than adequate  Pain also managed  Pt medicated with oral Roxicodone- however patient says pain 6-7/10 pretty consistent but "hurts more when I move around" and deep breath with incentive spirometer

## 2018-07-12 NOTE — PROGRESS NOTES
80- Pt's wife, Alma Delia Ran at nurses station requesting pain medication for   RN, Sallie Paul made me aware  Wife remained at nurse's station asking for "Iv pole, and scd pump and can he have something because he's really moaning in there " Addressed her concerns, and pt's needs  Orders reviewed and carried out  Pt was last medication with PRN diluadid at 1837 and PRN Diluadid ordered q3 hours    Teaching done with patient and wife (who is a Nurse) about pain management and the regiment  Also, realistic goal setting for pain  Patient says he verbalizes understanding and just "wants to be less" than a 10 currently  2007-Dr Lara called  Voicemail reached  Answering service called, and Dr Joaquín Khanna paged for orders  Orders received for 1x toradol IV, and Morphine  See orders  See Mar- Medication administered with patient and Wife, Alma Delia Ran understanding about patient's pain may/will not be completely alleviated but hope to break thru 10/10    2130- PRN Zofran administered for nausea  Pt belching  Bowel sounds very hypoactive  Not passing gas at this time  Patient did not vomit  Re-assessed: Alma Delia Ran leaving for evening, states her  the patient is resting comfortable and "he's relaxing and calm almost happy   I told him if he doesn't need pain med every hour don't take it or he'll be blocked up till kingdom come"

## 2018-07-12 NOTE — DISCHARGE INSTRUCTIONS
Emilie Nicholas Instructions                                                                           Dr Hugo GALINDO     1  General: Woody Reynoso will feel pulling sensations around the wound or funny aches and pains around the incisions  This is normal  Even minor surgery is a change in your body and this is your bodys way of reaction to it  If you have had abdominal surgery, it may help to support the incision with a small pillow or blanket for comfort when moving or coughing  2  Wound care:     Glue - Leave glue alone, it will fall off on its own, no need for an additional dressings    3  Water: You may shower over the wound, unless there are drain tubes left in place  Do not bathe or use a pool or hot tub until cleared by the physician  You may shower right over the staples, glue or Steri-Strips and rinse wound with soapy water but do not scrub incision pat dry when you are done  4  Activity: You may go up and down stairs, walk as much as you are comfortable, but walk at least 3 times each day  If you have had abdominal surgery, do not lift anything heavier than 15 pounds for at least 2 weeks, unless cleared by the doctor  5  Diet: You should stay on the Clear liquid diet today, you may start a full liquid diet through Sunday  On Monday you can start the Nissen soft diet given to you by the dietician in the hospital    6  Medications: Resume all of your previous medications, unless told otherwise by the doctor  Avoid aspirin or ibuprofen (Advil, Motrin, etc ) products for 2-3 days after the date of surgery  You may, at that time, began to take them again  Tylenol is always fine, unless you are taking any narcotic pain medication containing Tylenol (such as Percocet, Darvocet, Vicodin, or anything containing acetaminophen)  Do not take Tylenol if you're taking these medications   You do not need to take the narcotic pain medications unless you are having significant pain and discomfort  7  Driving: He will need someone to drive you home on the day of surgery  Do not drive or make any important decisions while on narcotic pain medication or 24 hours and after anesthesia or sedation for surgery  Generally, you may drive when your off all narcotic pain medications  8  Upset Stomach: You may take Maalox, Tums, or similar items for an upset stomach  If your narcotic pain medication causes an upset stomach, do not take it on an empty stomach  Try taking it with at least some crackers or toast      9  Constipation: Patients often experienced constipation after surgery  You may take over-the-counter medication for this, such as Metamucil, Senokot, Dulcolax, milk of magnesia, etc  You may take a suppository unless you have had anorectal surgery such as a procedure on your hemorrhoids  If you experience significant nausea or vomiting after abdominal surgery, call the office before trying any of these medications  10  Call the office: If you are experiencing any of the following, fevers above 101 5°, significant nausea or vomiting, if the wound develops drainage and/or is excessive redness around the wound, or if you have significant diarrhea or other worsening symptoms  11  Pain: You may be given a prescription for pain  This will be given to the hospital, the day of surgery  12  Sexual Activity: You may resume sexual activity when you feel ready and comfortable and your incision is sealed and healed without apparent infection risk      [facility]  Phone: [phone]

## 2018-07-13 ENCOUNTER — PROCEDURE VISIT (OUTPATIENT)
Dept: UROLOGY | Facility: AMBULATORY SURGERY CENTER | Age: 68
End: 2018-07-13
Payer: MEDICARE

## 2018-07-13 VITALS
HEART RATE: 62 BPM | SYSTOLIC BLOOD PRESSURE: 126 MMHG | DIASTOLIC BLOOD PRESSURE: 68 MMHG | WEIGHT: 199 LBS | BODY MASS INDEX: 29.47 KG/M2 | HEIGHT: 69 IN

## 2018-07-13 DIAGNOSIS — R33.9 RETENTION OF URINE: ICD-10-CM

## 2018-07-13 DIAGNOSIS — N20.0 CALCULUS OF KIDNEY: Primary | ICD-10-CM

## 2018-07-13 PROCEDURE — 52310 CYSTOSCOPY AND TREATMENT: CPT | Performed by: UROLOGY

## 2018-07-16 ENCOUNTER — TELEPHONE (OUTPATIENT)
Dept: SURGERY | Facility: HOSPITAL | Age: 68
End: 2018-07-16

## 2018-07-18 NOTE — OP NOTE
OPERATIVE REPORT  PATIENT NAME: Dante Schroeder    :  1950  MRN: [id number]  Pt Location: [facility] OR ROOM 02    SURGERY DATE: 2018    Surgeon(s) and Role:     * Sunny Flores MD - Primary     * Ed Salgado PA-C - Assisting    Preop Diagnosis:  Paraesophageal hernia [K44 9]    Post-Op Diagnosis Codes:     * Paraesophageal hernia [K44 9]    Procedure(s) (LRB):  REPAIR RECURRENT HERNIA PARAESOPHAGEAL  LAPAROSCOPIC;TOUPE FUNDIPLICATION (N/A)  ESOPHAGOGASTRODUODENOSCOPY (EGD) INTRAOPERATIVE  (N/A)  LYSIS ADHESIONS EXTENSIVE (N/A)  REPAIR HERNIA INCISIONAL X2 (N/A)    Specimen(s):  * No specimens in log *    Estimated Blood Loss:   Minimal    Drains:  Urethral Catheter Other (Comment) 16 Fr  (Active)   Amt returned on insertion(mL) 425 mL 2018  1:15 AM   Site Assessment Clean;Skin intact 2018  8:56 AM   Collection Container Standard drainage bag 2018  8:56 AM   Securement Method Securing device (Describe) 2018  8:56 AM   Output (mL) 350 mL 2018 11:01 AM   Number of days: 6       Ureteral Drain/Stent Left ureter 6 Fr  (Active)   Number of days: 196       [REMOVED] NG/OG/Enteral Tube Orogastric 18 Fr Right mouth (Removed)   Number of days: 0       [REMOVED] Urethral Catheter Latex 16 Fr  (Removed)   Output (mL) 300 mL 2018  4:56 PM   Number of days: 0       Anesthesia Type:   General    Operative Indications:  Paraesophageal hernia [K44 9]      Operative Findings:  Recurrent paraesophageal hernia  Extensive adhesions  Incisional hernia x 2    Complications:   None    Procedure and Technique:    The patient was seen again in the Holding Room  The risks, benefits, complications, treatment options, and expected outcomes were discussed with the patient  The patient and/or family concurred with the proposed plan, giving informed consent  The site of surgery properly noted/marked  The patient was taken to Operating Room, identified as Dante Schroeder  and the procedure verified   A Time Out was held after prepping and draping in sterile fashion  The above information was confirmed  Prior to the induction of general anesthesia, antibiotic prophylaxis was administered  General endotracheal anesthesia was then administered and tolerated well  After the induction, the abdomen was prepped in the usual sterile fashion  An incision was made hand's breath below the xiphoid with 11 blade scalpel  Tissues was dissected over clamps down to the fascia  The fascia was elevated and opened with an 11 blade scalpel  The silvia trocar was placed in under direct visualization  A 5mm   Trocar was placed in the right flank  The liver retractor was placed in and the left lobe of liver was elevated revealing the  Diaphragm and the retractor was secured to the bed  An 5 mm trochars placed in the midclavicular line on the left  Last 2 5mm trochars were placed to the right of the xiphoid and left lateral     Extensive adhesions were noted in the upper abdomen of stomach and omentum to liver and abdominal wall  These were lysed sharply with scissors and harmonic cautery  This lysis of adhesions was quite extensive taking over 1 5 hours including taking down the previous nissen wrap  Attention was turned to the stomach where the pars flaccida was opened with Harmonic scalpel to the right marlene  The peritoneum at the edge of the marlene was opened with Harmonic scalpel entering into the avascular plane around the hernia sac  The attachments w dissected along the marlene towards the anterior portion  Using blunt dissection the avascular plane was dissected around the hernia sac dissecting it out of the mediastinum  Then the left marlene was freed up from hernial attachments reducing the hernia sac  Next the avascular plane was dissected under the esophagus across the left side  The nissen wrap was taken down with careful dissection and sharp dissection with scissors   Once this was completed a Penrose drain was passed through the space and wrapped around the esophagus and secured with a Endoloop  Dissection was continued until the gastroesophageal junction was at least 2 cm inside the abdominal cavity  The vagus nerve was preserved  The short gastrics were dissected with Harmonic scalpel from midway down the greater curvature all the way up to the left marlene  Care was taken to ensure hemostasis  Also to ensure careful dissection along the gastro -splenic ligament  Now the dissection was complete  The GE junction was identified  At least 2 cm in the abdominal cavity  The Cruroplasty was performed next  The posterior marlene were reapproximated with interrupted simple 0 Ethibond  Sutures  An intraoperative EGD was performed the esophagus, stomach and duodenum were normal and the GE junction was identified  A guidewire was placed over the scope and then  Bougie was placed over the guidewire  A bougie was in place to prevent closing this defect too tightly  Then a [employer] mesh was brought on and deployed posteriorly and secured with tacking device  The fundus was grasped and pulled posterior to esophagus and the 2 ends were wrapped across and held together  Came together nicely without tension  A 54 Fr  bougie was placed in the esophagus for sizing  The Toupet fundoplication was performed with 3 interrupted 0  Ethibond sutures taking a small bite of the esophagus and stomach on each side of the GEJ  The bougie was carefully removed  The wrap was loose enough to prevent dysphasia  The liver retractor was removed  The 11 mm trochars  Had fascia closed with figure-of-eight 0 Vicryl suture using the RanFlaconi suture passer  Two additional incisional hernias were noted in the mid abdomen and left abdomen separate incisions from the original operation were made and these were closed using figure of eight prolene sutures  The gas was deflated    The skin was closed with interrupted 4-0 Monocryl suture  Histocryl was applied  Please note that the PA was essential for assistance throughout the entire procedure including opening closing retraction and visualization       I was present for the entire procedure    Patient Disposition:  PACU     SIGNATURE: Abiel Zepeda MD  DATE: July 18, 2018  TIME: 2:34 PM

## 2018-07-25 DIAGNOSIS — E78.2 MIXED HYPERLIPIDEMIA: Primary | ICD-10-CM

## 2018-07-25 RX ORDER — SIMVASTATIN 40 MG
TABLET ORAL
Qty: 90 TABLET | Refills: 3 | Status: SHIPPED | OUTPATIENT
Start: 2018-07-25 | End: 2019-09-18 | Stop reason: SDUPTHER

## 2018-07-25 RX ORDER — LOSARTAN POTASSIUM AND HYDROCHLOROTHIAZIDE 12.5; 5 MG/1; MG/1
TABLET ORAL
Qty: 90 TABLET | Refills: 3 | Status: SHIPPED | OUTPATIENT
Start: 2018-07-25 | End: 2019-09-18 | Stop reason: SDUPTHER

## 2018-07-26 ENCOUNTER — OFFICE VISIT (OUTPATIENT)
Dept: SURGERY | Facility: HOSPITAL | Age: 68
End: 2018-07-26

## 2018-07-26 VITALS — TEMPERATURE: 98.8 F | BODY MASS INDEX: 28.08 KG/M2 | HEIGHT: 69 IN | WEIGHT: 189.6 LBS

## 2018-07-26 DIAGNOSIS — Z09 POSTOP CHECK: Primary | ICD-10-CM

## 2018-07-26 PROCEDURE — 99024 POSTOP FOLLOW-UP VISIT: CPT | Performed by: SURGERY

## 2018-07-26 NOTE — PROGRESS NOTES
Assessment/Plan:    Meena Lee is a 69 y o  male who presents today in postoperative follow-up status post laparoscopic recurrent paraesophageal hernia repair with toupet fundoplication, extensive lysis of adhesions, and two incisional hernia repairs performed on 7/11/18  Physical examination revealed well healing abdominal incisions with mild ecchymosis surrounding the LLQ incision site and supraumbilical incision site  Discussed incisional hernias found were due to previous abdominal surgeries  Given that he is having no swallowing issues or reflux, he is able to continue to introduce foods back into his diet  Discussed that there is a possibility for recurrence of reflux and he is able to restart his omeprazole or take Tums at that time  Otherwise, he no longer needs to take omeprazole  Post-surgery restrictions were reviewed with the patient: For the next two weeks, no heavy lifting greater than 25 pounds, but the patient can reintroduce light cardiac activity, e g  elliptical and stationary bike  Following the fourth week from the date of surgery there are no restrictions but the patient should have common sense and be cognizant of pain, reducing activity as needed  For his diarrhea, advised to increase fiber intake and introduce probiotics into the diet  He knows to contact our office should any problems or concerns arise  Otherwise, he will follow-up in one year  Inguinal hernia - he is going to conservatively manage this hernia for now with observation  Should he develop pain in regards to this hernia, he knows to contact our office  There are no diagnoses linked to this encounter  Subjective:      Patient ID: Meena Lee is a 69 y o  male who presents today in postoperative follow-up status post laparoscopic recurrent paraesophageal hernia repair with toupet fundoplication, extensive lysis of adhesions, and two incisional hernia repairs performed on 7/11/18        Patient reports that he is feeling better this week  He is slowly reintroducing foods back into his diet  He has some mild pain but he is not taking any narcotic pain medication at this time  Prior to the surgery, symptoms including vomiting which he is no longer experiencing  He continues to have diarrhea immediately after eating but this is slowly getting better  The following portions of the patient's history were reviewed and updated as appropriate: allergies, current medications, past family history, past medical history, past social history, past surgical history and problem list     Review of Systems   Constitutional: Negative  HENT: Negative  Eyes: Negative  Respiratory: Negative  Cardiovascular: Negative  Gastrointestinal: Positive for diarrhea  Negative for vomiting  Endocrine: Negative  Genitourinary: Negative  Musculoskeletal: Negative  Skin: Negative  Mild tenderness    Allergic/Immunologic: Negative  Neurological: Negative  Hematological: Negative  Psychiatric/Behavioral: Negative  All other systems reviewed and are negative  Objective:      Temp 98 8 °F (37 1 °C) (Tympanic)   Ht 5' 9" (1 753 m)   Wt 86 kg (189 lb 9 6 oz)   BMI 28 00 kg/m²          Physical Exam   Constitutional: He is oriented to person, place, and time  He appears well-developed and well-nourished  No distress  HENT:   Head: Normocephalic and atraumatic  Right Ear: External ear normal    Left Ear: External ear normal    Nose: Nose normal    Mouth/Throat: Oropharynx is clear and moist  No oropharyngeal exudate  Eyes: Conjunctivae and EOM are normal  Right eye exhibits no discharge  Left eye exhibits no discharge  No scleral icterus  Neck: Normal range of motion  Neck supple  No JVD present  No tracheal deviation present  No thyromegaly present  Cardiovascular: Normal rate, regular rhythm, normal heart sounds and intact distal pulses  Exam reveals no gallop and no friction rub      No murmur heard  Pulmonary/Chest: Effort normal and breath sounds normal  No stridor  No respiratory distress  He has no wheezes  He has no rales  He exhibits no tenderness  Abdominal: Soft  Bowel sounds are normal  He exhibits no distension and no mass  There is no tenderness  There is no rebound and no guarding  well healing abdominal incisions with mild ecchymosis surrounding the LLQ incision site and supraumbilical incision site   Musculoskeletal: Normal range of motion  He exhibits no edema, tenderness or deformity  Lymphadenopathy:     He has no cervical adenopathy  Neurological: He is alert and oriented to person, place, and time  No cranial nerve deficit  Coordination normal    Skin: Skin is dry  No rash noted  He is not diaphoretic  No erythema  No pallor  Psychiatric: He has a normal mood and affect  His behavior is normal  Thought content normal    Nursing note and vitals reviewed  Attestation:   By signing my name below, Jonathan Leon, attest that this documentation has been prepared under the direction and in the presence of Peggy Mariano MD  Electronically Signed: Keshia Steel  07/26/18     I, Peggy Mariano, personally performed the services described in this documentation  All medical record entries made by the avniibandrew were at my direction and in my presence  I have reviewed the chart and discharge instructions and agree that the record reflects my personal performance and is accurate and complete    Peggy Mariano MD  07/26/18

## 2018-08-28 ENCOUNTER — OFFICE VISIT (OUTPATIENT)
Dept: UROLOGY | Facility: AMBULATORY SURGERY CENTER | Age: 68
End: 2018-08-28
Payer: MEDICARE

## 2018-08-28 VITALS
SYSTOLIC BLOOD PRESSURE: 122 MMHG | HEART RATE: 72 BPM | WEIGHT: 192 LBS | HEIGHT: 68 IN | BODY MASS INDEX: 29.1 KG/M2 | DIASTOLIC BLOOD PRESSURE: 76 MMHG

## 2018-08-28 DIAGNOSIS — N40.1 ENLARGED PROSTATE WITH URINARY OBSTRUCTION: ICD-10-CM

## 2018-08-28 DIAGNOSIS — N13.8 ENLARGED PROSTATE WITH URINARY OBSTRUCTION: ICD-10-CM

## 2018-08-28 DIAGNOSIS — N20.0 NEPHROLITHIASIS: Primary | ICD-10-CM

## 2018-08-28 PROCEDURE — 99213 OFFICE O/P EST LOW 20 MIN: CPT | Performed by: NURSE PRACTITIONER

## 2018-08-28 PROCEDURE — 51736 URINE FLOW MEASUREMENT: CPT | Performed by: NURSE PRACTITIONER

## 2018-08-28 PROCEDURE — 51798 US URINE CAPACITY MEASURE: CPT | Performed by: NURSE PRACTITIONER

## 2018-08-28 RX ORDER — TAMSULOSIN HYDROCHLORIDE 0.4 MG/1
CAPSULE ORAL
Qty: 90 CAPSULE | Refills: 3 | Status: SHIPPED | OUTPATIENT
Start: 2018-08-28 | End: 2019-12-23 | Stop reason: SDUPTHER

## 2019-04-04 ENCOUNTER — TELEPHONE (OUTPATIENT)
Dept: UROLOGY | Facility: AMBULATORY SURGERY CENTER | Age: 69
End: 2019-04-04

## 2019-04-26 DIAGNOSIS — I10 BENIGN ESSENTIAL HYPERTENSION: Primary | ICD-10-CM

## 2019-04-26 DIAGNOSIS — E78.00 HYPERCHOLESTEROLEMIA: ICD-10-CM

## 2019-04-27 ENCOUNTER — APPOINTMENT (OUTPATIENT)
Dept: LAB | Facility: HOSPITAL | Age: 69
End: 2019-04-27
Attending: INTERNAL MEDICINE
Payer: MEDICARE

## 2019-04-27 DIAGNOSIS — E78.00 HYPERCHOLESTEROLEMIA: ICD-10-CM

## 2019-04-27 DIAGNOSIS — I10 BENIGN ESSENTIAL HYPERTENSION: ICD-10-CM

## 2019-04-27 LAB
ALBUMIN SERPL BCP-MCNC: 3.9 G/DL (ref 3.5–5)
ALP SERPL-CCNC: 60 U/L (ref 46–116)
ALT SERPL W P-5'-P-CCNC: 33 U/L (ref 12–78)
ANION GAP SERPL CALCULATED.3IONS-SCNC: 8 MMOL/L (ref 4–13)
AST SERPL W P-5'-P-CCNC: 20 U/L (ref 5–45)
BASOPHILS # BLD AUTO: 0.03 THOUSANDS/ΜL (ref 0–0.1)
BASOPHILS NFR BLD AUTO: 1 % (ref 0–1)
BILIRUB SERPL-MCNC: 0.7 MG/DL (ref 0.2–1)
BUN SERPL-MCNC: 12 MG/DL (ref 5–25)
CALCIUM SERPL-MCNC: 9 MG/DL (ref 8.3–10.1)
CHLORIDE SERPL-SCNC: 105 MMOL/L (ref 100–108)
CHOLEST SERPL-MCNC: 169 MG/DL (ref 50–200)
CO2 SERPL-SCNC: 29 MMOL/L (ref 21–32)
CREAT SERPL-MCNC: 0.73 MG/DL (ref 0.6–1.3)
EOSINOPHIL # BLD AUTO: 0.3 THOUSAND/ΜL (ref 0–0.61)
EOSINOPHIL NFR BLD AUTO: 5 % (ref 0–6)
ERYTHROCYTE [DISTWIDTH] IN BLOOD BY AUTOMATED COUNT: 12.1 % (ref 11.6–15.1)
GFR SERPL CREATININE-BSD FRML MDRD: 94 ML/MIN/1.73SQ M
GLUCOSE P FAST SERPL-MCNC: 85 MG/DL (ref 65–99)
HCT VFR BLD AUTO: 46.1 % (ref 36.5–49.3)
HDLC SERPL-MCNC: 44 MG/DL (ref 40–60)
HGB BLD-MCNC: 15.6 G/DL (ref 12–17)
IMM GRANULOCYTES # BLD AUTO: 0.02 THOUSAND/UL (ref 0–0.2)
IMM GRANULOCYTES NFR BLD AUTO: 0 % (ref 0–2)
LDLC SERPL CALC-MCNC: 96 MG/DL (ref 0–100)
LYMPHOCYTES # BLD AUTO: 1.84 THOUSANDS/ΜL (ref 0.6–4.47)
LYMPHOCYTES NFR BLD AUTO: 32 % (ref 14–44)
MCH RBC QN AUTO: 28.5 PG (ref 26.8–34.3)
MCHC RBC AUTO-ENTMCNC: 33.8 G/DL (ref 31.4–37.4)
MCV RBC AUTO: 84 FL (ref 82–98)
MONOCYTES # BLD AUTO: 0.56 THOUSAND/ΜL (ref 0.17–1.22)
MONOCYTES NFR BLD AUTO: 10 % (ref 4–12)
NEUTROPHILS # BLD AUTO: 3.05 THOUSANDS/ΜL (ref 1.85–7.62)
NEUTS SEG NFR BLD AUTO: 52 % (ref 43–75)
NONHDLC SERPL-MCNC: 125 MG/DL
NRBC BLD AUTO-RTO: 0 /100 WBCS
PLATELET # BLD AUTO: 252 THOUSANDS/UL (ref 149–390)
PMV BLD AUTO: 9 FL (ref 8.9–12.7)
POTASSIUM SERPL-SCNC: 4.2 MMOL/L (ref 3.5–5.3)
PROT SERPL-MCNC: 7.3 G/DL (ref 6.4–8.2)
RBC # BLD AUTO: 5.48 MILLION/UL (ref 3.88–5.62)
SODIUM SERPL-SCNC: 142 MMOL/L (ref 136–145)
TRIGL SERPL-MCNC: 143 MG/DL
WBC # BLD AUTO: 5.8 THOUSAND/UL (ref 4.31–10.16)

## 2019-04-27 PROCEDURE — 80053 COMPREHEN METABOLIC PANEL: CPT

## 2019-04-27 PROCEDURE — 85025 COMPLETE CBC W/AUTO DIFF WBC: CPT

## 2019-04-27 PROCEDURE — 36415 COLL VENOUS BLD VENIPUNCTURE: CPT

## 2019-04-27 PROCEDURE — 80061 LIPID PANEL: CPT

## 2019-04-29 ENCOUNTER — OFFICE VISIT (OUTPATIENT)
Dept: FAMILY MEDICINE CLINIC | Facility: HOSPITAL | Age: 69
End: 2019-04-29
Payer: MEDICARE

## 2019-04-29 ENCOUNTER — TELEPHONE (OUTPATIENT)
Dept: FAMILY MEDICINE CLINIC | Facility: HOSPITAL | Age: 69
End: 2019-04-29

## 2019-04-29 VITALS
BODY MASS INDEX: 29.25 KG/M2 | DIASTOLIC BLOOD PRESSURE: 70 MMHG | WEIGHT: 193 LBS | SYSTOLIC BLOOD PRESSURE: 112 MMHG | HEIGHT: 68 IN | HEART RATE: 62 BPM

## 2019-04-29 DIAGNOSIS — F51.01 PRIMARY INSOMNIA: ICD-10-CM

## 2019-04-29 DIAGNOSIS — K44.9 PARAESOPHAGEAL HERNIA: ICD-10-CM

## 2019-04-29 DIAGNOSIS — M51.9 LUMBAR DISC DISEASE: Chronic | ICD-10-CM

## 2019-04-29 DIAGNOSIS — Z11.59 NEED FOR HEPATITIS C SCREENING TEST: ICD-10-CM

## 2019-04-29 DIAGNOSIS — Z82.49 FAMILY HISTORY OF ABDOMINAL AORTIC ANEURYSM (AAA): ICD-10-CM

## 2019-04-29 DIAGNOSIS — Z23 NEED FOR SHINGLES VACCINE: ICD-10-CM

## 2019-04-29 DIAGNOSIS — Z00.00 MEDICARE ANNUAL WELLNESS VISIT, SUBSEQUENT: Primary | ICD-10-CM

## 2019-04-29 DIAGNOSIS — M96.1 POSTLAMINECTOMY SYNDROME, LUMBAR: ICD-10-CM

## 2019-04-29 DIAGNOSIS — M25.511 PAIN IN JOINT OF RIGHT SHOULDER: ICD-10-CM

## 2019-04-29 DIAGNOSIS — Z12.5 SCREENING FOR PROSTATE CANCER: ICD-10-CM

## 2019-04-29 PROBLEM — M54.50 LOW BACK PAIN: Status: ACTIVE | Noted: 2017-12-01

## 2019-04-29 PROBLEM — D22.9 MULTIPLE PIGMENTED NEVI: Status: ACTIVE | Noted: 2018-01-12

## 2019-04-29 PROCEDURE — 90750 HZV VACC RECOMBINANT IM: CPT

## 2019-04-29 PROCEDURE — G0439 PPPS, SUBSEQ VISIT: HCPCS | Performed by: INTERNAL MEDICINE

## 2019-04-29 PROCEDURE — 90471 IMMUNIZATION ADMIN: CPT

## 2019-04-29 RX ORDER — DICLOFENAC POTASSIUM 50 MG/1
50 TABLET, FILM COATED ORAL 2 TIMES DAILY
Qty: 180 TABLET | Refills: 1 | Status: SHIPPED | OUTPATIENT
Start: 2019-04-29 | End: 2019-10-07 | Stop reason: SDUPTHER

## 2019-04-29 RX ORDER — HYDROCODONE BITARTRATE AND ACETAMINOPHEN 5; 325 MG/1; MG/1
1 TABLET ORAL EVERY 6 HOURS PRN
Qty: 60 TABLET | Refills: 0 | Status: SHIPPED | OUTPATIENT
Start: 2019-04-29 | End: 2019-10-07 | Stop reason: SDUPTHER

## 2019-04-29 RX ORDER — ZOLPIDEM TARTRATE 10 MG/1
10 TABLET ORAL
Qty: 30 TABLET | Refills: 1 | Status: SHIPPED | OUTPATIENT
Start: 2019-04-29 | End: 2019-10-07 | Stop reason: SDUPTHER

## 2019-04-29 RX ORDER — NICOTINE POLACRILEX 4 MG/1
20 GUM, CHEWING ORAL DAILY
Qty: 30 EACH | Refills: 6 | Status: SHIPPED | OUTPATIENT
Start: 2019-04-29 | End: 2020-06-10 | Stop reason: SDUPTHER

## 2019-05-28 ENCOUNTER — HOSPITAL ENCOUNTER (OUTPATIENT)
Dept: RADIOLOGY | Facility: HOSPITAL | Age: 69
Discharge: HOME | End: 2019-05-28
Payer: MEDICARE

## 2019-05-28 DIAGNOSIS — N20.0 NEPHROLITHIASIS: ICD-10-CM

## 2019-05-28 PROCEDURE — 74018 RADEX ABDOMEN 1 VIEW: CPT

## 2019-05-30 ENCOUNTER — TELEPHONE (OUTPATIENT)
Dept: UROLOGY | Facility: AMBULATORY SURGERY CENTER | Age: 69
End: 2019-05-30

## 2019-05-30 ENCOUNTER — OFFICE VISIT (OUTPATIENT)
Dept: UROLOGY | Facility: AMBULATORY SURGERY CENTER | Age: 69
End: 2019-05-30
Payer: MEDICARE

## 2019-05-30 VITALS
BODY MASS INDEX: 29.7 KG/M2 | HEART RATE: 60 BPM | HEIGHT: 68 IN | SYSTOLIC BLOOD PRESSURE: 122 MMHG | DIASTOLIC BLOOD PRESSURE: 60 MMHG | WEIGHT: 196 LBS

## 2019-05-30 DIAGNOSIS — Z12.5 SCREENING FOR PROSTATE CANCER: ICD-10-CM

## 2019-05-30 DIAGNOSIS — N20.0 CALCULUS OF KIDNEY: Primary | ICD-10-CM

## 2019-05-30 DIAGNOSIS — R33.9 URINARY RETENTION: ICD-10-CM

## 2019-05-30 LAB — POST-VOID RESIDUAL VOLUME, ML POC: 165 ML

## 2019-05-30 PROCEDURE — 51798 US URINE CAPACITY MEASURE: CPT | Performed by: UROLOGY

## 2019-05-30 PROCEDURE — 99213 OFFICE O/P EST LOW 20 MIN: CPT | Performed by: UROLOGY

## 2019-06-14 DIAGNOSIS — I10 BENIGN ESSENTIAL HYPERTENSION: Primary | ICD-10-CM

## 2019-06-17 RX ORDER — LOSARTAN POTASSIUM 50 MG/1
50 TABLET ORAL DAILY
Qty: 90 TABLET | Refills: 3 | Status: SHIPPED | OUTPATIENT
Start: 2019-06-17 | End: 2019-10-07 | Stop reason: ALTCHOICE

## 2019-06-17 RX ORDER — HYDROCHLOROTHIAZIDE 12.5 MG/1
12.5 CAPSULE, GELATIN COATED ORAL DAILY
Qty: 30 CAPSULE | Refills: 2 | Status: SHIPPED | OUTPATIENT
Start: 2019-06-17 | End: 2019-10-07 | Stop reason: ALTCHOICE

## 2019-08-12 ENCOUNTER — OFFICE VISIT (OUTPATIENT)
Dept: SURGERY | Facility: HOSPITAL | Age: 69
End: 2019-08-12
Payer: MEDICARE

## 2019-08-12 VITALS
SYSTOLIC BLOOD PRESSURE: 131 MMHG | WEIGHT: 193.2 LBS | HEIGHT: 67 IN | BODY MASS INDEX: 30.32 KG/M2 | HEART RATE: 57 BPM | RESPIRATION RATE: 16 BRPM | TEMPERATURE: 98.2 F | DIASTOLIC BLOOD PRESSURE: 84 MMHG

## 2019-08-12 DIAGNOSIS — L21.9 SEBORRHEA: ICD-10-CM

## 2019-08-12 DIAGNOSIS — K40.20 BILATERAL INGUINAL HERNIA WITHOUT OBSTRUCTION OR GANGRENE, RECURRENCE NOT SPECIFIED: ICD-10-CM

## 2019-08-12 DIAGNOSIS — K91.1 DUMPING SYNDROME: Primary | ICD-10-CM

## 2019-08-12 DIAGNOSIS — D18.01 HEMANGIOMA OF SKIN: ICD-10-CM

## 2019-08-12 DIAGNOSIS — E66.9 OBESITY (BMI 30-39.9): ICD-10-CM

## 2019-08-12 DIAGNOSIS — D22.9 MULTIPLE PIGMENTED NEVI: ICD-10-CM

## 2019-08-12 PROCEDURE — 99214 OFFICE O/P EST MOD 30 MIN: CPT | Performed by: SURGERY

## 2019-08-12 NOTE — PROGRESS NOTES
Assessment/Plan: Helder Stanley is a 77year old male who presents today status post laparoscopic paraesophageal hernia repair with Toupe fundoplication, lysis of abdominal adhesions, and incisional hernia repair (X2) performed on 7/11/18  Physical exam revealed incisions are healing well  No lifting restrictions  Advised he avoids drinking high sugar beverages due to possible dumping syndrome  If his discomfort becomes worse, we will consider getting a CT scan  He can follow up as needed  He knows to call the office if any questions or concerns arise  Bilateral inguinal hernias - Physical exam revealed bilateral inguinal hernias  Discussed risks, benefits, and alternatives to bilateral inguinal hernia repair  He would not like to schedule the procedure at this time  Skin- Physical exam revealed multiple seborrhea of the face  Pigmented nevi, scattered hemangiomas, and multiple seborrhea of the back  His PCP should monitor these annually  Obesity- Discussed weight loss in the context of diet and exercise  No problem-specific Assessment & Plan notes found for this encounter  There are no diagnoses linked to this encounter  Subjective:      Patient ID: Chasity Curran is a 77 y o  male  Helder Stanley is a 77year old male who presents today status post laparoscopic paraesophageal hernia repair with Toupe fundoplication, lysis of abdominal adhesions, and incisional hernia repair (X2) performed on 7/11/18  Cesilia Becka said that his reflux has been okay  He has omeprazole but hardly uses it  He has not had any significant problems after the surgery  Before the surgery he said he was vomiting a lot and having worse reflux but all of that has resolved after the surgery  He had an hernia repair on 1994  He mentions that sometimes when he sits for a long time he feels irritantion  Denies having any groin pain   After he eats breakfast, at which he drinks orange juice, he goes to the bathroom and has diarrhea since having the surgery  Adalberto Perry occasionally has irritation at the site of his incisions when he drives  He has a history of second hand smoke exposure  Adalberto Perry is up to date on his colonoscopy  The patient is obese with a BMI of 30 26     he might have dumping syndrome  Disccused to avoid drinks that are high sugar  He states that this happens in the morning, he usually has oranhe juice in the morning  If he has any compians cr sacan might be done  Asked abput    Bilaterla inguinal hernia, they go back  Second hand smoker  Multipe sub-  Pultimi, nevi  On back  Small skin lesions  The following portions of the patient's history were reviewed and updated as appropriate: allergies, current medications, past family history, past medical history, past social history, past surgical history and problem list     Review of Systems   Constitutional: Negative  HENT: Negative  Eyes: Negative  Respiratory: Negative  Cardiovascular: Negative  Gastrointestinal:        Reflux   Endocrine: Negative  Genitourinary: Negative  Musculoskeletal: Negative  Skin: Negative  Allergic/Immunologic: Negative  Neurological: Negative  Hematological: Negative  Psychiatric/Behavioral: Negative  Objective: There were no vitals taken for this visit  Physical Exam   Constitutional: He is oriented to person, place, and time  He appears well-developed and well-nourished  No distress  HENT:   Head: Normocephalic and atraumatic  Right Ear: External ear normal    Left Ear: External ear normal    Nose: Nose normal    Mouth/Throat: Oropharynx is clear and moist  No oropharyngeal exudate  Eyes: Pupils are equal, round, and reactive to light  Conjunctivae and EOM are normal    Neck: Normal range of motion  Neck supple  No JVD present  No tracheal deviation present  No thyromegaly present  Cardiovascular: Normal rate, regular rhythm, normal heart sounds and intact distal pulses  Exam reveals no gallop and no friction rub  No murmur heard  Pulmonary/Chest: Effort normal and breath sounds normal  No stridor  No respiratory distress  He has no wheezes  He has no rales  He exhibits no tenderness  Abdominal: Soft  Bowel sounds are normal  He exhibits no distension and no mass  There is no tenderness  There is no rebound and no guarding  A hernia (Bilateral inguinal hernias) is present  Bilateral inguinal hernia   Musculoskeletal: Normal range of motion  He exhibits no edema, tenderness or deformity  Lymphadenopathy:     He has no cervical adenopathy  Neurological: He is alert and oriented to person, place, and time  No cranial nerve deficit  Coordination normal    Skin: Skin is warm and dry  No rash noted  He is not diaphoretic  No erythema  No pallor  Multiple seborrhea of the face  Pigmented nevi, scattered hemangiomas, and multiple seborrhea of the back   Psychiatric: He has a normal mood and affect  His behavior is normal  Judgment and thought content normal    Nursing note and vitals reviewed  By signing my name below, Alexandre Cosby, attest that this documentation has been prepared under the direction and in the presence of Demetrio Cadena MD  Electronically Signed: Devaughn Nixon  8/12/19  Yair Rodriguez, personally performed the services described in this documentation  All medical record entries made by the devaughn were at my direction and in my presence  I have reviewed the chart and discharge instructions and agree that the record reflects my personal performance and is accurate and complete  Demetrio Cadena MD  8/12/19

## 2019-09-18 DIAGNOSIS — E78.2 MIXED HYPERLIPIDEMIA: ICD-10-CM

## 2019-09-18 RX ORDER — LOSARTAN POTASSIUM AND HYDROCHLOROTHIAZIDE 12.5; 5 MG/1; MG/1
1 TABLET ORAL DAILY
Qty: 90 TABLET | Refills: 3 | Status: SHIPPED | OUTPATIENT
Start: 2019-09-18 | End: 2020-06-24 | Stop reason: ALTCHOICE

## 2019-09-18 RX ORDER — SIMVASTATIN 40 MG
40 TABLET ORAL DAILY
Qty: 90 TABLET | Refills: 3 | Status: SHIPPED | OUTPATIENT
Start: 2019-09-18 | End: 2020-09-21

## 2019-10-04 RX ORDER — MELOXICAM 15 MG/1
TABLET ORAL
Refills: 0 | COMMUNITY
Start: 2019-07-22 | End: 2019-10-07 | Stop reason: ALTCHOICE

## 2019-10-07 ENCOUNTER — OFFICE VISIT (OUTPATIENT)
Dept: FAMILY MEDICINE CLINIC | Facility: HOSPITAL | Age: 69
End: 2019-10-07
Payer: MEDICARE

## 2019-10-07 VITALS
SYSTOLIC BLOOD PRESSURE: 138 MMHG | HEART RATE: 68 BPM | DIASTOLIC BLOOD PRESSURE: 80 MMHG | BODY MASS INDEX: 30.29 KG/M2 | WEIGHT: 193 LBS | HEIGHT: 67 IN

## 2019-10-07 DIAGNOSIS — Z83.3 FAMILY HISTORY OF DIABETES MELLITUS IN MOTHER: ICD-10-CM

## 2019-10-07 DIAGNOSIS — F51.01 PRIMARY INSOMNIA: ICD-10-CM

## 2019-10-07 DIAGNOSIS — M51.9 LUMBAR DISC DISEASE: Chronic | ICD-10-CM

## 2019-10-07 DIAGNOSIS — E78.00 HYPERCHOLESTEROLEMIA: ICD-10-CM

## 2019-10-07 DIAGNOSIS — M96.1 POSTLAMINECTOMY SYNDROME, LUMBAR: ICD-10-CM

## 2019-10-07 DIAGNOSIS — N40.1 BENIGN NODULAR PROSTATIC HYPERPLASIA WITH LOWER URINARY TRACT SYMPTOMS: Primary | ICD-10-CM

## 2019-10-07 DIAGNOSIS — N20.0 NEPHROLITHIASIS: ICD-10-CM

## 2019-10-07 DIAGNOSIS — J30.9 ALLERGIC RHINITIS: ICD-10-CM

## 2019-10-07 DIAGNOSIS — M25.511 PAIN IN JOINT OF RIGHT SHOULDER: ICD-10-CM

## 2019-10-07 PROBLEM — R10.12 LEFT UPPER QUADRANT PAIN: Chronic | Status: RESOLVED | Noted: 2018-01-02 | Resolved: 2019-10-07

## 2019-10-07 PROBLEM — K44.9 LARGE HIATAL HERNIA: Chronic | Status: RESOLVED | Noted: 2018-01-02 | Resolved: 2019-10-07

## 2019-10-07 PROCEDURE — 99214 OFFICE O/P EST MOD 30 MIN: CPT | Performed by: INTERNAL MEDICINE

## 2019-10-07 RX ORDER — DICLOFENAC POTASSIUM 50 MG/1
50 TABLET, FILM COATED ORAL AS NEEDED
Qty: 60 TABLET | Refills: 1 | Status: SHIPPED | OUTPATIENT
Start: 2019-10-07 | End: 2021-10-14

## 2019-10-07 RX ORDER — FLUTICASONE PROPIONATE 50 MCG
2 SPRAY, SUSPENSION (ML) NASAL DAILY
Qty: 1 BOTTLE | Refills: 5 | Status: SHIPPED | OUTPATIENT
Start: 2019-10-07

## 2019-10-07 RX ORDER — HYDROCODONE BITARTRATE AND ACETAMINOPHEN 5; 325 MG/1; MG/1
1 TABLET ORAL EVERY 6 HOURS PRN
Qty: 60 TABLET | Refills: 0 | Status: SHIPPED | OUTPATIENT
Start: 2019-10-07 | End: 2020-06-24 | Stop reason: SDUPTHER

## 2019-10-07 RX ORDER — ZOLPIDEM TARTRATE 10 MG/1
10 TABLET ORAL
Qty: 30 TABLET | Refills: 1 | Status: SHIPPED | OUTPATIENT
Start: 2019-10-07

## 2019-10-07 NOTE — PROGRESS NOTES
Assessment/Plan:             Problem List Items Addressed This Visit        Respiratory    Allergic rhinitis     Using otc claritin- will renew fluticasone         Relevant Medications    diclofenac potassium (CATAFLAM) 50 mg tablet    fluticasone (FLONASE) 50 mcg/act nasal spray       Musculoskeletal and Integument    Lumbar disc disease (Chronic)    Relevant Medications    HYDROcodone-acetaminophen (NORCO) 5-325 mg per tablet       Genitourinary    Nephrolithiasis     Seen last month by Urology- no recent problems- Dr Daniel Marks office         Relevant Medications    HYDROcodone-acetaminophen (NORCO) 5-325 mg per tablet    Benign nodular prostatic hyperplasia with lower urinary tract symptoms - Primary     Usually once at night to void            Other    Insomnia    Relevant Medications    zolpidem (AMBIEN) 10 mg tablet    Postlaminectomy syndrome, lumbar     Uses diclofenac and norco prn   saw Dr Cyrus Campos series of injections but only minimal relief  Pain in joint of right shoulder     Had  Rotator cuff repair in 2002- now seeing dr Margie Lujan for possible replacement options         Relevant Medications    diclofenac potassium (CATAFLAM) 50 mg tablet    HYDROcodone-acetaminophen (NORCO) 5-325 mg per tablet            Subjective:      Patient ID: Soumya Juarez is a 68 y o  male    HPI    The following portions of the patient's history were reviewed and updated as appropriate: allergies, current medications and problem list      Review of Systems   Musculoskeletal: Positive for arthralgias  Bilateral shoulder pain- ongoing  Low back pain   All other systems reviewed and are negative          Objective:      Current Outpatient Medications:     albuterol (PROAIR HFA) 90 mcg/act inhaler, Inhale every 4 (four) hours as needed  , Disp: , Rfl:     Coenzyme Q10 (CO Q 10) 100 MG CAPS, Take by mouth, Disp: , Rfl:     diclofenac potassium (CATAFLAM) 50 mg tablet, Take 1 tablet (50 mg total) by mouth as needed (joint pain), Disp: 60 tablet, Rfl: 1    HYDROcodone-acetaminophen (NORCO) 5-325 mg per tablet, Take 1 tablet by mouth every 6 (six) hours as needed for painMax Daily Amount: 4 tablets, Disp: 60 tablet, Rfl: 0    losartan-hydrochlorothiazide (HYZAAR) 50-12 5 mg per tablet, Take 1 tablet by mouth daily, Disp: 90 tablet, Rfl: 3    Omeprazole 20 MG TBEC, Take 1 tablet (20 mg total) by mouth daily (Patient taking differently: Take 20 mg by mouth as needed ), Disp: 30 each, Rfl: 6    simvastatin (ZOCOR) 40 mg tablet, Take 1 tablet (40 mg total) by mouth daily, Disp: 90 tablet, Rfl: 3    tamsulosin (FLOMAX) 0 4 mg, I capsule daily, Disp: 90 capsule, Rfl: 3    zolpidem (AMBIEN) 10 mg tablet, Take 1 tablet (10 mg total) by mouth daily at bedtime as needed for sleep, Disp: 30 tablet, Rfl: 1    fluticasone (FLONASE) 50 mcg/act nasal spray, 2 sprays into each nostril daily, Disp: 1 Bottle, Rfl: 5    Blood pressure 138/80, pulse 68, height 5' 7" (1 702 m), weight 87 5 kg (193 lb)  Physical Exam   Constitutional: He is oriented to person, place, and time  He appears well-developed and well-nourished  HENT:   Head: Normocephalic  Right Ear: External ear normal    Left Ear: External ear normal    Mild injection in posterior pharynx   Eyes: Pupils are equal, round, and reactive to light  EOM are normal  Right eye exhibits no discharge  Left eye exhibits no discharge  Neck: No tracheal deviation present  No thyromegaly present  Cardiovascular: Normal rate and regular rhythm  Exam reveals no friction rub  No murmur heard  Pulmonary/Chest: Effort normal and breath sounds normal  No stridor  No respiratory distress  He has no wheezes  Abdominal: Soft  Bowel sounds are normal  He exhibits no distension  There is no tenderness  Musculoskeletal: He exhibits no edema, tenderness or deformity  Lymphadenopathy:     He has no cervical adenopathy     Neurological: He is alert and oriented to person, place, and time  No cranial nerve deficit  Coordination normal    Nursing note and vitals reviewed

## 2019-12-16 DIAGNOSIS — Z83.3 FAMILY HISTORY OF DIABETES MELLITUS IN MOTHER: Primary | ICD-10-CM

## 2019-12-17 RX ORDER — LOSARTAN POTASSIUM 50 MG/1
50 TABLET ORAL DAILY
Qty: 90 TABLET | Refills: 3 | Status: SHIPPED | OUTPATIENT
Start: 2019-12-17 | End: 2021-01-04 | Stop reason: SDUPTHER

## 2019-12-17 RX ORDER — HYDROCHLOROTHIAZIDE 12.5 MG/1
12.5 TABLET ORAL DAILY
Qty: 90 TABLET | Refills: 1 | Status: ON HOLD | OUTPATIENT
Start: 2019-12-17 | End: 2021-06-01 | Stop reason: ALTCHOICE

## 2019-12-23 DIAGNOSIS — N13.8 ENLARGED PROSTATE WITH URINARY OBSTRUCTION: ICD-10-CM

## 2019-12-23 DIAGNOSIS — N40.1 ENLARGED PROSTATE WITH URINARY OBSTRUCTION: ICD-10-CM

## 2019-12-23 RX ORDER — TAMSULOSIN HYDROCHLORIDE 0.4 MG/1
CAPSULE ORAL
Qty: 90 CAPSULE | Refills: 3 | Status: SHIPPED | OUTPATIENT
Start: 2019-12-23 | End: 2020-12-07

## 2020-01-01 DIAGNOSIS — K21.9 GASTROESOPHAGEAL REFLUX DISEASE WITHOUT ESOPHAGITIS: Primary | Chronic | ICD-10-CM

## 2020-01-02 RX ORDER — OMEPRAZOLE 20 MG/1
CAPSULE, DELAYED RELEASE ORAL
Qty: 30 CAPSULE | Refills: 11 | Status: SHIPPED | OUTPATIENT
Start: 2020-01-02

## 2020-06-09 ENCOUNTER — TELEPHONE (OUTPATIENT)
Dept: UROLOGY | Facility: AMBULATORY SURGERY CENTER | Age: 70
End: 2020-06-09

## 2020-06-09 DIAGNOSIS — Z12.5 SCREENING FOR PROSTATE CANCER: Primary | ICD-10-CM

## 2020-06-09 DIAGNOSIS — N20.0 NEPHROLITHIASIS: ICD-10-CM

## 2020-06-10 ENCOUNTER — APPOINTMENT (OUTPATIENT)
Dept: LAB | Facility: HOSPITAL | Age: 70
End: 2020-06-10
Attending: UROLOGY
Payer: MEDICARE

## 2020-06-10 ENCOUNTER — OFFICE VISIT (OUTPATIENT)
Dept: UROLOGY | Facility: AMBULATORY SURGERY CENTER | Age: 70
End: 2020-06-10
Payer: MEDICARE

## 2020-06-10 ENCOUNTER — HOSPITAL ENCOUNTER (OUTPATIENT)
Dept: RADIOLOGY | Facility: HOSPITAL | Age: 70
Discharge: HOME | End: 2020-06-10
Attending: UROLOGY
Payer: MEDICARE

## 2020-06-10 VITALS
SYSTOLIC BLOOD PRESSURE: 126 MMHG | TEMPERATURE: 98.3 F | BODY MASS INDEX: 30.29 KG/M2 | HEIGHT: 67 IN | WEIGHT: 193 LBS | DIASTOLIC BLOOD PRESSURE: 70 MMHG

## 2020-06-10 DIAGNOSIS — N20.0 CALCULUS OF KIDNEY: ICD-10-CM

## 2020-06-10 DIAGNOSIS — Z12.5 SCREENING FOR PROSTATE CANCER: ICD-10-CM

## 2020-06-10 DIAGNOSIS — R31.0 GROSS HEMATURIA: Primary | ICD-10-CM

## 2020-06-10 PROCEDURE — 3074F SYST BP LT 130 MM HG: CPT | Performed by: UROLOGY

## 2020-06-10 PROCEDURE — 74018 RADEX ABDOMEN 1 VIEW: CPT

## 2020-06-10 PROCEDURE — 3008F BODY MASS INDEX DOCD: CPT | Performed by: UROLOGY

## 2020-06-10 PROCEDURE — 1036F TOBACCO NON-USER: CPT | Performed by: UROLOGY

## 2020-06-10 PROCEDURE — 3078F DIAST BP <80 MM HG: CPT | Performed by: UROLOGY

## 2020-06-10 PROCEDURE — 99214 OFFICE O/P EST MOD 30 MIN: CPT | Performed by: UROLOGY

## 2020-06-10 PROCEDURE — G0103 PSA SCREENING: HCPCS

## 2020-06-10 PROCEDURE — 1160F RVW MEDS BY RX/DR IN RCRD: CPT | Performed by: UROLOGY

## 2020-06-10 PROCEDURE — 4040F PNEUMOC VAC/ADMIN/RCVD: CPT | Performed by: UROLOGY

## 2020-06-10 PROCEDURE — 87086 URINE CULTURE/COLONY COUNT: CPT

## 2020-06-10 PROCEDURE — 36415 COLL VENOUS BLD VENIPUNCTURE: CPT

## 2020-06-11 LAB — BACTERIA UR CULT: NORMAL

## 2020-06-12 LAB
PSA FREE MFR SERPL: 18.8 %
PSA FREE SERPL-MCNC: 0.49 NG/ML
PSA SERPL-MCNC: 2.6 NG/ML (ref 0–4)

## 2020-06-24 ENCOUNTER — OFFICE VISIT (OUTPATIENT)
Dept: FAMILY MEDICINE CLINIC | Facility: HOSPITAL | Age: 70
End: 2020-06-24
Payer: MEDICARE

## 2020-06-24 VITALS
DIASTOLIC BLOOD PRESSURE: 70 MMHG | TEMPERATURE: 98.2 F | WEIGHT: 195.2 LBS | OXYGEN SATURATION: 97 % | BODY MASS INDEX: 30.64 KG/M2 | SYSTOLIC BLOOD PRESSURE: 132 MMHG | HEART RATE: 55 BPM | HEIGHT: 67 IN

## 2020-06-24 DIAGNOSIS — G89.4 CHRONIC PAIN SYNDROME: Primary | ICD-10-CM

## 2020-06-24 DIAGNOSIS — M25.511 PAIN IN JOINT OF RIGHT SHOULDER: ICD-10-CM

## 2020-06-24 DIAGNOSIS — M51.9 LUMBAR DISC DISEASE: Chronic | ICD-10-CM

## 2020-06-24 DIAGNOSIS — N40.0 ENLARGED PROSTATE: ICD-10-CM

## 2020-06-24 PROCEDURE — 1160F RVW MEDS BY RX/DR IN RCRD: CPT | Performed by: INTERNAL MEDICINE

## 2020-06-24 PROCEDURE — 3075F SYST BP GE 130 - 139MM HG: CPT | Performed by: INTERNAL MEDICINE

## 2020-06-24 PROCEDURE — 3078F DIAST BP <80 MM HG: CPT | Performed by: INTERNAL MEDICINE

## 2020-06-24 PROCEDURE — 99214 OFFICE O/P EST MOD 30 MIN: CPT | Performed by: INTERNAL MEDICINE

## 2020-06-24 PROCEDURE — 1036F TOBACCO NON-USER: CPT | Performed by: INTERNAL MEDICINE

## 2020-06-24 PROCEDURE — 3008F BODY MASS INDEX DOCD: CPT | Performed by: INTERNAL MEDICINE

## 2020-06-24 PROCEDURE — 4040F PNEUMOC VAC/ADMIN/RCVD: CPT | Performed by: INTERNAL MEDICINE

## 2020-06-24 RX ORDER — HYDROCODONE BITARTRATE AND ACETAMINOPHEN 5; 325 MG/1; MG/1
1 TABLET ORAL EVERY 6 HOURS PRN
Qty: 60 TABLET | Refills: 0 | Status: SHIPPED | OUTPATIENT
Start: 2020-06-24 | End: 2021-06-02 | Stop reason: HOSPADM

## 2020-07-21 ENCOUNTER — APPOINTMENT (OUTPATIENT)
Dept: LAB | Facility: HOSPITAL | Age: 70
End: 2020-07-21
Attending: UROLOGY
Payer: MEDICARE

## 2020-07-21 ENCOUNTER — LAB (OUTPATIENT)
Dept: LAB | Facility: HOSPITAL | Age: 70
End: 2020-07-21
Attending: INTERNAL MEDICINE
Payer: MEDICARE

## 2020-07-21 DIAGNOSIS — Z83.3 FAMILY HISTORY OF DIABETES MELLITUS IN MOTHER: ICD-10-CM

## 2020-07-21 DIAGNOSIS — R31.0 GROSS HEMATURIA: ICD-10-CM

## 2020-07-21 DIAGNOSIS — E78.00 HYPERCHOLESTEROLEMIA: ICD-10-CM

## 2020-07-21 LAB
ALBUMIN SERPL BCP-MCNC: 4.1 G/DL (ref 3.5–5)
ALP SERPL-CCNC: 52 U/L (ref 46–116)
ALT SERPL W P-5'-P-CCNC: 36 U/L (ref 12–78)
ANION GAP SERPL CALCULATED.3IONS-SCNC: 6 MMOL/L (ref 4–13)
AST SERPL W P-5'-P-CCNC: 18 U/L (ref 5–45)
BASOPHILS # BLD AUTO: 0.03 THOUSANDS/ΜL (ref 0–0.1)
BASOPHILS NFR BLD AUTO: 1 % (ref 0–1)
BILIRUB SERPL-MCNC: 0.71 MG/DL (ref 0.2–1)
BUN SERPL-MCNC: 15 MG/DL (ref 5–25)
CALCIUM SERPL-MCNC: 9 MG/DL (ref 8.3–10.1)
CHLORIDE SERPL-SCNC: 108 MMOL/L (ref 100–108)
CHOLEST SERPL-MCNC: 155 MG/DL (ref 50–200)
CO2 SERPL-SCNC: 27 MMOL/L (ref 21–32)
CREAT SERPL-MCNC: 0.85 MG/DL (ref 0.6–1.3)
EOSINOPHIL # BLD AUTO: 0.26 THOUSAND/ΜL (ref 0–0.61)
EOSINOPHIL NFR BLD AUTO: 5 % (ref 0–6)
ERYTHROCYTE [DISTWIDTH] IN BLOOD BY AUTOMATED COUNT: 11.9 % (ref 11.6–15.1)
EST. AVERAGE GLUCOSE BLD GHB EST-MCNC: 114 MG/DL
GFR SERPL CREATININE-BSD FRML MDRD: 88 ML/MIN/1.73SQ M
GLUCOSE P FAST SERPL-MCNC: 101 MG/DL (ref 65–99)
HBA1C MFR BLD: 5.6 %
HCT VFR BLD AUTO: 43.4 % (ref 36.5–49.3)
HDLC SERPL-MCNC: 45 MG/DL
HGB BLD-MCNC: 14.9 G/DL (ref 12–17)
IMM GRANULOCYTES # BLD AUTO: 0.01 THOUSAND/UL (ref 0–0.2)
IMM GRANULOCYTES NFR BLD AUTO: 0 % (ref 0–2)
LDLC SERPL CALC-MCNC: 91 MG/DL (ref 0–100)
LYMPHOCYTES # BLD AUTO: 1.68 THOUSANDS/ΜL (ref 0.6–4.47)
LYMPHOCYTES NFR BLD AUTO: 32 % (ref 14–44)
MCH RBC QN AUTO: 28.7 PG (ref 26.8–34.3)
MCHC RBC AUTO-ENTMCNC: 34.3 G/DL (ref 31.4–37.4)
MCV RBC AUTO: 84 FL (ref 82–98)
MONOCYTES # BLD AUTO: 0.62 THOUSAND/ΜL (ref 0.17–1.22)
MONOCYTES NFR BLD AUTO: 12 % (ref 4–12)
NEUTROPHILS # BLD AUTO: 2.59 THOUSANDS/ΜL (ref 1.85–7.62)
NEUTS SEG NFR BLD AUTO: 50 % (ref 43–75)
NRBC BLD AUTO-RTO: 0 /100 WBCS
PLATELET # BLD AUTO: 227 THOUSANDS/UL (ref 149–390)
PMV BLD AUTO: 9.1 FL (ref 8.9–12.7)
POTASSIUM SERPL-SCNC: 3.6 MMOL/L (ref 3.5–5.3)
PROT SERPL-MCNC: 7.2 G/DL (ref 6.4–8.2)
RBC # BLD AUTO: 5.19 MILLION/UL (ref 3.88–5.62)
SODIUM SERPL-SCNC: 141 MMOL/L (ref 136–145)
TRIGL SERPL-MCNC: 93 MG/DL
WBC # BLD AUTO: 5.19 THOUSAND/UL (ref 4.31–10.16)

## 2020-07-21 PROCEDURE — 88112 CYTOPATH CELL ENHANCE TECH: CPT | Performed by: PATHOLOGY

## 2020-07-21 PROCEDURE — 80053 COMPREHEN METABOLIC PANEL: CPT

## 2020-07-21 PROCEDURE — 83036 HEMOGLOBIN GLYCOSYLATED A1C: CPT

## 2020-07-21 PROCEDURE — 85025 COMPLETE CBC W/AUTO DIFF WBC: CPT

## 2020-07-21 PROCEDURE — 80061 LIPID PANEL: CPT

## 2020-07-21 PROCEDURE — 36415 COLL VENOUS BLD VENIPUNCTURE: CPT

## 2020-07-21 NOTE — RESULT ENCOUNTER NOTE
Call patient: Michelle Yee normal cholesterol, normal electrolytes, renal and liver function tests    Blood sugar is just above normal at 101

## 2020-07-31 ENCOUNTER — HOSPITAL ENCOUNTER (OUTPATIENT)
Dept: CT IMAGING | Facility: HOSPITAL | Age: 70
Discharge: HOME | End: 2020-07-31
Attending: UROLOGY
Payer: MEDICARE

## 2020-07-31 DIAGNOSIS — R31.0 GROSS HEMATURIA: ICD-10-CM

## 2020-07-31 PROCEDURE — 74178 CT ABD&PLV WO CNTR FLWD CNTR: CPT

## 2020-07-31 RX ADMIN — IOHEXOL 100 ML: 350 INJECTION, SOLUTION INTRAVENOUS at 08:13

## 2020-08-07 ENCOUNTER — PROCEDURE VISIT (OUTPATIENT)
Dept: UROLOGY | Facility: CLINIC | Age: 70
End: 2020-08-07
Payer: MEDICARE

## 2020-08-07 VITALS
WEIGHT: 191 LBS | TEMPERATURE: 97.3 F | HEART RATE: 62 BPM | DIASTOLIC BLOOD PRESSURE: 68 MMHG | HEIGHT: 67 IN | BODY MASS INDEX: 29.98 KG/M2 | SYSTOLIC BLOOD PRESSURE: 110 MMHG

## 2020-08-07 DIAGNOSIS — N40.1 ENLARGED PROSTATE WITH URINARY OBSTRUCTION: ICD-10-CM

## 2020-08-07 DIAGNOSIS — R31.0 GROSS HEMATURIA: Primary | ICD-10-CM

## 2020-08-07 DIAGNOSIS — N13.8 ENLARGED PROSTATE WITH URINARY OBSTRUCTION: ICD-10-CM

## 2020-08-07 DIAGNOSIS — Z12.5 SCREENING FOR PROSTATE CANCER: ICD-10-CM

## 2020-08-07 LAB
SL AMB  POCT GLUCOSE, UA: NORMAL
SL AMB LEUKOCYTE ESTERASE,UA: NORMAL
SL AMB POCT BILIRUBIN,UA: NORMAL
SL AMB POCT BLOOD,UA: NORMAL
SL AMB POCT CLARITY,UA: CLEAR
SL AMB POCT COLOR,UA: YELLOW
SL AMB POCT KETONES,UA: NORMAL
SL AMB POCT NITRITE,UA: NORMAL
SL AMB POCT PH,UA: 5
SL AMB POCT SPECIFIC GRAVITY,UA: 1
SL AMB POCT URINE PROTEIN: NORMAL
SL AMB POCT UROBILINOGEN: 0.2

## 2020-08-07 PROCEDURE — 52000 CYSTOURETHROSCOPY: CPT | Performed by: UROLOGY

## 2020-08-07 PROCEDURE — 81002 URINALYSIS NONAUTO W/O SCOPE: CPT | Performed by: UROLOGY

## 2020-08-07 RX ORDER — FINASTERIDE 5 MG/1
5 TABLET, FILM COATED ORAL DAILY
Qty: 90 TABLET | Refills: 3 | Status: SHIPPED | OUTPATIENT
Start: 2020-08-07 | End: 2021-08-06 | Stop reason: SDUPTHER

## 2020-08-07 NOTE — PROGRESS NOTES
Cystoscopy    Date/Time: 8/7/2020 10:13 AM  Performed by: Bronson Cabrera MD  Authorized by: Bronson Cabrera MD     Procedure details: cystoscopy        Merry Stuart is a 70-year-old male with a history of nephrolithiasis  In 2018 he underwent ureteroscopy with stent insertion followed by stent removal   He was recently evaluated for painless gross hematuria  A KUB at that time revealed no radiopaque stones  He then underwent a CT scan of the abdomen pelvis with and without IV contrast for the gross hematuria  Two simple cysts were identified measuring up to 2 cm in the left kidney  Bilateral renal calculi measuring up to 3 mm were identified with the largest on the left  Bladder wall thickening was noted with severe prostatomegaly  His most recent PSA is 2 6 from June 2020  PSA from April 2017 is noted to be 2 9  He returns in follow-up today for cystoscopy to complete his hematuria workup  Urine cytology is negative for malignant cells  He currently has mild lower urinary tract symptoms on tamsulosin  Male cystoscopy procedure note:    Risk and benefits of flexible cystoscopy were discussed  Informed consent was obtained  The patient was placed in the supine position  His genitalia was prepped and draped in a sterile fashion  Viscous lidocaine jelly was instilled into the urethra and flexible cystoscopy was then performed  The urethra, prostatic urethra, and bladder were all thoroughly inspected there was no evidence of mucosal abnormalities or lesions  Retroflexion revealed a significantly enlarged prostate with large median lobe and large friable blood vessels noted  Otherwise, there was no evidence of urothelial carcinoma  Digital rectal examination reveals a 60 g prostate which is smooth and firm without nodularity  Impression:  Routine prostate cancer screening, BPH, resolved hematuria, history of nephrolithiasis    Plan:  I recommend that he continue on the tamsulosin    I also recommend adding finasteride 5 mg daily based on the appearance of the prostate on cystoscopy today and his prior history of hematuria  Follow-up in 9 months to reassess  PSA at that time

## 2020-09-19 DIAGNOSIS — E78.2 MIXED HYPERLIPIDEMIA: ICD-10-CM

## 2020-09-21 RX ORDER — SIMVASTATIN 40 MG
TABLET ORAL
Qty: 90 TABLET | Refills: 3 | Status: SHIPPED | OUTPATIENT
Start: 2020-09-21 | End: 2021-11-23 | Stop reason: SDUPTHER

## 2020-12-07 DIAGNOSIS — N13.8 ENLARGED PROSTATE WITH URINARY OBSTRUCTION: ICD-10-CM

## 2020-12-07 DIAGNOSIS — N40.1 ENLARGED PROSTATE WITH URINARY OBSTRUCTION: ICD-10-CM

## 2020-12-07 RX ORDER — TAMSULOSIN HYDROCHLORIDE 0.4 MG/1
CAPSULE ORAL
Qty: 90 CAPSULE | Refills: 3 | Status: SHIPPED | OUTPATIENT
Start: 2020-12-07 | End: 2021-01-29 | Stop reason: SDUPTHER

## 2021-01-04 DIAGNOSIS — Z83.3 FAMILY HISTORY OF DIABETES MELLITUS IN MOTHER: ICD-10-CM

## 2021-01-04 RX ORDER — LOSARTAN POTASSIUM 50 MG/1
50 TABLET ORAL DAILY
Qty: 90 TABLET | Refills: 3 | Status: SHIPPED | OUTPATIENT
Start: 2021-01-04 | End: 2022-02-03 | Stop reason: SDUPTHER

## 2021-01-29 DIAGNOSIS — N13.8 ENLARGED PROSTATE WITH URINARY OBSTRUCTION: ICD-10-CM

## 2021-01-29 DIAGNOSIS — N40.1 ENLARGED PROSTATE WITH URINARY OBSTRUCTION: ICD-10-CM

## 2021-01-29 RX ORDER — TAMSULOSIN HYDROCHLORIDE 0.4 MG/1
CAPSULE ORAL
Qty: 90 CAPSULE | Refills: 3 | Status: SHIPPED | OUTPATIENT
Start: 2021-01-29 | End: 2022-01-31 | Stop reason: SDUPTHER

## 2021-04-08 ENCOUNTER — OFFICE VISIT (OUTPATIENT)
Dept: OBGYN CLINIC | Facility: OTHER | Age: 71
End: 2021-04-08
Payer: MEDICARE

## 2021-04-08 VITALS
DIASTOLIC BLOOD PRESSURE: 80 MMHG | WEIGHT: 192.2 LBS | SYSTOLIC BLOOD PRESSURE: 146 MMHG | HEART RATE: 60 BPM | HEIGHT: 67 IN | BODY MASS INDEX: 30.17 KG/M2

## 2021-04-08 DIAGNOSIS — M19.011 GLENOHUMERAL ARTHRITIS, RIGHT: Primary | ICD-10-CM

## 2021-04-08 DIAGNOSIS — G89.29 CHRONIC RIGHT SHOULDER PAIN: ICD-10-CM

## 2021-04-08 DIAGNOSIS — M25.511 CHRONIC RIGHT SHOULDER PAIN: ICD-10-CM

## 2021-04-08 DIAGNOSIS — E78.00 HYPERCHOLESTEROLEMIA: ICD-10-CM

## 2021-04-08 PROCEDURE — 99214 OFFICE O/P EST MOD 30 MIN: CPT | Performed by: ORTHOPAEDIC SURGERY

## 2021-04-08 RX ORDER — CHLORHEXIDINE GLUCONATE 0.12 MG/ML
15 RINSE ORAL ONCE
Status: CANCELLED | OUTPATIENT
Start: 2021-04-08 | End: 2021-04-08

## 2021-04-08 NOTE — PATIENT INSTRUCTIONS
What to Expect Before and After Shoulder Replacement Surgery  You are being scheduled for a shoulder replacement by Dr Lynn Newton to treat your shoulder condition  Here is some information which may help to answer questions that you may have  Please do not hesitate to reach out to our team to answer questions not addressed here  Before Surgery  You will be contacted the evening prior to your surgery to confirm the scheduled time of the procedure and when to arrive at the hospital    Do not eat or drink anything after midnight the night before your surgery so that the anesthesia can be performed safely  If you have been fitted for a sling in the office prior to the surgery please remember to bring it to the hospital   You will meet the anesthesiologist the morning of the surgery  The surgery is performed under a general anesthetic but they will also offer you a regional block (shot to numb the arm) to help control your post-operative pain as well as with a catheter that is left in place after the block  The catheter is connected to a small pump which will continue to provide numbing medicine and help prolong the pain control from the block  Unfortunately this catheter is not as effective as the initial block, but can still be very helpful in managing the pain  After Surgery and in the Hospital  The shoulder replacement surgery typically takes 60-90 minutes  When surgery is completed, your surgeon will update your family and friends on your condition and progress  You will remain in the recovery room for at least an hour or until the anesthesia has worn off and your blood pressure and pulse are stable  If you have pain, the nurses will give you medication  Once out of surgery, your surgeon will decide on how long you will be using a sling in order to protect and position your shoulder  However, this won't keep you from starting physical therapy    Exercises typically begin on the day after surgery with emphasis on moving the shoulder, wrist, and hand  The physical therapist will be provided with a detailed protocol but typically the first 6 weeks are used to regain range of motion and then strengthening is initiated  Starting strengthening exercises too early may lead to complications  When You Are Discharged from the [address] [place] [id number] can expect to be released from the hospital the day after surgery (this may change if you have special needs or medical conditions)  Before you are released, the treatment team (Orthopaedic surgery residents, physician assistants and physical therapists) will talk with you about the importance of limiting any sudden or stressful movements to the arm for several weeks or longer  Activities that involve pushing, pulling, and lifting should not be done until you are given permission from your surgeon  Your First Day at [address] [place] [address] may need help with your daily activities, so it is a good idea to have family and friends prepared to help you  It is okay to remove the sling and let the arm hang at the side so that you can get cleaned and change your clothes  To put on a shirt, place the bad arm in first and then the good arm  Reverse to take it off  Don't forget to wear the sling every night for at least the first month after surgery, and never use your arm to push yourself up in bed or from a chair  The added weight on your shoulder may cause you to re-injure the joint  How to [place] in the First Week  You are encouraged to return to your normal eating and sleeping patterns as soon as possible  It is important for you to be active in order to control your weight and muscle tone  It is ok to increase your activity level and even perform light aerobic exercise (like walking or riding a stationary bike) within the first week or so if you are feeling up to it    If there is concern about these activates best to wait until the first post-operative visit and discuss this with the team    Chen Silverio might be able to return to work within several days if you can perform your job while wearing a sling  Consult with your doctor, as this differs from patient to patient  However, if your job requires heavy lifting or climbing, there may be a delay for several months  Until you are seen for your first follow-up visit, please try and keep wound dry  It is okay to shower but try your best to keep the incision out of direct contact with the water (or consider using a waterproof bandage)  If it does gets wet please dry as best as possible afterwards  If you notice any drainage or a foul odor from your incision or your temperature goes above 101 5 degrees, please contact the office  What You Can Expect in the First Month  Your first post-operative appointment will be with Dr Ida Villarreal physician assistant (PA) around 2 weeks after the surgery  You skin staples will be removed and X-rays will be obtained at that visit  Please understand that it is quite common to still experience pain at that time but the pain should be steadily improving  Hopefully physical therapy has already begun but if not it will be initiated at this visit and will continue for the 8-12 weeks  At about 12 weeks after surgery you will start a progressive strengthening program  Physical therapy is a deliberate process of not only strengthening your shoulder but also altering how you use your arm  It may be many months before your desired results are achieved, so do not get discouraged  Your shoulder will generally continue to improve steadily up to 6-8 months after surgery  After that point further improvement is very slow; although it has been shown that even after a year or more, activity can increase as muscle strength continues to improve  After the First Month at Home   Because each person heals differently, there are different recovery timelines  An average recovery period typically lasts about between 3-6 months  Talk with your surgeon about which activities will be appropriate for you once you have recovered

## 2021-04-08 NOTE — PROGRESS NOTES
Assessment  Diagnoses and all orders for this visit:    Glenohumeral arthritis, right    Chronic right shoulder pain      Discussion and Plan:    The patient has an examination consistent with advanced right shoulder OA which has failed to improve with appropriate nonoperative care in the past   I have discussed with the patient the pathophysiology of this diagnosis and reviewed how the examination correlates with this diagnosis  Surgical vs conservative treatment options were discussed at length and after discussing these treatment options, the patient elected to proceed with right total shoulder arthroplasty  We will set a date today  Patient will have a MRI and CT blue print  I will see him back prior to surgery to discuss the findings of these studies which will determine if we will to an anatomic vs reverse prosthesis is appropriate based on those studies  A thorough discussion was performed with the patient reviewing all operative and nonoperative options as well as the risks of the procedure  Risks discussed include but not limited to persistent pain, dislocation, loosening of the prosthesis, infection, need for further surgery including revision to a reverse total shoulder, rotator cuff rupture , neurovascular injury, as well as the risk of anesthesia  After this discussion all questions were answered and informed consent was obtained for Total Shoulder Arthroplasty of the right shoulder  Subjective:   Patient ID: Mark Anthony Sanchez is a 65 y o  male      HPI  The patient presents with a chief complaint of right shoulder pain  Patient has know severe [facility] OA  He was last seen in 2018  The patient describes the pain as aching, dull and sharp in intensity,  intermittent in timing, and localizes the pain to the  right globally  The pain is worse with movement and relieved by rest   The pain is not associated with numbness and tingling    The pain is not associated with constitutional symptoms  The patient is awoken at night by the pain  The following portions of the patient's history were reviewed and updated as appropriate: allergies, current medications, past family history, past medical history, past social history, past surgical history and problem list     Review of Systems   Constitutional: Negative for chills and fever  HENT: Negative for drooling and hearing loss  Eyes: Negative for visual disturbance  Respiratory: Negative for cough and shortness of breath  Cardiovascular: Negative for chest pain  Gastrointestinal: Negative for abdominal pain  Skin: Negative for rash  Psychiatric/Behavioral: Negative for agitation  Objective:  /80   Pulse 60   Ht 5' 7" (1 702 m)   Wt 87 2 kg (192 lb 3 2 oz)   BMI 30 10 kg/m²       Right Shoulder Exam     Tenderness   Right shoulder tenderness location: generalized  Range of Motion   External rotation: 10   Forward flexion: 90     Muscle Strength   Abduction: 3/5   External rotation: 3/5     Other   Erythema: absent  Sensation: normal  Pulse: present            Physical Exam  Vitals signs reviewed  Constitutional:       Appearance: He is well-developed  HENT:      Head: Normocephalic  Eyes:      Pupils: Pupils are equal, round, and reactive to light  Pulmonary:      Effort: Pulmonary effort is normal    Skin:     General: Skin is warm and dry  I have personally reviewed pertinent films in PACS from 2018 and my interpretation is as follows  Right shoulder x-rays demonstrate severe GH degenerative changes      Scribe Attestation    I,:  Ernst Pruett am acting as a scribe while in the presence of the attending physician :       I,:  Robert Bryant MD personally performed the services described in this documentation    as scribed in my presence :

## 2021-04-25 ENCOUNTER — HOSPITAL ENCOUNTER (OUTPATIENT)
Dept: MRI IMAGING | Facility: HOSPITAL | Age: 71
Discharge: HOME | End: 2021-04-25
Attending: ORTHOPAEDIC SURGERY
Payer: MEDICARE

## 2021-04-25 ENCOUNTER — HOSPITAL ENCOUNTER (OUTPATIENT)
Dept: CT IMAGING | Facility: HOSPITAL | Age: 71
Discharge: HOME | End: 2021-04-25
Attending: ORTHOPAEDIC SURGERY
Payer: MEDICARE

## 2021-04-25 DIAGNOSIS — M19.011 GLENOHUMERAL ARTHRITIS, RIGHT: ICD-10-CM

## 2021-04-25 DIAGNOSIS — M25.511 CHRONIC RIGHT SHOULDER PAIN: ICD-10-CM

## 2021-04-25 DIAGNOSIS — G89.29 CHRONIC RIGHT SHOULDER PAIN: ICD-10-CM

## 2021-04-25 PROCEDURE — 73221 MRI JOINT UPR EXTREM W/O DYE: CPT

## 2021-04-25 PROCEDURE — 73200 CT UPPER EXTREMITY W/O DYE: CPT

## 2021-04-25 PROCEDURE — G1004 CDSM NDSC: HCPCS

## 2021-04-27 ENCOUNTER — OFFICE VISIT (OUTPATIENT)
Dept: OBGYN CLINIC | Facility: HOSPITAL | Age: 71
End: 2021-04-27
Payer: MEDICARE

## 2021-04-27 VITALS
BODY MASS INDEX: 30.35 KG/M2 | HEIGHT: 67 IN | WEIGHT: 193.4 LBS | DIASTOLIC BLOOD PRESSURE: 79 MMHG | SYSTOLIC BLOOD PRESSURE: 135 MMHG | HEART RATE: 57 BPM

## 2021-04-27 DIAGNOSIS — M25.511 CHRONIC RIGHT SHOULDER PAIN: ICD-10-CM

## 2021-04-27 DIAGNOSIS — G89.29 CHRONIC RIGHT SHOULDER PAIN: ICD-10-CM

## 2021-04-27 DIAGNOSIS — M19.011 GLENOHUMERAL ARTHRITIS, RIGHT: Primary | ICD-10-CM

## 2021-04-27 PROCEDURE — 99214 OFFICE O/P EST MOD 30 MIN: CPT | Performed by: ORTHOPAEDIC SURGERY

## 2021-04-27 NOTE — PATIENT INSTRUCTIONS
What to Expect Before and After Shoulder Replacement Surgery  You are being scheduled for a shoulder replacement by Dr Damion Bowden to treat your shoulder condition  Here is some information which may help to answer questions that you may have  Please do not hesitate to reach out to our team to answer questions not addressed here  Before Surgery  You will be contacted the evening prior to your surgery to confirm the scheduled time of the procedure and when to arrive at the hospital    Do not eat or drink anything after midnight the night before your surgery so that the anesthesia can be performed safely  If you have been fitted for a sling in the office prior to the surgery please remember to bring it to the hospital   You will meet the anesthesiologist the morning of the surgery  The surgery is performed under a general anesthetic but they will also offer you a regional block (shot to numb the arm) to help control your post-operative pain as well as with a catheter that is left in place after the block  The catheter is connected to a small pump which will continue to provide numbing medicine and help prolong the pain control from the block  Unfortunately this catheter is not as effective as the initial block, but can still be very helpful in managing the pain  After Surgery and in the Hospital  The shoulder replacement surgery typically takes 60-90 minutes  When surgery is completed, your surgeon will update your family and friends on your condition and progress  You will remain in the recovery room for at least an hour or until the anesthesia has worn off and your blood pressure and pulse are stable  If you have pain, the nurses will give you medication  Once out of surgery, your surgeon will decide on how long you will be using a sling in order to protect and position your shoulder  However, this won't keep you from starting physical therapy    Exercises typically begin on the day after surgery with emphasis on moving the shoulder, wrist, and hand  The physical therapist will be provided with a detailed protocol but typically the first 6 weeks are used to regain range of motion and then strengthening is initiated  Starting strengthening exercises too early may lead to complications  When You Are Discharged from the [address] [place] [address] can expect to be released from the hospital the day after surgery (this may change if you have special needs or medical conditions)  Before you are released, the treatment team (Orthopaedic surgery residents, physician assistants and physical therapists) will talk with you about the importance of limiting any sudden or stressful movements to the arm for several weeks or longer  Activities that involve pushing, pulling, and lifting should not be done until you are given permission from your surgeon  Your First Day at [address] [place] [address] may need help with your daily activities, so it is a good idea to have family and friends prepared to help you  It is okay to remove the sling and let the arm hang at the side so that you can get cleaned and change your clothes  To put on a shirt, place the bad arm in first and then the good arm  Reverse to take it off  Don't forget to wear the sling every night for at least the first month after surgery, and never use your arm to push yourself up in bed or from a chair  The added weight on your shoulder may cause you to re-injure the joint  How to [place] in the First Week  You are encouraged to return to your normal eating and sleeping patterns as soon as possible  It is important for you to be active in order to control your weight and muscle tone  It is ok to increase your activity level and even perform light aerobic exercise (like walking or riding a stationary bike) within the first week or so if you are feeling up to it    If there is concern about these activates best to wait until the first post-operative visit and discuss this with the everardo Philippe Ridbre might be able to return to work within several days if you can perform your job while wearing a sling  Consult with your doctor, as this differs from patient to patient  However, if your job requires heavy lifting or climbing, there may be a delay for several months  Until you are seen for your first follow-up visit, please try and keep wound dry  It is okay to shower but try your best to keep the incision out of direct contact with the water (or consider using a waterproof bandage)  If it does gets wet please dry as best as possible afterwards  If you notice any drainage or a foul odor from your incision or your temperature goes above 101 5 degrees, please contact the office  What You Can Expect in the First Month  Your first post-operative appointment will be with Dr Rhiannon Christy physician assistant (PA) around 2 weeks after the surgery  You skin staples will be removed and X-rays will be obtained at that visit  Please understand that it is quite common to still experience pain at that time but the pain should be steadily improving  Hopefully physical therapy has already begun but if not it will be initiated at this visit and will continue for the 8-12 weeks  At about 12 weeks after surgery you will start a progressive strengthening program  Physical therapy is a deliberate process of not only strengthening your shoulder but also altering how you use your arm  It may be many months before your desired results are achieved, so do not get discouraged  Your shoulder will generally continue to improve steadily up to 6-8 months after surgery  After that point further improvement is very slow; although it has been shown that even after a year or more, activity can increase as muscle strength continues to improve  After the First Month at Home   Because each person heals differently, there are different recovery timelines  An average recovery period typically lasts about between 3-6 months  Talk with your surgeon about which activities will be appropriate for you once you have recovered

## 2021-04-27 NOTE — PROGRESS NOTES
Assessment  Diagnoses and all orders for this visit:    Glenohumeral arthritis, right    Chronic right shoulder pain      Discussion and Plan:  Imaging was reviewed with the patient and does document standard glenohumeral osteoarthritis with an intact rotator cuff in a Garcia B2 glenoid deformity  This is something for which anatomic total shoulder is indicated and he will proceed forward with his scheduled total shoulder arthroplasty June 1st     A thorough discussion was performed with the patient reviewing all operative and nonoperative options as well as the risks of the procedure  Risks discussed include but not limited to persistent pain, dislocation, loosening of the prosthesis, infection, need for further surgery including revision to a reverse total shoulder, rotator cuff rupture , neurovascular injury, as well as the risk of anesthesia  After this discussion all questions were answered and informed consent was obtained for Total Shoulder Arthroplasty of the right shoulder  Subjective:   Patient ID: Sondra Morales is a 65 y o  male      HPI  The patient presents with a chief complaint of right shoulder pain  Patient is scheduled for a total shoulder on 6/01/21  He is here today to discuss the findings of his right shoulder MRI  Patient has know severe [facility] OA  He was last seen in 2018  The patient describes the pain as aching, dull and sharp in intensity,  intermittent in timing, and localizes the pain to the  right globally  The pain is worse with movement and relieved by rest   The pain is not associated with numbness and tingling  The pain is not associated with constitutional symptoms  The patient is awoken at night by the pain            The following portions of the patient's history were reviewed and updated as appropriate: allergies, current medications, past family history, past medical history, past social history, past surgical history and problem list     Review of Systems Constitutional: Negative for chills and fever  HENT: Negative for drooling and hearing loss  Eyes: Negative for visual disturbance  Respiratory: Negative for cough and shortness of breath  Cardiovascular: Negative for chest pain  Gastrointestinal: Negative for abdominal pain  Skin: Negative for rash  Psychiatric/Behavioral: Negative for agitation  Objective:  /79   Pulse 57   Ht 5' 7" (1 702 m)   Wt 87 7 kg (193 lb 6 4 oz)   BMI 30 29 kg/m²       Right Shoulder Exam     Tenderness   Right shoulder tenderness location: generalized  Range of Motion   External rotation: 10   Forward flexion: 90     Muscle Strength   Abduction: 3/5   External rotation: 3/5     Other   Erythema: absent  Sensation: normal  Pulse: present            Physical Exam  Vitals signs and nursing note reviewed  Constitutional:       Appearance: He is well-developed  HENT:      Head: Normocephalic and atraumatic  Eyes:      Pupils: Pupils are equal, round, and reactive to light  Neck:      Musculoskeletal: Normal range of motion and neck supple  Cardiovascular:      Rate and Rhythm: Normal rate and regular rhythm  Heart sounds: Normal heart sounds  Pulmonary:      Effort: Pulmonary effort is normal  No respiratory distress  Breath sounds: Normal breath sounds  Abdominal:      General: There is no distension  Palpations: Abdomen is soft  Skin:     General: Skin is warm and dry  Neurological:      Mental Status: He is alert and oriented to person, place, and time  Psychiatric:         Behavior: Behavior normal            I have personally reviewed pertinent films in PACS and my interpretation is as follows    MRI right shoulder demonstrates severe GH OA with B2 glenoid, intact RTC, with degenerative labrum    Scribe Attestation    I,:  Tahira Diego am acting as a scribe while in the presence of the attending physician :       I,:  Don Higgins MD personally performed the services described in this documentation    as scribed in my presence :

## 2021-05-07 ENCOUNTER — TRANSCRIBE ORDERS (OUTPATIENT)
Dept: ADMINISTRATIVE | Facility: HOSPITAL | Age: 71
End: 2021-05-07

## 2021-05-07 ENCOUNTER — LAB REQUISITION (OUTPATIENT)
Dept: LAB | Facility: HOSPITAL | Age: 71
End: 2021-05-07
Payer: MEDICARE

## 2021-05-07 ENCOUNTER — OFFICE VISIT (OUTPATIENT)
Dept: LAB | Facility: HOSPITAL | Age: 71
End: 2021-05-07
Attending: ORTHOPAEDIC SURGERY
Payer: MEDICARE

## 2021-05-07 ENCOUNTER — LAB (OUTPATIENT)
Dept: LAB | Facility: HOSPITAL | Age: 71
End: 2021-05-07
Attending: UROLOGY
Payer: MEDICARE

## 2021-05-07 DIAGNOSIS — E78.00 HYPERCHOLESTEROLEMIA: ICD-10-CM

## 2021-05-07 DIAGNOSIS — Z01.818 OTHER SPECIFIED PRE-OPERATIVE EXAMINATION: Primary | ICD-10-CM

## 2021-05-07 DIAGNOSIS — M19.011 GLENOHUMERAL ARTHRITIS, RIGHT: ICD-10-CM

## 2021-05-07 DIAGNOSIS — Z01.812 PRE-OPERATIVE LABORATORY EXAMINATION: ICD-10-CM

## 2021-05-07 DIAGNOSIS — Z01.818 ENCOUNTER FOR OTHER PREPROCEDURAL EXAMINATION: ICD-10-CM

## 2021-05-07 DIAGNOSIS — Z12.5 SCREENING FOR PROSTATE CANCER: ICD-10-CM

## 2021-05-07 DIAGNOSIS — Z01.818 OTHER SPECIFIED PRE-OPERATIVE EXAMINATION: ICD-10-CM

## 2021-05-07 LAB
ABO GROUP BLD: NORMAL
ALBUMIN SERPL BCP-MCNC: 4.3 G/DL (ref 3.5–5)
ALP SERPL-CCNC: 63 U/L (ref 46–116)
ALT SERPL W P-5'-P-CCNC: 33 U/L (ref 12–78)
ANION GAP SERPL CALCULATED.3IONS-SCNC: 4 MMOL/L (ref 4–13)
APTT PPP: 30 SECONDS (ref 23–37)
AST SERPL W P-5'-P-CCNC: 17 U/L (ref 5–45)
ATRIAL RATE: 57 BPM
BASOPHILS # BLD AUTO: 0.02 THOUSANDS/ΜL (ref 0–0.1)
BASOPHILS NFR BLD AUTO: 0 % (ref 0–1)
BILIRUB SERPL-MCNC: 0.74 MG/DL (ref 0.2–1)
BLD GP AB SCN SERPL QL: NEGATIVE
BUN SERPL-MCNC: 12 MG/DL (ref 5–25)
CALCIUM SERPL-MCNC: 9.5 MG/DL (ref 8.3–10.1)
CHLORIDE SERPL-SCNC: 106 MMOL/L (ref 100–108)
CO2 SERPL-SCNC: 28 MMOL/L (ref 21–32)
CREAT SERPL-MCNC: 0.79 MG/DL (ref 0.6–1.3)
EOSINOPHIL # BLD AUTO: 0.18 THOUSAND/ΜL (ref 0–0.61)
EOSINOPHIL NFR BLD AUTO: 4 % (ref 0–6)
ERYTHROCYTE [DISTWIDTH] IN BLOOD BY AUTOMATED COUNT: 12 % (ref 11.6–15.1)
EST. AVERAGE GLUCOSE BLD GHB EST-MCNC: 108 MG/DL
GFR SERPL CREATININE-BSD FRML MDRD: 90 ML/MIN/1.73SQ M
GLUCOSE P FAST SERPL-MCNC: 92 MG/DL (ref 65–99)
HBA1C MFR BLD: 5.4 %
HCT VFR BLD AUTO: 46.3 % (ref 36.5–49.3)
HGB BLD-MCNC: 15.7 G/DL (ref 12–17)
IMM GRANULOCYTES # BLD AUTO: 0.01 THOUSAND/UL (ref 0–0.2)
IMM GRANULOCYTES NFR BLD AUTO: 0 % (ref 0–2)
INR PPP: 1.1 (ref 0.84–1.19)
LYMPHOCYTES # BLD AUTO: 1.43 THOUSANDS/ΜL (ref 0.6–4.47)
LYMPHOCYTES NFR BLD AUTO: 31 % (ref 14–44)
MCH RBC QN AUTO: 28.8 PG (ref 26.8–34.3)
MCHC RBC AUTO-ENTMCNC: 33.9 G/DL (ref 31.4–37.4)
MCV RBC AUTO: 85 FL (ref 82–98)
MONOCYTES # BLD AUTO: 0.53 THOUSAND/ΜL (ref 0.17–1.22)
MONOCYTES NFR BLD AUTO: 11 % (ref 4–12)
NEUTROPHILS # BLD AUTO: 2.47 THOUSANDS/ΜL (ref 1.85–7.62)
NEUTS SEG NFR BLD AUTO: 54 % (ref 43–75)
NRBC BLD AUTO-RTO: 0 /100 WBCS
P AXIS: 36 DEGREES
PLATELET # BLD AUTO: 241 THOUSANDS/UL (ref 149–390)
PMV BLD AUTO: 9.4 FL (ref 8.9–12.7)
POTASSIUM SERPL-SCNC: 3.8 MMOL/L (ref 3.5–5.3)
PR INTERVAL: 144 MS
PROT SERPL-MCNC: 7.6 G/DL (ref 6.4–8.2)
PROTHROMBIN TIME: 14.2 SECONDS (ref 11.6–14.5)
PSA SERPL-MCNC: 1.9 NG/ML (ref 0–4)
QRS AXIS: 30 DEGREES
QRSD INTERVAL: 92 MS
QT INTERVAL: 412 MS
QTC INTERVAL: 401 MS
RBC # BLD AUTO: 5.45 MILLION/UL (ref 3.88–5.62)
RH BLD: POSITIVE
SODIUM SERPL-SCNC: 138 MMOL/L (ref 136–145)
SPECIMEN EXPIRATION DATE: NORMAL
T WAVE AXIS: 30 DEGREES
VENTRICULAR RATE: 57 BPM
WBC # BLD AUTO: 4.64 THOUSAND/UL (ref 4.31–10.16)

## 2021-05-07 PROCEDURE — G0103 PSA SCREENING: HCPCS

## 2021-05-07 PROCEDURE — 86900 BLOOD TYPING SEROLOGIC ABO: CPT | Performed by: ORTHOPAEDIC SURGERY

## 2021-05-07 PROCEDURE — 93005 ELECTROCARDIOGRAM TRACING: CPT

## 2021-05-07 PROCEDURE — 83036 HEMOGLOBIN GLYCOSYLATED A1C: CPT

## 2021-05-07 PROCEDURE — 85610 PROTHROMBIN TIME: CPT

## 2021-05-07 PROCEDURE — 86850 RBC ANTIBODY SCREEN: CPT | Performed by: ORTHOPAEDIC SURGERY

## 2021-05-07 PROCEDURE — 85025 COMPLETE CBC W/AUTO DIFF WBC: CPT

## 2021-05-07 PROCEDURE — 80053 COMPREHEN METABOLIC PANEL: CPT

## 2021-05-07 PROCEDURE — 93010 ELECTROCARDIOGRAM REPORT: CPT | Performed by: INTERNAL MEDICINE

## 2021-05-07 PROCEDURE — 86901 BLOOD TYPING SEROLOGIC RH(D): CPT | Performed by: ORTHOPAEDIC SURGERY

## 2021-05-07 PROCEDURE — 36415 COLL VENOUS BLD VENIPUNCTURE: CPT

## 2021-05-07 PROCEDURE — 85730 THROMBOPLASTIN TIME PARTIAL: CPT

## 2021-05-11 ENCOUNTER — ANESTHESIA EVENT (OUTPATIENT)
Dept: PERIOP | Facility: HOSPITAL | Age: 71
End: 2021-05-11
Payer: MEDICARE

## 2021-05-11 NOTE — PRE-PROCEDURE INSTRUCTIONS
Pre-Surgery Instructions:   Medication Instructions    albuterol (PROAIR HFA) 90 mcg/act inhaler Instructed patient per Anesthesia Guidelines  Take  Morning of surgery if needed    Coenzyme Q10 (CO Q 10) 100 MG CAPS Instructed patient per Anesthesia Guidelines  Stop 5/25    diclofenac potassium (CATAFLAM) 50 mg tablet Instructed patient per Anesthesia Guidelines  Stop 5/25    finasteride (PROSCAR) 5 mg tablet Instructed patient per Anesthesia Guidelines  Take morning of surgery with sip water    fluticasone (FLONASE) 50 mcg/act nasal spray Instructed patient per Anesthesia Guidelines  Take morning of surgery if needed    hydrochlorothiazide (HYDRODIURIL) 12 5 mg tablet Instructed patient per Anesthesia Guidelines  Do not take morning of surgery    HYDROcodone-acetaminophen (NORCO) 5-325 mg per tablet Instructed patient per Anesthesia Guidelines  Take morning of surgery if needed    losartan (Cozaar) 50 mg tablet Instructed patient per Anesthesia Guidelines  Do not take morning of surgery    omeprazole (PriLOSEC) 20 mg delayed release capsule Instructed patient per Anesthesia Guidelines  Take morning of surgery with sip water if needed    simvastatin (ZOCOR) 40 mg tablet Instructed patient per Anesthesia Guidelines  Take morning of surgery with sip water    tamsulosin (FLOMAX) 0 4 mg Instructed patient per Anesthesia Guidelines  Takes evenings     zolpidem (AMBIEN) 10 mg tablet Instructed patient per Anesthesia Guidelines  Do not take morning of surgery   Covid screening negative as per patient  Fully vaccinated  Reviewed pre op medicine and showering instructions with patient via phone call, verbalizes understanding  Reinforced need to bring sling to hospital with him on DOS  Offered referral to sleep medicine-patient not interested  Advised NPO after MN and ASC will call with surgical scheduled time  Pt verbalized understanding

## 2021-05-26 ENCOUNTER — OFFICE VISIT (OUTPATIENT)
Dept: FAMILY MEDICINE CLINIC | Facility: HOSPITAL | Age: 71
End: 2021-05-26
Payer: MEDICARE

## 2021-05-26 VITALS
DIASTOLIC BLOOD PRESSURE: 72 MMHG | WEIGHT: 190 LBS | BODY MASS INDEX: 29.82 KG/M2 | HEIGHT: 67 IN | OXYGEN SATURATION: 94 % | HEART RATE: 61 BPM | SYSTOLIC BLOOD PRESSURE: 120 MMHG

## 2021-05-26 DIAGNOSIS — M19.011 GLENOHUMERAL ARTHRITIS, RIGHT: Primary | ICD-10-CM

## 2021-05-26 PROCEDURE — 99214 OFFICE O/P EST MOD 30 MIN: CPT | Performed by: INTERNAL MEDICINE

## 2021-05-26 NOTE — PROGRESS NOTES
Subjective:     Mariza Polanco is a 65 y o  male who presents to the office today for a preoperative consultation at the request of surgeon Lynn Newton who plans on performing shoulder  Replacement on June 1, 2021    Prior anesthesia adverse reactions  -some nausea    Exercise capacity - Able to walk 4 blocks or climb 2 flights of stairs without symptoms - Yes    Easy bleeding/bruising - No    Chest pain - No cp or aplapitations    Dyspnea, wheezing, cough - No    Sleep apnea - No    Here for preop for shoulder replacement with Dr Lynn Newton      Past Medical History:   Diagnosis Date    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     Asthma     Chronic pain     back    GERD (gastroesophageal reflux disease)     Hiatal hernia 1994    Hyperlipidemia     Hypertension     Kidney stone     r stent- suppose to be removed Fri 7/13    PONV (postoperative nausea and vomiting)     Shortness of breath        Past Surgical History:   Procedure Laterality Date    ABDOMINAL ADHESION SURGERY N/A 7/11/2018    Procedure: LYSIS ADHESIONS EXTENSIVE;  Surgeon: Navi Thompson MD;  [facility];  Service: General    ABDOMINAL SURGERY      BACK SURGERY      x2    CARPAL TUNNEL RELEASE      COLONOSCOPY      CYSTOSCOPY  08/07/2020    CYSTOSCOPY W/ URETERAL STENT REMOVAL  01/26/2018    ESOPHAGOGASTRODUODENOSCOPY N/A 7/11/2018    Procedure: ESOPHAGOGASTRODUODENOSCOPY (EGD) INTRAOPERATIVE ;  Surgeon: Navi Thompson MD;  [facility];  Service: General    HALLUX VALGUS CORRECTION Bilateral     1st MTP joint     HERNIA REPAIR      HIATAL HERNIA REPAIR  1994    INCISIONAL HERNIA REPAIR N/A 7/11/2018    Procedure: REPAIR HERNIA INCISIONAL X2;  Surgeon: Navi Thompson MD;  [facility];  Service: General    KNEE SURGERY Bilateral     LITHOTRIPSY      PERIPHERAL NEUROPLASTY OF FINGER / HAND / Corrie High      left middle finger    [place] CYSTO/URETERO W/LITHOTRIPSY &INDWELL STENT INSRT Left 1/3/2018    Procedure: CYSTOSCOPY , RETROGRADE PYELOGRAM AND INSERTION STENT Mika Pitch;  Surgeon: Blade Hussein MD;  [facility];  Service: Urology    [place] CYSTO/URETERO W/LITHOTRIPSY &INDWELL STENT INSRT Right 6/8/2018    Procedure: CYSTOSCOPY URETEROSCOPY WITH LITHOTRIPSY HOLMIUM LASER, RETROGRADE PYELOGRAM AND INSERTION STENT URETERAL;  Surgeon: Ravi Pleitez MD;  [facility];  Service: Urology    WI FRAGMENT KIDNEY STONE/ ESWL Left 1/18/2018    Procedure: ESWL;  Surgeon: Ravi Pleitez MD;  [facility];  Service: Urology    WI LAP, REPAIR PARAESOPHAGEAL HERNIA, INCL FUNDOPLASTY W/ MESH N/A 7/11/2018    Procedure: REPAIR RECURRENT HERNIA PARAESOPHAGEAL  LAPAROSCOPIC;KLAUDIA Chavis;  Surgeon: Sourav Landrum MD;  [facility];  Service: General    ROTATOR CUFF REPAIR Right     SHOULDER ARTHROSCOPY Bilateral     SHOULDER SURGERY         Family History   Problem Relation Age of Onset    Diabetes Mother     Hypertension Mother     Brain cancer Mother     Hypertension Sister     Nephrolithiasis Sister     COPD Father     Cancer Maternal Grandmother     Aortic aneurysm Maternal Uncle         x3 maternal uncles        Social History     Tobacco Use    Smoking status: Never Smoker    Smokeless tobacco: Never Used   Substance Use Topics    Alcohol use:  Yes     Alcohol/week: 1 0 standard drinks     Types: 1 Cans of beer per week     Frequency: Monthly or less     Drinks per session: 1 or 2     Binge frequency: Never     Comment: once a month     Drug use: No       Current Outpatient Medications   Medication Sig Dispense Refill    albuterol (PROAIR HFA) 90 mcg/act inhaler Inhale every 4 (four) hours as needed        Coenzyme Q10 (CO Q 10) 100 MG CAPS Take by mouth      diclofenac potassium (CATAFLAM) 50 mg tablet Take 1 tablet (50 mg total) by mouth as needed (joint pain) 60 tablet 1    finasteride (PROSCAR) 5 mg tablet Take 1 tablet (5 mg total) by mouth daily 90 tablet 3    fluticasone (FLONASE) 50 mcg/act nasal spray 2 sprays into each nostril daily (Patient taking differently: 2 sprays into each nostril daily as needed ) 1 Bottle 5    HYDROcodone-acetaminophen (NORCO) 5-325 mg per tablet Take 1 tablet by mouth every 6 (six) hours as needed for painMax Daily Amount: 4 tablets 60 tablet 0    losartan (Cozaar) 50 mg tablet Take 1 tablet (50 mg total) by mouth daily 90 tablet 3    omeprazole (PriLOSEC) 20 mg delayed release capsule take 1 capsule by mouth once daily (Patient taking differently: No sig reported) 30 capsule 11    simvastatin (ZOCOR) 40 mg tablet Take 1 tablet by mouth once daily 90 tablet 3    tamsulosin (FLOMAX) 0 4 mg take 1 capsule by mouth once daily 90 capsule 3    zolpidem (AMBIEN) 10 mg tablet Take 1 tablet (10 mg total) by mouth daily at bedtime as needed for sleep 30 tablet 1    hydrochlorothiazide (HYDRODIURIL) 12 5 mg tablet Take 1 tablet (12 5 mg total) by mouth daily (Patient not taking: Reported on 5/26/2021) 90 tablet 1     No current facility-administered medications for this visit  Meperidine, Horse-derived products, and Lisinopril        The following portions of the patient's history were reviewed and updated as appropriate: allergies, current medications, past family history, past medical history, past social history, past surgical history and problem list       Review of Systems   HENT: Negative for congestion  Respiratory: Negative for shortness of breath  Gastrointestinal: Negative for abdominal distention and constipation  Musculoskeletal: Negative for arthralgias  Limited right arm raising   All other systems reviewed and are negative  Objective      /72   Pulse 61   Ht 5' 7" (1 702 m)   Wt 86 2 kg (190 lb)   SpO2 94%   BMI 29 76 kg/m²     Physical Exam  Vitals signs and nursing note reviewed  Constitutional:       Appearance: He is well-developed  HENT:      Head: Atraumatic  Right Ear: External ear normal       Left Ear: External ear normal       Nose: Nose normal    Eyes:      Conjunctiva/sclera: Conjunctivae normal       Pupils: Pupils are equal, round, and reactive to light  Neck:      Musculoskeletal: Normal range of motion and neck supple  Cardiovascular:      Rate and Rhythm: Normal rate and regular rhythm  Heart sounds: Normal heart sounds  Pulmonary:      Effort: Pulmonary effort is normal       Breath sounds: Normal breath sounds  Abdominal:      General: Bowel sounds are normal       Palpations: Abdomen is soft  Musculoskeletal:         General: Tenderness present  Comments: Limited right arm ROM    Skin:     General: Skin is warm and dry  Neurological:      Mental Status: He is alert and oriented to person, place, and time  Deep Tendon Reflexes: Reflexes are normal and symmetric  Psychiatric:         Behavior: Behavior normal          Thought Content:  Thought content normal                    Lab Review   Labs reviewed - no concerns     Problem List Items Addressed This Visit     None                   Plan:         Surgical Clearance - Cleared    Risk low     Discussion/Summary: stopped nsaids- to resume prn after surgery           Allan Yepez DO

## 2021-05-28 ENCOUNTER — TELEPHONE (OUTPATIENT)
Dept: OBGYN CLINIC | Facility: HOSPITAL | Age: 71
End: 2021-05-28

## 2021-05-28 NOTE — TELEPHONE ENCOUNTER
Hardik Jiménez, patient's wife is calling to find out what the protocol is with sx  She would like to know if she is just dropping him off the day of sx or can she wait in the waiting room for him  She would also like to know if she is allowed in his room & allowed to visit at this time        [phone]

## 2021-06-01 ENCOUNTER — ANESTHESIA (OUTPATIENT)
Dept: PERIOP | Facility: HOSPITAL | Age: 71
End: 2021-06-01
Payer: MEDICARE

## 2021-06-01 ENCOUNTER — HOSPITAL ENCOUNTER (OUTPATIENT)
Facility: HOSPITAL | Age: 71
Setting detail: OUTPATIENT SURGERY
Discharge: HOME | End: 2021-06-02
Attending: ORTHOPAEDIC SURGERY | Admitting: ORTHOPAEDIC SURGERY
Payer: MEDICARE

## 2021-06-01 ENCOUNTER — APPOINTMENT (OUTPATIENT)
Dept: RADIOLOGY | Facility: HOSPITAL | Age: 71
End: 2021-06-01
Payer: MEDICARE

## 2021-06-01 DIAGNOSIS — Z96.611 STATUS POST TOTAL SHOULDER ARTHROPLASTY, RIGHT: Primary | ICD-10-CM

## 2021-06-01 DIAGNOSIS — M19.011 GLENOHUMERAL ARTHRITIS, RIGHT: ICD-10-CM

## 2021-06-01 LAB — GLUCOSE SERPL-MCNC: 111 MG/DL (ref 65–140)

## 2021-06-01 PROCEDURE — 99204 OFFICE O/P NEW MOD 45 MIN: CPT | Performed by: INTERNAL MEDICINE

## 2021-06-01 PROCEDURE — C1776 JOINT DEVICE (IMPLANTABLE): HCPCS | Performed by: ORTHOPAEDIC SURGERY

## 2021-06-01 PROCEDURE — NC001 PR NO CHARGE: Performed by: ORTHOPAEDIC SURGERY

## 2021-06-01 PROCEDURE — C9290 INJ, BUPIVACAINE LIPOSOME: HCPCS | Performed by: INTERNAL MEDICINE

## 2021-06-01 PROCEDURE — C1713 ANCHOR/SCREW BN/BN,TIS/BN: HCPCS | Performed by: ORTHOPAEDIC SURGERY

## 2021-06-01 PROCEDURE — G9197 ORDER FOR CEPH: HCPCS | Performed by: ORTHOPAEDIC SURGERY

## 2021-06-01 PROCEDURE — 23472 RECONSTRUCT SHOULDER JOINT: CPT | Performed by: ORTHOPAEDIC SURGERY

## 2021-06-01 PROCEDURE — 82948 REAGENT STRIP/BLOOD GLUCOSE: CPT

## 2021-06-01 PROCEDURE — 23472 RECONSTRUCT SHOULDER JOINT: CPT | Performed by: PHYSICIAN ASSISTANT

## 2021-06-01 PROCEDURE — 73020 X-RAY EXAM OF SHOULDER: CPT

## 2021-06-01 DEVICE — SMARTSET HV HIGH VISCOSITY BONE CEMENT 40G
Type: IMPLANTABLE DEVICE | Site: SHOULDER | Status: FUNCTIONAL
Brand: SMARTSET

## 2021-06-01 DEVICE — IMPLANTABLE DEVICE: Type: IMPLANTABLE DEVICE | Site: SHOULDER | Status: FUNCTIONAL

## 2021-06-01 DEVICE — HEAD HUM SZ 2 SIMPLICITI NUCLEUS: Type: IMPLANTABLE DEVICE | Site: SHOULDER | Status: FUNCTIONAL

## 2021-06-01 RX ORDER — LOSARTAN POTASSIUM 50 MG/1
50 TABLET ORAL DAILY
Status: DISCONTINUED | OUTPATIENT
Start: 2021-06-02 | End: 2021-06-01

## 2021-06-01 RX ORDER — ONDANSETRON 2 MG/ML
4 INJECTION INTRAMUSCULAR; INTRAVENOUS ONCE AS NEEDED
Status: DISCONTINUED | OUTPATIENT
Start: 2021-06-01 | End: 2021-06-01 | Stop reason: HOSPADM

## 2021-06-01 RX ORDER — TAMSULOSIN HYDROCHLORIDE 0.4 MG/1
0.4 CAPSULE ORAL
Status: DISCONTINUED | OUTPATIENT
Start: 2021-06-01 | End: 2021-06-02 | Stop reason: HOSPADM

## 2021-06-01 RX ORDER — HYDRALAZINE HYDROCHLORIDE 25 MG/1
25 TABLET, FILM COATED ORAL EVERY 8 HOURS PRN
Status: DISCONTINUED | OUTPATIENT
Start: 2021-06-01 | End: 2021-06-02 | Stop reason: HOSPADM

## 2021-06-01 RX ORDER — OXYCODONE HYDROCHLORIDE 5 MG/1
TABLET ORAL
Qty: 13 TABLET | Refills: 0 | Status: SHIPPED | OUTPATIENT
Start: 2021-06-01 | End: 2021-08-23 | Stop reason: ALTCHOICE

## 2021-06-01 RX ORDER — CHLORHEXIDINE GLUCONATE 0.12 MG/ML
15 RINSE ORAL ONCE
Status: COMPLETED | OUTPATIENT
Start: 2021-06-01 | End: 2021-06-01

## 2021-06-01 RX ORDER — HYDROMORPHONE HCL/PF 1 MG/ML
0.5 SYRINGE (ML) INJECTION
Status: DISCONTINUED | OUTPATIENT
Start: 2021-06-01 | End: 2021-06-01 | Stop reason: HOSPADM

## 2021-06-01 RX ORDER — SODIUM CHLORIDE, SODIUM LACTATE, POTASSIUM CHLORIDE, CALCIUM CHLORIDE 600; 310; 30; 20 MG/100ML; MG/100ML; MG/100ML; MG/100ML
125 INJECTION, SOLUTION INTRAVENOUS CONTINUOUS
Status: DISCONTINUED | OUTPATIENT
Start: 2021-06-01 | End: 2021-06-02 | Stop reason: HOSPADM

## 2021-06-01 RX ORDER — ZOLPIDEM TARTRATE 5 MG/1
10 TABLET ORAL
Status: DISCONTINUED | OUTPATIENT
Start: 2021-06-01 | End: 2021-06-02 | Stop reason: HOSPADM

## 2021-06-01 RX ORDER — OXYCODONE HYDROCHLORIDE 5 MG/1
5 TABLET ORAL EVERY 4 HOURS PRN
Status: DISCONTINUED | OUTPATIENT
Start: 2021-06-01 | End: 2021-06-02 | Stop reason: HOSPADM

## 2021-06-01 RX ORDER — ONDANSETRON 2 MG/ML
4 INJECTION INTRAMUSCULAR; INTRAVENOUS EVERY 6 HOURS PRN
Status: DISCONTINUED | OUTPATIENT
Start: 2021-06-01 | End: 2021-06-02 | Stop reason: HOSPADM

## 2021-06-01 RX ORDER — PANTOPRAZOLE SODIUM 40 MG/1
40 TABLET, DELAYED RELEASE ORAL
Status: DISCONTINUED | OUTPATIENT
Start: 2021-06-02 | End: 2021-06-02 | Stop reason: HOSPADM

## 2021-06-01 RX ORDER — SODIUM CHLORIDE, SODIUM LACTATE, POTASSIUM CHLORIDE, CALCIUM CHLORIDE 600; 310; 30; 20 MG/100ML; MG/100ML; MG/100ML; MG/100ML
100 INJECTION, SOLUTION INTRAVENOUS CONTINUOUS
Status: DISCONTINUED | OUTPATIENT
Start: 2021-06-01 | End: 2021-06-02

## 2021-06-01 RX ORDER — BUPIVACAINE HYDROCHLORIDE 5 MG/ML
INJECTION, SOLUTION PERINEURAL AS NEEDED
Status: DISCONTINUED | OUTPATIENT
Start: 2021-06-01 | End: 2021-06-01

## 2021-06-01 RX ORDER — ROCURONIUM BROMIDE 10 MG/ML
INJECTION, SOLUTION INTRAVENOUS AS NEEDED
Status: DISCONTINUED | OUTPATIENT
Start: 2021-06-01 | End: 2021-06-01

## 2021-06-01 RX ORDER — FLUTICASONE PROPIONATE 50 MCG
2 SPRAY, SUSPENSION (ML) NASAL DAILY PRN
Status: DISCONTINUED | OUTPATIENT
Start: 2021-06-01 | End: 2021-06-02 | Stop reason: HOSPADM

## 2021-06-01 RX ORDER — FINASTERIDE 5 MG/1
5 TABLET, FILM COATED ORAL DAILY
Status: DISCONTINUED | OUTPATIENT
Start: 2021-06-02 | End: 2021-06-02 | Stop reason: HOSPADM

## 2021-06-01 RX ORDER — ALBUTEROL SULFATE 90 UG/1
1 AEROSOL, METERED RESPIRATORY (INHALATION) EVERY 4 HOURS PRN
Status: DISCONTINUED | OUTPATIENT
Start: 2021-06-01 | End: 2021-06-02 | Stop reason: HOSPADM

## 2021-06-01 RX ORDER — PROPOFOL 10 MG/ML
INJECTION, EMULSION INTRAVENOUS AS NEEDED
Status: DISCONTINUED | OUTPATIENT
Start: 2021-06-01 | End: 2021-06-01

## 2021-06-01 RX ORDER — FENTANYL CITRATE/PF 50 MCG/ML
50 SYRINGE (ML) INJECTION
Status: DISCONTINUED | OUTPATIENT
Start: 2021-06-01 | End: 2021-06-01 | Stop reason: HOSPADM

## 2021-06-01 RX ORDER — CHOLECALCIFEROL (VITAMIN D3) 125 MCG
100 CAPSULE ORAL
Status: DISCONTINUED | OUTPATIENT
Start: 2021-06-02 | End: 2021-06-02 | Stop reason: HOSPADM

## 2021-06-01 RX ORDER — EPHEDRINE SULFATE 50 MG/ML
INJECTION INTRAVENOUS AS NEEDED
Status: DISCONTINUED | OUTPATIENT
Start: 2021-06-01 | End: 2021-06-01

## 2021-06-01 RX ORDER — CEFAZOLIN SODIUM 2 G/50ML
2000 SOLUTION INTRAVENOUS ONCE
Status: COMPLETED | OUTPATIENT
Start: 2021-06-01 | End: 2021-06-01

## 2021-06-01 RX ORDER — CALCIUM CARBONATE 200(500)MG
1000 TABLET,CHEWABLE ORAL DAILY PRN
Status: DISCONTINUED | OUTPATIENT
Start: 2021-06-01 | End: 2021-06-02 | Stop reason: HOSPADM

## 2021-06-01 RX ORDER — LIDOCAINE HYDROCHLORIDE 10 MG/ML
INJECTION, SOLUTION EPIDURAL; INFILTRATION; INTRACAUDAL; PERINEURAL AS NEEDED
Status: DISCONTINUED | OUTPATIENT
Start: 2021-06-01 | End: 2021-06-01

## 2021-06-01 RX ORDER — DOCUSATE SODIUM 100 MG/1
100 CAPSULE, LIQUID FILLED ORAL 2 TIMES DAILY
Qty: 10 CAPSULE | Refills: 0 | Status: SHIPPED | OUTPATIENT
Start: 2021-06-01 | End: 2021-10-14

## 2021-06-01 RX ORDER — MIDAZOLAM HYDROCHLORIDE 2 MG/2ML
INJECTION, SOLUTION INTRAMUSCULAR; INTRAVENOUS AS NEEDED
Status: DISCONTINUED | OUTPATIENT
Start: 2021-06-01 | End: 2021-06-01

## 2021-06-01 RX ORDER — FENTANYL CITRATE 50 UG/ML
INJECTION, SOLUTION INTRAMUSCULAR; INTRAVENOUS AS NEEDED
Status: DISCONTINUED | OUTPATIENT
Start: 2021-06-01 | End: 2021-06-01

## 2021-06-01 RX ORDER — OXYCODONE HYDROCHLORIDE 5 MG/1
10 TABLET ORAL EVERY 4 HOURS PRN
Status: DISCONTINUED | OUTPATIENT
Start: 2021-06-01 | End: 2021-06-02 | Stop reason: HOSPADM

## 2021-06-01 RX ORDER — DOCUSATE SODIUM 100 MG/1
100 CAPSULE, LIQUID FILLED ORAL 2 TIMES DAILY
Status: DISCONTINUED | OUTPATIENT
Start: 2021-06-01 | End: 2021-06-02 | Stop reason: HOSPADM

## 2021-06-01 RX ORDER — LIDOCAINE HYDROCHLORIDE 10 MG/ML
0.5 INJECTION, SOLUTION EPIDURAL; INFILTRATION; INTRACAUDAL; PERINEURAL ONCE AS NEEDED
Status: DISCONTINUED | OUTPATIENT
Start: 2021-06-01 | End: 2021-06-01 | Stop reason: HOSPADM

## 2021-06-01 RX ORDER — PRAVASTATIN SODIUM 80 MG/1
80 TABLET ORAL
Status: DISCONTINUED | OUTPATIENT
Start: 2021-06-01 | End: 2021-06-02 | Stop reason: HOSPADM

## 2021-06-01 RX ORDER — ACETAMINOPHEN 325 MG/1
650 TABLET ORAL EVERY 6 HOURS PRN
Status: DISCONTINUED | OUTPATIENT
Start: 2021-06-01 | End: 2021-06-02 | Stop reason: HOSPADM

## 2021-06-01 RX ORDER — CEFAZOLIN SODIUM 2 G/50ML
2000 SOLUTION INTRAVENOUS EVERY 8 HOURS
Status: COMPLETED | OUTPATIENT
Start: 2021-06-01 | End: 2021-06-01

## 2021-06-01 RX ORDER — ONDANSETRON 2 MG/ML
INJECTION INTRAMUSCULAR; INTRAVENOUS AS NEEDED
Status: DISCONTINUED | OUTPATIENT
Start: 2021-06-01 | End: 2021-06-01

## 2021-06-01 RX ADMIN — TAMSULOSIN HYDROCHLORIDE 0.4 MG: 0.4 CAPSULE ORAL at 17:18

## 2021-06-01 RX ADMIN — CEFAZOLIN SODIUM 2000 MG: 2 SOLUTION INTRAVENOUS at 08:16

## 2021-06-01 RX ADMIN — CHLORHEXIDINE GLUCONATE 15 ML: 1.2 SOLUTION ORAL at 07:44

## 2021-06-01 RX ADMIN — DOCUSATE SODIUM 100 MG: 100 CAPSULE, LIQUID FILLED ORAL at 17:18

## 2021-06-01 RX ADMIN — PROPOFOL 200 MG: 10 INJECTION, EMULSION INTRAVENOUS at 08:16

## 2021-06-01 RX ADMIN — CEFAZOLIN SODIUM 2000 MG: 2 SOLUTION INTRAVENOUS at 23:26

## 2021-06-01 RX ADMIN — BUPIVACAINE 20 ML: 13.3 INJECTION, SUSPENSION, LIPOSOMAL INFILTRATION at 08:06

## 2021-06-01 RX ADMIN — EPHEDRINE SULFATE 5 MG: 50 INJECTION, SOLUTION INTRAVENOUS at 09:29

## 2021-06-01 RX ADMIN — MIDAZOLAM 2 MG: 1 INJECTION INTRAMUSCULAR; INTRAVENOUS at 08:04

## 2021-06-01 RX ADMIN — SUGAMMADEX 200 MG: 100 INJECTION, SOLUTION INTRAVENOUS at 09:47

## 2021-06-01 RX ADMIN — PRAVASTATIN SODIUM 80 MG: 80 TABLET ORAL at 17:18

## 2021-06-01 RX ADMIN — ROCURONIUM BROMIDE 40 MG: 50 INJECTION, SOLUTION INTRAVENOUS at 08:17

## 2021-06-01 RX ADMIN — SODIUM CHLORIDE, SODIUM LACTATE, POTASSIUM CHLORIDE, AND CALCIUM CHLORIDE: .6; .31; .03; .02 INJECTION, SOLUTION INTRAVENOUS at 08:10

## 2021-06-01 RX ADMIN — SODIUM CHLORIDE, SODIUM LACTATE, POTASSIUM CHLORIDE, AND CALCIUM CHLORIDE 100 ML/HR: .6; .31; .03; .02 INJECTION, SOLUTION INTRAVENOUS at 11:09

## 2021-06-01 RX ADMIN — ONDANSETRON 4 MG: 2 INJECTION INTRAMUSCULAR; INTRAVENOUS at 12:16

## 2021-06-01 RX ADMIN — OXYCODONE HYDROCHLORIDE 5 MG: 5 TABLET ORAL at 23:34

## 2021-06-01 RX ADMIN — LIDOCAINE HYDROCHLORIDE 50 MG: 10 INJECTION, SOLUTION EPIDURAL; INFILTRATION; INTRACAUDAL; PERINEURAL at 08:16

## 2021-06-01 RX ADMIN — ACETAMINOPHEN 650 MG: 325 TABLET, FILM COATED ORAL at 23:35

## 2021-06-01 RX ADMIN — FENTANYL CITRATE 50 MCG: 50 INJECTION INTRAMUSCULAR; INTRAVENOUS at 08:04

## 2021-06-01 RX ADMIN — ONDANSETRON 4 MG: 2 INJECTION INTRAMUSCULAR; INTRAVENOUS at 09:29

## 2021-06-01 RX ADMIN — EPHEDRINE SULFATE 5 MG: 50 INJECTION, SOLUTION INTRAVENOUS at 09:06

## 2021-06-01 RX ADMIN — CEFAZOLIN SODIUM 2000 MG: 2 SOLUTION INTRAVENOUS at 15:57

## 2021-06-01 RX ADMIN — BUPIVACAINE HYDROCHLORIDE 6 ML: 5 INJECTION, SOLUTION PERINEURAL at 08:06

## 2021-06-01 NOTE — PERIOPERATIVE NURSING NOTE
Report called, no questions, pt denies pain or nausea, assessment unchanged, pt transferred to floor

## 2021-06-01 NOTE — ANESTHESIA PREPROCEDURE EVALUATION
Procedure:  TOTAL SHOULDER ARTHROPLASTY (ANATOMIC VS REVERSE) (Right Shoulder)    Relevant Problems   ANESTHESIA (within normal limits)   (-) History of anesthesia complications      CARDIO   (+) Hypercholesterolemia   (-) Chest pain   (-) SMALLWOOD (dyspnea on exertion)      GI/HEPATIC   (+) Gastroesophageal reflux disease without esophagitis   (+) Paraesophageal hernia      /RENAL   (+) Benign nodular prostatic hyperplasia with lower urinary tract symptoms   (+) Nephrolithiasis      MUSCULOSKELETAL   (+) Glenohumeral arthritis, right   (+) Low back pain      NEURO/PSYCH   (+) Chronic pain syndrome      PULMONARY   (+) Asthma (rare inhaler use)   (-) Shortness of breath   (-) URI (upper respiratory infection)        Physical Exam    Airway    Mallampati score: II  TM Distance: >3 FB  Neck ROM: full     Dental   No notable dental hx     Cardiovascular      Pulmonary      Other Findings        Anesthesia Plan  ASA Score- 2     Anesthesia Type- general and regional with ASA Monitors  Additional Monitors:   Airway Plan: ETT  Plan Factors-Exercise tolerance (METS): >4 METS  Chart reviewed  EKG reviewed  Existing labs reviewed  Induction- intravenous  Postoperative Plan-     Informed Consent- Anesthetic plan and risks discussed with patient  I personally reviewed this patient with the CRNA  Discussed and agreed on the Anesthesia Plan with the CRNA  Jessika Castellon

## 2021-06-01 NOTE — OP NOTE
OPERATIVE REPORT  PATIENT NAME: Herrera Zhou    :  1950  MRN: [id number]  Pt Location:  OR ROOM 15    SURGERY DATE: 2021     SURGEON: Keke Hayes MD     ASSISTANT: Louie Colin PA-C     NOTE: Louie Colin PA-C was present throughout the entire procedure and performed essential assistance with patient prepping, draping, positioning, trial implantation/range of motion, final implant insertion, careful retraction, wound closure, sterile dressing application and sling application, all under my direct supervision  NOTE: No qualified resident physician was available for assistance    PREOPERATIVE DIAGNOSIS:  Right Shoulder Glenohumeral Osteoarthritis    POSTOPERATIVE DIAGNOSIS: Same    PROCEDURES: Right Total Shoulder Arthroplasty with Long Head Biceps Tenodesis    ANESTHESIA STAFF: Juan C Traylor MD     ANESTHESIA TYPE: General with endotracheal tube with ultrasound guided interscalene block (Exparel)  The interscalene block was provided by the anesthesia staff per my request for postoperative pain control and to decrease the use of postoperative narcotic medication for pain control  COMPLICATIONS: None    FINDINGS: Glenohumeral Osteoarthritis    SPECIMEN(S): None    ESTIMATED BLOOD LOSS: Minimal    INDICATIONS FOR PROCEDURE:  The patient is a 65 y o  male presenting with pain and lack of function secondary to glenohumeral osteoarthritis of the right shoulder  Pre-operatvie imaging documented a Garcia B2 glenoid as well as an intact rotator cuff and it was felt that an anatomic prosthesis was appropriate  After a thorough discussion of the risks and benefits of operative and nonoperative care the patient elected for right total shoulder arthroplasty  Informed consent was obtained in the office  OPERATIVE TECHNIQUE:  The day of surgery I identified the patient's right shoulder and marked it with my initials    The patient was taken back to the operating room where anesthesia was induced by the anesthesia staff without complication  The patient was placed in the beachchair position with all bony prominences padded  The right shoulder was prepped and draped in routine sterile fashion and after a time-out for safety and confirming 2 grams of IV Cefazolin were given, a standard deltopectoral approach was performed  The dissection was carried down to the subscapularis and a subscapularis peel was preformed  The long head of biceps had severe tenosynovitis and degeneration, so it was released and tagged for later tenodesis to the anterior humerus at the end of the case  The humeral head was exposed and found to have severe degenerative change with an intact rotator cuff  The humerus was prepared keeping with the surgical technique for a Tornier Simpliciti prosthesis  After preparing the humerus the glenoid was exposed and found to have B2 wear pattern  The glenoid was  prepared for a Tornier Perform Plus A25 Augmented glenoid size Medium  After confirming appropriate size and version the final glenoid component was press fit centrally and peripheral pegs were cemented  The humerus was then finished and a size 2 nucleus 56 x 18 mm size head was trialed and found to have excellent stability with full range of motion and appropriate soft tissue tension  Final implants were placed and the area was irrigated with pulse lavage  The subscapularis was repaired to the anterior humerus and the long head biceps was tenodesed to the anterior humerus with Orthocord sutures  The deltopectoral interval was loosely closed with 0 Vicryl and the subdermal layer with 2-0 Vicryl with staples for skin  Sterile dressings and a sling with abduction pillow was placed and the patient was awoken   The patient was transported to the recovery room in good condition and will be admitted for post-operative care and physical therapy will be initiated following the standard total shoulder arthroplasty protocol      SIGNATURE: Sekou Kidd MD  DATE: June 1, 2021  TIME: 9:53 AM

## 2021-06-01 NOTE — CONSULTS
Internal Medicine  Consultation Note    Patient: Eleanor Bazan  Age/sex: 65 y o  male  Medical Record #: [id number]    Assessment/Plan    Status Post right Total Shoulder Arthroplasty   Continue post op pain control measures as prescribed   Follow bowel regimen to help decrease narcotic induced constipation    Follow post operative hemoglobin with serial CBC and treat accordingly   Monitor WBC and fever curve post op while encouraging use of incentive spirometer   DVT prophylaxis in place and reviewed  HTN   Hold cozaar in the post operative setting to avoid hypotension/MARK ANTHONY   OK to resume on dc   Add hydralazine as needed for SBP >160   Monitor trend    GERD   Cont PPI   +vomiting   Add zofran prn    BPH  · Cont proscar/flomax  · Monitor for retention    Asthma  · Cont albuterol as needed  · Monitor respiratory status      PRE-OP HGB LEVEL: 15 7    Subjective/ HPI: Eleanor Bazan was seen and examined  Hx of shoulder pain failed out patient conservative measures  Elected to undergo total shoulder arthroplasty We are asked to see patient for post op management of underlying medical co-morbidities as outlined above  Pt did well intra and post operatively with good hemodynamics  Pt currently comfortable and without any reported post op nausea  ROS:   A 10 point ROS was performed; negative except as noted above       Social History:    Substance Use History:   Social History     Substance and Sexual Activity   Alcohol Use Yes    Alcohol/week: 1 0 standard drinks    Types: 1 Cans of beer per week    Frequency: Monthly or less    Drinks per session: 1 or 2    Binge frequency: Never    Comment: once a month      Social History     Tobacco Use   Smoking Status Never Smoker   Smokeless Tobacco Never Used     Social History     Substance and Sexual Activity   Drug Use No       Family History:    non-contributory      Review of Scheduled Meds:  Current Facility-Administered Medications Medication Dose Route Frequency Provider Last Rate    acetaminophen  650 mg Oral Q6H PRN William Truong, PA-C      albuterol  1 puff Inhalation Q4H PRN William Truong PA-C      calcium carbonate  1,000 mg Oral Daily PRN William Truong, PA-C      cefazolin  2,000 mg Intravenous Q8H AZ Holley-C      [START ON 6/2/2021] co-enzyme Q-10  100 mg Oral Daily With Breakfast AZ Holley-C      docusate sodium  100 mg Oral BID AZ Holley-C      [START ON 6/2/2021] finasteride  5 mg Oral Daily AZ Holley-C      fluticasone  2 spray Each Nare Daily PRN AZ Holley-C      hydrALAZINE  25 mg Oral Q8H PRN Kuldip Young DO      lactated ringers  1,000 mL Intravenous Once PRN AZ Holley-C      And    lactated ringers  1,000 mL Intravenous Once PRN AZ Holley-C      lactated ringers  125 mL/hr Intravenous Continuous Lesia Ortiz MD Stopped (06/01/21 1008)    lactated ringers  100 mL/hr Intravenous Continuous AZ Holley-C 100 mL/hr (06/01/21 1109)    morphine injection  2 mg Intravenous Q2H PRN AZ Holley-C      ondansetron  4 mg Intravenous Q6H PRN William Truong, PA-C      oxyCODONE  10 mg Oral Q4H PRN AZ Holley-C      oxyCODONE  5 mg Oral Q4H PRN AZ Holley-C      [START ON 6/2/2021] pantoprazole  40 mg Oral Early Morning AZ Holley-C      pravastatin  80 mg Oral After Kaleta Samaria, PA-C      tamsulosin  0 4 mg Oral After Kaleta Samaria, PA-C      zolpidem  10 mg Oral HS PRN [place] Holley-ANITRA         Labs: Invalid input(s): LABGLOM, CMP               Results from last 7 days   Lab Units 06/01/21  1009   POC GLUCOSE mg/dl 111       Lab Results   Component Value Date    BLOODCX No Growth After 5 Days  01/02/2018    BLOODCX No Growth After 5 Days   01/02/2018    URINECX No Growth <1000 cfu/mL 06/10/2020    URINECX No Growth <100 cfu/mL 06/08/2018    URINECX No Growth <1000 cfu/mL 2018       Input and Output Summary (last 24 hours): Intake/Output Summary (Last 24 hours) at 2021 1214  Last data filed at 2021 1008  Gross per 24 hour   Intake 900 ml   Output --   Net 900 ml       Imaging:     XR shoulder right 1 view    (Results Pending)       *Labs /Radiology studiesLabs reviewed  *Medications reviewed and reconciled as needed  *Please refer to order section for additional ordered labs studies  *Case discussed with primary attending during morning huddle case rounds    Vitals:   Temp (24hrs), Av 8 °F (36 °C), Min:96 °F (35 6 °C), Max:97 5 °F (36 4 °C)    Temp:  [96 °F (35 6 °C)-97 5 °F (36 4 °C)] 97 3 °F (36 3 °C)  HR:  [50-60] 52  Resp:  [12-22] 18  BP: (133-158)/(61-80) 151/80  SpO2:  [96 %-100 %] 96 %  Body mass index is 29 76 kg/m²  Physical Exam:   General Appearance: no distress, conversive  HEENT: PERRLA, conjuctiva normal; oropharynx clear; mucous membranes moist;   Neck:  Supple, no lymphadenopathy or thyromegaly  Lungs: CTA, normal respiratory effort, no retractions, expiratory effort normal  CV: regular rate and rhythm , PMI normal   ABD: soft non tender, no masses , no hepatic or splenomegaly  EXT: DP pulses intact, no lymphadenopathy, no edema ;  right shoulder dressing in place  Skin: normal turgor, normal texture, no rash  Psych: affect normal, mood normal  Neuro: AAOx3          Invasive Devices     Peripheral Intravenous Line            Peripheral IV 18 Right Forearm 1056 days    Peripheral IV 18 Left Wrist 1055 days    Peripheral IV 21 Left Hand less than 1 day          Drain            Ureteral Drain/Stent Left ureter 6 Fr  1244 days    Urethral Catheter Other (Comment) 16 Fr  1055 days                   Code Status: Level 1 - Full Code  Current Length of Stay: 0 day(s)    Total floor / unit time spent today 45 minutes with more than 50% spent counseling/coordinating care   Counseling includes discussion with patient re: progress and discussion with patient of his/her current medical state/information  Coordination of patient's care was performed in conjunction with primary service  Time invested included review of patient's labs, vitals, and management of their comorbidities with continued monitoring  In addition, this patient was discussed with medical team including physician and advanced extenders  The care of the patient was extensively discussed and appropriate treatment plan was formulated unique for this patient  ** Please Note: Fluency Direct voice to text software may have been used in the creation of this document   Audio transcription errors may occur**

## 2021-06-01 NOTE — H&P
I identified and marked the patient in the pre-op holding area after confirming the surgical consent  No changes to medical health since the H&P was preformed  The patient's prescription history was queried in the [facility] license of [facility] to ensure compliance with applicable state laws

## 2021-06-01 NOTE — PLAN OF CARE
Problem: Prexisting or High Potential for Compromised Skin Integrity  Goal: Skin integrity is maintained or improved  Description: INTERVENTIONS:  - Identify patients at risk for skin breakdown  - Assess and monitor skin integrity  - Assess and monitor nutrition and hydration status  - Monitor labs   - Assess for incontinence   - Turn and reposition patient  - Assist with mobility/ambulation  - Relieve pressure over bony prominences  - Avoid friction and shearing  - Provide appropriate hygiene as needed including keeping skin clean and dry  - Evaluate need for skin moisturizer/barrier cream  - Collaborate with interdisciplinary team   - Patient/family teaching  - Consider wound care consult   Outcome: Progressing     Problem: Potential for Falls  Goal: Patient will remain free of falls  Description: INTERVENTIONS:  - Assess patient frequently for physical needs  -  Identify cognitive and physical deficits and behaviors that affect risk of falls    -  [place] fall precautions as indicated by assessment   - Educate patient/family on patient safety including physical limitations  - Instruct patient to call for assistance with activity based on assessment  - Modify environment to reduce risk of injury  - Consider OT/PT consult to assist with strengthening/mobility  Outcome: Progressing     Problem: PAIN - ADULT  Goal: Verbalizes/displays adequate comfort level or baseline comfort level  Description: Interventions:  - Encourage patient to monitor pain and request assistance  - Assess pain using appropriate pain scale  - Administer analgesics based on type and severity of pain and evaluate response  - Implement non-pharmacological measures as appropriate and evaluate response  - Consider cultural and social influences on pain and pain management  - Notify physician/advanced practitioner if interventions unsuccessful or patient reports new pain  Outcome: Progressing     Problem: INFECTION - ADULT  Goal: Absence or prevention of progression during hospitalization  Description: INTERVENTIONS:  - Assess and monitor for signs and symptoms of infection  - Monitor lab/diagnostic results  - Monitor all insertion sites, i e  indwelling lines, tubes, and drains  - Monitor endotracheal if appropriate and nasal secretions for changes in amount and color  - [place] appropriate cooling/warming therapies per order  - Administer medications as ordered  - Instruct and encourage patient and family to use good hand hygiene technique  - Identify and instruct in appropriate isolation precautions for identified infection/condition  Outcome: Progressing  Goal: Absence of fever/infection during neutropenic period  Description: INTERVENTIONS:  - Monitor WBC    Outcome: Progressing     Problem: SAFETY ADULT  Goal: Patient will remain free of falls  Description: INTERVENTIONS:  - Assess patient frequently for physical needs  -  Identify cognitive and physical deficits and behaviors that affect risk of falls    -  [place] fall precautions as indicated by assessment   - Educate patient/family on patient safety including physical limitations  - Instruct patient to call for assistance with activity based on assessment  - Modify environment to reduce risk of injury  - Consider OT/PT consult to assist with strengthening/mobility  Outcome: Progressing  Goal: Maintain or return to baseline ADL function  Description: INTERVENTIONS:  -  Assess patient's ability to carry out ADLs; assess patient's baseline for ADL function and identify physical deficits which impact ability to perform ADLs (bathing, care of mouth/teeth, toileting, grooming, dressing, etc )  - Assess/evaluate cause of self-care deficits   - Assess range of motion  - Assess patient's mobility; develop plan if impaired  - Assess patient's need for assistive devices and provide as appropriate  - Encourage maximum independence but intervene and supervise when necessary  - Involve family in performance of ADLs  - Assess for home care needs following discharge   - Consider OT consult to assist with ADL evaluation and planning for discharge  - Provide patient education as appropriate  Outcome: Progressing  Goal: Maintain or return mobility status to optimal level  Description: INTERVENTIONS:  - Assess patient's baseline mobility status (ambulation, transfers, stairs, etc )    - Identify cognitive and physical deficits and behaviors that affect mobility  - Identify mobility aids required to assist with transfers and/or ambulation (gait belt, sit-to-stand, lift, walker, cane, etc )  - [place] fall precautions as indicated by assessment  - Record patient progress and toleration of activity level on Mobility SBAR; progress patient to next Phase/Stage  - Instruct patient to call for assistance with activity based on assessment  - Consider rehabilitation consult to assist with strengthening/weightbearing, etc   Outcome: Progressing     Problem: DISCHARGE PLANNING  Goal: Discharge to home or other facility with appropriate resources  Description: INTERVENTIONS:  - Identify barriers to discharge w/patient and caregiver  - Arrange for needed discharge resources and transportation as appropriate  - Identify discharge learning needs (meds, wound care, etc )  - Arrange for interpretive services to assist at discharge as needed  - Refer to Case Management Department for coordinating discharge planning if the patient needs post-hospital services based on physician/advanced practitioner order or complex needs related to functional status, cognitive ability, or social support system  Outcome: Progressing     Problem: Knowledge Deficit  Goal: Patient/family/caregiver demonstrates understanding of disease process, treatment plan, medications, and discharge instructions  Description: Complete learning assessment and assess knowledge base    Interventions:  - Provide teaching at level of understanding  - Provide teaching via preferred learning methods  Outcome: Progressing

## 2021-06-01 NOTE — ANESTHESIA POSTPROCEDURE EVALUATION
Post-Op Assessment Note    CV Status:  Stable  Pain Score: 0    Pain management: adequate     Mental Status:  Sleepy   Hydration Status:  Stable   PONV Controlled:  None   Airway Patency:  Patent      Post Op Vitals Reviewed: Yes      Staff: CRNA         No complications documented      /72 (06/01/21 1003)    Temp (!) 96 2 °F (35 7 °C) (06/01/21 1003)    Pulse 60 (06/01/21 1003)   Resp 12 (06/01/21 1003)    SpO2 97 % (06/01/21 1003)

## 2021-06-01 NOTE — DISCHARGE INSTRUCTIONS
What to Expect Before and After Shoulder Replacement Surgery  You are being scheduled for a shoulder replacement by Dr Cristine Juan to treat your shoulder condition  Here is some information which may help to answer questions that you may have  Please do not hesitate to reach out to our team to answer questions not addressed here  Before Surgery  You will be contacted the evening prior to your surgery to confirm the scheduled time of the procedure and when to arrive at the hospital    Do not eat or drink anything after midnight the night before your surgery so that the anesthesia can be performed safely  If you have been fitted for a sling in the office prior to the surgery please remember to bring it to the hospital   You will meet the anesthesiologist the morning of the surgery  The surgery is performed under a general anesthetic but they will also offer you a regional block (shot to numb the arm) to help control your post-operative pain as well as with a catheter that is left in place after the block  The catheter is connected to a small pump which will continue to provide numbing medicine and help prolong the pain control from the block  Unfortunately this catheter is not as effective as the initial block, but can still be very helpful in managing the pain  After Surgery and in the Hospital  The shoulder replacement surgery typically takes 60-90 minutes  When surgery is completed, your surgeon will update your family and friends on your condition and progress  You will remain in the recovery room for at least an hour or until the anesthesia has worn off and your blood pressure and pulse are stable  If you have pain, the nurses will give you medication  Once out of surgery, your surgeon will decide on how long you will be using a sling in order to protect and position your shoulder  However, this won't keep you from starting physical therapy    Exercises typically begin on the day after surgery with emphasis on moving the shoulder, wrist, and hand  The physical therapist will be provided with a detailed protocol but typically the first 6 weeks are used to regain range of motion and then strengthening is initiated  Starting strengthening exercises too early may lead to complications  When You Are Discharged from the [address] [place] [address] can expect to be released from the hospital the day after surgery (this may change if you have special needs or medical conditions)  Before you are released, the treatment team (Orthopaedic surgery residents, physician assistants and physical therapists) will talk with you about the importance of limiting any sudden or stressful movements to the arm for several weeks or longer  Activities that involve pushing, pulling, and lifting should not be done until you are given permission from your surgeon  Your First Day at [address] [place] [address] may need help with your daily activities, so it is a good idea to have family and friends prepared to help you  It is okay to remove the sling and let the arm hang at the side so that you can get cleaned and change your clothes  To put on a shirt, place the bad arm in first and then the good arm  Reverse to take it off  Don't forget to wear the sling every night for at least the first month after surgery, and never use your arm to push yourself up in bed or from a chair  The added weight on your shoulder may cause you to re-injure the joint  How to [place] in the First Week  You are encouraged to return to your normal eating and sleeping patterns as soon as possible  It is important for you to be active in order to control your weight and muscle tone  It is ok to increase your activity level and even perform light aerobic exercise (like walking or riding a stationary bike) within the first week or so if you are feeling up to it    If there is concern about these activates best to wait until the first post-operative visit and discuss this with the team    [facility] might be able to return to work within several days if you can perform your job while wearing a sling  Consult with your doctor, as this differs from patient to patient  However, if your job requires heavy lifting or climbing, there may be a delay for several months  Until you are seen for your first follow-up visit, please try and keep wound dry  It is okay to shower but try your best to keep the incision out of direct contact with the water (or consider using a waterproof bandage)  If it does gets wet please dry as best as possible afterwards  If you notice any drainage or a foul odor from your incision or your temperature goes above 101 5 degrees, please contact the office  What You Can Expect in the First Month  Your first post-operative appointment will be with Dr Marylene Cha physician assistant (PA) around 2 weeks after the surgery  You skin staples will be removed and X-rays will be obtained at that visit  Please understand that it is quite common to still experience pain at that time but the pain should be steadily improving  Hopefully physical therapy has already begun but if not it will be initiated at this visit and will continue for the 8-12 weeks  At about 12 weeks after surgery you will start a progressive strengthening program  Physical therapy is a deliberate process of not only strengthening your shoulder but also altering how you use your arm  It may be many months before your desired results are achieved, so do not get discouraged  Your shoulder will generally continue to improve steadily up to 6-8 months after surgery  After that point further improvement is very slow; although it has been shown that even after a year or more, activity can increase as muscle strength continues to improve  After the First Month at Home   Because each person heals differently, there are different recovery timelines  An average recovery period typically lasts about between 3-6 months  Talk with your surgeon about which activities will be appropriate for you once you have recovered

## 2021-06-01 NOTE — ANESTHESIA PROCEDURE NOTES
Peripheral Block    Patient location during procedure: holding area  Start time: 6/1/2021 8:06 AM  Reason for block: at surgeon's request and post-op pain management  Staffing  Anesthesiologist: Paolo Johnson MD  Preanesthetic Checklist  Completed: patient identified, site marked, surgical consent, pre-op evaluation, timeout performed, IV checked, risks and benefits discussed and monitors and equipment checked  Peripheral Block  Patient position: sitting  Prep: ChloraPrep  Patient monitoring: continuous pulse ox and frequent blood pressure checks  Block type: intraclavicular and interscalene  Laterality: right  Injection technique: single-shot  Procedures: ultrasound guided, Ultrasound guidance required for the procedure to increase accuracy and safety of medication placement and decrease risk of complications    Ultrasound permanent image saved  Needle  Needle type: Stimuplex   Needle gauge: 22 G  Needle length: 5 cm  Needle localization: anatomical landmarks and ultrasound guidance  Test dose: negative  Assessment  Injection assessment: incremental injection, local visualized surrounding nerve on ultrasound, negative aspiration for CSF, negative aspiration for heme and no paresthesia on injection  Paresthesia pain: none  Heart rate change: no  Slow fractionated injection: yes  Post-procedure:  site cleaned  patient tolerated the procedure well with no immediate complications

## 2021-06-02 VITALS
RESPIRATION RATE: 18 BRPM | HEART RATE: 59 BPM | TEMPERATURE: 97.6 F | BODY MASS INDEX: 29.82 KG/M2 | DIASTOLIC BLOOD PRESSURE: 78 MMHG | OXYGEN SATURATION: 94 % | WEIGHT: 190 LBS | SYSTOLIC BLOOD PRESSURE: 131 MMHG | HEIGHT: 67 IN

## 2021-06-02 PROBLEM — Z96.611 STATUS POST TOTAL SHOULDER ARTHROPLASTY, RIGHT: Status: ACTIVE | Noted: 2021-06-02

## 2021-06-02 PROBLEM — M19.011 GLENOHUMERAL ARTHRITIS, RIGHT: Status: RESOLVED | Noted: 2018-06-28 | Resolved: 2021-06-02

## 2021-06-02 LAB
ANION GAP SERPL CALCULATED.3IONS-SCNC: 4 MMOL/L (ref 4–13)
BUN SERPL-MCNC: 8 MG/DL (ref 5–25)
CALCIUM SERPL-MCNC: 9 MG/DL (ref 8.3–10.1)
CHLORIDE SERPL-SCNC: 105 MMOL/L (ref 100–108)
CO2 SERPL-SCNC: 30 MMOL/L (ref 21–32)
CREAT SERPL-MCNC: 0.81 MG/DL (ref 0.6–1.3)
ERYTHROCYTE [DISTWIDTH] IN BLOOD BY AUTOMATED COUNT: 12.2 % (ref 11.6–15.1)
GFR SERPL CREATININE-BSD FRML MDRD: 89 ML/MIN/1.73SQ M
GLUCOSE SERPL-MCNC: 108 MG/DL (ref 65–140)
HCT VFR BLD AUTO: 44.1 % (ref 36.5–49.3)
HGB BLD-MCNC: 14.9 G/DL (ref 12–17)
MCH RBC QN AUTO: 29.3 PG (ref 26.8–34.3)
MCHC RBC AUTO-ENTMCNC: 33.8 G/DL (ref 31.4–37.4)
MCV RBC AUTO: 87 FL (ref 82–98)
PLATELET # BLD AUTO: 224 THOUSANDS/UL (ref 149–390)
PMV BLD AUTO: 8.9 FL (ref 8.9–12.7)
POTASSIUM SERPL-SCNC: 3.8 MMOL/L (ref 3.5–5.3)
RBC # BLD AUTO: 5.09 MILLION/UL (ref 3.88–5.62)
SODIUM SERPL-SCNC: 139 MMOL/L (ref 136–145)
WBC # BLD AUTO: 12.09 THOUSAND/UL (ref 4.31–10.16)

## 2021-06-02 PROCEDURE — 97535 SELF CARE MNGMENT TRAINING: CPT

## 2021-06-02 PROCEDURE — 99213 OFFICE O/P EST LOW 20 MIN: CPT | Performed by: INTERNAL MEDICINE

## 2021-06-02 PROCEDURE — 97166 OT EVAL MOD COMPLEX 45 MIN: CPT

## 2021-06-02 PROCEDURE — 97162 PT EVAL MOD COMPLEX 30 MIN: CPT

## 2021-06-02 PROCEDURE — NC001 PR NO CHARGE: Performed by: ORTHOPAEDIC SURGERY

## 2021-06-02 PROCEDURE — 85027 COMPLETE CBC AUTOMATED: CPT | Performed by: NURSE PRACTITIONER

## 2021-06-02 PROCEDURE — 80048 BASIC METABOLIC PNL TOTAL CA: CPT | Performed by: PHYSICIAN ASSISTANT

## 2021-06-02 PROCEDURE — 99024 POSTOP FOLLOW-UP VISIT: CPT | Performed by: PHYSICIAN ASSISTANT

## 2021-06-02 RX ADMIN — FINASTERIDE 5 MG: 5 TABLET, FILM COATED ORAL at 08:11

## 2021-06-02 RX ADMIN — DOCUSATE SODIUM 100 MG: 100 CAPSULE, LIQUID FILLED ORAL at 08:11

## 2021-06-02 RX ADMIN — ACETAMINOPHEN 650 MG: 325 TABLET, FILM COATED ORAL at 06:03

## 2021-06-02 RX ADMIN — PANTOPRAZOLE SODIUM 40 MG: 40 TABLET, DELAYED RELEASE ORAL at 06:03

## 2021-06-02 RX ADMIN — OXYCODONE HYDROCHLORIDE 10 MG: 5 TABLET ORAL at 06:04

## 2021-06-02 RX ADMIN — Medication 100 MG: at 08:11

## 2021-06-02 NOTE — PROGRESS NOTES
Internal Medicine Progress Note  Patient: Jnanet Dunn  Age/sex: 65 y o  male  Medical Record #: [id number]      ASSESSMENT/PLAN: (Interval History)  Jannet Dunn is seen and examined and management for following issues:    Status Post right Total SHOULDER ARTHROPLASTY   Pain controlled   Continue encourage incentive spirometry; monitor fever curve   DVT prophylaxis in place and reviewed  Results from last 7 days   Lab Units 06/02/21  0553   WBC Thousand/uL 12 09*   HEMOGLOBIN g/dL 14 9   HEMATOCRIT % 44 1   PLATELETS Thousands/uL 224          HTN  · Hold cozaar in the post operative setting to avoid hypotension/MARK ANTHONY  · OK to resume on dc  · Add hydralazine as needed for SBP >160  · Monitor trend     GERD  · Cont PPI  · improved  · cont zofran prn     BPH  · Cont proscar/flomax  · Monitor for retention     Asthma  · Cont albuterol as needed  · Monitor respiratory status        PRE-OP HGB LEVEL: 15 7    Patient stable for DC from medical standpoint  The above assessment and plan was reviewed and updated as determined by my evaluation of the patient on 6/2/2021      Labs:   Results from last 7 days   Lab Units 06/02/21  0553   WBC Thousand/uL 12 09*   HEMOGLOBIN g/dL 14 9   HEMATOCRIT % 44 1   PLATELETS Thousands/uL 224     Results from last 7 days   Lab Units 06/02/21  0553   SODIUM mmol/L 139   POTASSIUM mmol/L 3 8   CHLORIDE mmol/L 105   CO2 mmol/L 30   BUN mg/dL 8   CREATININE mg/dL 0 81   CALCIUM mg/dL 9 0             Results from last 7 days   Lab Units 06/01/21  1009   POC GLUCOSE mg/dl 111       Review of Scheduled Meds:  Current Facility-Administered Medications   Medication Dose Route Frequency Provider Last Rate    acetaminophen  650 mg Oral Q6H PRN Sanjay Virk PA-C      albuterol  1 puff Inhalation Q4H PRN Sanjay Virk PA-C      calcium carbonate  1,000 mg Oral Daily PRN Sanjay Virk PA-C      co-enzyme Q-10  100 mg Oral Daily With Breakfast Sanjay Virk PA-C      docusate sodium  100 mg Oral BID Radene Minor, PA-C      finasteride  5 mg Oral Daily Radene Minor, PA-C      fluticasone  2 spray Each Nare Daily PRN Radene Minor, PA-C      hydrALAZINE  25 mg Oral Q8H PRN Kerrie Bran,       lactated ringers  1,000 mL Intravenous Once PRN Radene Minor, PA-C      And    lactated ringers  1,000 mL Intravenous Once PRN Radene Minor, PA-C      lactated ringers  125 mL/hr Intravenous Continuous Cliff Morejon MD Stopped (06/01/21 1008)    lactated ringers  100 mL/hr Intravenous Continuous Radene Minor, PA-C 100 mL/hr (06/01/21 1109)    morphine injection  2 mg Intravenous Q2H PRN Radene Minor, PA-C      ondansetron  4 mg Intravenous Q6H PRN Radene Minor, PA-C      oxyCODONE  10 mg Oral Q4H PRN Radene Minor, PA-C      oxyCODONE  5 mg Oral Q4H PRN Radene Minor, PA-C      pantoprazole  40 mg Oral Early Morning Radene Minor, PA-C      phenol  1 spray Mouth/Throat Q2H PRN Christopher Harris MD      pravastatin  80 mg Oral After Saurabh Branch, PA-C      tamsulosin  0 4 mg Oral After Saurabh Branch, PA-C      zolpidem  10 mg Oral HS PRN Radene Minor, PA-C         Subjective/ HPI: Mariza Polanco seen and examined  Patient's overnight issues or events were reviewed with nursing or staff during rounds or morning huddle session  New or overnight issues include the following:     Pt without any overnight events or reported nursing issues      ROS:   A 10 point ROS was performed; negative except as noted above         Imaging:     XR shoulder right 1 view    (Results Pending)       *Labs /Radiology studies Reviewed  *Medications  reviewed and reconciled as needed  *Please refer to order section for additional ordered labs studies  *Case discussed with primary attending during morning huddle case rounds    Physical Examination:  Vitals:   Vitals:    06/01/21 2307 06/02/21 0035 06/02/21 0255 06/02/21 0745   BP: 125/76 125/77 128/77 131/78   Pulse: 63 59 58 59   Resp: 17 17 17 18   Temp: 99 3 °F (37 4 °C) 98 2 °F (36 8 °C) 97 9 °F (36 6 °C) 97 6 °F (36 4 °C)   TempSrc:       SpO2: 92% 93% 97% 94%   Weight:       Height:           General Appearance: no distress, conversive  HEENT: PERRLA, conjuctiva normal; oropharynx clear; mucous membranes moist;   Neck:  Supple, no lymphadenopathy or thyromegaly  Lungs: CTA, normal respiratory effort, no retractions, expiratory effort normal  CV: regular rate and rhythm , PMI normal   ABD: soft non tender, no masses , no hepatic or splenomegaly  EXT: DP pulses intact, no lymphadenopathy, no edema; right shoulder dressing in place  Skin: normal turgor, normal texture, no rash  Psych: affect normal, mood normal  Neuro: AAOx3      The above physical exam was reviewed and updated as determined by my evaluation of the patient on 6/2/2021  Invasive Devices     Peripheral Intravenous Line            Peripheral IV 06/01/21 Left Hand 1 day                   VTE Pharmacologic Prophylaxis: Sequential pneumatic compression stocking  Code Status: Level 1 - Full Code  Current Length of Stay: 0 day(s)      Total floor / unit time spent today 30 minutes  Coordination of patient's care was performed in conjunction with primary service  Time invested included review of patient's labs, vitals, and management of their comorbidities with continued monitoring, examination of patient as well as answering patient questions, documenting her findings and creating progress note in electronic medical record,  ordering appropriate diagnostic testing  ** Please Note:  voice to text software may have been used in the creation of this document   Although proof errors in transcription or interpretation are a potential of such software**

## 2021-06-02 NOTE — PLAN OF CARE
Problem: OCCUPATIONAL THERAPY ADULT  Goal: Performs self-care activities at highest level of function for planned discharge setting  See evaluation for individualized goals  Description: Treatment Interventions: ADL retraining, Patient/family training, Compensatory technique education          See flowsheet documentation for full assessment, interventions and recommendations  Note: Limitation: Decreased ADL status, Decreased self-care trans, Decreased high-level ADLs  Prognosis: Good  Assessment: RHD, 66 YO Male SEEN FOR INITIAL OCCUPATIONAL THERAPY EVALUATION S/P R TSA WITH LONG HEAD BICEPS TENODESIS ON 6/1/21  PT CURRENTLY HAS THE FOLLOWING RESTRICTIONS;RUE NWB STATUS IN ABDUCTION SLING  PT WITH FALL THIS ADMISSION WITHOUT INJURY  PROBLEMS LIST INCLUDES CHRONIC PAIN, HTN, HIATAL HERNIA, HLD, AND ARTHRITIS  PT IS FROM HOME WITH SUPPORTIVE SPOUSE WHERE HE REPORTS BEING INDEPENDENT WITH ADLS/IADLS/DRIVING PTA  PT CURRENTLY REQUIRES OVERALL MIN-MOD A WITH ADLS AND SUPERVISION WITH TRANSFERS /FUNCTIONAL MOBILITY WITHOUT USE OF AD  PT IS EXPERIENCING EXPECTED LIMITATIONS 2' PAIN, FATIGUE, IMPAIRED BALANCE, FALL RISK , WB RESTRICTIONS, ORTHOPEDIC RESTRICTIONS and OVERALL LIMITED ACTIVITY TOLERANCE  PT EDUCATED ON TSA PRECAUTIONS, SLING MANAGEMENT,  CARRY OVER OF WB STATUS, DEEP BREATHING TECHNIQUES T/O ACTIVITY, SLOWING OF PACE, INCREASED FAMILY SUPPORT and CONTINUE PARTICIPATION IN SELF-CARE/MOBILITY WITH STAFF [address]   The patient's raw score on the AM-PAC Daily Activity inpatient short form is 16, standardized score is 35 96, less than 39 4  Patients at this level are likely to benefit from discharge to post-acute rehabilitation services  Please refer to the recommendation of the Occupational Therapist for safe discharge planning   HOWEVER PT HAS SUPPORTIVE FAMILY AND IS EXPECTED TO PROGRESS QUICKLY, THEREFORE, FROM AN OCCUPATIONAL THERAPY PERSPECTIVE, PT CAN RETURN HOME WITH INCREASED FAMILY SUPPORT WHEN MEDICALLY CLEARED + F/U WITH OUTPT THERAPY SERVICE FOR RUE  WILL CONT TO FOLLOW TO ADDRESS THE BELOW DESCRIBED GOALS  OT Discharge Recommendation: Home with outpatient rehabilitation(OUTPT FOR RUE )  OT - OK to Discharge:  Yes

## 2021-06-02 NOTE — PROGRESS NOTES
Orthopedics   Tila Dill 65 y o  male MRN: [id number]  Unit/Bed#: -01      Subjective:  65 y o male post operative day 1 right total shoulder arthroplasty  Patient doing well  Pain controlled - block wearing off, but PO medications providing relief  Patient had a fall overnight, no complaints this morning  No other events, no acute complaints       Labs:  0   Lab Value Date/Time    HCT 46 3 05/07/2021 0857    HCT 43 4 07/21/2020 0754    HCT 46 1 04/27/2019 0744    HCT 42 1 10/21/2015 0738    HCT 44 0 09/24/2014 0750    HGB 15 7 05/07/2021 0857    HGB 14 9 07/21/2020 0754    HGB 15 6 04/27/2019 0744    HGB 14 7 10/21/2015 0738    HGB 15 1 09/24/2014 0750    INR 1 10 05/07/2021 0857    WBC 4 64 05/07/2021 0857    WBC 5 19 07/21/2020 0754    WBC 5 80 04/27/2019 0744    WBC 5 61 10/21/2015 0738    WBC 5 19 09/24/2014 0750       Meds:    Current Facility-Administered Medications:     acetaminophen (TYLENOL) tablet 650 mg, 650 mg, Oral, Q6H PRN, Sari Doshi PA-C, 650 mg at 06/02/21 0603    albuterol (PROVENTIL HFA,VENTOLIN HFA) inhaler 1 puff, 1 puff, Inhalation, Q4H PRN, Sari Doshi PA-C    calcium carbonate (TUMS) chewable tablet 1,000 mg, 1,000 mg, Oral, Daily PRN, Sari Doshi PA-C    co-enzyme Q-10 capsule 100 mg, 100 mg, Oral, Daily With Breakfast, Sari Doshi PA-C    docusate sodium (COLACE) capsule 100 mg, 100 mg, Oral, BID, Sari Doshi PA-C, 100 mg at 06/01/21 1718    finasteride (PROSCAR) tablet 5 mg, 5 mg, Oral, Daily, Sair Doshi PA-C    fluticasone (FLONASE) 50 mcg/act nasal spray 2 spray, 2 spray, Each Nare, Daily PRN, Sari Doshi PA-C    hydrALAZINE (APRESOLINE) tablet 25 mg, 25 mg, Oral, Q8H PRN, Meagan De Luna,     lactated ringers bolus 1,000 mL, 1,000 mL, Intravenous, Once PRN **AND** lactated ringers bolus 1,000 mL, 1,000 mL, Intravenous, Once PRN, Sari Doshi PA-C    lactated ringers infusion, 125 mL/hr, Intravenous, Continuous, Daina Higginbotham MD, Stopped at 06/01/21 1008    lactated ringers infusion, 100 mL/hr, Intravenous, Continuous, Farnaz Grange, PA-C, Last Rate: 100 mL/hr at 06/01/21 1109, 100 mL/hr at 06/01/21 1109    morphine injection 2 mg, 2 mg, Intravenous, Q2H PRN, Farnaz Grange, PA-C    ondansetron [facility]) injection 4 mg, 4 mg, Intravenous, Q6H PRN, Farnaz Grange, PA-C, 4 mg at 06/01/21 1216    oxyCODONE (ROXICODONE) IR tablet 10 mg, 10 mg, Oral, Q4H PRN, Farnaz Grange, PA-C, 10 mg at 06/02/21 0604    oxyCODONE (ROXICODONE) IR tablet 5 mg, 5 mg, Oral, Q4H PRN, Farnaz Grange, PA-C, 5 mg at 06/01/21 2334    pantoprazole (PROTONIX) EC tablet 40 mg, 40 mg, Oral, Early Morning, Farnaz Grange, PA-C, 40 mg at 06/02/21 0603    phenol (CHLORASEPTIC) 1 4 % mucosal liquid 1 spray, 1 spray, Mouth/Throat, Q2H PRN, Jazlyn Kirkpatrick MD    pravastatin (PRAVACHOL) tablet 80 mg, 80 mg, Oral, After Yomaira Lease, PA-C, 80 mg at 06/01/21 1718    tamsulosin (FLOMAX) capsule 0 4 mg, 0 4 mg, Oral, After Yomaira Lease, PA-C, 0 4 mg at 06/01/21 1718    zolpidem (AMBIEN) tablet 10 mg, 10 mg, Oral, HS PRN, Farnaz Grange, PA-C    Blood Culture:   Lab Results   Component Value Date    BLOODCX No Growth After 5 Days  01/02/2018    BLOODCX No Growth After 5 Days  01/02/2018       Wound Culture:   No results found for: WOUNDCULT    Ins and Outs:  I/O last 24 hours: In: 1700 [P O :800; I V :900]  Out: -           Physical:  Vitals:    06/02/21 0255   BP: 128/77   Pulse: 58   Resp: 17   Temp: 97 9 °F (36 6 °C)   SpO2: 97%     right upper extremity  · Dressings clean dry intact  · Sensation intact to axillary, musculocutaneous, radial, ulna, median nerves  · Motor intact to axillary, musculocutaneous, radial, ulna, median nerves  · 2+ Radial pulse    Assessment: 70 y o male post operative day 1 right total shoulder arthroplasty   Doing well    Plan:  · Nonweight Bearing right upper extremity  · Up and out of bed  · Sling for Comfort, may remove for pendulums and hygiene  · DVT prophylaxis  · Ice and analgesics  · Will continue to assess for acute blood loss anemia      Dorcas Pandya PA-C

## 2021-06-02 NOTE — OCCUPATIONAL THERAPY NOTE
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     Asthma     Chronic pain     back    GERD (gastroesophageal reflux disease)     Hiatal hernia 1994    Hyperlipidemia     Hypertension     Kidney stone     r stent- suppose to be removed Fri 7/13    PONV (postoperative nausea and vomiting)     Shortness of breath      Past Surgical History  Past Surgical History:   Procedure Laterality Date    ABDOMINAL ADHESION SURGERY N/A 7/11/2018    Procedure: LYSIS ADHESIONS EXTENSIVE;  Surgeon: Toñito Kahn MD;  [facility];  Service: General    ABDOMINAL SURGERY      BACK SURGERY      x2    CARPAL TUNNEL RELEASE      COLONOSCOPY      CYSTOSCOPY  08/07/2020    CYSTOSCOPY W/ URETERAL STENT REMOVAL  01/26/2018    ESOPHAGOGASTRODUODENOSCOPY N/A 7/11/2018    Procedure: ESOPHAGOGASTRODUODENOSCOPY (EGD) INTRAOPERATIVE ;  Surgeon: Toñito Kahn MD;  [facility];  Service: General    HALLUX VALGUS CORRECTION Bilateral     1st MTP joint     HERNIA REPAIR      HIATAL HERNIA REPAIR  1994    INCISIONAL HERNIA REPAIR N/A 7/11/2018    Procedure: REPAIR HERNIA INCISIONAL X2;  Surgeon: Toñito Kahn MD;  [facility];  Service: General    KNEE SURGERY Bilateral     LITHOTRIPSY      PERIPHERAL NEUROPLASTY OF FINGER / HAND / Ival Patella      left middle finger    NV CYSTO/URETERO W/LITHOTRIPSY &INDWELL STENT INSRT Left 1/3/2018    Procedure: CYSTOSCOPY , RETROGRADE PYELOGRAM AND INSERTION STENT Marcelle Vel;  Surgeon: Julee Peabody, MD;  [facility];  Service: Urology    NV CYSTO/URETERO W/LITHOTRIPSY &INDWELL STENT INSRT Right 6/8/2018    Procedure: CYSTOSCOPY URETEROSCOPY WITH LITHOTRIPSY HOLMIUM LASER, RETROGRADE PYELOGRAM AND INSERTION STENT URETERAL;  Surgeon: Mihaela Villanueva MD;  [facility];  Service: Urology    Daryl ESWL Left 1/18/2018    Procedure: ESWL;  Surgeon: Mihaela Villanueva MD;  [facility];  Service: Urology    HI LAP, REPAIR PARAESOPHAGEAL HERNIA, INCL FUNDOPLASTY W/ MESH N/A 7/11/2018    Procedure: REPAIR RECURRENT HERNIA PARAESOPHAGEAL  LAPAROSCOPIC;KLAUDIA Pascal;  Surgeon: Jong Yung MD;  [facility];  Service: General    ROTATOR CUFF REPAIR Right     SHOULDER ARTHROSCOPY Bilateral     SHOULDER SURGERY           06/02/21 0855   OT Last Visit   OT Visit Date 06/02/21   Note Type   Note type Evaluation  (EVAL; 9835-3924  F/U TX; 7465-8545)   Restrictions/Precautions   Weight Bearing Precautions Per Order Yes   RUE Weight Bearing Per Order NWB  (IN ABDUCTION SLING )   Braces or Orthoses   (ABDUCTION SLING )   Other Precautions WBS;Pain; Fall Risk; Chair Alarm   Pain Assessment   Pain Assessment Tool 0-10   Pain Score 4   Pain Location/Orientation Orientation: Right;Location: Shoulder   Hospital Pain Intervention(s) Repositioned; Ambulation/increased activity; Emotional support   Home Living   Type of [address] [place] [address] Two level; Able to live on main level with bedroom/bathroom;Stairs to enter with rails  (1 NEETU )   Bathroom Shower/Tub Tub/shower unit   Bathroom Toilet Standard   Bathroom Accessibility Accessible   Home Equipment Walker;Cane   Additional Comments NO USE OF DME AT BASELINE    Prior Function   Level of [place] Independent with ADLs and functional mobility   Lives With [place] Help From Family   ADL Assistance Independent   IADLs Independent   Falls in the last 6 months 1 to 4  (1- DURING THIS ADMISSION )   Vocational Retired   Lifestyle   Autonomy  [facility] ADLS/IADLS/DRIVING PTA    Reciprocal Relationships [id number] TO ASSIST    Service to Others RETIRED; POLICE    Intrinsic Gratification ENJOYS HELPING OUT WITH SECURITY    Psychosocial   Psychosocial (WDL) WDL   ADL   Eating Assistance 5  Supervision/Setup   Grooming Assistance 4  Minimal Assistance   UB Bathing Assistance 4  Minimal Assistance   LB Bathing Assistance 4  Minimal Assistance   UB Dressing Assistance 3  Moderate Assistance   LB Dressing Assistance 3  Moderate Assistance   Toileting Assistance  5  Supervision/Setup   Functional Assistance 5  Supervision/Setup   Transfers   Sit to Stand 5  Supervision   Additional items Increased time required   Stand to Sit 5  Supervision   Additional items Increased time required   Functional Mobility   Functional Mobility 5  Supervision   Balance   Static Sitting Good   Static Standing Fair +   Ambulatory Fair   Activity Tolerance   Activity Tolerance Patient tolerated treatment well   Nurse Made Aware APPROPRIATE TO SEE    RUE Assessment   RUE Assessment X  (RHD; NWB IN SLING )   LUE Assessment   LUE Assessment WFL   Cognition   Overall Cognitive Status WFL   Arousal/Participation Alert; Cooperative   Attention Within functional limits   Orientation Level Oriented X4   Memory Within functional limits   Following Commands Follows all commands and directions without difficulty   Comments PT IS PLEASANT AND COOPERATIVE  Assessment   Limitation Decreased ADL status; Decreased self-care trans;Decreased high-level ADLs   Prognosis Good   Assessment RHD, 64 YO Male SEEN FOR INITIAL OCCUPATIONAL THERAPY EVALUATION S/P R TSA WITH LONG HEAD BICEPS TENODESIS ON 6/1/21  PT CURRENTLY HAS THE FOLLOWING RESTRICTIONS;RUE NWB STATUS IN ABDUCTION SLING  PT WITH FALL THIS ADMISSION WITHOUT INJURY  PROBLEMS LIST INCLUDES CHRONIC PAIN, HTN, HIATAL HERNIA, HLD, AND ARTHRITIS  PT IS FROM HOME WITH SUPPORTIVE SPOUSE WHERE HE REPORTS BEING INDEPENDENT WITH ADLS/IADLS/DRIVING PTA  PT CURRENTLY REQUIRES OVERALL MIN-MOD A WITH ADLS AND SUPERVISION WITH TRANSFERS /FUNCTIONAL MOBILITY WITHOUT USE OF AD   PT IS EXPERIENCING EXPECTED LIMITATIONS 2' PAIN, FATIGUE, IMPAIRED BALANCE, FALL RISK , WB RESTRICTIONS, ORTHOPEDIC RESTRICTIONS and OVERALL LIMITED ACTIVITY TOLERANCE  PT EDUCATED ON TSA PRECAUTIONS, SLING MANAGEMENT,  CARRY OVER OF WB STATUS, DEEP BREATHING TECHNIQUES T/O ACTIVITY, SLOWING OF PACE, INCREASED FAMILY SUPPORT and CONTINUE PARTICIPATION IN SELF-CARE/MOBILITY WITH STAFF Cole Gee   The patient's raw score on the AM-PAC Daily Activity inpatient short form is 16, standardized score is 35 96, less than 39 4  Patients at this level are likely to benefit from discharge to post-acute rehabilitation services  Please refer to the recommendation of the Occupational Therapist for safe discharge planning  HOWEVER PT HAS SUPPORTIVE FAMILY AND IS EXPECTED TO PROGRESS QUICKLY, THEREFORE, FROM AN OCCUPATIONAL THERAPY PERSPECTIVE, PT CAN RETURN HOME WITH INCREASED FAMILY SUPPORT WHEN MEDICALLY CLEARED + F/U WITH OUTPT THERAPY SERVICE FOR RUE  WILL CONT TO FOLLOW TO ADDRESS THE BELOW DESCRIBED GOALS  Goals   Patient Goals TO RETURN HOME    STG Time Frame 1-3   Short Term Goal #1 PT WILL % ATTENTIVE DURING PT EDUCATION IN ORDER TO ASSIST WITH PT SAFETY UPON D/C  Short Term Goal #2 PT WILL INCREASED INDEPENDENCE IN UB/LB ADLS TO S LEVEL WITH G CARRY OVER OF LEARNED PRECAUTIONS  Plan   Treatment Interventions ADL retraining;Patient/family training; Compensatory technique education   Goal Expiration Date 06/05/21   OT Frequency 3-5x/wk   Additional Treatment Session   Start Time 0845   End Time 0855   Treatment Assessment PT SEEN FOR OT TX SESSION FOCUSING ON UB/LB ADLS, SLING MANAGEMENT AND PT EDUCATION  PT EDUCATED ON TSA PRECAUTIONS, ONE HANDED DRESSING TECHNIQUES, AND SLING MANAGEMENT  PT COMPLETED LB DRESSING WITH MOD A INCLUDING THREADING LLE AND PULL UP OVER B/L HIPS   PT ABLE TO COMPLETE UB DRESSING INCLUDING DONNING PULL OVER SHIRT AND DOFFING/DONNING SLING WITH MOD A + MIN CUES FOR CARRY OVER OF LEARNED PRECAUTIONS  PT PROVIDED WITH PT EDUCATIONAL HANDOUT "AFTER YOUR TOTAL SHOULDER" TO ASSIST WITH PT CARRY OVER UPON D/C  PT REPORTS SPOUSE IS ABLE TO PROVIDE CURRENT LEVEL OF ASSIST  ALL QUESTIONS/CONCERNS ADDRESSED  FROM AN OT PERSPECTIVE, PT CAN RETURN HOME WITH FAMILY SUPPORT + F/U WITH OUTPT THERAPY FOR RUE  NO ADDITIONAL ACUTE CARE OT NEEDS  D/C OT      Additional Treatment Day 1   Recommendation   OT Discharge Recommendation Home with outpatient rehabilitation  (OUTPT FOR RUE )   OT - OK to Discharge Yes   AM-PAC Daily Activity Inpatient   Lower Body Dressing 2   Bathing 3   Toileting 3   Upper Body Dressing 2   Grooming 3   Eating 3   Daily Activity Raw Score 16   Daily Activity Standardized Score (Calc for Raw Score >=11) 35 96   AM-PAC Applied Cognition Inpatient   Following a Speech/Presentation 4   Understanding Ordinary Conversation 4   Taking Medications 4   Remembering Where Things Are Placed or Put Away 4   Remembering List of 4-5 Errands 4   Taking Care of Complicated Tasks 4   Applied Cognition Raw Score 24   Applied Cognition Standardized Score 62 21   Modified [place] Scale   Modified [place] Scale 3       Documentation completed by Conrado Ch, MADHAVI, OTR/L

## 2021-06-02 NOTE — CONSULTS
Peripheral Nerve Block Follow-up Note - Acute Pain Service    Dhara Randhawa 65 y o  male MRN: [id number]  Unit/Bed#: -01 Encounter: [id number]      Assessment:   Principal Problem:    Status post total shoulder arthroplasty, right    Dhara Randhawa is a 65y o  year old male s/p R TSA with IS block with exparel in place  Pt overall doing well  Had some increased pain after initial short acting block medication wore off  Pain well controlled with ice, tylenol and prn oxycodone  Plan:   - Right interscalene block is functioning appropriately with expected sensory deficits  - Recommend standard oral opioid regimen with oxycodone 5 mg/10 mg PO q4hrs PRN for moderate/severe pain, Dilaudid 0 5 mg IV q2hrs PRN for breakthrough pain    - Multimodal analgesia with:  - Tylenol 975 mg PO q8hrs standing    APS will sign off at this time  Thank you for the consult  All opioids and other analgesics to be written at discretion of primary team  Please call  / [phone] or [facility] Acute Pain Service - [facility] (/ between [phone] and on weekends) with questions or concerns    Pain History  Current pain location(s): right shoulder  Pain Scale:   4/10  Quality: sharp  24 hour history: as above, no CP, SOB, n/v    Opioid requirement previous 24 hours: oxycodone 15mg    Meds/Allergies   all current active meds have been reviewed    Allergies   Allergen Reactions    Meperidine GI Intolerance    Horse-Derived Products     Lisinopril Cough     Other reaction(s): Cough       Objective     Temp:  [97 6 °F (36 4 °C)-99 4 °F (37 4 °C)] 97 6 °F (36 4 °C)  HR:  [58-71] 59  Resp:  [17-18] 18  BP: (119-136)/(69-78) 131/78    Physical Exam  Vitals signs reviewed  Constitutional:       Appearance: Normal appearance  HENT:      Head: Normocephalic and atraumatic  Mouth/Throat:      Mouth: Mucous membranes are moist    Eyes:      Extraocular Movements: Extraocular movements intact     Cardiovascular:      Rate and Rhythm: Normal rate  Pulmonary:      Effort: Pulmonary effort is normal  No respiratory distress  Musculoskeletal:      Comments: Right arm in sling, block site c/d/i, distal motor and sensory intact   Skin:     General: Skin is warm and dry  Neurological:      General: No focal deficit present  Mental Status: He is alert and oriented to person, place, and time  Psychiatric:         Mood and Affect: Mood normal          Behavior: Behavior normal            Lab Results:   Results from last 7 days   Lab Units 06/02/21  0553   WBC Thousand/uL 12 09*   HEMOGLOBIN g/dL 14 9   HEMATOCRIT % 44 1   PLATELETS Thousands/uL 224      Results from last 7 days   Lab Units 06/02/21  0553   POTASSIUM mmol/L 3 8   CHLORIDE mmol/L 105   CO2 mmol/L 30   BUN mg/dL 8   CREATININE mg/dL 0 81   CALCIUM mg/dL 9 0       Imaging Studies: I have personally reviewed pertinent reports  EKG, Pathology, and Other Studies: I have personally reviewed pertinent reports  Counseling / Coordination of Care  Total floor / unit time spent today 15 minutes  Greater than 50% of total time was spent with the patient and / or family counseling and / or coordination of care  A description of the counseling / coordination of care: chart review, pt exam, discussion block expectations and home pain regimen    Please note that the APS provides consultative services regarding pain management only  With the exception of ketamine, peripheral nerve catheters, and epidural infusions (and except when indicated), final decisions regarding starting or changing doses of analgesic medications are at the discretion of the consulting service  Off hours consultation and/or medication management is generally not available      Carolyn Peña MD  Acute Pain Service

## 2021-06-02 NOTE — DISCHARGE SUMMARY
ORTHOPEDICS DISCHARGE SUMMARY  Lary Bryan 65 y o  male MRN: [id number]  Unit/Bed#: -01    Attending Physician: Rudy Munoz    Admitting diagnosis: Glenohumeral arthritis, right [M19 011]    Discharge diagnosis: Glenohumeral arthritis, right [M19 011]    Date of admission: 6/1/2021    Date of discharge: 06/02/21         Procedure: right total shoulder arthroplasty with long head biceps tenodesis    HPI:  This is a 65y o  year old male that presented to the office with signs and symptoms of right shoulder osteoarthritis and/or other pathology  They tried and failed conservative treatment measures and wished to proceed with surgical intervention  The risks, benefits, and complications of the procedure were discussed with the patient and informed consent was obtained  Hospital Course: The patient was admitted to the hospital on 6/1/2021 and underwent an uncomplicated right total shoulder arthroplasty  They were transferred to the floor after a brief stay in the post-anesthesia care unit  Their pain was well managed with IV and oral pain medications  On discharge date pt was cleared by PT and the medicine team and determined to be safe for discharge    0   Lab Value Date/Time    HGB 14 9 06/02/2021 0553    HGB 15 7 05/07/2021 0857    HGB 14 9 07/21/2020 0754    HGB 15 6 04/27/2019 0744    HGB 13 3 07/12/2018 0521    HGB 14 8 07/09/2018 1555    HGB 13 4 01/04/2018 0523    HGB 14 4 01/03/2018 0522    HGB 16 7 01/02/2018 1440    HGB 15 0 04/18/2017 0748    HGB 14 7 10/21/2015 0738    HGB 15 1 09/24/2014 0750       There was no Greater than 2 gram drop which does not qualify for diagnosis of acute blood loss anemia  Vital signs remained stable and pt was resuscitated with IVF as needed   Body mass index is 29 76 kg/m²  moderately obese  Recommend behavior modifications, nutrition and physical activity  Discharge Instructions: The patient was discharged nonweight bearing to the right upper extremity   Refrain from PT/OT until cleared by surgeon  Take pain medications as instructed  Discharge Medications: For the complete list of discharge medications, please refer to the patient's medication reconciliation

## 2021-06-02 NOTE — CASE MANAGEMENT
Pt is not a bundle patient, his unplanned readmission risk score is currently not available  CM Romayne Sovani met pt at bedside  CM explained her role, and plans of discharge  Pt lives in a 2 floor home, 0 NEETU  Pt lives with his wife, and is independent in ADLs  Pt has a cane and walker at home, but does not use them  Pt emergency contact is his wife Sandra Spangler [phone]  Pt drives a car, and is retired  His pharmacy is located at [address] [place] [address] in [place]  Pt has never had VNA or STR  Pt has not been inpatient for psychiatric of D/A treatment  Sandra Spangler will be picking up pt once discharged  CM reviewed d/c planning process including the following: identifying help at home, patient preference for d/c planning needs, Discharge Lounge, [facility] Meds to Bed program, availability of treatment team to discuss questions or concerns patient and/or family may have regarding understanding medications and recognizing signs and symptoms once discharged  CM also encouraged patient to follow up with all recommended appointments after discharge  Patient advised of importance for patient and family to participate in managing patients medical well being

## 2021-06-02 NOTE — PHYSICAL THERAPY NOTE
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     Asthma     Chronic pain     back    GERD (gastroesophageal reflux disease)     Hiatal hernia 1994    Hyperlipidemia     Hypertension     Kidney stone     r stent- suppose to be removed Fri 7/13    PONV (postoperative nausea and vomiting)     Shortness of breath        Past Surgical History:   Procedure Laterality Date    ABDOMINAL ADHESION SURGERY N/A 7/11/2018    Procedure: LYSIS ADHESIONS EXTENSIVE;  Surgeon: Mary Leon MD;  [facility];  Service: General    ABDOMINAL SURGERY      BACK SURGERY      x2    CARPAL TUNNEL RELEASE      COLONOSCOPY      CYSTOSCOPY  08/07/2020    CYSTOSCOPY W/ URETERAL STENT REMOVAL  01/26/2018    ESOPHAGOGASTRODUODENOSCOPY N/A 7/11/2018    Procedure: ESOPHAGOGASTRODUODENOSCOPY (EGD) INTRAOPERATIVE ;  Surgeon: Mary Leon MD;  [facility];  Service: General    [place] [address] Bilateral     1st MTP joint     HERNIA REPAIR     [address] [place] [address] N/A 7/11/2018    Procedure: REPAIR HERNIA INCISIONAL X2;  Surgeon: Mary Leon MD;  [facility];  Service: General    KNEE SURGERY Bilateral     LITHOTRIPSY      PERIPHERAL NEUROPLASTY OF FINGER / HAND / Elpidio Correa      left middle finger    [place] CYSTO/URETERO W/LITHOTRIPSY &INDWELL STENT INSRT Left 1/3/2018    Procedure: CYSTOSCOPY , RETROGRADE PYELOGRAM AND INSERTION STENT [place] Right;  Surgeon: Naya Avendaño MD;  [facility];  Service: Urology    [place] CYSTO/URETERO W/LITHOTRIPSY &INDWELL STENT INSRT Right 6/8/2018    Procedure: CYSTOSCOPY URETEROSCOPY WITH LITHOTRIPSY HOLMIUM LASER, RETROGRADE PYELOGRAM AND INSERTION STENT URETERAL;  Surgeon: Magdy Feliciano MD;  [facility];  Service: Urology    ND FRAGMENT KIDNEY STONE/ ESWL Left 1/18/2018    Procedure: ESWL;  Surgeon: Isabel Pisano MD;  [facility];  Service: Urology    ND LAP, REPAIR PARAESOPHAGEAL HERNIA, INCL FUNDOPLASTY W/ MESH N/A 7/11/2018    Procedure: REPAIR RECURRENT HERNIA PARAESOPHAGEAL  LAPAROSCOPIC;KLAUDIA Pemberton;  Surgeon: Magy Varghese MD;  Location:  MAIN OR;  Service: General    ROTATOR CUFF REPAIR Right     SHOULDER ARTHROSCOPY Bilateral     SHOULDER SURGERY         Length Of Stay: 0    PHYSICAL THERAPY EVALUATION:        06/02/21 0836   Note Type   Note type Evaluation   Pain Assessment   Pain Assessment Tool Pain Assessment not indicated - pt denies pain   Home Living   Type of [facility] Two level; Able to live on main level with bedroom/bathroom; Bed/bath upstairs;Stairs to enter with rails  (1 NEETU )   Home Equipment Walker;Cane   Additional Comments Pt reports living with supportive spouse who is able to assist pt if needed    Prior Function   Level of [place] Independent with ADLs and functional mobility   Lives With Spouse   Receives Help From Family   ADL Assistance Independent   Falls in the last 6 months 1 to 4  (1- during this admission )   Comments Pt denies the use of an AD for ambulation PTA    Restrictions/Precautions   Weight Bearing Precautions Per Order Yes   RUE Weight Bearing Per Order NWB  (in shoulder abduction sling )   Braces or Orthoses Other (Comment)  (shoulder abduction sling )   Other Precautions WBS; Fall Risk;Pain   General   Additional Pertinent History Pt educated on RUE pendulum exercises during PT eval  Pt reports he is familiar with them from previous R shoulder surgeries    Family/Caregiver Present No   Cognition   Overall Cognitive Status WFL   Arousal/Participation Alert   Orientation Level Oriented to person;Oriented to place;Oriented to time   Memory Within functional limits   Following Commands Follows all commands and directions without difficulty   RUE Assessment   RUE Assessment X   LUE Assessment   LUE Assessment WFL   RLE Assessment   RLE Assessment WFL   LLE Assessment   LLE Assessment WFL   Bed Mobility   Additional Comments NA, Pt seated OOB in chair at time of PT eval    Transfers   Sit to Stand 5  Supervision   Additional items Increased time required   Stand to Sit 5  Supervision   Additional items Increased time required   Ambulation/Elevation   Gait pattern WNL   Gait Assistance 5  Supervision   Assistive Device None   Distance 200ft    Stair Management Assistance 5  Supervision   Stair Management Technique One rail L   Number of Stairs 3   Balance   Static Sitting Good   Static Standing Fair   Ambulatory Fair   Endurance Deficit   Endurance Deficit No   Activity Tolerance   Activity Tolerance Patient tolerated treatment well   Nurse Made Aware Pt appropriate to be seen and mobilize per nsg    Assessment   Prognosis Good   Problem List Decreased strength;Decreased range of motion;Orthopedic restrictions   Assessment Pt is 65 y o  male seen for [facility] on [facility]1/[facility]21  Two pt identifiers were used to confirm  Pt presented for scheduled Right Total Shoulder Arthroplasty with Long Head Biceps Tenodesis which was performed on 6/1/21   Pt with a primary dx of: R glenohumeral arthritis s/p Right Total Shoulder Arthroplasty with Long Head Biceps Tenodesis  PT now consulted for assessment of mobility and d/c needs  Pt with Out of bed orders  Pts current co morbidities affecting treatment include: chronic pain, hiatal hernia, HTN, HLD, arthritis, asthma, and personal factors including NEETU home  Pts current clinical presentation is Evolving (medium complexity) due to Ongoing medical management for primary dx, Fall risk, Current WBS, Continuous pulse oximetry monitoring , s/p surgical intervention    Prior to admission, pt was I with ambulation without the use of an AD as per pt   Upon evaluation, pt currently is requiring ; Supervision for transfers and Supervision for ambulation w/ no AD   Pt denies any lightheadedness or dizziness with ambulation  Pt presents at PT eval functioning below baseline and currently w/ overall mobility deficits 2* to: decreased ROM, fall risk, orthopedic restrictions, RUE weakness, decreased RUE ROM  At conclusion of PT session pt returned back to EOB with phone and call bell within reach  Pt denies any further questions at this time  PT is currently recommending Home with family support and Outpatient PT   D/C acute care PT at this time due to pt being supervision with all mobility and having supportive spouse who is able to assist pt as needed  Pt denies any mobility or safety concerns about returning home at d/c  Recommend pt continues to mobilize with nsg and restorative techs during hospital stay     Barriers to Discharge None   Barriers to Discharge Comments Pt denies any mobility or safety concerns about returning home at time of d/c    Goals   Patient Goals " to go home"   Plan   PT Frequency Other (Comment)  (DC IP PT )   Recommendation   PT Discharge Recommendation Home with outpatient rehabilitation   Equipment Recommended   (none at this time )   PT - OK to Discharge Yes  (When medically cleared )   Additional Comments Pt denies any mobility or safety concerns about returning home at time of d/c    AM-PAC Basic Mobility Inpatient   Turning in Bed Without Bedrails 4   Lying on Back to Sitting on Edge of Flat Bed 4   Moving Bed to Chair 4   Standing Up From Chair 4   Walk in Room 4   Climb 3-5 Stairs 4   Basic Mobility Inpatient Raw Score 24   Basic Mobility Standardized Score 57 68   Modified [place] Scale   Modified Jozef Scale 3   Barthel Index   Feeding 10   Bathing 0   Grooming Score 5   Dressing Score 5   Bladder Score 10   Bowels Score 10   Toilet Use Score 10   Transfers (Bed/Chair) Score 15   Mobility (Level Surface) Score 15   Stairs Score 10   Barthel Index Score 90   Portions of the documentation may have been created using voice recognition software  Occasional wrong word or sound alike substitutions may have occurred due to the inherent limitations of the voice recognition software  Read the chart carefully and recognize, using context, where substitutions have occurred      Dee Dee Washburn, PT, DPT

## 2021-06-08 ENCOUNTER — EVALUATION (OUTPATIENT)
Dept: PHYSICAL THERAPY | Facility: CLINIC | Age: 71
End: 2021-06-08
Payer: MEDICARE

## 2021-06-08 DIAGNOSIS — Z96.611 AFTERCARE FOLLOWING RIGHT SHOULDER JOINT REPLACEMENT SURGERY: Primary | ICD-10-CM

## 2021-06-08 DIAGNOSIS — M19.011 GLENOHUMERAL ARTHRITIS, RIGHT: ICD-10-CM

## 2021-06-08 DIAGNOSIS — Z47.1 AFTERCARE FOLLOWING RIGHT SHOULDER JOINT REPLACEMENT SURGERY: Primary | ICD-10-CM

## 2021-06-08 PROCEDURE — 97161 PT EVAL LOW COMPLEX 20 MIN: CPT | Performed by: PHYSICAL THERAPIST

## 2021-06-08 PROCEDURE — 97110 THERAPEUTIC EXERCISES: CPT | Performed by: PHYSICAL THERAPIST

## 2021-06-08 NOTE — PROGRESS NOTES
PT Evaluation     Today's date: 2021  Patient name: Sanjay Restrepo  : 1950  MRN: [id number]  Referring provider: Lani Adamson MD  Dx:   Encounter Diagnosis     ICD-10-CM    1  Aftercare following right shoulder joint replacement surgery  Z47 1     Z96 611    2  Glenohumeral arthritis, right  M19 011 Ambulatory referral to Physical Therapy       Start Time: 1000  Stop Time: 1045  Total time in clinic (min): 45 minutes    Assessment  Assessment details: Patient is a 65 y o  male with PT prescription for PT following TSA and biceps tenodesis on 21  Patient presents with decreased shoulder and elbow ROM, decreased UE strength, UE swelling, pain, and poor postural awareness  These impairments limit patient with sleeping, bathing, dressing, reaching overhead for ADL completion, returning to recreational weight lifting and riding his motor cycle  To address impairments and improve function, patient would benefit from skilled PT consisting of manual therapy to improve ROM and joint mobility, therapeutic exercises to improve UE strength and flexibility, neuromuscular reeducation to improve shoulder stability, and patient education on home program and precautions associated with surgery  Impairments: abnormal or restricted ROM, impaired physical strength, lacks appropriate home exercise program, pain with function and scapular dyskinesis    Symptom irritability: moderateUnderstanding of Dx/Px/POC: good   Prognosis: good    Goals  Short term goals:  Patient is independent in home program to support plan of care and improve function  - 2 weeks  Patient demonstrates full elbow ROM to reduce difficulty with dressing  - 2 weeks  Patient demonstrates at least 90 deg  shoulder flexion PROM so he can get dressed with less pain  - 3 weeks    Long term goals:  Patient demonstrates improvPatient demonstrates at least ement in community participation with increase in FOTO score from 32 to 59%  -12 weeks  75% shoulder AROM for less difficulty reaching overhead and completing ADLs  -8 weeks  Patient demonstrates at least 4+/5 shoulder strength so he can return to recreational exercise and riding motor cycle  -12 weeks  Patient can sleep without waking from pain  - 4 weeks      Plan  Patient would benefit from: skilled physical therapy  Planned modality interventions: cryotherapy  Planned therapy interventions: activity modification, ADL training, body mechanics training, flexibility, functional ROM exercises, home exercise program, therapeutic exercise, therapeutic activities, stretching, strengthening, patient education, neuromuscular re-education, massage, manual therapy and joint mobilization  Frequency: 2x week  Duration in weeks: 12  Plan of Care beginning date: 2021  Plan of Care expiration date: 2021  Treatment plan discussed with: patient        Subjective Evaluation    History of Present Illness  Date of surgery: 2021  Mechanism of injury: surgery  Mechanism of injury: Patient presents to therapy post op TSA and biceps tenodesis on 21  Patient has next follow up on   Patient has been managing pain with ice and oral medication  He can d/c sling after 4 weeks  Symptoms: shoulder pain, difficulty sleeping (currently sleeping in recliner) getting between 3-4 hours of sleep at a time due to discomfort  Patient needs help to complete showering and dressing  Per protocol, he is not using his R UE or lifting any objects      PMH: Previous history of 2 arthroscopic surgeries on R shoulder  Pain  Current pain ratin  At best pain ratin  At worst pain ratin  Relieving factors: ice and rest    Social Support  Lives with: spouse    Hand dominance: right    Patient Goals  Patient goals for therapy: decreased pain, increased motion, increased strength and return to sport/leisure activities  Patient goal: get back to going to the gym and ride motor cycle        Objective     Tenderness Additional Tenderness Details  Mild tenderness to light palpation over right shoulder and upper arm, medial side    Active Range of Motion   Cervical/Thoracic Spine     Normal active range of motion  Left Shoulder   Flexion: WFL  Abduction: WFL    Right Elbow   Flexion: 120 degrees   Extension: -15 degrees   Forearm supination: 70 degrees   Forearm pronation: 70 degrees     Passive Range of Motion     Right Shoulder   Flexion: 30 degrees   Adduction: 0 degrees   External rotation 0°: 20 degrees   Internal rotation 0°: 30 degrees   Horizontal adduction: 0 degrees     Left Elbow   Flexion: WFL  Extension: WFL    Right Elbow   Flexion: 120 degrees   Extension: -10 degrees   Forearm supination: 70 degrees   Forearm pronation: 70 degrees     Strength/Myotome Testing     Left Shoulder     Planes of Motion   Flexion: 4+   Abduction: 4+   External rotation at 0°: 4+   Internal rotation at 0°: 4+              Precautions: see protocol      Manuals                                                                 Neuro Re-Ed                                                                                                        Ther Ex                                                                                                                     Ther Activity                                       Gait Training                                       Modalities

## 2021-06-10 ENCOUNTER — TELEPHONE (OUTPATIENT)
Dept: OBGYN CLINIC | Facility: HOSPITAL | Age: 71
End: 2021-06-10

## 2021-06-10 ENCOUNTER — OFFICE VISIT (OUTPATIENT)
Dept: PHYSICAL THERAPY | Facility: CLINIC | Age: 71
End: 2021-06-10
Payer: MEDICARE

## 2021-06-10 DIAGNOSIS — Z96.611 AFTERCARE FOLLOWING RIGHT SHOULDER JOINT REPLACEMENT SURGERY: Primary | ICD-10-CM

## 2021-06-10 DIAGNOSIS — Z47.1 AFTERCARE FOLLOWING RIGHT SHOULDER JOINT REPLACEMENT SURGERY: Primary | ICD-10-CM

## 2021-06-10 DIAGNOSIS — M19.011 GLENOHUMERAL ARTHRITIS, RIGHT: ICD-10-CM

## 2021-06-10 PROCEDURE — 97110 THERAPEUTIC EXERCISES: CPT | Performed by: PHYSICAL THERAPIST

## 2021-06-10 PROCEDURE — 97010 HOT OR COLD PACKS THERAPY: CPT | Performed by: PHYSICAL THERAPIST

## 2021-06-10 PROCEDURE — 97140 MANUAL THERAPY 1/> REGIONS: CPT | Performed by: PHYSICAL THERAPIST

## 2021-06-10 NOTE — TELEPHONE ENCOUNTER
Ecchymosis/hematoma is expected  Elbow range of motion will improve the swelling  Coming out of sling when seated to rest the area several times daily recommended  If the hardened area is sensitive to touch, then warm compress will help dissolve the hematoma and improve tension

## 2021-06-10 NOTE — TELEPHONE ENCOUNTER
Spoke to patient's wife  Advised above  Verbalized understanding  Denies warmth to touch or redness, stated the bruising is resolving but the area is still very hard  Encouraged warm compresses 20 mins on 20 mins off and ROM to the elbow  She stated PT gave him a stockinet to help compress the area  Advised that this isn't absolutely necessary but if he would like to wear it that is fine  Verbalized understanding

## 2021-06-10 NOTE — PROGRESS NOTES
Daily Note     Today's date: 6/10/2021  Patient name: Sondra Morales  : 1950  MRN: [id number]  Referring provider: Aissatou Mcknight MD  Dx:   Encounter Diagnosis     ICD-10-CM    1  Aftercare following right shoulder joint replacement surgery  Z47 1     Z96 611    2  Glenohumeral arthritis, right  M19 011                   Subjective: Patient reports no pain today  He felt fine after evaluation and has been doing home program  He reports large area of swelling in underarm, that has been there since surgery  Nurse was notified initially about this while in hospital       Objective: See treatment diary below      Assessment: Upon assessment, patient had large area of edema and swelling in upper medial arm with hardness to palpation of borders  No pain with palpation or significant redness  Yellow and bruising coloration were present  Area of edema improved slightly following elevation during manual PROM and following edema massage  Gave patient tubigrip compression stocking for UE to reduce edema with wearing instructions  Educated patient on not wearing at night and not wearing if it it became painful  Patient tolerated TE's well today  Also instructed patient and spouse on notifying physician of area of increased swelling in case this needed to be addressed and seen prior to follow up appointment on Monday with surgeon  Patient verbalized understanding of all education  Plan: Continue per plan of care        Precautions: see protocol, ER limit of 30 degrees per surgeon recommendation      Manuals 6/8 6/10           Edema massage  10'           Shoulder PROM (IR and flexion)  10           Elbow PROM (extension, supination, pronation)  5                        Neuro Re-Ed                                                                                                        Ther Ex             Pendulums all directions 4' 4'           Elbow flex/ext 20x            Wrist circles             Supine wand IR PROM  5x10"           Table flexion PROM, rolling back on stool  5x10"           Patient education on notifying surgeon about swelling and use of tubigrip  10 min                                     Ther Activity                                       Gait Training                                       Modalities             Cold pack  8'

## 2021-06-10 NOTE — TELEPHONE ENCOUNTER
Patient sees Dr Damion Bowden    Patient's wife called stating the patient has a large purple spot near his bicep that's become hardened  Physical therapy recommended they call and notify Dr Damion Bowden of this      Call back # [phone]

## 2021-06-14 ENCOUNTER — OFFICE VISIT (OUTPATIENT)
Dept: OBGYN CLINIC | Facility: OTHER | Age: 71
End: 2021-06-14

## 2021-06-14 VITALS
BODY MASS INDEX: 29.82 KG/M2 | HEIGHT: 67 IN | DIASTOLIC BLOOD PRESSURE: 81 MMHG | SYSTOLIC BLOOD PRESSURE: 125 MMHG | WEIGHT: 190 LBS | HEART RATE: 49 BPM

## 2021-06-14 DIAGNOSIS — Z96.621 AFTERCARE FOLLOWING RIGHT ELBOW JOINT REPLACEMENT SURGERY: Primary | ICD-10-CM

## 2021-06-14 DIAGNOSIS — Z47.1 AFTERCARE FOLLOWING RIGHT ELBOW JOINT REPLACEMENT SURGERY: Primary | ICD-10-CM

## 2021-06-14 PROCEDURE — 1123F ACP DISCUSS/DSCN MKR DOCD: CPT | Performed by: PHYSICIAN ASSISTANT

## 2021-06-14 PROCEDURE — 99024 POSTOP FOLLOW-UP VISIT: CPT | Performed by: PHYSICIAN ASSISTANT

## 2021-06-14 NOTE — PROGRESS NOTES
Assessment:       1  Aftercare following right elbow joint replacement surgery          Plan:        Patient is doing well postoperatively  Operative note, images, and anatomic total shoulder arthroplasty rehab protocol were discussed  I encouraged him to move his fingers, wrist, and elbow  He is to wear his sling for 2 more weeks  All questions were addressed to the patient's satisfaction  Patient has started PT  Follow-up will be in 2 months to assess patient's progress  Subjective:     Patient ID: Peng Parrish is a 65 y o  male  Chief Complaint:    HPI    Patient presents to the office for follow-up status post right anatomic total shoulder arthroplasty  He states his pain is controlled  He is concerned about the hematoma in his right upper arm near his elbow  Social History     Occupational History    Not on file   Tobacco Use    Smoking status: Never Smoker    Smokeless tobacco: Never Used   Vaping Use    Vaping Use: Never used   Substance and Sexual Activity    Alcohol use: Yes     Alcohol/week: 1 0 standard drinks     Types: 1 Cans of beer per week     Comment: once a month     Drug use: No    Sexual activity: Not Currently     Comment: flomax is helping       Review of Systems   Constitutional: Negative  Respiratory: Negative  Musculoskeletal: Positive for myalgias  Negative for arthralgias  Skin: Positive for wound  Neurological: Positive for weakness  Negative for numbness  Psychiatric/Behavioral: Negative  Objective:     Ortho ExamPhysical Exam  HENT:      Head: Atraumatic  Cardiovascular:      Pulses: Normal pulses  Pulmonary:      Effort: Pulmonary effort is normal    Musculoskeletal:      Comments: Right Arm in sling  Range of motion and strength not tested  Skin:     General: Skin is warm  Capillary Refill: Capillary refill takes less than 2 seconds  Comments: Surgical incision dry and clean  Staples removed, Steri-Strips applied  Neurological:      Mental Status: He is alert and oriented to person, place, and time  Sensory: No sensory deficit  Psychiatric:         Mood and Affect: Mood normal          Behavior: Behavior normal            I have personally reviewed pertinent films in PACS and my interpretation is Consistent with anatomic total shoulder arthroplasty, right shoulder  Prosthesis in excellent position  Glenohumeral joint preserved

## 2021-06-15 ENCOUNTER — OFFICE VISIT (OUTPATIENT)
Dept: PHYSICAL THERAPY | Facility: CLINIC | Age: 71
End: 2021-06-15
Payer: MEDICARE

## 2021-06-15 DIAGNOSIS — Z47.1 AFTERCARE FOLLOWING RIGHT SHOULDER JOINT REPLACEMENT SURGERY: Primary | ICD-10-CM

## 2021-06-15 DIAGNOSIS — Z96.611 AFTERCARE FOLLOWING RIGHT SHOULDER JOINT REPLACEMENT SURGERY: Primary | ICD-10-CM

## 2021-06-15 DIAGNOSIS — M19.011 GLENOHUMERAL ARTHRITIS, RIGHT: ICD-10-CM

## 2021-06-15 PROCEDURE — 97140 MANUAL THERAPY 1/> REGIONS: CPT | Performed by: PHYSICAL THERAPIST

## 2021-06-15 PROCEDURE — 97110 THERAPEUTIC EXERCISES: CPT | Performed by: PHYSICAL THERAPIST

## 2021-06-15 NOTE — PROGRESS NOTES
Daily Note     Today's date: 6/15/2021  Patient name: Alex Akhtar  : 1950  MRN: [id number]  Referring provider: Felix Juarez MD  Dx:   Encounter Diagnosis     ICD-10-CM    1  Aftercare following right shoulder joint replacement surgery  Z47 1     Z96 611    2  Glenohumeral arthritis, right  M19 011                   Subjective: Patient reports no pain today  He saw surgeon's PA-C yesterday  Stiches were removed, he is to wear sling for 2 more weeks  They told him hematoma was not uncommon in arm following surgery and to continue with PT per plan of care  Objective: See treatment diary below      Assessment: Upon assessment, patient had less discoloration and swelling of upper arm compared to last week  He continues to exhibit decreased elbow extension PROM, but this improved with manual stretching and soft tissue mobilization  Patient was able to tolerate flexion PROM on table, rolling back in stool with less discomfort compared to previous session  He also displayed approximately 80 deg  Flexion PROM  Patient would benefit from continued skilled PT per plan of care  Plan: Continue per plan of care  Precautions: see protocol, ER limit of 30 degrees per surgeon recommendation, sling until week of   Manuals 6/8 6/10 6/15          Edema massage  10' 8'          Shoulder PROM (IR, ER to 30 deg   and flexion)  10 12'          Elbow PROM (extension, supination, pronation)  5 6'                       Neuro Re-Ed                                                                                                        Ther Ex             Pendulums all directions 4' 4' 4'          Elbow flex/ext 20x  20x          Wrist circles             Supine wand IR PROM  5x10" 5x10"          Table flexion PROM, rolling back on stool  5x10" 5x10"          Pulley flexion PROM   5x10"                       Ther Activity                                       Gait Training Modalities             Cold pack  8'

## 2021-06-17 ENCOUNTER — OFFICE VISIT (OUTPATIENT)
Dept: PHYSICAL THERAPY | Facility: CLINIC | Age: 71
End: 2021-06-17
Payer: MEDICARE

## 2021-06-17 DIAGNOSIS — Z47.1 AFTERCARE FOLLOWING RIGHT SHOULDER JOINT REPLACEMENT SURGERY: Primary | ICD-10-CM

## 2021-06-17 DIAGNOSIS — M19.011 GLENOHUMERAL ARTHRITIS, RIGHT: ICD-10-CM

## 2021-06-17 DIAGNOSIS — Z96.611 AFTERCARE FOLLOWING RIGHT SHOULDER JOINT REPLACEMENT SURGERY: Primary | ICD-10-CM

## 2021-06-17 PROCEDURE — 97110 THERAPEUTIC EXERCISES: CPT | Performed by: PHYSICAL THERAPIST

## 2021-06-17 PROCEDURE — 97140 MANUAL THERAPY 1/> REGIONS: CPT | Performed by: PHYSICAL THERAPIST

## 2021-06-17 NOTE — PROGRESS NOTES
Daily Note     Today's date: 2021  Patient name: Eleanor Bazan  : 1950  MRN: [id number]  Referring provider: Kaela Aguero MD  Dx:   Encounter Diagnosis     ICD-10-CM    1  Aftercare following right shoulder joint replacement surgery  Z47 1     Z96 611    2  Glenohumeral arthritis, right  M19 011                   Subjective: Patient reports 2/10 soreness today, unsure why  He reported feeling good following last session  Objective: See treatment diary below  50 deg  IR PROM      Assessment: Patient continues to display decreased elbow extension PROM, but improved compared to previous session  Patient demonstrated improvement in IR PROM to 50 degrees today  Patient would benefit from continued skilled PT per plan of care  Plan: Continue per plan of care  Precautions: see protocol, ER limit of 30 degrees per surgeon recommendation, sling until week of   Manuals 6/8 6/10 6/15 6/17         Edema massage  10' 8' 4'         Shoulder PROM (IR, ER to 30 deg   and flexion)  10 12' 12'         Elbow PROM (extension, supination, pronation)  5 6' 6'                      Neuro Re-Ed                                                                                                        Ther Ex             Pendulums all directions 4' 4' 4' 4'         Elbow flex/ext 20x  20x 20x         Wrist circles   20x 20x         Supine wand IR PROM  5x10" 5x10" 5x10"         Table flexion PROM, rolling back on stool  5x10" 5x10" 5x10"         Pulley flexion PROM   10x10" 10x10"         Cross body adduction    3x10"         Ther Activity                                       Gait Training                                       Modalities             Cold pack  8'

## 2021-06-22 ENCOUNTER — OFFICE VISIT (OUTPATIENT)
Dept: PHYSICAL THERAPY | Facility: CLINIC | Age: 71
End: 2021-06-22
Payer: MEDICARE

## 2021-06-22 ENCOUNTER — OFFICE VISIT (OUTPATIENT)
Dept: UROLOGY | Facility: AMBULATORY SURGERY CENTER | Age: 71
End: 2021-06-22
Payer: MEDICARE

## 2021-06-22 VITALS
HEIGHT: 67 IN | HEART RATE: 55 BPM | SYSTOLIC BLOOD PRESSURE: 108 MMHG | BODY MASS INDEX: 30.42 KG/M2 | DIASTOLIC BLOOD PRESSURE: 78 MMHG | WEIGHT: 193.8 LBS

## 2021-06-22 DIAGNOSIS — Z12.5 SCREENING FOR PROSTATE CANCER: Primary | ICD-10-CM

## 2021-06-22 DIAGNOSIS — Z96.611 AFTERCARE FOLLOWING RIGHT SHOULDER JOINT REPLACEMENT SURGERY: Primary | ICD-10-CM

## 2021-06-22 DIAGNOSIS — N40.1 ENLARGED PROSTATE WITH URINARY OBSTRUCTION: ICD-10-CM

## 2021-06-22 DIAGNOSIS — N20.0 NEPHROLITHIASIS: ICD-10-CM

## 2021-06-22 DIAGNOSIS — M19.011 GLENOHUMERAL ARTHRITIS, RIGHT: ICD-10-CM

## 2021-06-22 DIAGNOSIS — N13.8 ENLARGED PROSTATE WITH URINARY OBSTRUCTION: ICD-10-CM

## 2021-06-22 DIAGNOSIS — Z47.1 AFTERCARE FOLLOWING RIGHT SHOULDER JOINT REPLACEMENT SURGERY: Primary | ICD-10-CM

## 2021-06-22 PROCEDURE — 99213 OFFICE O/P EST LOW 20 MIN: CPT | Performed by: NURSE PRACTITIONER

## 2021-06-22 PROCEDURE — 97110 THERAPEUTIC EXERCISES: CPT | Performed by: PHYSICAL THERAPIST

## 2021-06-22 PROCEDURE — 97140 MANUAL THERAPY 1/> REGIONS: CPT | Performed by: PHYSICAL THERAPIST

## 2021-06-22 NOTE — PROGRESS NOTES
Daily Note     Today's date: 2021  Patient name: Peng Parrish  : 1950  MRN: [id number]  Referring provider: Kim Tsai MD  Dx:   Encounter Diagnosis     ICD-10-CM    1  Aftercare following right shoulder joint replacement surgery  Z47 1     Z96 611    2  Glenohumeral arthritis, right  M19 011                   Subjective: Patient reports not much pain, mainly stiffness today  Objective: See treatment diary below  90 deg  flexion PROM, 50 deg  Abduction PROM      Assessment: Progressed ROM exercises with shoulder flexion P/AAROM in supine position  Patient demonstrated improvement in shoulder flexion and abduction ROM today  He also had less discomfort and guarding with PROM today  Patient is progressing well with therapy and would benefit from continued skilled PT per plan of care  Plan: Continue per plan of care  Precautions: see protocol, ER limit of 30 degrees per surgeon recommendation, sling until week of          Manuals 6/8 6/10 6/15 6/17 6/22       Edema massage  10' 8' 4' 4'       Shoulder PROM (IR, ER to 30 deg , abduction and flexion)  10 12' 12' 10       Elbow PROM (extension, supination, pronation)  5 6' 6' 5'       [facility] jt, Low grade joint mobs 1-2 for pain relief, long axis distraction, a-p     3'       Neuro Re-Ed                                                                                                Ther Ex            Pendulums all directions 4' 4' 4' 4' 4       Elbow flex/ext 20x  20x 20x        Wrist circles   20x 20x        Supine wand IR PROM  5x10" 5x10" 5x10" 5x10"       Table scaption PROM, rolling back on stool     5x10"       Table flexion PROM, rolling back on stool  5x10" 5x10" 5x10" 5x10"       Pulley flexion PROM   10x10" 10x10" 10x10"       Supine flexion stretch, P/AAROM     5x10"       Elbow extension stretch/wrist extension     3x15"                   Cross body adduction    3x10" 3x10"       Ther Activity Gait Training                                    Modalities            Cold pack  8'

## 2021-06-22 NOTE — PROGRESS NOTES
06/22/21    Ambrose De Leon   1950   [id number]     Assessment  1 Nephrolithiasis   2 Prostate cancer screening  3 BPH with LUTS    Discussion/Plan  1 Nephrolithiasis   No recurrence of symptoms   We reviewed ER precautions and dietary recommendations at length  Educational packet provided for patient to reference  2 Prostate cancer screening   5/7/21 PSA: 1 8  (3 6 corrected on finasteride)     IDALIA: Approx 50 g prostate, smooth, no nodules, non tender    We discussed discontinuation of prostate cancer screening per AUA guidelines given age, stability of PSA and IDALIA  Denies family history of prostate cancer  3 BPH with LUTS   Continue Tamsulosin and Finasteride, refilled by PCP   Reviewed dietary modifications to help manage symptoms  Encouraged hydration with water and avoidance of bladder irritating beverages  AUA 8    Patient will follow up as needed  He wishes for his PCP to manage his medications  All questions answered at this time  Subjective  HPI   Carroll Barrett is a 75-year-old male with a history of nephrolithiasis and BPH  In 2018 he underwent ureteroscopy with stent insertion followed by stent removal   He was recently evaluated for painless gross hematuria  A KUB at that time revealed no radiopaque stones  He then underwent a CT scan of the abdomen pelvis with and without IV contrast for the gross hematuria  Two simple cysts were identified measuring up to 2 cm in the left kidney  Bilateral renal calculi measuring up to 3 mm were identified with the largest on the left  Bladder wall thickening was noted with severe prostatomegaly  Urine cytology is negative for malignant cells  Cystoscopy was negative  He currently has mild lower urinary tract symptoms on tamsulosin and finasteride  He started Finasteride June 2020  PSA trend below  Denies family history of prostate cancer       Review of Systems - History obtained from chart review and the patient  General ROS: negative  Psychological ROS: negative  Ophthalmic ROS: negative  ENT ROS: negative  Allergy and Immunology ROS: negative  Hematological and Lymphatic ROS: negative  Endocrine ROS: negative  Breast ROS: negative  Respiratory ROS: no cough, shortness of breath, or wheezing  Cardiovascular ROS: no chest pain or dyspnea on exertion  Gastrointestinal ROS: no abdominal pain, change in bowel habits, or black or bloody stools  Genito-Urinary ROS: positive for - urinary frequency/urgency  Musculoskeletal ROS: negative  Neurological ROS: negative  Dermatological ROS: negative       Objective  Physical Exam  Vitals and nursing note reviewed  Constitutional:       General: He is awake  He is not in acute distress  Appearance: Normal appearance  He is well-developed, well-groomed and normal weight  He is not ill-appearing, toxic-appearing or diaphoretic  Pulmonary:      Effort: Pulmonary effort is normal    Abdominal:      Tenderness: There is no right CVA tenderness or left CVA tenderness  Genitourinary:     Rectum: Normal    Musculoskeletal:         General: Normal range of motion  Cervical back: Normal range of motion and neck supple  Skin:     General: Skin is warm  Neurological:      General: No focal deficit present  Mental Status: He is alert and oriented to person, place, and time  Mental status is at baseline  Psychiatric:         Attention and Perception: Attention normal          Mood and Affect: Mood normal          Speech: Speech normal          Behavior: Behavior normal  Behavior is cooperative  Thought Content: Thought content normal          Cognition and Memory: Cognition normal          Judgment: Judgment normal        AUA SYMPTOM SCORE      Most Recent Value   AUA SYMPTOM SCORE   How often have you had a sensation of not emptying your bladder completely after you finished urinating? 1   How often have you had to urinate again less than two hours after you finished urinating?   2   How often have you found you stopped and started again several times when you urinate?  0   How often have you found it difficult to postpone urination? 3   How often have you had a weak urinary stream?  1   How often have you had to push or strain to begin urination? 0   How many times did you most typically get up to urinate from the time you went to bed at night until the time you got up in the morning?   1   Quality of Life: If you were to spend the rest of your life with your urinary condition just the way it is now, how would you feel about that?  2   AUA SYMPTOM SCORE  8              Component      Latest Ref Rng & Units 4/18/2017 5/30/2018 6/10/2020 5/7/2021   PSA, Total      0 0 - 4 0 ng/mL 2 9 2 5 2 6 1 9     ORTIZ Leiva

## 2021-06-24 ENCOUNTER — OFFICE VISIT (OUTPATIENT)
Dept: PHYSICAL THERAPY | Facility: CLINIC | Age: 71
End: 2021-06-24
Payer: MEDICARE

## 2021-06-24 DIAGNOSIS — Z47.1 AFTERCARE FOLLOWING RIGHT SHOULDER JOINT REPLACEMENT SURGERY: Primary | ICD-10-CM

## 2021-06-24 DIAGNOSIS — M19.011 GLENOHUMERAL ARTHRITIS, RIGHT: ICD-10-CM

## 2021-06-24 DIAGNOSIS — Z96.611 AFTERCARE FOLLOWING RIGHT SHOULDER JOINT REPLACEMENT SURGERY: Primary | ICD-10-CM

## 2021-06-24 PROCEDURE — 97110 THERAPEUTIC EXERCISES: CPT | Performed by: PHYSICAL THERAPIST

## 2021-06-24 PROCEDURE — 97140 MANUAL THERAPY 1/> REGIONS: CPT | Performed by: PHYSICAL THERAPIST

## 2021-06-24 NOTE — PROGRESS NOTES
Daily Note     Today's date: 2021  Patient name: Jannet Dunn  : 1950  MRN: [id number]  Referring provider: Romina Alfaro MD  Dx:   Encounter Diagnosis     ICD-10-CM    1  Aftercare following right shoulder joint replacement surgery  Z47 1     Z96 611    2  Glenohumeral arthritis, right  M19 011                   Subjective: Patient reports that his shoulder feels stiff as normal  He has been doing updated home program        Objective: See treatment diary below  70 deg  Abduction PROM      Assessment: Patient continues to exhibit elbow ROM restriction, lacking full extension  Performed IASTM to elbow to improve mobility but no significant change was noted  Patient demonstrated improvement in shoulder abduction ROM today  Educated patient to continue to perform home exercise program regularly but in painfree range  Patient would benefit from continued skilled PT per plan of care  Plan: Continue per plan of care  Precautions: see protocol, ER limit of 30 degrees per surgeon recommendation, sling until week of          Manuals 6/8 6/10 6/15 6/17 6/22 6/24      Edema massage  10' 8' 4' 4' 2'      Shoulder PROM (IR, ER to 30 deg , abduction and flexion)  10 12' 12' 10' 10'      Elbow PROM (extension, supination, pronation)  5 6' 6' 5' 5'      Cubital soft tissue/biceps IASTM      5'      [facility] jt, Low grade joint mobs 1-2 for pain relief, long axis distraction, a-p     3'       Neuro Re-Ed                                                                                                Ther Ex            Pendulums all directions 4' 4' 4' 4' 4 4'      Elbow flex/ext 20x  20x 20x        Wrist circles   20x 20x        Supine wand IR PROM  5x10" 5x10" 5x10" 5x10" 5x10"      Table scaption PROM, rolling back on stool     5x10" 5x10"      Table flexion PROM, rolling back on stool  5x10" 5x10" 5x10" 5x10" 5x10"      Pulley flexion PROM   10x10" 10x10" 10x10" 10x10"      Supine flexion stretch, P/AAROM     5x10" 5x10"      Elbow extension stretch/wrist extension     3x15" 3x30"                  Cross body adduction    3x10" 3x10"       Ther Activity                                    Gait Training                                    Modalities            Cold pack  8'

## 2021-06-29 ENCOUNTER — OFFICE VISIT (OUTPATIENT)
Dept: PHYSICAL THERAPY | Facility: CLINIC | Age: 71
End: 2021-06-29
Payer: MEDICARE

## 2021-06-29 DIAGNOSIS — M19.011 GLENOHUMERAL ARTHRITIS, RIGHT: ICD-10-CM

## 2021-06-29 DIAGNOSIS — Z47.1 AFTERCARE FOLLOWING RIGHT SHOULDER JOINT REPLACEMENT SURGERY: Primary | ICD-10-CM

## 2021-06-29 DIAGNOSIS — Z96.611 AFTERCARE FOLLOWING RIGHT SHOULDER JOINT REPLACEMENT SURGERY: Primary | ICD-10-CM

## 2021-06-29 PROCEDURE — 97140 MANUAL THERAPY 1/> REGIONS: CPT | Performed by: PHYSICAL THERAPIST

## 2021-06-29 PROCEDURE — 97110 THERAPEUTIC EXERCISES: CPT | Performed by: PHYSICAL THERAPIST

## 2021-06-29 NOTE — PROGRESS NOTES
Daily Note     Today's date: 2021  Patient name: Angel Curtis  : 1950  MRN: [id number]  Referring provider: Charlie Carmona MD  Dx:   Encounter Diagnosis     ICD-10-CM    1  Aftercare following right shoulder joint replacement surgery  Z47 1     Z96 611    2  Glenohumeral arthritis, right  M19 011                   Subjective: Patient reports that he is no longer using sling per surgeon recommendations  He has been compliant with home program  He feels stiff and sore today  Objective: See treatment diary below  Assessment: Patient continues to exhibit elbow ROM restriction, lacking full extension  Patient noted pain with passive flexion ROM stretch, so cued patient to reduce intensity of stretch  Performed scapular mobilizations and he noted less pain following  Progressed ROM exercises with addition of pulley abduction and IR with strap  Patient would benefit from continued skilled PT per plan of care  Plan: Continue per plan of care  Precautions: see protocol, ER limit of 30 degrees per surgeon recommendation, sling until week          Manuals 6/8 6/10 6/15 6/17 6/22 6/24 6/29     Edema massage  10' 8' 4' 4' 2'      Shoulder PROM (IR, ER to 30 deg , abduction and flexion)  10 12' 12' 10' 10' 10'     Elbow PROM (extension, supination, pronation)  5 6' 6' 5' 5' 6'     Cubital soft tissue/biceps IASTM      5'      [facility] jt, Low grade joint mobs 1-2 for pain relief, long axis distraction, a-p     3'  3'     Scapular mobs inferior and posterior gr 1-3       4'     Neuro Re-Ed                                                            Ther Ex            Pendulums all directions 4' 4' 4' 4' 4 4' 3'     Elbow flex/ext 20x  20x 20x        Wrist circles   20x 20x        Supine wand IR PROM  5x10" 5x10" 5x10" 5x10" 5x10"      Table scaption PROM, rolling back on stool     5x10" 5x10" 5x10"     Table flexion PROM, rolling back on stool  5x10" 5x10" 5x10" 5x10" 5x10" 5x10" Pulley flexion PROM   10x10" 10x10" 10x10" 10x10" 10x10"     Pulley abduction PROM       10x10"     Supine flexion stretch, P/AAROM     5x10" 5x10" 5x10"     Elbow extension stretch/wrist extension     3x15" 3x30" 3x30"     IR PROM with strap       3x10"     Cross body adduction    3x10" 3x10"  3x15"     ER isometric 50%        3x10"     Ther Activity                                    Gait Training                                    Modalities            Cold pack  8'

## 2021-07-01 ENCOUNTER — OFFICE VISIT (OUTPATIENT)
Dept: PHYSICAL THERAPY | Facility: CLINIC | Age: 71
End: 2021-07-01
Payer: MEDICARE

## 2021-07-01 DIAGNOSIS — Z96.611 AFTERCARE FOLLOWING RIGHT SHOULDER JOINT REPLACEMENT SURGERY: Primary | ICD-10-CM

## 2021-07-01 DIAGNOSIS — M19.011 GLENOHUMERAL ARTHRITIS, RIGHT: ICD-10-CM

## 2021-07-01 DIAGNOSIS — Z47.1 AFTERCARE FOLLOWING RIGHT SHOULDER JOINT REPLACEMENT SURGERY: Primary | ICD-10-CM

## 2021-07-01 PROCEDURE — 97140 MANUAL THERAPY 1/> REGIONS: CPT | Performed by: PHYSICAL THERAPIST

## 2021-07-01 PROCEDURE — 97110 THERAPEUTIC EXERCISES: CPT | Performed by: PHYSICAL THERAPIST

## 2021-07-01 NOTE — PROGRESS NOTES
Daily Note     Today's date: 2021  Patient name: Jessica Hall  : 1950  MRN: [id number]  Referring provider: Vanda Perez MD  Dx:   Encounter Diagnosis     ICD-10-CM    1  Aftercare following right shoulder joint replacement surgery  Z47 1     Z96 611    2  Glenohumeral arthritis, right  M19 011                   Subjective: Patient reports that he he usually feels more flexible following therapy, occasionally a little sore  Objective: See treatment diary below  Assessment: Patient required verbal and tactile cuing to prevent upper trap compensation with pulley exercises  Initiated scapular isometric retraction today, which he tolerated well  Patient would benefit from continued skilled PT per plan of care  Plan: Continue per plan of care  Precautions: see protocol, ER limit of 30 degrees per surgeon recommendation, sling until week of          Manuals 6/8 6/10 6/15 6/17 6/22 6/24 6/29 7/1    Edema massage  10' 8' 4' 4' 2'      Shoulder PROM (IR, ER to 30 deg , abduction and flexion)  10 12' 12' 10' 10' 10' 10'    Elbow PROM (extension, supination, pronation)  5 6' 6' 5' 5' 6' 5'    [address] jt, Low grade joint mobs 1-2 for pain relief, long axis distraction, a-p     3'  3' 3'    Scapular mobs inferior and posterior gr 1-3       4' 3'    Neuro Re-Ed                                                Ther Ex            Pendulums all directions 4' 4' 4' 4' 4 4' 3' 3'    Wrist circles   20x 20x        Supine wand IR PROM  5x10" 5x10" 5x10" 5x10" 5x10"      Table scaption PROM, rolling back on stool     5x10" 5x10" 5x10" 5x10"    Table flexion PROM, rolling back on stool  5x10" 5x10" 5x10" 5x10" 5x10" 5x10" 5x10"    Pulley flexion PROM   10x10" 10x10" 10x10" 10x10" 10x10" 10x10" seated    Pulley abduction PROM       10x10" 10x10"    Supine flexion stretch, P/AAROM     5x10" 5x10" 5x10" 5x10"    Elbow extension stretch/wrist extension     3x15" 3x30" 3x30"     IR PROM with strap 3x10"     Cross body adduction    3x10" 3x10"  3x15"     ER isometric 50%        3x10"                                         Ther Activity                                    Gait Training                        Modalities            Cold pack  8'

## 2021-07-06 ENCOUNTER — OFFICE VISIT (OUTPATIENT)
Dept: PHYSICAL THERAPY | Facility: CLINIC | Age: 71
End: 2021-07-06
Payer: MEDICARE

## 2021-07-06 DIAGNOSIS — M19.011 GLENOHUMERAL ARTHRITIS, RIGHT: ICD-10-CM

## 2021-07-06 DIAGNOSIS — Z96.611 AFTERCARE FOLLOWING RIGHT SHOULDER JOINT REPLACEMENT SURGERY: Primary | ICD-10-CM

## 2021-07-06 DIAGNOSIS — Z47.1 AFTERCARE FOLLOWING RIGHT SHOULDER JOINT REPLACEMENT SURGERY: Primary | ICD-10-CM

## 2021-07-06 PROCEDURE — 97110 THERAPEUTIC EXERCISES: CPT | Performed by: PHYSICAL THERAPIST

## 2021-07-06 PROCEDURE — 97140 MANUAL THERAPY 1/> REGIONS: CPT | Performed by: PHYSICAL THERAPIST

## 2021-07-06 NOTE — PROGRESS NOTES
PT Re-Evaluation     Today's date: 2021  Patient name: Hector Watson  : 1950  MRN: [id number]  Referring provider: Simone Burnham MD  Dx:   Encounter Diagnosis     ICD-10-CM    1  Aftercare following right shoulder joint replacement surgery  Z47 1     Z96 611    2  Glenohumeral arthritis, right  M19 011                   Assessment  Assessment details: RE-ASSESSMENT 21: Patient has been seen for 9 visits over the past month following TSA and biceps tenodesis on 21  Treatment has consisted of manual therapy to improve ROM and swelling, therapeutic exercises to improve flexibility and strength, and patient education on home program  From an objective standpoint, patient has demonstrated gradual improvement in shoulder and elbow ROM  Functionally, patient has less difficulty sleeping and dressing  Patient continues to be limited with reaching overhead, lifting any weight, and returning to recreational exercise  Patient would benefit from continued skilled PT over the next 8 weeks to further progress upon deficits and return patient to prior level of function  Impairments: abnormal or restricted ROM, impaired physical strength, pain with function and scapular dyskinesis    Symptom irritability: lowUnderstanding of Dx/Px/POC: good   Prognosis: good    Goals  Short term goals:    Patient is independent in home program to support plan of care and improve function  - 2 weeks met   Patient demonstrates full elbow ROM to reduce difficulty with dressing  - 2 weeks 10 deg  Elbow extension restriction   Patient demonstrates at least 90 deg  shoulder flexion PROM so he can get dressed with less pain  - 3 weeks goal met     Long term goals:  Patient demonstrates improvement in community participation with increase in FOTO score from 32 to 59%  - 12 weeks 62% goal met   Patient demonstrates at least 75% shoulder AROM for less difficulty reaching overhead and completing ADLs   - 8 weeks progressing  Patient demonstrates at least 4+/5 shoulder strength so he can return to recreational exercise and riding motor cycle  - 12 weeks progressing  Patient can sleep without waking from pain  - 4 weeks progressing        Plan  Patient would benefit from: skilled physical therapy  Planned modality interventions: cryotherapy  Planned therapy interventions: activity modification, ADL training, body mechanics training, flexibility, functional ROM exercises, home exercise program, therapeutic exercise, therapeutic activities, stretching, strengthening, patient education, neuromuscular re-education, massage, manual therapy and joint mobilization  Frequency: 2x week  Duration in weeks: 12  Plan of Care beginning date: 2021  Plan of Care expiration date: 2021  Treatment plan discussed with: patient        Subjective Evaluation    History of Present Illness  Date of surgery: 2021  Mechanism of injury: surgery  Mechanism of injury: Re-assessment 21: Patient reports that his shoulder is gradually improving, and he has been trying to use it more  He reports consistently with home program      Patient presents to therapy post op TSA and biceps tenodesis on 21  Patient has next follow up on   Patient has been managing pain with ice and oral medication  He can d/c sling after 4 weeks  Symptoms: shoulder pain, difficulty sleeping (currently sleeping in recliner) getting between 3-4 hours of sleep at a time due to discomfort  Patient needs help to complete showering and dressing  Per protocol, he is not using his R UE or lifting any objects      PMH: Previous history of 2 arthroscopic surgeries on R shoulder  Pain  Current pain ratin  At best pain ratin  At worst pain ratin  Relieving factors: ice and rest    Social Support  Lives with: spouse    Hand dominance: right    Patient Goals  Patient goals for therapy: decreased pain, increased motion, increased strength and return to sport/leisure activities  Patient goal: get back to going to the gym and ride motor cycle        Objective     Tenderness     Additional Tenderness Details  Mild tenderness to palpation over right shoulder and upper arm    Active Range of Motion   Cervical/Thoracic Spine     Normal active range of motion  Left Shoulder   Flexion: WFL  Abduction: WFL    Right Shoulder   Flexion: 70 degrees   Extension: 32 degrees   Abduction: 55 degrees   External rotation 45°: 30 degrees   Internal rotation BTB: L3     Right Elbow   Flexion: WFL  Extension: -10 degrees   Forearm supination: WFL  Forearm pronation: WFL    Passive Range of Motion     Right Shoulder   Flexion: 105 degrees   Abduction: 108 degrees   External rotation 45°: 30 degrees   Internal rotation 0°: 52 degrees   Horizontal adduction: 0 degrees     Left Elbow   Flexion: WFL  Extension: WFL    Right Elbow   Flexion: WFL  Extension: -5 degrees   Forearm supination: WFL  Forearm pronation: [facility]    Strength/Myotome Testing     Left Shoulder     Planes of Motion   Flexion: 4+   Abduction: 4+   External rotation at 0°: 4+   Internal rotation at 0°: 4+     Right Shoulder     Planes of Motion   Flexion: 4-   Abduction: 4-   External rotation at 45°: 4                Precautions: see protocol, ER limit of 30 degrees per surgeon recommendation, sling until week of June 28th         Manuals 6/8 6/10 6/15 6/17 6/22 6/24 6/29 7/1 7/6   Edema massage  10' 8' 4' 4' 2'   2'   Shoulder PROM (IR, ER to 30 deg , abduction and flexion)  10 12' 12' 10' 10' 10' 10' 10'   Elbow PROM (extension, supination, pronation)  5 6' 6' 5' 5' 6' 5' 4'   [facility] jt, Low grade joint mobs 1-2 for pain relief, long axis distraction, a-p     3'  3' 3' 2'   Scapular mobs inferior and posterior gr 1-3       4' 3'    Neuro Re-Ed                                                Ther Ex            Pendulums all directions 4' 4' 4' 4' 4 4' 3' 3' 3'   Wrist circles   20x 20x        Supine wand IR PROM  5x10" 5x10" 5x10" 5x10" 5x10" Table scaption PROM, rolling back on stool     5x10" 5x10" 5x10" 5x10"    Table flexion PROM, rolling back on stool  5x10" 5x10" 5x10" 5x10" 5x10" 5x10" 5x10"    Pulley flexion PROM   10x10" 10x10" 10x10" 10x10" 10x10" 10x10" seated 10x10" seated   Pulley abduction PROM       10x10" 10x10" 10x10"   Supine flexion stretch, P/AAROM     5x10" 5x10" 5x10" 5x10" wand   Elbow extension stretch/wrist extension     3x15" 3x30" 3x30"     IR PROM with strap       3x10"  3x15"   Cross body adduction    3x10" 3x10"  3x15"     ER isometric 50%        3x10"     Wand abduction AAROM         5x10"   Scapular retraction         10x               Ther Activity                                    Gait Training                        Modalities            Cold pack  8'

## 2021-07-08 ENCOUNTER — OFFICE VISIT (OUTPATIENT)
Dept: PHYSICAL THERAPY | Facility: CLINIC | Age: 71
End: 2021-07-08
Payer: MEDICARE

## 2021-07-08 DIAGNOSIS — M19.011 GLENOHUMERAL ARTHRITIS, RIGHT: ICD-10-CM

## 2021-07-08 DIAGNOSIS — Z96.611 AFTERCARE FOLLOWING RIGHT SHOULDER JOINT REPLACEMENT SURGERY: Primary | ICD-10-CM

## 2021-07-08 DIAGNOSIS — Z47.1 AFTERCARE FOLLOWING RIGHT SHOULDER JOINT REPLACEMENT SURGERY: Primary | ICD-10-CM

## 2021-07-08 PROCEDURE — 97110 THERAPEUTIC EXERCISES: CPT | Performed by: PHYSICAL THERAPIST

## 2021-07-08 PROCEDURE — 97140 MANUAL THERAPY 1/> REGIONS: CPT | Performed by: PHYSICAL THERAPIST

## 2021-07-08 NOTE — PROGRESS NOTES
Daily Note     Today's date: 2021  Patient name: Josue Bullock  : 1950  MRN: [id number]  Referring provider: Aida Benítez MD  Dx:   Encounter Diagnosis     ICD-10-CM    1  Aftercare following right shoulder joint replacement surgery  Z47 1     Z96 611    2  Glenohumeral arthritis, right  M19 011        Start Time: 1046  Stop Time: 1125  Total time in clinic (min): 39 minutes    Subjective: Patient reports being a little sore today  He has been doing stretches regularly  He will be gone next week but is scheduled for the following week  Patient inquiring about using arm in pool at his hotel  Objective: See treatment diary below  Assessment: Instructed patient on maintaining precautions while in pool, focusing on pendulums or gentle flexion AROM, but not doing any resisted movements or ER  Patient verbalized understanding  Patient tolerated manual therapy well with improvement in abduction mobility following  Patient reported episode of "clicking" during scapular retraction, when "pulling too far " Educated patient to hold off on this exercise for now and to avoid anterior glenohumeral movement  He verbalized understanding  Home program to be completed for next week was reviewed  Patient ended therapy with slight soreness which is similar to most post treatment sessions thus far  Patient would benefit from continued skilled PT per plan of care  Plan: Continue per plan of care  Precautions: see protocol, ER limit of 30 degrees per surgeon recommendation, sling until week          Manuals      Edema massage 4' 4' 2'   2'      Shoulder PROM (IR, ER to 30 deg , abduction and flexion) 12' 10' 10' 10' 10' 10' 10'     Elbow PROM (extension, supination, pronation) 6' 5' 5' 6' 5' 4' 4'     [address] jt, Low grade joint mobs 1-2 for pain relief, long axis distraction, a-p  3'  3' 3' 2' 2'     Scapular mobs inferior and posterior gr 1-3    4' 3'  2' Neuro Re-Ed                                                Ther Ex            Pendulums all directions 4' 4 4' 3' 3' 3' 3'     Supine wand IR PROM 5x10" 5x10" 5x10"         Table scaption PROM, rolling back on stool  5x10" 5x10" 5x10" 5x10"       Table flexion PROM, rolling back on stool 5x10" 5x10" 5x10" 5x10" 5x10"       Pulley flexion PROM 10x10" 10x10" 10x10" 10x10" 10x10" seated 10x10" seated 10x10" seated     Pulley abduction PROM    10x10" 10x10" 10x10" 10x10"     Supine flexion stretch, P/AAROM  5x10" 5x10" 5x10" 5x10" wand Wand 10x10"     Elbow extension stretch/wrist extension  3x15" 3x30" 3x30"        IR PROM with strap    3x10"  3x15" 3x15"     Cross body adduction 3x10" 3x10"  3x15"   3x15"     ER isometric 50%     3x10"        Wand abduction AAROM      5x10" 5x5"     Scapular retraction      10x hold                 Ther Activity                                    Gait Training                        Modalities            Cold pack

## 2021-07-20 ENCOUNTER — OFFICE VISIT (OUTPATIENT)
Dept: PHYSICAL THERAPY | Facility: CLINIC | Age: 71
End: 2021-07-20
Payer: MEDICARE

## 2021-07-20 DIAGNOSIS — Z96.611 AFTERCARE FOLLOWING RIGHT SHOULDER JOINT REPLACEMENT SURGERY: Primary | ICD-10-CM

## 2021-07-20 DIAGNOSIS — Z47.1 AFTERCARE FOLLOWING RIGHT SHOULDER JOINT REPLACEMENT SURGERY: Primary | ICD-10-CM

## 2021-07-20 DIAGNOSIS — M19.011 GLENOHUMERAL ARTHRITIS, RIGHT: ICD-10-CM

## 2021-07-20 PROCEDURE — 97140 MANUAL THERAPY 1/> REGIONS: CPT | Performed by: PHYSICAL THERAPIST

## 2021-07-20 PROCEDURE — 97110 THERAPEUTIC EXERCISES: CPT | Performed by: PHYSICAL THERAPIST

## 2021-07-20 NOTE — PROGRESS NOTES
Daily Note     Today's date: 2021  Patient name: Meena Lee  : 1950  MRN: [id number]  Referring provider: Jasiel Austin  Dx:   Encounter Diagnosis     ICD-10-CM    1  Aftercare following right shoulder joint replacement surgery  Z47 1     Z96 611    2  Glenohumeral arthritis, right  M19 011                   Subjective: Patient returns following being away for one week  He reports no significant pain over that time or clicking  He was consistent with home program        Objective: See treatment diary below  ER PROM 50 deg  Assessment: Patient demonstrated improvement in ER PROM today, with 30 deg  Restriction ending last week  Patient had less muscle guarding with passive stretching compared to previous session  He continues to display weakness throughout shoulder girdle but is making gradual progress  Patient would benefit from continued skilled PT per plan of care  Plan: Continue per plan of care  Advance as able per protocol       Precautions: see protocol      Manuals     Shoulder PROM (IR, ER to 30 deg , abduction and flexion) 12' 10' 10' 10' 10' 10' 10' 12'    Elbow PROM (extension, supination, pronation) 6' 5' 5' 6' 5' 4' 4' 5'    [address] j[address], Low grade joint mobs 1-2 for pain relief, long axis distraction, a-p  3'  3' 3' 2' 2' 2'    Scapular mobs inferior and posterior gr 1-3    4' 3'  2'     Neuro Re-Ed            Scapula muscle activation all directions with tactile cuing        4'                            Ther Ex            Pendulums all directions 4' 4 4' 3' 3' 3' 3' 2'    Table scaption PROM, rolling back on stool  5x10" 5x10" 5x10" 5x10"   Table slides AROM 10x    Table flexion PROM, rolling back on stool 5x10" 5x10" 5x10" 5x10" 5x10"   Table slides AROM 10x    Pulley flexion PROM 10x10" 10x10" 10x10" 10x10" 10x10" seated 10x10" seated 10x10" seated 3x30"    Pulley abduction PROM    10x10" 10x10" 10x10" 10x10" 3x30"    Supine flexion stretch, P/AAROM  5x10" 5x10" 5x10" 5x10" wand Wand 10x10" Wand supine and standing 10ea    IR PROM with strap    3x10"  3x15" 3x15" 3x20"    Cross body adduction 3x10" 3x10"  3x15"   3x15"     Wand abduction AAROM      5x10" 5x5" 10x    Scapular retraction      10x hold     ER sidelying        2x10    Biceps curls        15 1lb    Ther Activity                                    Gait Training                        Modalities            Cold pack

## 2021-07-22 ENCOUNTER — OFFICE VISIT (OUTPATIENT)
Dept: PHYSICAL THERAPY | Facility: CLINIC | Age: 71
End: 2021-07-22
Payer: MEDICARE

## 2021-07-22 DIAGNOSIS — Z96.611 AFTERCARE FOLLOWING RIGHT SHOULDER JOINT REPLACEMENT SURGERY: Primary | ICD-10-CM

## 2021-07-22 DIAGNOSIS — Z47.1 AFTERCARE FOLLOWING RIGHT SHOULDER JOINT REPLACEMENT SURGERY: Primary | ICD-10-CM

## 2021-07-22 DIAGNOSIS — M19.011 GLENOHUMERAL ARTHRITIS, RIGHT: ICD-10-CM

## 2021-07-22 PROCEDURE — 97140 MANUAL THERAPY 1/> REGIONS: CPT | Performed by: PHYSICAL THERAPIST

## 2021-07-22 PROCEDURE — 97112 NEUROMUSCULAR REEDUCATION: CPT | Performed by: PHYSICAL THERAPIST

## 2021-07-22 PROCEDURE — 97110 THERAPEUTIC EXERCISES: CPT | Performed by: PHYSICAL THERAPIST

## 2021-07-22 NOTE — PROGRESS NOTES
Daily Note     Today's date: 2021  Patient name: Margo Londono  : 1950  MRN: [id number]  Referring provider: Zee Holt  Dx:   Encounter Diagnosis     ICD-10-CM    1  Aftercare following right shoulder joint replacement surgery  Z47 1     Z96 611    2  Glenohumeral arthritis, right  M19 011                   Subjective: Patient reports no significant soreness following exercise progression last session  He also reports riding his motorcycle for a short ride yesterday with no shoulder pain, like he use to have pre surgery  Objective: See treatment diary below  IR AROM reach behind back L3      Assessment: Patient demonstrated improvement in IR AROM when reaching behind back  Progressed scapular strengthening exercises with addition of prone unilateral row and T  He also had greater scapular control with manual facilitation  Patient would benefit from continued skilled PT per plan of care  Plan: Continue per plan of care  Advance as able per protocol       Precautions: see protocol      Manuals    Shoulder PROM (IR, ER to 30 deg , abduction and flexion) 12' 10' 10' 10' 10' 10' 10' 12' 12'   Elbow PROM (extension, supination, pronation) 6' 5' 5' 6' 5' 4' 4' 5'    [address], Low grade joint mobs 1-2 for pain relief, long axis distraction, a-p  3'  3' 3' 2' 2' 2' 1'   Scapular mobs inferior and posterior gr 1-3    4' 3'  2'     Neuro Re-Ed            Scapula muscle activation all directions with tactile cuing        4' With resistance 4'   Prone row UL         10x   Prone T UL         10x   Ther Ex            Pendulums all directions 4' 4 4' 3' 3' 3' 3' 2' 2'   Table scaption PROM, rolling back on stool  5x10" 5x10" 5x10" 5x10"   Table slides AROM 10x 10x   Table flexion PROM, rolling back on stool 5x10" 5x10" 5x10" 5x10" 5x10"   Table slides AROM 10x 10x   Pulley flexion PROM 10x10" 10x10" 10x10" 10x10" 10x10" seated 10x10" seated 10x10" seated 3x30" 10x   Pulley abduction PROM    10x10" 10x10" 10x10" 10x10" 3x30"    Supine flexion stretch, P/AAROM  5x10" 5x10" 5x10" 5x10" wand Wand 10x10" Wand supine and standing 10ea 2x10 1lb   IR PROM with strap    3x10"  3x15" 3x15" 3x20" 3x20"   Cross body adduction 3x10" 3x10"  3x15"   3x15"     Wand abduction AAROM      5x10" 5x5" 10x    ER sidelying        2x10    ER with band         2x10 PTB   Wand flexion AAROM standing         Mirror 10x   Triceps ext         20X PTB   Biceps curls        15 1lb 2lb 15x   Ther Activity                                    Gait Training                        Modalities            Cold pack

## 2021-07-27 ENCOUNTER — OFFICE VISIT (OUTPATIENT)
Dept: PHYSICAL THERAPY | Facility: CLINIC | Age: 71
End: 2021-07-27
Payer: MEDICARE

## 2021-07-27 DIAGNOSIS — Z47.1 AFTERCARE FOLLOWING RIGHT SHOULDER JOINT REPLACEMENT SURGERY: Primary | ICD-10-CM

## 2021-07-27 DIAGNOSIS — Z96.611 AFTERCARE FOLLOWING RIGHT SHOULDER JOINT REPLACEMENT SURGERY: Primary | ICD-10-CM

## 2021-07-27 DIAGNOSIS — M19.011 GLENOHUMERAL ARTHRITIS, RIGHT: ICD-10-CM

## 2021-07-27 PROCEDURE — 97110 THERAPEUTIC EXERCISES: CPT | Performed by: PHYSICAL THERAPIST

## 2021-07-27 PROCEDURE — 97140 MANUAL THERAPY 1/> REGIONS: CPT | Performed by: PHYSICAL THERAPIST

## 2021-07-27 PROCEDURE — 97112 NEUROMUSCULAR REEDUCATION: CPT | Performed by: PHYSICAL THERAPIST

## 2021-07-27 NOTE — PROGRESS NOTES
Daily Note     Today's date: 2021  Patient name: Fausto Malave  : 1950  MRN: [id number]  Referring provider: Suzy Dasilva  Dx:   Encounter Diagnosis     ICD-10-CM    1  Aftercare following right shoulder joint replacement surgery  Z47 1     Z96 611    2  Glenohumeral arthritis, right  M19 011                   Subjective: Patient reports mild soreness following last session, but overall feeling good  Objective: See treatment diary below  Assessment: Patient tolerated scapular exercise progression with increase in sets for prone T's and rows  Patient still requires visual and tactile cuing to prevent upper trap compensation with wand flexion AAROM  Patient would benefit from continued skilled PT per plan of care  Plan: Continue per plan of care  Advance as able per protocol       Precautions: see protocol      Manuals      Shoulder PROM (IR, ER to 30 deg , abduction and flexion) 10' 10' 10' 10' 10' 12' 12' 10'     Elbow PROM (extension, supination, pronation) 5' 6' 5' 4' 4' 5'  3'     [facility], Low grade joint mobs 1-2 for pain relief, long axis distraction, a-p  3' 3' 2' 2' 2' 1' 2'     Scapular mobs inferior and posterior gr 1-3  4' 3'  2'        Neuro Re-Ed             Scapula muscle activation all directions with tactile cuing      4' With resistance 4' 4'     Prone row UL       10x 2x10x5"     Prone T UL       10x 2x10x5"     Ther Ex             Pendulums all directions 4' 3' 3' 3' 3' 2' 2' 2'     Table scaption PROM, rolling back on stool 5x10" 5x10" 5x10"   Table slides AROM 10x 10x 10x     Table flexion PROM, rolling back on stool 5x10" 5x10" 5x10"   Table slides AROM 10x 10x 10x     Pulley flexion PROM 10x10" 10x10" 10x10" seated 10x10" seated 10x10" seated 3x30" 10x 3x30"     Pulley abduction PROM  10x10" 10x10" 10x10" 10x10" 3x30"       Supine flexion stretch, P/AAROM 5x10" 5x10" 5x10" wand Wand 10x10" Wand supine and standing 10ea 2x10 1lb 2x10 2lb     IR PROM with strap  3x10"  3x15" 3x15" 3x20" 3x20"      Cross body adduction  3x15"   3x15"        Wand abduction AAROM    5x10" 5x5" 10x  10x     ER with band       2x10 PTB 2x10     Wand flexion AAROM standing       Mirror 10x 10x     Triceps ext       20X PTB      Biceps curls      15 1lb 2lb 15x      Ther Activity                          Gait Training                          Modalities             Cold pack

## 2021-07-29 ENCOUNTER — OFFICE VISIT (OUTPATIENT)
Dept: PHYSICAL THERAPY | Facility: CLINIC | Age: 71
End: 2021-07-29
Payer: MEDICARE

## 2021-07-29 DIAGNOSIS — Z96.611 AFTERCARE FOLLOWING RIGHT SHOULDER JOINT REPLACEMENT SURGERY: Primary | ICD-10-CM

## 2021-07-29 DIAGNOSIS — Z47.1 AFTERCARE FOLLOWING RIGHT SHOULDER JOINT REPLACEMENT SURGERY: Primary | ICD-10-CM

## 2021-07-29 DIAGNOSIS — M19.011 GLENOHUMERAL ARTHRITIS, RIGHT: ICD-10-CM

## 2021-07-29 PROCEDURE — 97110 THERAPEUTIC EXERCISES: CPT | Performed by: PHYSICAL THERAPIST

## 2021-07-29 PROCEDURE — 97140 MANUAL THERAPY 1/> REGIONS: CPT | Performed by: PHYSICAL THERAPIST

## 2021-07-29 PROCEDURE — 97112 NEUROMUSCULAR REEDUCATION: CPT | Performed by: PHYSICAL THERAPIST

## 2021-07-29 NOTE — PROGRESS NOTES
Daily Note     Today's date: 2021  Patient name: Felicia Auguste  : 1950  MRN: [id number]  Referring provider: Paloma Rosa  Dx:   Encounter Diagnosis     ICD-10-CM    1  Aftercare following right shoulder joint replacement surgery  Z47 1     Z96 611    2  Glenohumeral arthritis, right  M19 011                   Subjective: Patient reports that he is feeling okay today  He reports consistency with home program       Objective: See treatment diary below  Assessment: Patient progressed shoulder girdle strengthening with addition of active assisted chest press and serratus punch  He initially noted pain but with therapist guidance, pain subsided and only fatigue was felt  He reported overall shoulder fatigue by end of session but no pain  Patient would benefit from continued skilled PT per plan of care  Plan: Continue per plan of care  Advance as able per protocol       Precautions: see protocol      Manuals     Shoulder PROM (IR, ER to 30 deg , abduction and flexion) 10' 10' 10' 10' 10' 12' 12' 10' 8'    Elbow PROM (extension, supination, pronation) 5' 6' 5' 4' 4' 5'  3' 3'    [facility] jt, Low grade joint mobs 1-2 for pain relief, long axis distraction, a-p  3' 3' 2' 2' 2' 1' 2' 2'    Scapular mobs inferior and posterior gr 1-3  4' 3'  2'        Neuro Re-Ed             Scapula muscle activation all directions with tactile cuing      4' With resistance 4' 4' 4'    Prone row UL       10x 2x10x5" 2x10x5" 1lb    Prone T UL       10x 2x10x5" 2x10x5"    Ther Ex             Table scaption PROM, rolling back 3x10on stool 5x10" 5x10" 5x10"   Table slides AROM 10x 10x 10x     Table flexion PROM, rolling back on stool 5x10" 5x10" 5x10"   Table slides AROM 10x 10x 10x     Pulley flexion PROM 10x10" 10x10" 10x10" seated 10x10" seated 10x10" seated 3x30" 10x 3x30" 3x30"    Pulley abduction PROM  10x10" 10x10" 10x10" 10x10" 3x30"       Supine flexion stretch, P/AAROM 5x10" 5x10" 5x10" wand Wand 10x10" Wand supine and standing 10ea 2x10 1lb 2x10 2lb 2x10 2lb    IR PROM with strap  3x10"  3x15" 3x15" 3x20" 3x20"      Cross body adduction  3x15"   3x15"        Wand abduction AAROM    5x10" 5x5" 10x  10x     ER with band       2x10 PTB 2x10 3x10    Wand flexion AAROM standing       Mirror 10x 10x     Triceps ext       20X PTB  3x10    UL chest press, AAROM         10X    Serratus punch aarom         10x    SL flexion AAROM         10x    Biceps curls      15 1lb 2lb 15x      Ther Activity                          Gait Training                          Modalities             Cold pack

## 2021-08-03 ENCOUNTER — OFFICE VISIT (OUTPATIENT)
Dept: PHYSICAL THERAPY | Facility: CLINIC | Age: 71
End: 2021-08-03
Payer: MEDICARE

## 2021-08-03 DIAGNOSIS — Z96.611 AFTERCARE FOLLOWING RIGHT SHOULDER JOINT REPLACEMENT SURGERY: Primary | ICD-10-CM

## 2021-08-03 DIAGNOSIS — M19.011 GLENOHUMERAL ARTHRITIS, RIGHT: ICD-10-CM

## 2021-08-03 DIAGNOSIS — Z47.1 AFTERCARE FOLLOWING RIGHT SHOULDER JOINT REPLACEMENT SURGERY: Primary | ICD-10-CM

## 2021-08-03 PROCEDURE — 97140 MANUAL THERAPY 1/> REGIONS: CPT | Performed by: PHYSICAL THERAPIST

## 2021-08-03 PROCEDURE — 97112 NEUROMUSCULAR REEDUCATION: CPT | Performed by: PHYSICAL THERAPIST

## 2021-08-03 PROCEDURE — 97110 THERAPEUTIC EXERCISES: CPT | Performed by: PHYSICAL THERAPIST

## 2021-08-03 NOTE — PROGRESS NOTES
Daily Note     Today's date: 8/3/2021  Patient name: Jessica Hall  : 1950  MRN: [id number]  Referring provider: Nic Rivera  Dx:   Encounter Diagnosis     ICD-10-CM    1  Aftercare following right shoulder joint replacement surgery  Z47 1     Z96 611    2  Glenohumeral arthritis, right  M19 011                   Subjective: Patient reports that he is still has pain and difficulty reaching arm overhead or into top shelf  Objective: See treatment diary below  Assessment: Patient continues to have limited shoulder flexion AROM, but is progressing with PROM  Progressed with strengthening and AROM exercises with increase in sets today  Progressed scapular stabilization with rhythmic stabilization to scapula  Patient would benefit from continued skilled PT per plan of care  Plan: Continue per plan of care  Advance as able per protocol       Precautions: see protocol      Manuals 6/24 6/29 7/1 7/6 7/8 7/20 7/22 7/27 7/29 8/3   Shoulder PROM (IR, ER, abduction and flexion) 10' 10' 10' 10' 10' 12' 12' 10' 8' 10'   Elbow PROM (extension, supination, pronation) 5' 6' 5' 4' 4' 5'  3' 3' 2'   [address] [facility] j, Low grade joint mobs 1-2 for pain relief, long axis distraction, a-p  3' 3' 2' 2' 2' 1' 2' 2' 2'   Scapular mobs inferior and posterior gr 1-3  4' 3'  2'        Neuro Re-Ed             Scapula muscle activation all directions with tactile cuing      4' With resistance 4' 4' 4' 4'   Prone row UL       10x 2x10x5" 2x10x5" 1lb 2x10x5" 2lbs   Prone T UL       10x 2x10x5" 2x10x5" 2x10x5"   Ther Ex             Wall walk/slide          10x   Pulley flexion PROM 10x10" 10x10" 10x10" seated 10x10" seated 10x10" seated 3x30" 10x 3x30" 3x30" 10x10   Pulley abduction PROM  10x10" 10x10" 10x10" 10x10" 3x30"       Supine flexion stretch, P/AAROM 5x10" 5x10" 5x10" wand Wand 10x10" Wand supine and standing 10ea 2x10 1lb 2x10 2lb 2x10 2lb    IR PROM with strap  3x10"  3x15" 3x15" 3x20" 3x20"   3x20"   Cross body adduction  3x15"   3x15"        Wand abduction AAROM    5x10" 5x5" 10x  10x     ER with band       2x10 PTB 2x10 3x10 3x10 OTB   Wand flexion AAROM standing       Mirror 10x 10x     Triceps ext       20X PTB  3x10    UL chest press, AAROM         10X 2x10   Serratus punch aarom         10x 10x   SL flexion AAROM         10x 2x10   Biceps curls      15 1lb 2lb 15x      Ther Activity                          Gait Training                          Modalities             Cold pack

## 2021-08-05 ENCOUNTER — APPOINTMENT (OUTPATIENT)
Dept: PHYSICAL THERAPY | Facility: CLINIC | Age: 71
End: 2021-08-05
Payer: MEDICARE

## 2021-08-06 DIAGNOSIS — N13.8 ENLARGED PROSTATE WITH URINARY OBSTRUCTION: ICD-10-CM

## 2021-08-06 DIAGNOSIS — N40.1 ENLARGED PROSTATE WITH URINARY OBSTRUCTION: ICD-10-CM

## 2021-08-06 RX ORDER — FINASTERIDE 5 MG/1
5 TABLET, FILM COATED ORAL DAILY
Qty: 90 TABLET | Refills: 3 | Status: SHIPPED | OUTPATIENT
Start: 2021-08-06 | End: 2022-08-05 | Stop reason: SDUPTHER

## 2021-08-10 ENCOUNTER — OFFICE VISIT (OUTPATIENT)
Dept: PHYSICAL THERAPY | Facility: CLINIC | Age: 71
End: 2021-08-10
Payer: MEDICARE

## 2021-08-10 DIAGNOSIS — Z47.1 AFTERCARE FOLLOWING RIGHT SHOULDER JOINT REPLACEMENT SURGERY: Primary | ICD-10-CM

## 2021-08-10 DIAGNOSIS — Z96.611 AFTERCARE FOLLOWING RIGHT SHOULDER JOINT REPLACEMENT SURGERY: Primary | ICD-10-CM

## 2021-08-10 DIAGNOSIS — M19.011 GLENOHUMERAL ARTHRITIS, RIGHT: ICD-10-CM

## 2021-08-10 PROCEDURE — 97140 MANUAL THERAPY 1/> REGIONS: CPT | Performed by: PHYSICAL THERAPIST

## 2021-08-10 PROCEDURE — 97110 THERAPEUTIC EXERCISES: CPT | Performed by: PHYSICAL THERAPIST

## 2021-08-10 NOTE — PROGRESS NOTES
Progress Report     Today's date: 8/10/2021  Patient name: Rigo Ruiz  : 1950  MRN: [id number]  Referring provider: Mary Alvarez  Dx:   Encounter Diagnosis     ICD-10-CM    1  Aftercare following right shoulder joint replacement surgery  Z47 1     Z96 611    2  Glenohumeral arthritis, right  M19 011                   Subjective: Patient reports that he thinks therapy is helping  He is having less difficulty reaching overhead and feels stronger  He still is limited with overhead activities and lifting light weight objects for completion of ADLs    Objective: See treatment diary below  Shoulder PROM :  flexion 100/130 A/PROM  Abduction 90/120 A/PROM  ER 78 AROM,  IR 70 PROM,     Shoulder MMT:   ER 4/5,   IR 4/5,   flexion 4-/5,   abduction 4-/5    Assessment: Patient has been treated for 2 months following TSA on 2021  He has made gradual progress in shoulder ROM and strength  He continues to have moderate objective and functional limitations, including reaching overhead, performing meal prep and household chores, and returning to recreational weight lifting  Patient would benefit from continued skilled PT per plan of care over the next 4 weeks  Plan: Continue per plan of care  Advance as able per protocol  Precautions: see protocol  EPOC 21        Manuals 7/8 7/20 7/22 7/27 7/29 8/3 8/10    Shoulder PROM (IR, ER, abduction and flexion) 10' 12' 12' 10' 8' 10' 10'    Elbow PROM (extension, supination, pronation) 4' 5'  3' 3' 2' 2'    [facility] jt, Low grade joint mobs 1-2 for pain relief, long axis distraction, a-p 2' 2' 1' 2' 2' 2'     Scapular mobs inferior and posterior gr 1-3 2'          Neuro Re-Ed           Scapula muscle activation all directions with tactile cuing  4' With resistance 4' 4' 4' 4'     Prone row UL   10x 2x10x5" 2x10x5" 1lb 2x10x5" 2lbs     PNF patterns, aarom       3'    Prone T UL   10x 2x10x5" 2x10x5" 2x10x5" 2x10x5"    Ther Ex           Wall walk/slide 10x 10x    Pulley flexion PROM 10x10" seated 3x30" 10x 3x30" 3x30" 10x10 10x10"    Pulley abduction PROM 10x10" 3x30"     10x10"    Supine flexion stretch, P/AAROM Wand 10x10" Wand supine and standing 10ea 2x10 1lb 2x10 2lb 2x10 2lb      IR PROM with strap 3x15" 3x20" 3x20"   3x20"     Cross body adduction 3x15"          Wand abduction AAROM 5x5" 10x  10x       ER with band   2x10 PTB 2x10 3x10 3x10 OTB 10x OTB    IR with band       10x OTB    Wand flexion AAROM standing   Mirror 10x 10x       Triceps ext   20X PTB  3x10      UL chest press, AAROM     10X 2x10 2x10    Serratus punch aarom     10x 10x     SL flexion AAROM     10x 2x10 2x10    SL abduction       2x10    Biceps curls  15 1lb 2lb 15x        Ther Activity                      Gait Training                      Modalities           Cold pack

## 2021-08-12 ENCOUNTER — OFFICE VISIT (OUTPATIENT)
Dept: PHYSICAL THERAPY | Facility: CLINIC | Age: 71
End: 2021-08-12
Payer: MEDICARE

## 2021-08-12 DIAGNOSIS — Z96.611 AFTERCARE FOLLOWING RIGHT SHOULDER JOINT REPLACEMENT SURGERY: ICD-10-CM

## 2021-08-12 DIAGNOSIS — Z47.1 AFTERCARE FOLLOWING RIGHT SHOULDER JOINT REPLACEMENT SURGERY: ICD-10-CM

## 2021-08-12 DIAGNOSIS — M19.011 GLENOHUMERAL ARTHRITIS, RIGHT: Primary | ICD-10-CM

## 2021-08-12 PROCEDURE — 97140 MANUAL THERAPY 1/> REGIONS: CPT | Performed by: PHYSICAL THERAPIST

## 2021-08-12 PROCEDURE — 97112 NEUROMUSCULAR REEDUCATION: CPT | Performed by: PHYSICAL THERAPIST

## 2021-08-12 PROCEDURE — 97110 THERAPEUTIC EXERCISES: CPT | Performed by: PHYSICAL THERAPIST

## 2021-08-12 NOTE — PROGRESS NOTES
Daily Note     Today's date: 2021  Patient name: Rigo Ruiz  : 1950  MRN: [id number]  Referring provider: Marty Yap  Dx:   Encounter Diagnosis     ICD-10-CM    1  Glenohumeral arthritis, right  M19 011    2  Aftercare following right shoulder joint replacement surgery  Z47 1     Z96 611                   Subjective: Patient reports that he had a little soreness following last session  Objective: See treatment diary below  Assessment: Patient required increased verbal and tactile cues to prevent anterior shoulder position with resisted internal rotation, due to scapular muscle weakness and poor stabilization  Modified resisted internal rotation to isometric  Patient tolerated addition of shoulder extension with retraction and rows well to improve scapular muscle strength  Patient would benefit from continued skilled PT per plan of care  Plan: Continue per plan of care  Advance as able per protocol  Precautions: see protocol  EPOC 21        Manuals 7/8 7/20 7/22 7/27 7/29 8/3 8/10 8/12   Shoulder PROM (IR, ER, abduction and flexion) 10' 12' 12' 10' 8' 10' 10' 10'   Elbow PROM (extension, supination, pronation) 4' 5'  3' 3' 2' 2' 2'   [address], Low grade joint mobs 1-2 for pain relief, long axis distraction, a-p 2' 2' 1' 2' 2' 2'     Scapular mobs inferior and posterior gr 1-3 2'          Neuro Re-Ed           Scapula muscle activation all directions with tactile cuing  4' With resistance 4' 4' 4' 4'     PNF patterns, aarom       3' 3'   Rows with retraction        2X10 PTB   shld ext with retraction        2x10 PTB   Prone T UL   10x 2x10x5" 2x10x5" 2x10x5" 2x10x5" 2x10x5"   Ther Ex           Wall walk/slide      10x 10x 10x   Pulley flexion PROM 10x10" seated 3x30" 10x 3x30" 3x30" 10x10 10x10" 10x10"   Pulley abduction PROM 10x10" 3x30"     10x10"    IR PROM with strap 3x15" 3x20" 3x20"   3x20"     Cross body adduction 3x15"          Wand abduction AAROM 5x5" 10x  10x ER with band   2x10 PTB 2x10 3x10 3x10 OTB 10x OTB 2x15 PTB   IR with band       10x OTB 2X10 PTB   IR isometric with retraction        10x10"   Wand flexion AAROM standing   Mirror 10x 10x       Triceps ext   20X PTB  3x10      UL chest press, AAROM     10X 2x10 2x10 2X10   SL flexion AAROM     10x 2x10 2x10 2X10   SL abduction       2x10 2X10   Biceps curls  15 1lb 2lb 15x        Ther Activity                      Gait Training                      Modalities           Cold pack

## 2021-08-17 ENCOUNTER — APPOINTMENT (OUTPATIENT)
Dept: PHYSICAL THERAPY | Facility: CLINIC | Age: 71
End: 2021-08-17
Payer: MEDICARE

## 2021-08-19 ENCOUNTER — APPOINTMENT (OUTPATIENT)
Dept: PHYSICAL THERAPY | Facility: CLINIC | Age: 71
End: 2021-08-19
Payer: MEDICARE

## 2021-08-23 ENCOUNTER — OFFICE VISIT (OUTPATIENT)
Dept: OBGYN CLINIC | Facility: OTHER | Age: 71
End: 2021-08-23

## 2021-08-23 VITALS
SYSTOLIC BLOOD PRESSURE: 125 MMHG | BODY MASS INDEX: 30.07 KG/M2 | HEART RATE: 57 BPM | WEIGHT: 192 LBS | DIASTOLIC BLOOD PRESSURE: 81 MMHG

## 2021-08-23 DIAGNOSIS — Z96.611 AFTERCARE FOLLOWING RIGHT SHOULDER JOINT REPLACEMENT SURGERY: Primary | ICD-10-CM

## 2021-08-23 DIAGNOSIS — Z47.1 AFTERCARE FOLLOWING RIGHT SHOULDER JOINT REPLACEMENT SURGERY: Primary | ICD-10-CM

## 2021-08-23 PROCEDURE — 99024 POSTOP FOLLOW-UP VISIT: CPT | Performed by: PHYSICIAN ASSISTANT

## 2021-08-23 NOTE — PROGRESS NOTES
Assessment:       1  Aftercare following right shoulder joint replacement surgery          Plan:          Patient is doing well postoperatively  Operative note, images, and rehab protocol were reviewed  All questions were addressed to the patient's satisfaction  We discussed using vitamin E for this scar  Patient has an appointment with PT and will transition to home exercise program  Follow-up will be in 2 months to assess patient's progress  Based on today's evaluation, repeat x-ray did not seem warranted  Subjective:     Patient ID: Paola Lee is a 65 y o  male  Chief Complaint:    HPI      Patient presents to the office for follow-up status post anatomic right total shoulder arthroplasty on 06/01/2021  He he is making progress with PT  He does notice crepitus with certain motion  He finds he is doing more activities of daily living comfortably  Social History     Occupational History    Not on file   Tobacco Use    Smoking status: Never Smoker    Smokeless tobacco: Never Used   Vaping Use    Vaping Use: Never used   Substance and Sexual Activity    Alcohol use: Yes     Alcohol/week: 1 0 standard drinks     Types: 1 Cans of beer per week     Comment: once a month     Drug use: No    Sexual activity: Not Currently     Comment: flomax is helping       Review of Systems   Constitutional: Negative  Respiratory: Negative  Musculoskeletal: Positive for myalgias  Negative for arthralgias  Skin: Negative for wound  Neurological: Positive for weakness  Negative for numbness  Psychiatric/Behavioral: Negative  Objective:     Ortho ExamPhysical Exam  Cardiovascular:      Pulses: Normal pulses  Pulmonary:      Effort: Pulmonary effort is normal    Musculoskeletal:      Comments: Range of motion right shoulder: Active forward flexion 100°, passive forward flexion 150°  External rotation 60°  Internal rotation to sacrum  Neurological:      Mental Status: He is alert

## 2021-08-24 ENCOUNTER — OFFICE VISIT (OUTPATIENT)
Dept: PHYSICAL THERAPY | Facility: CLINIC | Age: 71
End: 2021-08-24
Payer: MEDICARE

## 2021-08-24 DIAGNOSIS — M19.011 GLENOHUMERAL ARTHRITIS, RIGHT: Primary | ICD-10-CM

## 2021-08-24 DIAGNOSIS — Z96.611 AFTERCARE FOLLOWING RIGHT SHOULDER JOINT REPLACEMENT SURGERY: ICD-10-CM

## 2021-08-24 DIAGNOSIS — Z47.1 AFTERCARE FOLLOWING RIGHT SHOULDER JOINT REPLACEMENT SURGERY: ICD-10-CM

## 2021-08-24 PROCEDURE — 97110 THERAPEUTIC EXERCISES: CPT | Performed by: PHYSICAL THERAPIST

## 2021-08-24 PROCEDURE — 97140 MANUAL THERAPY 1/> REGIONS: CPT | Performed by: PHYSICAL THERAPIST

## 2021-08-24 PROCEDURE — 97112 NEUROMUSCULAR REEDUCATION: CPT | Performed by: PHYSICAL THERAPIST

## 2021-08-24 NOTE — PROGRESS NOTES
Daily Note     Today's date: 2021  Patient name: Ruth Lopez  : 1950  MRN: [id number]  Referring provider: Billy Gonsalves  Dx:   Encounter Diagnosis     ICD-10-CM    1  Glenohumeral arthritis, right  M19 011    2  Aftercare following right shoulder joint replacement surgery  Z47 1     Z96 611                   Subjective: Patient reports that he was mostly compliant with home program over the past week  He saw surgeon DANTE yesterday and was told that the stiffness is likely due to some scar tissue and to continue per protocol/plan of care  Objective: See treatment diary below  Assessment: Patient demonstrated good shoulder mobility with passive stretching, noting little discomfort with most restriction present with IR PROM  Patient still has pain with flexion AROM in both supine and sidelying, indicating continued shoulder girdle weakness and lack of stability  Progressed scapular stabilization exercises with addition of body blade and scapular retraction/protraction with table slides  Patient would benefit from continued skilled PT per plan of care  Plan: Continue per plan of care  Advance as able per protocol  Precautions: see protocol  EPOC 21        Manuals 7/22 7/27 7/29 8/3 8/10 8/12 8/24    Shoulder PROM (IR, ER, abduction and flexion) 12' 10' 8' 10' 10' 10' 12'    Elbow PROM (extension, supination, pronation)  3' 3' 2' 2' 2'     [address] [place] [address], Low grade joint mobs 1-2 for pain relief, long axis distraction, a-p 1' 2' 2' 2'       Scapular mobs inferior and posterior gr 1-3           Neuro Re-Ed           Scapula muscle activation all directions with tactile cuing With resistance 4' 4' 4' 4'       PNF patterns, aarom     3' 3' 3'    Rows with retraction      2X10 PTB     shld ext with retraction      2x10 PTB     Body blade IR/ER       3x30"    Prone T UL 10x 2x10x5" 2x10x5" 2x10x5" 2x10x5" 2x10x5" 2x10x5" 1lb    Ther Ex           Wall walk/slide    10x 10x 10x     Pulley flexion PROM 10x 3x30" 3x30" 10x10 10x10" 10x10"     Pulley abduction PROM     10x10"      IR PROM with strap 3x20"   3x20"       Cross body adduction           Wand abduction AAROM  10x         ER with band 2x10 PTB 2x10 3x10 3x10 OTB 10x OTB 2x15 PTB     IR with band     10x OTB 2X10 PTB     IR isometric with retraction      10x10"     Wand flexion AAROM standing Mirror 10x 10x     Prone flex 10x    Triceps ext 20X PTB  3x10        UL chest press, AAROM   10X 2x10 2x10 2X10 3x10    SL flexion AAROM   10x 2x10 2x10 2X10 3x10    SL abduction     2x10 2X10 3x10    Biceps curls 2lb 15x          Ther Activity                      Gait Training                      Modalities           Cold pack

## 2021-08-26 ENCOUNTER — OFFICE VISIT (OUTPATIENT)
Dept: PHYSICAL THERAPY | Facility: CLINIC | Age: 71
End: 2021-08-26
Payer: MEDICARE

## 2021-08-26 DIAGNOSIS — Z47.1 AFTERCARE FOLLOWING RIGHT SHOULDER JOINT REPLACEMENT SURGERY: ICD-10-CM

## 2021-08-26 DIAGNOSIS — M19.011 GLENOHUMERAL ARTHRITIS, RIGHT: Primary | ICD-10-CM

## 2021-08-26 DIAGNOSIS — Z96.611 AFTERCARE FOLLOWING RIGHT SHOULDER JOINT REPLACEMENT SURGERY: ICD-10-CM

## 2021-08-26 PROCEDURE — 97140 MANUAL THERAPY 1/> REGIONS: CPT | Performed by: PHYSICAL THERAPIST

## 2021-08-26 PROCEDURE — 97112 NEUROMUSCULAR REEDUCATION: CPT | Performed by: PHYSICAL THERAPIST

## 2021-08-26 PROCEDURE — 97110 THERAPEUTIC EXERCISES: CPT | Performed by: PHYSICAL THERAPIST

## 2021-08-26 NOTE — PROGRESS NOTES
Daily Note     Today's date: 2021  Patient name: Dante Schroeder  : 1950  MRN: [id number]  Referring provider: Jo Puente  Dx:   Encounter Diagnosis     ICD-10-CM    1  Glenohumeral arthritis, right  M19 011    2  Aftercare following right shoulder joint replacement surgery  Z47 1     Z96 [id number]                   Subjective: Patient reports that he still has difficulty reaching his arm overhead, which he remembers having trouble doing for several years prior to surgery  Objective: See treatment diary below  Assessment: Patient demonstrates good passive overhead flexion ROM despite limited AROM  This is likely due to RTC weakness and lack of muscle control from years of disuse  However, he reports that his RTC repair was intact, and he demonstrates at least 4- RTC strength with MMT  This likely will lead to improved overhead AROM with continued therapy  Patient displayed less difficulty with chest press and flexion AAROM compared to previous session  Patient would benefit from continued skilled PT per plan of care  Plan: Continue per plan of care  Advance as able per protocol  Precautions: see protocol  EPOC 21  Manuals 7/22 7/27 7/29 8/3 8/10 8/12 8/24 8/26   Shoulder PROM (IR, ER, abduction and flexion) 12' 10' 8' 10' 10' 10' 12' 12'   Elbow PROM (extension, supination, pronation)  3' 3' 2' 2' 2'     [address] [facility], Low grade joint mobs 1-2 for pain relief, long axis distraction, a-p 1' 2' 2' 2'       Scapular mobs inferior and posterior gr 1-3           Neuro Re-Ed           PNF patterns, aarom     3' 3' 3' Rhythmic stab   5'   Rows with retraction      2X10 PTB     shld ext with retraction      2x10 PTB     Body blade IR/ER       3x30"    Prone T UL 10x 2x10x5" 2x10x5" 2x10x5" 2x10x5" 2x10x5" 2x10x5" 1lb 2x10x5" 1lb   Ther Ex           Wall walk/slide    10x 10x 10x     Pulley flexion PROM 10x 3x30" 3x30" 10x10 10x10" 10x10"  10x10"   Pulley abduction PROM     10x10" IR PROM with strap 3x20"   3x20"       Cross body adduction           Wand abduction AAROM  10x         ER with band 2x10 PTB 2x10 3x10 3x10 OTB 10x OTB 2x15 PTB     IR with band     10x OTB 2X10 PTB     IR isometric with retraction      10x10"     Wand flexion AAROM standing Mirror 10x 10x     Prone flex 10x Prone and supine 10ea   UL chest press, AAROM   10X 2x10 2x10 2X10 3x10 2x20   SL flexion AAROM   10x 2x10 2x10 2X10 3x10 2x10   SL abduction     2x10 2X10 3x10 3x10   Ther Activity                      Gait Training                      Modalities           Cold pack

## 2021-08-31 ENCOUNTER — OFFICE VISIT (OUTPATIENT)
Dept: PHYSICAL THERAPY | Facility: CLINIC | Age: 71
End: 2021-08-31
Payer: MEDICARE

## 2021-08-31 DIAGNOSIS — Z96.611 AFTERCARE FOLLOWING RIGHT SHOULDER JOINT REPLACEMENT SURGERY: ICD-10-CM

## 2021-08-31 DIAGNOSIS — M19.011 GLENOHUMERAL ARTHRITIS, RIGHT: Primary | ICD-10-CM

## 2021-08-31 DIAGNOSIS — Z47.1 AFTERCARE FOLLOWING RIGHT SHOULDER JOINT REPLACEMENT SURGERY: ICD-10-CM

## 2021-08-31 PROCEDURE — 97140 MANUAL THERAPY 1/> REGIONS: CPT | Performed by: PHYSICAL THERAPIST

## 2021-08-31 PROCEDURE — 97110 THERAPEUTIC EXERCISES: CPT | Performed by: PHYSICAL THERAPIST

## 2021-08-31 NOTE — PROGRESS NOTES
Daily Note     Today's date: 2021  Patient name: Phoebe Sky  : 1950  MRN: [id number]  Referring provider: Anders Thayer  Dx:   Encounter Diagnosis     ICD-10-CM    1  Glenohumeral arthritis, right  M19 011    2  Aftercare following right shoulder joint replacement surgery  Z47 1     Z96 611        Start Time: 1047  Stop Time: 1130  Total time in clinic (min): 43 minutes    Subjective: Patient reports difficulty with active movements    Objective: See treatment diary below  Assessment: Patient exhibits poor scapular control / stabilization - some improvement with cueing  Patient would benefit from continued skilled PT per plan of care  Plan: Continue per plan of care  Advance as able per protocol  Precautions: see protocol  EPOC 21  Manuals 8/31 7/27 7/29 8/3 8/10 8/12 8/24 8/26   Shoulder PROM (IR, ER, abduction and flexion) 12' 10' 8' 10' 10' 10' 12' 12'   Elbow PROM (extension, supination, pronation)  3' 3' 2' 2' 2'     [address], Low grade joint mobs 1-2 for pain relief, long axis distraction, a-p  2' 2' 2'       Scapular mobs inferior and posterior gr 1-3           Neuro Re-Ed           PNF patterns, aarom Rhythmic stab 3'    3' 3' 3' Rhythmic stab   5'   Rows with retraction 2x10 PTB     2X10 PTB     shld ext with retraction 2x10 PTB     2x10 PTB     Body blade IR/ER       3x30"    Prone T UL 2x10x5" 1# 2x10x5" 2x10x5" 2x10x5" 2x10x5" 2x10x5" 2x10x5" 1lb 2x10x5" 1lb   Ther Ex           Wall walk/slide    10x 10x 10x     Pulley flexion PROM 10x10" 3x30" 3x30" 10x10 10x10" 10x10"  10x10"   Pulley abduction PROM     10x10"      IR PROM with strap    3x20"       Cross body adduction           Wand abduction AAROM  10x         ER with band 2x15 2x10 3x10 3x10 OTB 10x OTB 2x15 PTB     IR with band 2x10    10x OTB 2X10 PTB     IR isometric with retraction      10x10"     Wand flexion AAROM standing Prone and supine flex 10ea 10x     Prone flex 10x Prone and supine 10ea UL chest press, AAROM 2x20 AROM  10X 2x10 2x10 2X10 3x10 2x20   SL flexion AAROM 2x10  10x 2x10 2x10 2X10 3x10 2x10   SL abduction 3x10    2x10 2X10 3x10 3x10   Ther Activity                      Gait Training                      Modalities           Cold pack

## 2021-09-02 ENCOUNTER — OFFICE VISIT (OUTPATIENT)
Dept: PHYSICAL THERAPY | Facility: CLINIC | Age: 71
End: 2021-09-02
Payer: MEDICARE

## 2021-09-02 DIAGNOSIS — M19.011 GLENOHUMERAL ARTHRITIS, RIGHT: Primary | ICD-10-CM

## 2021-09-02 DIAGNOSIS — Z47.1 AFTERCARE FOLLOWING RIGHT SHOULDER JOINT REPLACEMENT SURGERY: ICD-10-CM

## 2021-09-02 DIAGNOSIS — Z96.611 AFTERCARE FOLLOWING RIGHT SHOULDER JOINT REPLACEMENT SURGERY: ICD-10-CM

## 2021-09-02 PROCEDURE — 97140 MANUAL THERAPY 1/> REGIONS: CPT

## 2021-09-02 PROCEDURE — 97110 THERAPEUTIC EXERCISES: CPT

## 2021-09-02 NOTE — PROGRESS NOTES
Daily Note     Today's date: 2021  Patient name: Yumiko Stephens  : 1950  MRN: [id number]  Referring provider: Vince Ovalle  Dx:   Encounter Diagnosis     ICD-10-CM    1  Glenohumeral arthritis, right  M19 011    2  Aftercare following right shoulder joint replacement surgery  Z47 1     Z96 611                   Subjective: Patient noted pain R shoulder pre treatment  Objective: See treatment diary below      Assessment: Tolerated treatment fair  Patient needed VC to correct form with TB exercises for patient to decrease UT compensation  Patient would benefit from continued PT      Plan: Continue per plan of care  Precautions: see protocol  EPOC 21  Manuals 8/31 9/2  8/3 8/10 8/12 8/24 8/26   Shoulder PROM (IR, ER, abduction and flexion) 12' 10'  10' 10' 10' 12' 12'   Elbow PROM (extension, supination, pronation)    2' 2' 2'     [facility] jt, Low grade joint mobs 1-2 for pain relief, long axis distraction, a-p    2'       Scapular mobs inferior and posterior gr 1-3           Neuro Re-Ed           PNF patterns, aarom Rhythmic stab 3' Rhythmic stab 3'   3' 3' 3' Rhythmic stab   5'   Rows with retraction 2x10 PTB 2x10 PTB    2X10 PTB     shld ext with retraction 2x10 PTB 2x10 PTB    2x10 PTB     Body blade IR/ER       3x30"    Prone T UL 2x10x5" 1# 2x10 5" 1#  2x10x5" 2x10x5" 2x10x5" 2x10x5" 1lb 2x10x5" 1lb   Ther Ex           Wall walk/slide    10x 10x 10x     Pulley flexion PROM 10x10" 10"x10  10x10 10x10" 10x10"  10x10"   Pulley abduction PROM     10x10"      IR PROM with strap    3x20"       Cross body adduction           Wand abduction AAROM           ER with band 2x15 2x15  3x10 OTB 10x OTB 2x15 PTB     IR with band 2x10 2x10   10x OTB 2X10 PTB     IR isometric with retraction      10x10"     Wand flexion AAROM standing Prone and supine flex 10ea supine flex 10ea     Prone flex 10x Prone and supine 10ea   UL chest press, AAROM 2x20 AROM 2x20 AROM  2x10 2x10 2X10 3x10 2x20   SL flexion AAROM 2x10 2x10  2x10 2x10 2X10 3x10 2x10   SL abduction 3x10 3x10   2x10 2X10 3x10 3x10   Ther Activity                      Gait Training                      Modalities           Cold pack

## 2021-09-14 ENCOUNTER — APPOINTMENT (OUTPATIENT)
Dept: PHYSICAL THERAPY | Facility: CLINIC | Age: 71
End: 2021-09-14
Payer: MEDICARE

## 2021-09-16 ENCOUNTER — OFFICE VISIT (OUTPATIENT)
Dept: PHYSICAL THERAPY | Facility: CLINIC | Age: 71
End: 2021-09-16
Payer: MEDICARE

## 2021-09-16 DIAGNOSIS — M19.011 GLENOHUMERAL ARTHRITIS, RIGHT: ICD-10-CM

## 2021-09-16 DIAGNOSIS — Z47.1 AFTERCARE FOLLOWING RIGHT SHOULDER JOINT REPLACEMENT SURGERY: Primary | ICD-10-CM

## 2021-09-16 DIAGNOSIS — Z96.611 AFTERCARE FOLLOWING RIGHT SHOULDER JOINT REPLACEMENT SURGERY: Primary | ICD-10-CM

## 2021-09-16 PROCEDURE — 97140 MANUAL THERAPY 1/> REGIONS: CPT | Performed by: PHYSICAL THERAPIST

## 2021-09-16 PROCEDURE — 97110 THERAPEUTIC EXERCISES: CPT | Performed by: PHYSICAL THERAPIST

## 2021-09-16 NOTE — PROGRESS NOTES
PT Re-Evaluation     Today's date: 2021  Patient name: Bryan Cline  : 1950  MRN: [id number]  Referring provider: Rena Valverde  Dx:   Encounter Diagnosis     ICD-10-CM    1  Aftercare following right shoulder joint replacement surgery  Z47 1     Z96 611    2  Glenohumeral arthritis, right  M19 011                   Assessment  Assessment details: RE-ASSESSMENT 21: Patient has been seen for 22 visits over the past 3 months following TSA and biceps tenodesis on 21  Treatment has consisted of manual therapy to improve ROM and swelling, therapeutic exercises to improve flexibility and strength, and patient education on home program  From an objective standpoint, patient has demonstrated further improvements in shoulder ROM and strength  He has limited AROM past shoulder height, and RTC strength  Functionally, patient has less difficulty sleeping, dressing, performing ADLs, and returning to recreational exercise  Patient continues to be limited with reaching overhead, weight to shoulder height  Patient can continue to progress on deficits through participation of home program, but if he is not making functional improvements can return to formal physical therapy  Impairments: abnormal or restricted ROM, impaired physical strength, pain with function and scapular dyskinesis    Symptom irritability: lowUnderstanding of Dx/Px/POC: good   Prognosis: good    Goals  Short term goals:    Patient is independent in home program to support plan of care and improve function  - 2 weeks met   Patient demonstrates full elbow ROM to reduce difficulty with dressing  - 2 weeks 10 deg  Elbow extension restriction   Patient demonstrates at least 90 deg  shoulder flexion PROM so he can get dressed with less pain  - 3 weeks goal met     Long term goals:  Patient demonstrates improvement in community participation with increase in FOTO score from 32 to 59%   - 12 weeks 62% goal met   Patient demonstrates at least 75% shoulder AROM for less difficulty reaching overhead and completing ADLs  - 8 weeks progressing   Patient demonstrates at least 4+/5 shoulder strength so he can return to recreational exercise and riding motor cycle  - 12 weeks goal met  Patient can sleep without waking from pain  - 4 weeks goal met       Plan  Patient would benefit from: skilled physical therapy  Planned modality interventions: cryotherapy  Planned therapy interventions: activity modification, ADL training, body mechanics training, flexibility, functional ROM exercises, home exercise program, therapeutic exercise, therapeutic activities, stretching, strengthening, patient education, neuromuscular re-education, massage, manual therapy and joint mobilization  Frequency: 2x week  Plan of Care beginning date: 8/31/2021  Plan of Care expiration date: 9/16/2021  Treatment plan discussed with: patient        Subjective Evaluation    History of Present Illness  Date of surgery: 6/1/2021  Mechanism of injury: surgery  Mechanism of injury: RE-ASSESSMENT 9/16/21: Patient reports that his shoulder is getting better  He had recent follow up with surgeon's DANTE  He was told he can resume normal activities like riding motor cycle  He has been doing some exercises at the gym  He can reach overhead but can't lift weight overhead  He can put gallon of milk in the fridge below shoulder height  He wants to be discharge to home program for now to continue to progress at home  Re-assessment 7/6/21: Patient reports that his shoulder is gradually improving, and he has been trying to use it more  He reports consistently with home program      Patient presents to therapy post op TSA and biceps tenodesis on 6/1/21  Patient has next follow up on June 12  Patient has been managing pain with ice and oral medication  He can d/c sling after 4 weeks      Symptoms: shoulder pain, difficulty sleeping (currently sleeping in recliner) getting between 3-4 hours of sleep at a time due to discomfort  Patient needs help to complete showering and dressing  Per protocol, he is not using his R UE or lifting any objects  PMH: Previous history of 2 arthroscopic surgeries on R shoulder  Pain  Current pain ratin  At best pain ratin  At worst pain ratin  Relieving factors: ice and rest    Social Support  Lives with: spouse    Hand dominance: right    Patient Goals  Patient goals for therapy: decreased pain, increased motion, increased strength and return to sport/leisure activities  Patient goal: get back to going to the gym and ride motor cycle        Objective     Active Range of Motion   Cervical/Thoracic Spine     Normal active range of motion  Left Shoulder   Flexion: WFL  Abduction: WFL    Right Shoulder   Flexion: 86 degrees   Extension: 55 degrees   Abduction: 86 degrees   External rotation 45°: 85 degrees   Internal rotation BTB: L1     Right Elbow   Flexion: WFL  Extension: -10 degrees   Forearm supination: WFL  Forearm pronation: WFL    Passive Range of Motion     Right Shoulder   Flexion: 136 degrees   Abduction: 118 degrees   External rotation 45°: 87 degrees   Internal rotation 0°: 62 degrees     Left Elbow   Flexion: WFL  Extension: WFL    Right Elbow   Flexion: WFL  Extension: -5 degrees   Forearm supination: WFL  Forearm pronation: [facility]    Strength/Myotome Testing     Left Shoulder     Planes of Motion   Flexion: 4+   Abduction: 4+   External rotation at 0°: 4+   Internal rotation at 0°: 4+     Right Shoulder     Planes of Motion   Flexion: 4-   Abduction: 4-   External rotation at 45°: 4   Internal rotation at 45°: 4                Precautions: see protocol, ER limit of 30 degrees per surgeon recommendation, sling until week          Manuals    Shoulder PROM (IR, ER, abduction and flexion) 12' 10' 10'  10' 12' 12'   [facility] jt, Low grade joint mobs 1-2 for pain relief, long axis distraction, a-p          Scapular mobs inferior and posterior gr 1-3          Neuro Re-Ed          PNF patterns, aarom Rhythmic stab 3' Rhythmic stab 3'   3' 3' Rhythmic stab   5'   Rows with retraction 2x10 PTB 2x10 PTB   2X10 PTB     shld ext with retraction 2x10 PTB 2x10 PTB   2x10 PTB     Body blade IR/ER      3x30"    Prone T UL 2x10x5" 1# 2x10 5" 1#   2x10x5" 2x10x5" 1lb 2x10x5" 1lb   Ther Ex          Wall walk/slide   10x  10x     Pulley flexion PROM 10x10" 10"x10 10x10"  10x10"  10x10"   Pulley abduction PROM          IR PROM with strap          Cross body adduction          Wand abduction AAROM   10x       ER with band 2x15 2x15 15x  2x15 PTB     IR with band 2x10 2x10 15x  2X10 PTB     IR isometric with retraction     10x10"     Wand flexion AAROM standing Prone and supine flex 10ea supine flex 10ea 10x standing   Prone flex 10x Prone and supine 10ea   UL chest press, AAROM 2x20 AROM 2x20 AROM [id number]  2X10 3x10 2x20   SL flexion AAROM 2x10 2x10   2X10 3x10 2x10   Pt edu on discharge recommendations and home program   5'       Assessment associated with re-eval   25'       SL abduction 3x10 3x10   2X10 3x10 3x10   Ther Activity                    Gait Training                    Modalities          Cold pack

## 2021-09-21 ENCOUNTER — APPOINTMENT (OUTPATIENT)
Dept: PHYSICAL THERAPY | Facility: CLINIC | Age: 71
End: 2021-09-21
Payer: MEDICARE

## 2021-09-23 ENCOUNTER — APPOINTMENT (OUTPATIENT)
Dept: PHYSICAL THERAPY | Facility: CLINIC | Age: 71
End: 2021-09-23
Payer: MEDICARE

## 2021-10-14 ENCOUNTER — OFFICE VISIT (OUTPATIENT)
Dept: FAMILY MEDICINE CLINIC | Facility: HOSPITAL | Age: 71
End: 2021-10-14
Payer: MEDICARE

## 2021-10-14 VITALS
BODY MASS INDEX: 30.32 KG/M2 | WEIGHT: 193.2 LBS | OXYGEN SATURATION: 98 % | HEIGHT: 67 IN | SYSTOLIC BLOOD PRESSURE: 124 MMHG | HEART RATE: 58 BPM | DIASTOLIC BLOOD PRESSURE: 78 MMHG

## 2021-10-14 DIAGNOSIS — Z11.59 NEED FOR HEPATITIS C SCREENING TEST: ICD-10-CM

## 2021-10-14 DIAGNOSIS — E78.2 MIXED HYPERLIPIDEMIA: Primary | ICD-10-CM

## 2021-10-14 DIAGNOSIS — Z86.2 HISTORY OF LEUKOCYTOSIS: ICD-10-CM

## 2021-10-14 PROCEDURE — G0439 PPPS, SUBSEQ VISIT: HCPCS | Performed by: STUDENT IN AN ORGANIZED HEALTH CARE EDUCATION/TRAINING PROGRAM

## 2021-10-15 ENCOUNTER — LAB (OUTPATIENT)
Dept: LAB | Facility: HOSPITAL | Age: 71
End: 2021-10-15
Attending: STUDENT IN AN ORGANIZED HEALTH CARE EDUCATION/TRAINING PROGRAM
Payer: MEDICARE

## 2021-10-15 DIAGNOSIS — E78.2 MIXED HYPERLIPIDEMIA: ICD-10-CM

## 2021-10-15 DIAGNOSIS — Z11.59 NEED FOR HEPATITIS C SCREENING TEST: ICD-10-CM

## 2021-10-15 DIAGNOSIS — Z86.2 HISTORY OF LEUKOCYTOSIS: ICD-10-CM

## 2021-10-15 LAB
BASOPHILS # BLD AUTO: 0.04 THOUSANDS/ΜL (ref 0–0.1)
BASOPHILS NFR BLD AUTO: 1 % (ref 0–1)
CHOLEST SERPL-MCNC: 189 MG/DL (ref 50–200)
EOSINOPHIL # BLD AUTO: 0.3 THOUSAND/ΜL (ref 0–0.61)
EOSINOPHIL NFR BLD AUTO: 5 % (ref 0–6)
ERYTHROCYTE [DISTWIDTH] IN BLOOD BY AUTOMATED COUNT: 12.7 % (ref 11.6–15.1)
HCT VFR BLD AUTO: 46.1 % (ref 36.5–49.3)
HCV AB SER QL: NORMAL
HDLC SERPL-MCNC: 53 MG/DL
HGB BLD-MCNC: 15.5 G/DL (ref 12–17)
IMM GRANULOCYTES # BLD AUTO: 0.01 THOUSAND/UL (ref 0–0.2)
IMM GRANULOCYTES NFR BLD AUTO: 0 % (ref 0–2)
LDLC SERPL CALC-MCNC: 112 MG/DL (ref 0–100)
LYMPHOCYTES # BLD AUTO: 1.74 THOUSANDS/ΜL (ref 0.6–4.47)
LYMPHOCYTES NFR BLD AUTO: 31 % (ref 14–44)
MCH RBC QN AUTO: 28.4 PG (ref 26.8–34.3)
MCHC RBC AUTO-ENTMCNC: 33.6 G/DL (ref 31.4–37.4)
MCV RBC AUTO: 84 FL (ref 82–98)
MONOCYTES # BLD AUTO: 0.64 THOUSAND/ΜL (ref 0.17–1.22)
MONOCYTES NFR BLD AUTO: 11 % (ref 4–12)
NEUTROPHILS # BLD AUTO: 2.96 THOUSANDS/ΜL (ref 1.85–7.62)
NEUTS SEG NFR BLD AUTO: 52 % (ref 43–75)
NONHDLC SERPL-MCNC: 136 MG/DL
NRBC BLD AUTO-RTO: 0 /100 WBCS
PLATELET # BLD AUTO: 227 THOUSANDS/UL (ref 149–390)
PMV BLD AUTO: 9.2 FL (ref 8.9–12.7)
RBC # BLD AUTO: 5.46 MILLION/UL (ref 3.88–5.62)
TRIGL SERPL-MCNC: 121 MG/DL
WBC # BLD AUTO: 5.69 THOUSAND/UL (ref 4.31–10.16)

## 2021-10-15 PROCEDURE — 86803 HEPATITIS C AB TEST: CPT

## 2021-10-15 PROCEDURE — 85025 COMPLETE CBC W/AUTO DIFF WBC: CPT

## 2021-10-15 PROCEDURE — 80061 LIPID PANEL: CPT

## 2021-10-15 PROCEDURE — 36415 COLL VENOUS BLD VENIPUNCTURE: CPT

## 2021-10-19 ENCOUNTER — TELEMEDICINE (OUTPATIENT)
Dept: FAMILY MEDICINE CLINIC | Facility: HOSPITAL | Age: 71
End: 2021-10-19
Payer: MEDICARE

## 2021-10-19 VITALS — HEIGHT: 67 IN | BODY MASS INDEX: 30.29 KG/M2 | WEIGHT: 193 LBS

## 2021-10-19 DIAGNOSIS — R05.9 COUGH: Primary | ICD-10-CM

## 2021-10-19 PROBLEM — Z47.1 AFTERCARE FOLLOWING RIGHT SHOULDER JOINT REPLACEMENT SURGERY: Status: RESOLVED | Noted: 2021-08-23 | Resolved: 2021-10-19

## 2021-10-19 PROBLEM — Z96.621 AFTERCARE FOLLOWING RIGHT ELBOW JOINT REPLACEMENT SURGERY: Status: RESOLVED | Noted: 2021-06-14 | Resolved: 2021-10-19

## 2021-10-19 PROBLEM — Z47.1 AFTERCARE FOLLOWING RIGHT ELBOW JOINT REPLACEMENT SURGERY: Status: RESOLVED | Noted: 2021-06-14 | Resolved: 2021-10-19

## 2021-10-19 PROBLEM — Z96.611 AFTERCARE FOLLOWING RIGHT SHOULDER JOINT REPLACEMENT SURGERY: Status: RESOLVED | Noted: 2021-08-23 | Resolved: 2021-10-19

## 2021-10-19 PROCEDURE — 99442 PR PHYS/QHP TELEPHONE EVALUATION 11-20 MIN: CPT | Performed by: INTERNAL MEDICINE

## 2021-10-19 PROCEDURE — U0003 INFECTIOUS AGENT DETECTION BY NUCLEIC ACID (DNA OR RNA); SEVERE ACUTE RESPIRATORY SYNDROME CORONAVIRUS 2 (SARS-COV-2) (CORONAVIRUS DISEASE [COVID-19]), AMPLIFIED PROBE TECHNIQUE, MAKING USE OF HIGH THROUGHPUT TECHNOLOGIES AS DESCRIBED BY CMS-2020-01-R: HCPCS | Performed by: INTERNAL MEDICINE

## 2021-10-19 PROCEDURE — U0005 INFEC AGEN DETEC AMPLI PROBE: HCPCS | Performed by: INTERNAL MEDICINE

## 2021-10-19 RX ORDER — ALBUTEROL SULFATE 90 UG/1
2 AEROSOL, METERED RESPIRATORY (INHALATION) EVERY 4 HOURS PRN
Qty: 8 G | Refills: 1 | Status: SHIPPED | OUTPATIENT
Start: 2021-10-19 | End: 2022-05-10 | Stop reason: ALTCHOICE

## 2021-10-20 ENCOUNTER — TELEPHONE (OUTPATIENT)
Dept: FAMILY MEDICINE CLINIC | Facility: HOSPITAL | Age: 71
End: 2021-10-20

## 2021-10-20 DIAGNOSIS — J06.9 UPPER RESPIRATORY TRACT INFECTION, UNSPECIFIED TYPE: Primary | ICD-10-CM

## 2021-10-20 RX ORDER — AZITHROMYCIN 500 MG/1
500 TABLET, FILM COATED ORAL DAILY
Qty: 3 TABLET | Refills: 0 | Status: SHIPPED | OUTPATIENT
Start: 2021-10-20 | End: 2021-10-23

## 2021-11-23 DIAGNOSIS — E78.2 MIXED HYPERLIPIDEMIA: ICD-10-CM

## 2021-11-23 RX ORDER — SIMVASTATIN 40 MG
40 TABLET ORAL DAILY
Qty: 90 TABLET | Refills: 3 | Status: SHIPPED | OUTPATIENT
Start: 2021-11-23 | End: 2022-05-26 | Stop reason: ALTCHOICE

## 2022-01-03 ENCOUNTER — PREP FOR PROCEDURE (OUTPATIENT)
Dept: GASTROENTEROLOGY | Facility: CLINIC | Age: 72
End: 2022-01-03

## 2022-01-03 ENCOUNTER — TELEPHONE (OUTPATIENT)
Dept: GASTROENTEROLOGY | Facility: CLINIC | Age: 72
End: 2022-01-03

## 2022-01-03 DIAGNOSIS — Z12.11 SCREENING FOR COLON CANCER: Primary | ICD-10-CM

## 2022-01-19 ENCOUNTER — TELEPHONE (OUTPATIENT)
Dept: GASTROENTEROLOGY | Facility: CLINIC | Age: 72
End: 2022-01-19

## 2022-01-19 NOTE — TELEPHONE ENCOUNTER
Scheduled date of colonoscopy (as of today): 1/26/22  Physician performing colonoscopy: Dr Pearly Sandhoff  Location of colonoscopy: [facility]  Bowel prep reviewed with patient: Miralax  Instructions reviewed with patient by: Ana Rosa Puente  Clearances: none

## 2022-01-25 ENCOUNTER — ANESTHESIA EVENT (OUTPATIENT)
Dept: GASTROENTEROLOGY | Facility: AMBULATORY SURGERY CENTER | Age: 72
End: 2022-01-25

## 2022-01-26 ENCOUNTER — HOSPITAL ENCOUNTER (OUTPATIENT)
Dept: GASTROENTEROLOGY | Facility: AMBULATORY SURGERY CENTER | Age: 72
Discharge: HOME | End: 2022-01-26
Payer: MEDICARE

## 2022-01-26 ENCOUNTER — ANESTHESIA (OUTPATIENT)
Dept: GASTROENTEROLOGY | Facility: AMBULATORY SURGERY CENTER | Age: 72
End: 2022-01-26

## 2022-01-26 VITALS
OXYGEN SATURATION: 97 % | DIASTOLIC BLOOD PRESSURE: 63 MMHG | WEIGHT: 190 LBS | HEIGHT: 68 IN | SYSTOLIC BLOOD PRESSURE: 106 MMHG | BODY MASS INDEX: 28.79 KG/M2 | TEMPERATURE: 96.7 F | HEART RATE: 57 BPM | RESPIRATION RATE: 13 BRPM

## 2022-01-26 DIAGNOSIS — Z12.11 SCREENING FOR COLON CANCER: ICD-10-CM

## 2022-01-26 PROCEDURE — 88305 TISSUE EXAM BY PATHOLOGIST: CPT | Performed by: PATHOLOGY

## 2022-01-26 PROCEDURE — 45380 COLONOSCOPY AND BIOPSY: CPT | Performed by: INTERNAL MEDICINE

## 2022-01-26 RX ORDER — PROPOFOL 10 MG/ML
INJECTION, EMULSION INTRAVENOUS AS NEEDED
Status: DISCONTINUED | OUTPATIENT
Start: 2022-01-26 | End: 2022-01-26

## 2022-01-26 RX ORDER — SODIUM CHLORIDE, SODIUM LACTATE, POTASSIUM CHLORIDE, CALCIUM CHLORIDE 600; 310; 30; 20 MG/100ML; MG/100ML; MG/100ML; MG/100ML
50 INJECTION, SOLUTION INTRAVENOUS CONTINUOUS
Status: DISCONTINUED | OUTPATIENT
Start: 2022-01-26 | End: 2022-01-30 | Stop reason: HOSPADM

## 2022-01-26 RX ORDER — EPHEDRINE SULFATE 50 MG/ML
INJECTION INTRAVENOUS AS NEEDED
Status: DISCONTINUED | OUTPATIENT
Start: 2022-01-26 | End: 2022-01-26

## 2022-01-26 RX ORDER — LIDOCAINE HYDROCHLORIDE 10 MG/ML
INJECTION, SOLUTION EPIDURAL; INFILTRATION; INTRACAUDAL; PERINEURAL AS NEEDED
Status: DISCONTINUED | OUTPATIENT
Start: 2022-01-26 | End: 2022-01-26

## 2022-01-26 RX ADMIN — EPHEDRINE SULFATE 5 MG: 50 INJECTION INTRAVENOUS at 07:53

## 2022-01-26 RX ADMIN — PROPOFOL 50 MG: 10 INJECTION, EMULSION INTRAVENOUS at 07:39

## 2022-01-26 RX ADMIN — PROPOFOL 50 MG: 10 INJECTION, EMULSION INTRAVENOUS at 07:50

## 2022-01-26 RX ADMIN — PROPOFOL 50 MG: 10 INJECTION, EMULSION INTRAVENOUS at 07:55

## 2022-01-26 RX ADMIN — PROPOFOL 50 MG: 10 INJECTION, EMULSION INTRAVENOUS at 07:36

## 2022-01-26 RX ADMIN — PROPOFOL 50 MG: 10 INJECTION, EMULSION INTRAVENOUS at 07:42

## 2022-01-26 RX ADMIN — LIDOCAINE HYDROCHLORIDE 50 MG: 10 INJECTION, SOLUTION EPIDURAL; INFILTRATION; INTRACAUDAL; PERINEURAL at 07:30

## 2022-01-26 RX ADMIN — SODIUM CHLORIDE, SODIUM LACTATE, POTASSIUM CHLORIDE, CALCIUM CHLORIDE 50 ML/HR: 600; 310; 30; 20 INJECTION, SOLUTION INTRAVENOUS at 07:11

## 2022-01-26 RX ADMIN — EPHEDRINE SULFATE 5 MG: 50 INJECTION INTRAVENOUS at 07:38

## 2022-01-26 RX ADMIN — PROPOFOL 100 MG: 10 INJECTION, EMULSION INTRAVENOUS at 07:30

## 2022-01-26 RX ADMIN — PROPOFOL 50 MG: 10 INJECTION, EMULSION INTRAVENOUS at 07:45

## 2022-01-26 RX ADMIN — PROPOFOL 50 MG: 10 INJECTION, EMULSION INTRAVENOUS at 07:33

## 2022-01-26 NOTE — ANESTHESIA PREPROCEDURE EVALUATION
Procedure:  COLONOSCOPY  PONV with Major  Surgery  Relevant Problems   CARDIO   (+) Hypercholesterolemia      GI/HEPATIC   (+) Gastroesophageal reflux disease without esophagitis   (+) Paraesophageal hernia      /RENAL   (+) Benign nodular prostatic hyperplasia with lower urinary tract symptoms   (+) Nephrolithiasis      MUSCULOSKELETAL   (+) Low back pain      NEURO/PSYCH   (+) Chronic pain syndrome      PULMONARY   (+) Asthma   Stable w/o Meds needed for months     Physical Exam    Airway    Mallampati score: II  TM Distance: >3 FB  Neck ROM: full     Dental   No notable dental hx     Cardiovascular      Pulmonary      Other Findings        Anesthesia Plan  ASA Score- 3     Anesthesia Type- IV sedation with anesthesia with ASA Monitors  Additional Monitors:   Airway Plan:           Plan Factors-Exercise tolerance (METS): >4 METS  Chart reviewed  EKG reviewed  Patient is not a current smoker  Patient not instructed to abstain from smoking on day of procedure  Patient did not smoke on day of surgery  Induction- intravenous  Postoperative Plan-     Informed Consent- Anesthetic plan and risks discussed with patient  I personally reviewed this patient with the CRNA  Discussed and agreed on the Anesthesia Plan with the CRNA  Anastasiia Méndez

## 2022-01-26 NOTE — H&P
History and Physical - [facility]  Yonis Putnam 66 y o  male MRN: [id number]    HPI: Yonis Putnam is a 66y o  year old male who presents for colonoscopy for screening    REVIEW OF SYSTEMS: Per the HPI, and otherwise unremarkable      Historical Information   Past Medical History:   Diagnosis Date    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     Asthma     Chronic pain     back    GERD (gastroesophageal reflux disease)     Hiatal hernia 1994    Hyperlipidemia     Hypertension     Kidney stone     r stent- suppose to be removed Fri 7/13    PONV (postoperative nausea and vomiting)     Shortness of breath      Past Surgical History:   Procedure Laterality Date    ABDOMINAL ADHESION SURGERY N/A 7/11/2018    Procedure: LYSIS ADHESIONS EXTENSIVE;  Surgeon: Anatoliy Valdes MD;  [facility];  Service: General    ABDOMINAL SURGERY      BACK SURGERY      x2    CARPAL TUNNEL RELEASE      COLONOSCOPY      CYSTOSCOPY  08/07/2020    CYSTOSCOPY W/ URETERAL STENT REMOVAL  01/26/2018    ESOPHAGOGASTRODUODENOSCOPY N/A 7/11/2018    Procedure: ESOPHAGOGASTRODUODENOSCOPY (EGD) INTRAOPERATIVE ;  Surgeon: Anatoliy Valdes MD;  [facility];  Service: General    [place] [id number] Bilateral     1st MTP joint     HERNIA REPAIR     [address] [place] [address] N/A 7/11/2018    Procedure: REPAIR HERNIA INCISIONAL X2;  Surgeon: Anatoliy Valdes MD;  [facility];  Service: General    KNEE SURGERY Bilateral     LITHOTRIPSY      PERIPHERAL NEUROPLASTY OF FINGER / HAND / Max Graves      left middle finger    NM CYSTO/URETERO W/LITHOTRIPSY &INDWELL STENT INSRT Left 1/3/2018    Procedure: CYSTOSCOPY , RETROGRADE PYELOGRAM AND INSERTION STENT Jenny Ambrose;  Surgeon: Jr Beltran MD;  [facility];  Service: Urology    NM CYSTO/URETERO W/LITHOTRIPSY &INDWELL STENT INSRT Right 6/8/2018    Procedure: CYSTOSCOPY URETEROSCOPY WITH LITHOTRIPSY HOLMIUM LASER, RETROGRADE PYELOGRAM AND INSERTION STENT URETERAL;  Surgeon: Alka Ledesma MD;  [facility];  Service: Urology    Naomijoaosarah ESWL Left 1/18/2018    Procedure: ESWL;  Surgeon: Alka Ledesma MD;  [facility];  Service: Urology    MN LAP, REPAIR PARAESOPHAGEAL HERNIA, INCL FUNDOPLASTY W/ MESH N/A 7/11/2018    Procedure: REPAIR RECURRENT HERNIA PARAESOPHAGEAL  LAPAROSCOPIC;KLAUDIA Hutson Cera;  Surgeon: Gardiner Sicard, MD;  [facility];  Service: General    MN RECONSTR TOTAL SHOULDER IMPLANT Right 6/1/2021    Procedure: TOTAL SHOULDER ARTHROPLASTY ANATOMIC, biceps tenodesis;   Surgeon: Vero Robbins MD;  [facility];  Service: Orthopedics    ROTATOR CUFF REPAIR Right     SHOULDER ARTHROSCOPY Bilateral     SHOULDER SURGERY       Social History   Social History     Substance and Sexual Activity   Alcohol Use Yes    Alcohol/week: 1 0 standard drink    Types: 1 Cans of beer per week    Comment: once a month      Social History     Substance and Sexual Activity   Drug Use No     Social History     Tobacco Use   Smoking Status Never Smoker   Smokeless Tobacco Never Used     Family History   Problem Relation Age of Onset    Diabetes Mother     Hypertension Mother    Linda Chun Brain cancer Mother     Hypertension Sister     Nephrolithiasis Sister     COPD Father     Cancer Maternal Grandmother     Aortic aneurysm Maternal Uncle         x3 maternal uncles        Meds/Allergies       Current Outpatient Medications:     Coenzyme Q10 (CO Q 10) 100 MG CAPS    finasteride (PROSCAR) 5 mg tablet    losartan (Cozaar) 50 mg tablet    omeprazole (PriLOSEC) 20 mg delayed release capsule    simvastatin (ZOCOR) 40 mg tablet    tamsulosin (FLOMAX) 0 4 mg    albuterol (ProAir HFA) 90 mcg/act inhaler    fluticasone (FLONASE) 50 mcg/act nasal spray    zolpidem (AMBIEN) 10 mg tablet    Current Facility-Administered Medications:     lactated ringers infusion, 50 mL/hr, Intravenous, Continuous, Restarted at 01/26/22 5611    Allergies   Allergen Reactions    Meperidine GI Intolerance    Horse-Derived Products     Lisinopril Cough     Other reaction(s): Cough       Objective     /74   Pulse 55   Temp (!) 96 7 °F (35 9 °C) (Temporal)   Resp 21   Ht 5' 8" (1 727 m)   Wt 86 2 kg (190 lb)   SpO2 98%   BMI 28 89 kg/m²     PHYSICAL EXAM    Gen: NAD AAOx3  Head: Normocephalic, Atraumatic  CV: S1S2 RRR no m/r/g  CHEST: Clear b/l no c/r/w  ABD: soft, +BS NT/ND  EXT: no edema    ASSESSMENT/PLAN:  This is a 66y o  year old male here for colonoscopy, and he is stable and optimized for his procedure

## 2022-01-26 NOTE — ANESTHESIA POSTPROCEDURE EVALUATION
Post-Op Assessment Note    CV Status:  Stable  Pain Score: 0    Pain management: adequate     Mental Status:  Somnolent   Hydration Status:  Stable   PONV Controlled:  None   Airway Patency:  Patent      Post Op Vitals Reviewed: Yes      Staff: CRNA         No complications documented      BP      Temp     Pulse     Resp      SpO2

## 2022-01-26 NOTE — DISCHARGE INSTRUCTIONS
Hemorrhoids   WHAT YOU NEED TO KNOW:   What are hemorrhoids? Hemorrhoids are swollen blood vessels inside your rectum (internal hemorrhoids) or on your anus (external hemorrhoids)  Sometimes a hemorrhoid may prolapse  This means it extends out of your anus  What increases my risk for hemorrhoids? · Pregnancy or obesity    · Straining or sitting for a long time during bowel movements    · Liver disease    · Weak muscles around the anus caused by older age, rectal surgery, or anal intercourse    · A lack of physical activity    · Chronic diarrhea or constipation    · A low-fiber diet    What are the signs and symptoms of hemorrhoids? · Pain or itching around your anus or inside your rectum    · Swelling or bumps around your anus    · Bright red blood in your bowel movement, on the toilet paper, or in the toilet bowl    · Tissue bulging out of your anus (prolapsed hemorrhoids)    · Incontinence (poor control over urine or bowel movements)    How are hemorrhoids diagnosed? Your healthcare provider will ask about your symptoms, the foods you eat, and your bowel movements  He or she will examine your anus for external hemorrhoids  You may need the following:  · A digital rectal exam  is a test to check for hemorrhoids  Your healthcare provider will put a gloved finger inside your anus to feel for the hemorrhoids  · An anoscopy  is a test that uses a scope (small tube with a light and camera on the end) to look at your hemorrhoids  How are hemorrhoids treated? Treatment will depend on your symptoms  You may need any of the following:  · Medicines  can help decrease pain and swelling, and soften your bowel movement  The medicine may be a pill, pad, cream, or ointment  · Procedures  may be used to shrink or remove your hemorrhoid  Examples include rubber-band ligation, sclerotherapy, and photocoagulation  These procedures may be done in your healthcare provider's office   Ask your healthcare provider for more information about these procedures  · Surgery  may be needed to shrink or remove your hemorrhoids  How can I manage my symptoms? · Apply ice on your anus for 15 to 20 minutes every hour or as directed  Use an ice pack, or put crushed ice in a plastic bag  Cover it with a towel before you apply it to your anus  Ice helps prevent tissue damage and decreases swelling and pain  · Take a sitz bath  Fill a bathtub with 4 to 6 inches of warm water  You may also use a sitz bath pan that fits inside a toilet bowl  Sit in the sitz bath for 15 minutes  Do this 3 times a day, and after each bowel movement  The warm water can help decrease pain and swelling  · Keep your anal area clean  Gently wash the area with warm water daily  Soap may irritate the area  After a bowel movement, wipe with moist towelettes or wet toilet paper  Dry toilet paper can irritate the area  How can I help prevent hemorrhoids? · Do not strain to have a bowel movement  Do not sit on the toilet too long  These actions can increase pressure on the tissues in your rectum and anus  · Drink plenty of liquids  Liquids can help prevent constipation  Ask how much liquid to drink each day and which liquids are best for you  · Eat a variety of high-fiber foods  Examples include fruits, vegetables, and whole grains  Ask your healthcare provider how much fiber you need each day  You may need to take a fiber supplement  · Exercise as directed  Exercise, such as walking, may make it easier to have a bowel movement  Ask your healthcare provider to help you create an exercise plan  · Do not have anal sex  Anal sex can weaken the skin around your rectum and anus  · Avoid heavy lifting  This can cause straining and increase your risk for another hemorrhoid  When should I seek immediate care? · You have severe pain in your rectum or around your anus      · You have severe pain in your abdomen and you are vomiting  · You have bleeding from your anus that soaks through your underwear  When should I contact my healthcare provider? · You have frequent and painful bowel movements  · Your hemorrhoid looks or feels more swollen than usual      · You do not have a bowel movement for 2 days or more  · You see or feel tissue coming through your anus  · You have questions or concerns about your condition or care  CARE AGREEMENT:   You have the right to help plan your care  Learn about your health condition and how it may be treated  Discuss treatment options with your healthcare providers to decide what care you want to receive  You always have the right to refuse treatment  The above information is an  only  It is not intended as medical advice for individual conditions or treatments  Talk to your doctor, nurse or pharmacist before following any medical regimen to see if it is safe and effective for you  © Copyright [employer] 2021 Information is for End User's use only and may not be sold, redistributed or otherwise used for commercial purposes  All illustrations and images included in CareNotes® are the copyrighted property of A [employer] A M , Inc  or [address] [place] [address] Erika   Colorectal Polyps   WHAT YOU NEED TO KNOW:   What are colorectal polyps? Colorectal polyps are small growths of tissue in the lining of the colon and rectum  Most polyps are usually benign (not cancer)  Certain types of polyps, called adenomatous polyps, may turn into cancer  What increases my risk for colorectal polyps? The exact cause of colorectal polyps is unknown   The following may increase your risk:  · Older age    · Foods high in fat and low in fiber    · Family history of polyps    · Intestinal diseases, such as Crohn disease or ulcerative colitis    · Smoking cigarettes or drinking alcohol    · Lack of physical activity, such as exercise    · Obesity    What are the signs and symptoms of colorectal polyps? · Blood in your bowel movement or bleeding from the rectum    · Change in bowel movement habits, such as diarrhea or constipation    · Abdominal pain    What do I need to know about colorectal polyp screening and diagnosis? Screening means you are checked for polyps that may be cancer, even if you do not have signs or symptoms  Screening is recommended starting at age 48 and continuing to age 76 if you are at average risk  Your healthcare provider may suggest screening starting at age 39  Screening may start before you are 45 or continue after you are 75 if your risk is high  Your provider will tell you how often to get screened  Timing depends on the type of screening and if polyps are found  Timing also depends on your age and if you are at increased risk for cancer  Screening may be recommended every 1, 2, 5, or 10 years  Your healthcare provider will need to test polyps to find out if they are cancer  Any of the following may be used to find polyps:  · A digital rectal exam  means your provider will use a finger to check for polyps  · A barium enema  is an x-ray of the colon  A tube is put into your anus, and a liquid called barium is put through the tube  Barium is used so that healthcare providers can see your colon better on the x-ray film  · A virtual colonoscopy  is a CT scan that takes pictures of the inside of your colon and rectum  A small, flexible tube is put into your rectum and air or carbon dioxide (gas) is used to expand your colon  This lets healthcare providers clearly see your colon and any polyps on a monitor  · Colonoscopy or sigmoidoscopy  are procedures to help your provider see the inside of your colon  He or she will use a flexible tube with a small light and camera on the end  During a sigmoidoscopy, your provider will only look at rectum and lower colon  During a colonoscopy, he or she will look at the full length of your colon   A small amount of tissue may be removed from your colon for tests  How are colorectal polyps treated? Small, benign polyps may not need treatment  Your healthcare provider will check the polyp over time to make sure it is not changing  Polyps that are cancer may be removed with one of the following:  · A polypectomy  is a minimally invasive procedure to remove a polyp during a colonoscopy or sigmoidoscopy  Your healthcare provider may need to remove the polyp with a laparoscope  Laparoscopy is done by inserting a small, flexible scope into incisions made on your abdomen  · Surgery  may be needed to remove large or deep polyps that cannot be removed in a polypectomy  Tissues or lymph nodes near a polyp may also be removed  This helps stop cancer from spreading  What can I do to lower my risk for colorectal polyps? · Eat a variety of healthy foods  Healthy foods include fruit, vegetables, whole-grain breads, low-fat dairy products, beans, lean meat, and fish  Ask if you need to be on a special diet  · Maintain a healthy weight  Ask your healthcare provider what a healthy weight is for you  Ask him or her to help with a weight loss plan if you are overweight  · Exercise as directed  Begin to exercise slowly and do more as you get stronger  Talk with your healthcare provider before you start an exercise program          · Limit alcohol  Your risk for polyps increases the more you drink  · Do not smoke  Nicotine and other chemicals in cigarettes and cigars increase your risk for polyps  Ask your healthcare provider for information if you currently smoke and need help to quit  E-cigarettes or smokeless tobacco still contain nicotine  Talk to your healthcare provider before you use these products  Where can I find more information? · [facility] ([facility]) [address], [place] [address]  Phone: [phone] 005 - [phone]  Web Address: Earl Rizzo  [facility] gov    Call your local emergency number (911 in the [address]) if:   · You have sudden shortness of breath  · You have a fast heart rate, fast breathing, or are too dizzy to stand up  When should I seek immediate care? · You have severe abdominal pain  · You see blood in your bowel movement  When should I call my doctor? · You have a fever  · You have chills, a cough, or feel weak and achy  · You have abdominal pain that does not go away or gets worse after you take medicine  · Your abdomen is swollen  · You are losing weight without trying  · You have questions or concerns about your condition or care  CARE AGREEMENT:   You have the right to help plan your care  Learn about your health condition and how it may be treated  Discuss treatment options with your healthcare providers to decide what care you want to receive  You always have the right to refuse treatment  The above information is an  only  It is not intended as medical advice for individual conditions or treatments  Talk to your doctor, nurse or pharmacist before following any medical regimen to see if it is safe and effective for you  © Copyright [employer] 2021 Information is for End User's use only and may not be sold, redistributed or otherwise used for commercial purposes  All illustrations and images included in CareNotes® are the copyrighted property of [employer]  or [address] [place] [address] Erika   Colonoscopy   WHAT YOU NEED TO KNOW:   A colonoscopy is a procedure to examine the inside of your colon (intestine) with a scope  Polyps or tissue growths may have been removed during your colonoscopy  It is normal to feel bloated and to have some abdominal discomfort  You should be passing gas  If you have hemorrhoids or you had polyps removed, you may have a small amount of bleeding  DISCHARGE INSTRUCTIONS:   Seek care immediately if:    You have sudden, severe abdominal pain   You have problems swallowing        You have a large amount of black, sticky bowel movements or blood in your bowel movements   You have sudden trouble breathing   You feel weak, lightheaded, or faint or your heart beats faster than normal for you  Contact your healthcare provider if:    You have a fever and chills   You have nausea or are vomiting   Your abdomen is bloated or feels full and hard   You have abdominal pain   You have black, sticky bowel movements or blood in your bowel movements   You have not had a bowel movement for 3 days after your procedure   You have rash or hives   You have questions or concerns about your procedure  Activity:    Do not lift, strain, or run for 24 hours after your procedure   Rest after your procedure  You have been given medicine to relax you  Do not drive or make important decisions until the day after your procedure  Return to your normal activity as directed   Relieve gas and discomfort from bloating by lying on your right side with a heating pad on your abdomen  You may need to take short walks to help the gas move out  Eat small meals until bloating is relieved  Follow up with your healthcare provider as directed: Write down your questions so you remember to ask them during your visits  If you take a blood thinner, please review the specific instructions from your endoscopist about when you should resume it  These can be found in the Recommendation and Your Medication list sections of this After Visit Summary

## 2022-01-31 DIAGNOSIS — N13.8 ENLARGED PROSTATE WITH URINARY OBSTRUCTION: ICD-10-CM

## 2022-01-31 DIAGNOSIS — N40.1 ENLARGED PROSTATE WITH URINARY OBSTRUCTION: ICD-10-CM

## 2022-01-31 RX ORDER — TAMSULOSIN HYDROCHLORIDE 0.4 MG/1
CAPSULE ORAL
Qty: 90 CAPSULE | Refills: 3 | Status: SHIPPED | OUTPATIENT
Start: 2022-01-31

## 2022-02-03 DIAGNOSIS — Z83.3 FAMILY HISTORY OF DIABETES MELLITUS IN MOTHER: ICD-10-CM

## 2022-02-03 RX ORDER — LOSARTAN POTASSIUM 50 MG/1
50 TABLET ORAL DAILY
Qty: 90 TABLET | Refills: 3 | Status: SHIPPED | OUTPATIENT
Start: 2022-02-03

## 2022-04-11 ENCOUNTER — RA CDI HCC (OUTPATIENT)
Dept: OTHER | Facility: HOSPITAL | Age: 72
End: 2022-04-11

## 2022-04-11 NOTE — PROGRESS NOTES
[address]  coding opportunities       Chart reviewed, no opportunity found: CHART REVIEWED, NO OPPORTUNITY FOUND        Patients Insurance     Medicare Insurance: Medicare

## 2022-04-15 ENCOUNTER — OFFICE VISIT (OUTPATIENT)
Dept: FAMILY MEDICINE CLINIC | Facility: HOSPITAL | Age: 72
End: 2022-04-15
Payer: MEDICARE

## 2022-04-15 ENCOUNTER — HOSPITAL ENCOUNTER (OUTPATIENT)
Dept: RADIOLOGY | Facility: HOSPITAL | Age: 72
Discharge: HOME | End: 2022-04-15
Attending: INTERNAL MEDICINE
Payer: MEDICARE

## 2022-04-15 VITALS
OXYGEN SATURATION: 93 % | HEART RATE: 63 BPM | HEIGHT: 68 IN | WEIGHT: 192 LBS | BODY MASS INDEX: 29.1 KG/M2 | SYSTOLIC BLOOD PRESSURE: 142 MMHG | DIASTOLIC BLOOD PRESSURE: 96 MMHG

## 2022-04-15 DIAGNOSIS — E78.00 HYPERCHOLESTEROLEMIA: Primary | ICD-10-CM

## 2022-04-15 DIAGNOSIS — G89.29 CHRONIC PAIN OF LEFT ANKLE: ICD-10-CM

## 2022-04-15 DIAGNOSIS — M25.572 CHRONIC PAIN OF LEFT ANKLE: ICD-10-CM

## 2022-04-15 DIAGNOSIS — R19.5 LOOSE STOOLS: ICD-10-CM

## 2022-04-15 DIAGNOSIS — L30.9 DERMATITIS: ICD-10-CM

## 2022-04-15 DIAGNOSIS — L98.9 SKIN LESION: ICD-10-CM

## 2022-04-15 PROCEDURE — 73610 X-RAY EXAM OF ANKLE: CPT

## 2022-04-15 PROCEDURE — 99214 OFFICE O/P EST MOD 30 MIN: CPT | Performed by: INTERNAL MEDICINE

## 2022-04-15 RX ORDER — DICLOFENAC POTASSIUM 50 MG/1
50 TABLET, FILM COATED ORAL 3 TIMES DAILY
Qty: 90 TABLET | Refills: 1 | Status: SHIPPED | OUTPATIENT
Start: 2022-04-15 | End: 2022-04-15 | Stop reason: CLARIF

## 2022-04-15 RX ORDER — MOMETASONE FUROATE 1 MG/G
CREAM TOPICAL DAILY
Qty: 45 G | Refills: 2 | Status: SHIPPED | OUTPATIENT
Start: 2022-04-15

## 2022-04-15 NOTE — PROGRESS NOTES
Assessment/Plan:             Problem List Items Addressed This Visit        Other    Hypercholesterolemia - Primary     Simvastatin 40 mg daily- last labs         Relevant Orders    Comprehensive metabolic panel    Lipid Panel with Direct LDL reflex      Other Visit Diagnoses     Chronic pain of left ankle        Relevant Orders    Ambulatory referral to Orthopedic Surgery    XR ankle 3+ vw left    Loose stools        Relevant Orders    CBC and differential    Skin lesion        Relevant Orders    Lyme Total Antibody Profile with reflex to WB            Subjective:      Patient ID: Baron Yung is a 66 y o  male    1  New elevation in bp- repeat 138/ 74   2  Cerumen Impaction - went to urgent care in [place]  3 hyperlipidemia-will repeat levels  4  Left arm  Lesion x 3 weeks? Insect bite - if not improving will have him see dermatology  Will get xrays of ankles today- mother had gout      The following portions of the patient's history were reviewed and updated as appropriate: allergies, current medications and problem list      Review of Systems   Constitutional: Negative for fatigue  Respiratory: Negative for cough  Cardiovascular: Negative for chest pain  Gastrointestinal: Negative for abdominal pain  Musculoskeletal: Positive for arthralgias  Ankle pain   Neurological:        Ongoing scaling in ears - uses mometasone   All other systems reviewed and are negative          Objective:      Current Outpatient Medications:     Coenzyme Q10 (CO Q 10) 100 MG CAPS, Take by mouth, Disp: , Rfl:     finasteride (PROSCAR) 5 mg tablet, Take 1 tablet (5 mg total) by mouth daily, Disp: 90 tablet, Rfl: 3    fluticasone (FLONASE) 50 mcg/act nasal spray, 2 sprays into each nostril daily (Patient taking differently: 2 sprays into each nostril daily as needed ), Disp: 1 Bottle, Rfl: 5    losartan (Cozaar) 50 mg tablet, Take 1 tablet (50 mg total) by mouth daily, Disp: 90 tablet, Rfl: 3    omeprazole (PriLOSEC) 20 mg delayed release capsule, take 1 capsule by mouth once daily, Disp: 30 capsule, Rfl: 11    simvastatin (ZOCOR) 40 mg tablet, Take 1 tablet (40 mg total) by mouth daily, Disp: 90 tablet, Rfl: 3    tamsulosin (FLOMAX) 0 4 mg, take 1 capsule by mouth once daily, Disp: 90 capsule, Rfl: 3    zolpidem (AMBIEN) 10 mg tablet, Take 1 tablet (10 mg total) by mouth daily at bedtime as needed for sleep, Disp: 30 tablet, Rfl: 1    albuterol (ProAir HFA) 90 mcg/act inhaler, Inhale 2 puffs every 4 (four) hours as needed for wheezing (Patient not taking: Reported on 4/15/2022 ), Disp: 8 g, Rfl: 1    Blood pressure 142/96, pulse 63, height 5' 8" (1 727 m), weight 87 1 kg (192 lb), SpO2 93 %  Physical Exam  Vitals and nursing note reviewed  Constitutional:       General: He is not in acute distress  HENT:      Right Ear: There is no impacted cerumen  Left Ear: There is no impacted cerumen  Ears:      Comments: Scaling in external canals  Cardiovascular:      Rate and Rhythm: Normal rate and regular rhythm  Heart sounds: No murmur heard  Pulmonary:      Breath sounds: No wheezing or rhonchi  Abdominal:      General: Abdomen is flat  Palpations: Abdomen is soft  Tenderness: There is no guarding or rebound  Musculoskeletal:         General: Tenderness present  No swelling or deformity  Cervical back: No rigidity or tenderness  Comments: tenderness bilateral ankles   Skin:     Findings: No erythema  Neurological:      General: No focal deficit present  Mental Status: He is oriented to person, place, and time  Motor: No weakness  Coordination: Coordination normal    Psychiatric:         Mood and Affect: Mood normal          Thought Content:  Thought content normal

## 2022-04-21 ENCOUNTER — OFFICE VISIT (OUTPATIENT)
Dept: OBGYN CLINIC | Facility: CLINIC | Age: 72
End: 2022-04-21
Payer: MEDICARE

## 2022-04-21 VITALS
BODY MASS INDEX: 29.25 KG/M2 | DIASTOLIC BLOOD PRESSURE: 78 MMHG | SYSTOLIC BLOOD PRESSURE: 128 MMHG | HEIGHT: 68 IN | WEIGHT: 193 LBS

## 2022-04-21 DIAGNOSIS — G89.29 CHRONIC PAIN OF LEFT ANKLE: Primary | ICD-10-CM

## 2022-04-21 DIAGNOSIS — M25.572 CHRONIC PAIN OF LEFT ANKLE: Primary | ICD-10-CM

## 2022-04-21 DIAGNOSIS — S93.492A SPRAIN OF ANTERIOR TALOFIBULAR LIGAMENT OF LEFT ANKLE, INITIAL ENCOUNTER: ICD-10-CM

## 2022-04-21 DIAGNOSIS — M25.372 ANKLE INSTABILITY, LEFT: ICD-10-CM

## 2022-04-21 PROCEDURE — 99213 OFFICE O/P EST LOW 20 MIN: CPT | Performed by: PHYSICIAN ASSISTANT

## 2022-04-21 NOTE — PROGRESS NOTES
Orthopaedic Surgery - Office Note  Yaron Boone (1 y o  male)   : 1950   MRN: [id number]  Encounter Date: 2022    Chief Complaint   Patient presents with    Left Ankle - Pain         Assessment/Plan  Diagnoses and all orders for this visit:    Chronic pain of left ankle  -     Ambulatory referral to Orthopedic Surgery    Ankle instability, left    Sprain of anterior talofibular ligament of left ankle, initial encounter    The diagnosis as well as treatment options were reviewed with the patient in the office today  Advised him I would not recommend any type of surgical consideration at this time and I cannot appreciated OCD lesion on the x-ray  He was advised he is chronically sprained this ankle many times without any significant therapy to stabilize strength in and maintain a normal range of motion in the ankle  He will benefit from a formal physical therapy program over the next 3-4 weeks  If symptoms do not improve at that time we could consider an MRI  He may ice the ankle 20 minutes on 1 hour off 3 times a day  He may continue the over-the-counter splints on the days he is working but I would recommend he improve the stabilization of the ankle through the physical therapy and home exercise program as opposed to relying on a brace at this time  Return 4-5 weeks with Sports medicine or Dr Hope Gonzales  History of Present Illness  This is a new patient with ongoing left ankle pain  Patient reviewed the symptoms with the PCP who ordered x-rays which are available for review  Patient was also started on oral diclofenac  Patient reports a family history of gout  Patient reports he has had left ankle pain for several years  The pain is located in the lateral ankle and is worse after standing for long periods of time or acutely inverting the ankle  He reports he has had countless ankle sprains in this ankle that have never been formally treated    He states he has never had PT for the ankle  Patient reports he has an over-the-counter ankle support that when he wears it does nearly completely resolved his symptoms  He denies any calf pain or paresthesias  Review of Systems  Pertinent items are noted in HPI  All other systems were reviewed and are negative  Physical Exam  /78   Ht 5' 8" (1 727 m)   Wt 87 5 kg (193 lb)   BMI 29 35 kg/m²   Cons: Appears well  No apparent distress  Psych: Alert  Oriented x3  Mood and affect normal   Eyes: PERRLA, EOMI  Resp: Normal effort  No audible wheezing or stridor  CV: Palpable pulse  No discernable arrhythmia  Lymph:  No palpable cervical, axillary, or inguinal lymphadenopathy  Skin: Warm  No palpable masses  No visible lesions  Neuro: Normal muscle tone  Normal and symmetric DTR's  Left ankle has no skin breakdown lesions or signs of infection  He is tender to palpation of the ATFL  He has soft tissue edema in this region  He is nontender to palpation over the anterior ankle  He has no tenderness to palpation at the mediolateral malleolus  There is no calf tenderness negative Homans and no tenderness at the Achilles  No tenderness toe the calcaneus  He has no tenderness or swelling through the midfoot  The 5th metatarsal is nontender  He is neurovascularly intact in the left lower extremity  Gait is slightly antalgic favoring the left side  Patient is lacking a few degrees of ankle inversion and has 4/5 strength to ankle inversion and eversion  Patient has slight instability to anterior drawer with reproducible pain  Studies Reviewed  Study Result    Narrative & Impression   LEFT ANKLE     INDICATION:   M25 572: Pain in left ankle and joints of left foot  G89 29:  Other chronic pain      COMPARISON:  None     VIEWS:  XR ANKLE 3+ VW LEFT         FINDINGS:     There is no acute fracture or dislocation      No significant degenerative changes      No lytic or blastic osseous lesion      Soft tissues are unremarkable      IMPRESSION:     No acute osseous abnormality            Workstation performed: [id number]   Study Result    Narrative & Impression   RIGHT ANKLE     INDICATION:   M25 572: Pain in left ankle and joints of left foot  G89 29: Other chronic pain      COMPARISON:  None     VIEWS:  XR ANKLE 3+ VW RIGHT         FINDINGS:     There is no acute fracture or dislocation      No significant degenerative changes      No lytic or blastic osseous lesion      Soft tissues are unremarkable      IMPRESSION:     No acute osseous abnormality            Workstation performed: [id number]        X-ray images as well as PCP notes were reviewed by myself in the office today  X-ray reports were reviewed by myself in the office today  Procedures  No procedures today  Medical, Surgical, Family, and Social History  The patient's medical history, family history, and social history, were reviewed and updated as appropriate      Past Medical History:   Diagnosis Date    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     Asthma     Chronic pain     back    GERD (gastroesophageal reflux disease)     Hiatal hernia 1994    Hyperlipidemia     Hypertension     Kidney stone     r stent- suppose to be removed Fri 7/13    PONV (postoperative nausea and vomiting)     Shortness of breath        Past Surgical History:   Procedure Laterality Date    ABDOMINAL ADHESION SURGERY N/A 7/11/2018    Procedure: LYSIS ADHESIONS EXTENSIVE;  Surgeon: Elana Barnhart MD;  Location:  MAIN OR;  Service: General    ABDOMINAL SURGERY      BACK SURGERY      x2    CARPAL TUNNEL RELEASE      COLONOSCOPY      CYSTOSCOPY  08/07/2020    CYSTOSCOPY W/ URETERAL STENT REMOVAL  01/26/2018    ESOPHAGOGASTRODUODENOSCOPY N/A 7/11/2018    Procedure: ESOPHAGOGASTRODUODENOSCOPY (EGD) INTRAOPERATIVE ;  Surgeon: Elana Barnhart MD;  Location:  MAIN OR;  Service: General    HALLUX VALGUS CORRECTION Bilateral     1st MTP joint     HERNIA REPAIR      HIATAL HERNIA REPAIR  1994    INCISIONAL HERNIA REPAIR N/A 7/11/2018    Procedure: REPAIR HERNIA INCISIONAL X2;  Surgeon: Angelo Garcia MD;  [facility];  Service: General    KNEE SURGERY Bilateral     LITHOTRIPSY      PERIPHERAL NEUROPLASTY OF FINGER / HAND / Rhae Molt      left middle finger    DE CYSTO/URETERO W/LITHOTRIPSY &INDWELL STENT INSRT Left 1/3/2018    Procedure: Racheal Wright PYELOGRAM AND INSERTION STENT Governor Miguel;  Surgeon: Harleen Milan MD;  [facility];  Service: Urology    DE CYSTO/URETERO W/LITHOTRIPSY &INDWELL STENT INSRT Right 6/8/2018    Procedure: CYSTOSCOPY URETEROSCOPY WITH LITHOTRIPSY HOLMIUM LASER, RETROGRADE PYELOGRAM AND INSERTION STENT URETERAL;  Surgeon: Iker Rosa MD;  [facility];  Service: Urology    DE [address] [place] [address] Dr ESWL Left 1/18/2018    Procedure: ESWL;  Surgeon: Iker Rosa MD;  [facility];  Service: Urology    DE LAP, REPAIR PARAESOPHAGEAL HERNIA, INCL FUNDOPLASTY W/ MESH N/A 7/11/2018    Procedure: REPAIR RECURRENT HERNIA PARAESOPHAGEAL  LAPAROSCOPIC;KLAUDIA Bañuelos Necessary;  Surgeon: Angelo Garcia MD;  [facility];  Service: General    DE RECONSTR TOTAL SHOULDER IMPLANT Right 6/1/2021    Procedure: TOTAL SHOULDER ARTHROPLASTY ANATOMIC, biceps tenodesis;   Surgeon: Yordan Sawyer MD;  [facility];  Service: Orthopedics    ROTATOR CUFF REPAIR Right     SHOULDER ARTHROSCOPY Bilateral     SHOULDER SURGERY         Family History   Problem Relation Age of Onset    Diabetes Mother     Hypertension Mother     Brain cancer Mother     Hypertension Sister     Nephrolithiasis Sister     COPD Father     Cancer Maternal Grandmother     Aortic aneurysm Maternal Uncle         x3 maternal uncles        Social History     Occupational History    Not on file   Tobacco Use    Smoking status: Never Smoker    Smokeless tobacco: Never Used   Vaping Use    Vaping Use: Never used Substance and Sexual Activity    Alcohol use:  Yes     Alcohol/week: 1 0 standard drink     Types: 1 Cans of beer per week     Comment: once a month     Drug use: No    Sexual activity: Not Currently     Comment: flomax is helping        Allergies   Allergen Reactions    Meperidine GI Intolerance    Horse-Derived Products     Lisinopril Cough     Other reaction(s): Cough         Current Outpatient Medications:     albuterol (ProAir HFA) 90 mcg/act inhaler, Inhale 2 puffs every 4 (four) hours as needed for wheezing (Patient not taking: Reported on 4/15/2022 ), Disp: 8 g, Rfl: 1    Coenzyme Q10 (CO Q 10) 100 MG CAPS, Take by mouth, Disp: , Rfl:     diclofenac sodium (VOLTAREN) 50 mg EC tablet, Take 1 tablet (50 mg total) by mouth 2 (two) times a day, Disp: 60 tablet, Rfl: 5    finasteride (PROSCAR) 5 mg tablet, Take 1 tablet (5 mg total) by mouth daily, Disp: 90 tablet, Rfl: 3    fluticasone (FLONASE) 50 mcg/act nasal spray, 2 sprays into each nostril daily (Patient taking differently: 2 sprays into each nostril daily as needed ), Disp: 1 Bottle, Rfl: 5    losartan (Cozaar) 50 mg tablet, Take 1 tablet (50 mg total) by mouth daily, Disp: 90 tablet, Rfl: 3    mometasone (ELOCON) 0 1 % cream, Apply topically daily, Disp: 45 g, Rfl: 2    omeprazole (PriLOSEC) 20 mg delayed release capsule, take 1 capsule by mouth once daily, Disp: 30 capsule, Rfl: 11    simvastatin (ZOCOR) 40 mg tablet, Take 1 tablet (40 mg total) by mouth daily, Disp: 90 tablet, Rfl: 3    tamsulosin (FLOMAX) 0 4 mg, take 1 capsule by mouth once daily, Disp: 90 capsule, Rfl: 3    zolpidem (AMBIEN) 10 mg tablet, Take 1 tablet (10 mg total) by mouth daily at bedtime as needed for sleep, Disp: 30 tablet, Rfl: 1      Geoff Woodard PA-C

## 2022-04-21 NOTE — PATIENT INSTRUCTIONS
Ankle Strain   WHAT YOU NEED TO KNOW:   What is an ankle strain? An ankle strain is a twist, pull, or tear of a muscle or tendon in your ankle  An acute strain is a strain that happens suddenly  A chronic strain can happen over several days or weeks  A chronic strain can be caused by moving your ankle the same way over and over  What are the signs and symptoms of an ankle strain? · Pain and swelling in your ankle    · Trouble moving your ankle    · Muscle spasm, cramping, or weakness    · Bruising over your ankle    How is an ankle strain diagnosed? Your healthcare provider will ask you about your injury and examine you  Your healthcare provider will check the movement and strength of your joint  You may also need any of the following tests:  · An ultrasound  uses sound waves to show pictures of the muscles and tendons of your ankle on a monitor  An ultrasound may be done to see what type of strain you have  · An x-ray  may be done to check for broken bones in your ankle  How is an ankle strain treated? · A support device  , such as crutches or a brace, may be needed to decrease your pain as you move around  · NSAIDs , such as ibuprofen, help decrease swelling, pain, and fever  This medicine is available with or without a doctor's order  NSAIDs can cause stomach bleeding or kidney problems in certain people  If you take blood thinner medicine, always ask if NSAIDs are safe for you  Always read the medicine label and follow directions  Do not give these medicines to children under 10months of age without direction from your child's healthcare provider  · Acetaminophen  decreases pain  It is available without a doctor's order  Ask how much to take and how often to take it  Follow directions  Acetaminophen can cause liver damage if not taken correctly  · Physical therapy  may be recommended after your ankle strain has healed   A physical therapist teaches you exercises to help improve movement and strength, and to decrease pain  · Surgery  may be needed if you have a severe strain  How can I manage my symptoms? · Rest  your ankle so that it can heal  Return to normal activities as directed  · Apply ice on your ankle for 15 to 20 minutes every hour or as directed  Use an ice pack, or put crushed ice in a plastic bag  Cover it with a towel  Ice helps prevent tissue damage and decreases swelling and pain  · Compress  your ankle as directed  Ask your healthcare provider how to wrap an elastic bandage around your ankle  An elastic bandage provides support and helps decrease swelling and movement so your ankle can heal  Wear it as long as directed  · Elevate  your ankle above the level of your heart as often as you can  This will help decrease swelling and pain  Prop your ankle on pillows or blankets to keep it elevated comfortably  How can I prevent an ankle strain? · Always wear proper shoes when you play sports  Replace your old running shoes with new ones often if you are a runner  Use special shoe inserts or arch supports to correct leg or foot problems  Ask your healthcare provider for more information on shoe supports  · Do warm up and cool down exercises  Stretch before you work out or do sports activities  This will help loosen your muscles and prevent injury  Cool down and stretch after your workout  Do not stop and rest after a workout without cooling down first      · Do strength training exercises  Exercises such as weight lifting help keep your muscles flexible and strong  A physical therapist or  may help you with these exercises  · Slowly start your exercise or sports training program   Follow your healthcare provider's advice on when to start exercising  Slowly increase time, distance, and intensity of your exercises  Sudden increases in how often or how intensely you train may cause you to injure your muscle again  When should I seek immediate care? · You have severe pain in your ankle when you rest or put pressure on it  · Your foot or toes are cold or numb  · Your swelling has increased  When should I contact my healthcare provider? · Your pain or swelling do not go away, even after treatment  · You have questions or concerns about your condition or care  CARE AGREEMENT:   You have the right to help plan your care  Learn about your health condition and how it may be treated  Discuss treatment options with your healthcare providers to decide what care you want to receive  You always have the right to refuse treatment  The above information is an  only  It is not intended as medical advice for individual conditions or treatments  Talk to your doctor, nurse or pharmacist before following any medical regimen to see if it is safe and effective for you  © Copyright [employer] 2022 Information is for End User's use only and may not be sold, redistributed or otherwise used for commercial purposes   All illustrations and images included in CareNotes® are the copyrighted property of [employer]  or [address] [place] [address]

## 2022-04-29 ENCOUNTER — EVALUATION (OUTPATIENT)
Dept: PHYSICAL THERAPY | Facility: CLINIC | Age: 72
End: 2022-04-29
Payer: MEDICARE

## 2022-04-29 DIAGNOSIS — M25.372 ANKLE INSTABILITY, LEFT: ICD-10-CM

## 2022-04-29 DIAGNOSIS — S93.492D SPRAIN OF ANTERIOR TALOFIBULAR LIGAMENT OF LEFT ANKLE, SUBSEQUENT ENCOUNTER: ICD-10-CM

## 2022-04-29 DIAGNOSIS — G89.29 CHRONIC PAIN OF LEFT ANKLE: ICD-10-CM

## 2022-04-29 DIAGNOSIS — M25.572 CHRONIC PAIN OF LEFT ANKLE: ICD-10-CM

## 2022-04-29 PROCEDURE — 97161 PT EVAL LOW COMPLEX 20 MIN: CPT | Performed by: PHYSICAL THERAPIST

## 2022-04-29 NOTE — PROGRESS NOTES
PT Evaluation     Today's date: 2022  Patient name: Domi Jimenez  : 1950  MRN: [id number]  Referring provider: [place] Carlson*  Dx:   Encounter Diagnosis     ICD-10-CM    1  Chronic pain of left ankle  M25 572 Ambulatory Referral to Physical Therapy    G89 29    2  Ankle instability, left  M25 372 Ambulatory Referral to Physical Therapy   3  Sprain of anterior talofibular ligament of left ankle, subsequent encounter  S93 [id number] Ambulatory Referral to Physical Therapy       Start Time: 1325  Stop Time: 1410  Total time in clinic (min): 45 minutes    Assessment/Plan    The pt is a 66 yom presenting to PT w/ chronic instability in the L ankle due to past ankle sprains  The pt has decreased ankle ROM and strength  There is noticeable balance deficits from R vs  L; this is due to the instability the pt feels in the L ankle  Due to the pt's lifestyle they would benefit from PT to address the ROM, strength, instability, and pain  Planned Interventions: balance activities, stretching, strengthening, ankle stability exercises     Frequency: 2x/week for 4-6 weeks     STG 2-3 weeks   1  The pt will be able to SLS for 20 sec on the L leg  2  The pt will have 5 degrees of AROM of the L ankle  LTG 4-6 weeks  1  The pt will be able to SLS for 30 sec on the L leg  2  The pt will have a 1-2/10 pain after continuous activity  3  The pt will have 5/5 DF of the L ankle  4  The pt will have 5/5 inversion of the L ankle  5  The pt will be able to complete 5 single leg heel raises bilaterally w/o fatigue  Subjective  YUSUF/History of Impairment: Problem with ankles, when he is on his feet a lot a throbbing pain comes on  Pt has had 3-4 ankle sprains in the past, a couple really bad ones in years past but nothing recent  He has soreness after doing activity  Has not been icing it cause he has been focused on a pain in his shoulder   Mentioned he stepped on it on 22 and it gave way a little bit  He is an Ex- and used to carry on ankle, was thinking maybe that could have caused an issue  He likes to walk barefoot around the home and around outside as much as possible  Xray - showed damage to ATFL   Waking at night? Only wakes sometimes if he rolls onto it or if the bed sheets are too tight around it  PMH: shoulder replacement; 3-4 ankle sprains  Aggravating Factors: being on feet a long time   Relieving Factors: elevate  Pain Ratin/10; worst 8/10   Social History:    Part time job: security - on feet, running around    [employer] - shifts will L foot; does not like putting that foot down on bike for fear of it giving out so they got a different bike for that    reason  Home Environment:    Stairs - 14 steps, railing on the left going down; when it's flared up depends on the railing   Pt goals: pain free and have it healed up       Objective    OBJECTIVE:     Posture:    Ankle   Pt stands in slightly outward positioning; neutral arch     Gait:   Pt walks with toes pointed outward; good heel off and toe off        Swelling: No noticeable swelling     ROM -  AROM:    Ankle DF L -3  R 5   Ankle PF L 20  R 21   Ankle Ev L 18   R 18   Ankle Inv L 15  R 15    PROM:    Ankle DF L 5  R 10   Ankle PF L 25 p  R 22   Ankle Ev L 18  R 18   Ankle Inv L 20   R 20         STRENGTH/MYOTOME -   RIGHT:   Knee Flexion 5/5    Knee Ext 5/5    Ankle DF 5/5    Ankle PF 4 before fatigue   Ankle Inv 5/5    Ankle Ev 5/5     LEFT:    Knee Flexion 5/5    Knee Ext 5/5    Ankle DF 4/5    Ankle PF 1 before fatigue    Ankle Inv 4/5 p   Ankle Ev 5/5 no pain      PALPATION   TTP on ATFL L       INTEGUMENTARY   Skin intact R/L      TESTS -    L Anterior Drawer (-)          FUNCTIONAL TESTS -    SLS - L 15 sec   R 30 sec         Precautions: None     Beginning HEP   Calf stretch 30"x3   B heel raises 20x   Balance on one foot 10"x5    Manuals HEP                                                                Neuro Re-Ed SLS 4/29            Dinodisc balance                                                                               Ther Ex             B HR 4/29            TB DF/PF             TB Inv/Ev             Towel Toe Scrunches              Calf Stretch  4/29                                                   Ther Activity                                       Gait Training                                       Modalities             Ice prn

## 2022-04-30 ENCOUNTER — HOSPITAL ENCOUNTER (EMERGENCY)
Facility: HOSPITAL | Age: 72
Discharge: HOME | End: 2022-04-30
Attending: EMERGENCY MEDICINE | Admitting: EMERGENCY MEDICINE
Payer: MEDICARE

## 2022-04-30 ENCOUNTER — APPOINTMENT (EMERGENCY)
Dept: RADIOLOGY | Facility: HOSPITAL | Age: 72
End: 2022-04-30
Payer: MEDICARE

## 2022-04-30 VITALS
DIASTOLIC BLOOD PRESSURE: 86 MMHG | RESPIRATION RATE: 18 BRPM | TEMPERATURE: 97.8 F | HEIGHT: 68 IN | WEIGHT: 193 LBS | SYSTOLIC BLOOD PRESSURE: 158 MMHG | BODY MASS INDEX: 29.25 KG/M2 | HEART RATE: 58 BPM | OXYGEN SATURATION: 97 %

## 2022-04-30 DIAGNOSIS — S49.91XA INJURY, SHOULDER AND UPPER ARM, RIGHT, INITIAL ENCOUNTER: ICD-10-CM

## 2022-04-30 DIAGNOSIS — S43.401A SPRAIN OF RIGHT SHOULDER: Primary | ICD-10-CM

## 2022-04-30 PROCEDURE — 99284 EMERGENCY DEPT VISIT MOD MDM: CPT | Performed by: EMERGENCY MEDICINE

## 2022-04-30 PROCEDURE — 99283 EMERGENCY DEPT VISIT LOW MDM: CPT

## 2022-04-30 PROCEDURE — 73030 X-RAY EXAM OF SHOULDER: CPT

## 2022-04-30 RX ORDER — HYDROCODONE BITARTRATE AND ACETAMINOPHEN 5; 325 MG/1; MG/1
1 TABLET ORAL ONCE
Status: COMPLETED | OUTPATIENT
Start: 2022-04-30 | End: 2022-04-30

## 2022-04-30 RX ADMIN — HYDROCODONE BITARTRATE AND ACETAMINOPHEN 1 TABLET: 5; 325 TABLET ORAL at 15:10

## 2022-04-30 NOTE — ED PROVIDER NOTES
History  Chief Complaint   Patient presents with    Shoulder Pain     Pt c/o of right shoulde rpain     Patient with previous shoulder surgeries including rotator cuff and then shoulder replacement 621 now complains of fall about 1 week ago, lost balance standing up from kneeling position and fell with right arm outstretched onto right side  Since then he has severe pain right shoulder worse with flexion and not relieved with ice, Ibuprofen, or Tylenol  Patient took Vicodin from when he had his surgery and that provided some relief  Denies n/t/ or new weakness  Bruising noted at right shoulder  Prior to Admission Medications   Prescriptions Last Dose Informant Patient Reported? Taking?    Coenzyme Q10 (CO Q 10) 100 MG CAPS  Self Yes No   Sig: Take by mouth   albuterol (ProAir HFA) 90 mcg/act inhaler  Self No No   Sig: Inhale 2 puffs every 4 (four) hours as needed for wheezing   Patient not taking: Reported on 4/15/2022    diclofenac sodium (VOLTAREN) 50 mg EC tablet   No No   Sig: Take 1 tablet (50 mg total) by mouth 2 (two) times a day   finasteride (PROSCAR) 5 mg tablet  Self No No   Sig: Take 1 tablet (5 mg total) by mouth daily   fluticasone (FLONASE) 50 mcg/act nasal spray  Self No No   Si sprays into each nostril daily   Patient taking differently: 2 sprays into each nostril daily as needed    losartan (Cozaar) 50 mg tablet  Self No No   Sig: Take 1 tablet (50 mg total) by mouth daily   mometasone (ELOCON) 0 1 % cream   No No   Sig: Apply topically daily   omeprazole (PriLOSEC) 20 mg delayed release capsule  Self No No   Sig: take 1 capsule by mouth once daily   simvastatin (ZOCOR) 40 mg tablet  Self No No   Sig: Take 1 tablet (40 mg total) by mouth daily   tamsulosin (FLOMAX) 0 4 mg  Self No No   Sig: take 1 capsule by mouth once daily   zolpidem (AMBIEN) 10 mg tablet  Self No No   Sig: Take 1 tablet (10 mg total) by mouth daily at bedtime as needed for sleep      Facility-Administered Medications: None       Past Medical History:   Diagnosis Date    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     Asthma     Chronic pain     back    GERD (gastroesophageal reflux disease)     Hiatal hernia 1994    Hyperlipidemia     Hypertension     Kidney stone     r stent- suppose to be removed Fri 7/13    PONV (postoperative nausea and vomiting)     Shortness of breath        Past Surgical History:   Procedure Laterality Date    ABDOMINAL ADHESION SURGERY N/A 7/11/2018    Procedure: LYSIS ADHESIONS EXTENSIVE;  Surgeon: Judy Chirinos MD;  [facility];  Service: General    ABDOMINAL SURGERY      BACK SURGERY      x2    CARPAL TUNNEL RELEASE      COLONOSCOPY      CYSTOSCOPY  08/07/2020    CYSTOSCOPY W/ URETERAL STENT REMOVAL  01/26/2018    ESOPHAGOGASTRODUODENOSCOPY N/A 7/11/2018    Procedure: ESOPHAGOGASTRODUODENOSCOPY (EGD) INTRAOPERATIVE ;  Surgeon: Judy Chirinos MD;  [facility];  Service: General    [facility] Bilateral     1st MTP joint     HERNIA REPAIR      HIATAL [address] [place] [address] N/A 7/11/2018    Procedure: REPAIR HERNIA INCISIONAL X2;  Surgeon: Judy Chirinos MD;  [facility];  Service: General    KNEE SURGERY Bilateral     LITHOTRIPSY      PERIPHERAL NEUROPLASTY OF FINGER / HAND / [facility]      left middle finger    WI CYSTO/URETERO W/LITHOTRIPSY &INDWELL STENT INSRT Left 1/3/2018    Procedure: CYSTOSCOPY , RETROGRADE PYELOGRAM AND INSERTION STENT Christie Limber;  Surgeon: Abdelrahman Jewell MD;  [facility];  Service: Urology    WI CYSTO/URETERO W/LITHOTRIPSY &INDWELL STENT INSRT Right 6/8/2018    Procedure: CYSTOSCOPY URETEROSCOPY WITH LITHOTRIPSY HOLMIUM LASER, RETROGRADE PYELOGRAM AND INSERTION STENT URETERAL;  Surgeon: Nicole Cook MD;  [facility];  Service: Urology    [facility] ESWL Left 1/18/2018    Procedure: ESWL;  Surgeon: Nicole Cook MD;  [facility];  Service: Urology    MO LAP, REPAIR PARAESOPHAGEAL HERNIA, INCL FUNDOPLASTY W/ MESH N/A 7/11/2018    Procedure: REPAIR RECURRENT HERNIA PARAESOPHAGEAL  LAPAROSCOPIC;KLAUDIA Hugo;  Surgeon: Erika Patel MD;  Location:  MAIN OR;  Service: General    MO RECONSTR TOTAL SHOULDER IMPLANT Right 6/1/2021    Procedure: TOTAL SHOULDER ARTHROPLASTY ANATOMIC, biceps tenodesis; Surgeon: Alaina Hackett MD;  [facility];  Service: Orthopedics    ROTATOR CUFF REPAIR Right     SHOULDER ARTHROSCOPY Bilateral     SHOULDER SURGERY         Family History   Problem Relation Age of Onset    Diabetes Mother     Hypertension Mother     Brain cancer Mother     Hypertension Sister     Nephrolithiasis Sister     COPD Father     Cancer Maternal Grandmother     Aortic aneurysm Maternal Uncle         x3 maternal uncles      I have reviewed and agree with the history as documented  E-Cigarette/Vaping    E-Cigarette Use Never User      E-Cigarette/Vaping Substances    Nicotine No     THC No     CBD No     Flavoring No     Other No     Unknown No      Social History     Tobacco Use    Smoking status: Never Smoker    Smokeless tobacco: Never Used   Vaping Use    Vaping Use: Never used   Substance Use Topics    Alcohol use: Yes     Alcohol/week: 1 0 standard drink     Types: 1 Cans of beer per week     Comment: once a month     Drug use: No       Review of Systems   Constitutional: Negative for chills and fever  HENT: Negative for rhinorrhea and sore throat  Respiratory: Negative for shortness of breath  Cardiovascular: Negative for chest pain  Gastrointestinal: Negative for constipation, diarrhea, nausea and vomiting  Genitourinary: Negative for dysuria and frequency  Skin: Negative for rash  All other systems reviewed and are negative  Physical Exam  Physical Exam  Vitals and nursing note reviewed  Constitutional:       Appearance: He is well-developed     HENT: Head: Normocephalic and atraumatic  Right Ear: External ear normal       Left Ear: External ear normal       Nose: Nose normal    Eyes:      Conjunctiva/sclera: Conjunctivae normal       Pupils: Pupils are equal, round, and reactive to light  Cardiovascular:      Rate and Rhythm: Normal rate and regular rhythm  Heart sounds: Normal heart sounds  Pulmonary:      Effort: Pulmonary effort is normal  No respiratory distress  Breath sounds: Normal breath sounds  No wheezing  Abdominal:      General: Bowel sounds are normal  There is no distension  Palpations: Abdomen is soft  Tenderness: There is no abdominal tenderness  Musculoskeletal:         General: No deformity  Right shoulder: Tenderness present  Decreased range of motion  Decreased strength  Normal pulse  Left shoulder: Normal       Right upper arm: Normal       Right elbow: Normal         Arms:       Cervical back: Normal range of motion and neck supple  No bony tenderness  No spinous process tenderness  Thoracic back: No bony tenderness  Lumbar back: No bony tenderness  Skin:     General: Skin is warm and dry  Findings: No rash  Neurological:      General: No focal deficit present  Mental Status: He is alert  GCS: GCS eye subscore is 4  GCS verbal subscore is 5  GCS motor subscore is 6  Sensory: No sensory deficit     Psychiatric:         Mood and Affect: Mood normal          Vital Signs  ED Triage Vitals [04/30/22 1430]   Temperature Pulse Respirations Blood Pressure SpO2   97 8 °F (36 6 °C) 58 18 158/86 97 %      Temp Source Heart Rate Source Patient Position - Orthostatic VS BP Location FiO2 (%)   Temporal -- Lying Right arm --      Pain Score       3           Vitals:    04/30/22 1430   BP: 158/86   Pulse: 58   Patient Position - Orthostatic VS: Lying         Visual Acuity  Visual Acuity      Most Recent Value   L Pupil Size (mm) 4   R Pupil Size (mm) 4          ED Medications  Medications   HYDROcodone-acetaminophen (NORCO) 5-325 mg per tablet 1 tablet (1 tablet Oral Given 4/30/22 1510)       Diagnostic Studies  Results Reviewed     None                 XR shoulder 2+ views LEFT   ED Interpretation by Pau Zavala DO (04/30 1538)   No acute abnl                 Procedures  Procedures         ED Course                                             MDM  Number of Diagnoses or Management Options  Sprain of right shoulder: new and requires workup     Amount and/or Complexity of Data Reviewed  Tests in the radiology section of CPT®: ordered and reviewed    Patient Progress  Patient progress: improved      Disposition  Final diagnoses:   Sprain of right shoulder - acute     Time reflects when diagnosis was documented in both MDM as applicable and the Disposition within this note     Time User Action Codes Description Comment    4/30/2022  3:44 PM Claudeen Cone Add [M25 511] Acute pain of right shoulder     4/30/2022  3:44 PM Claudeen Cone Remove [H02 161] Acute pain of right shoulder     4/30/2022  3:44 PM Claudeen Cone Add [H02 722W] Sprain of right shoulder     4/30/2022  3:44 PM Claudeen Cone Modify [T52 675F] Sprain of right shoulder acute      ED Disposition     ED Disposition Condition Date/Time Comment    Discharge Stable Sat Apr 30, 2022  3:43 PM Sunitha Cain discharge to home/self care              Follow-up Information    None         Discharge Medication List as of 4/30/2022  3:45 PM      CONTINUE these medications which have NOT CHANGED    Details   albuterol (ProAir HFA) 90 mcg/act inhaler Inhale 2 puffs every 4 (four) hours as needed for wheezing, Starting Tue 10/19/2021, Normal      Coenzyme Q10 (CO Q 10) 100 MG CAPS Take by mouth, Historical Med      diclofenac sodium (VOLTAREN) 50 mg EC tablet Take 1 tablet (50 mg total) by mouth 2 (two) times a day, Starting Fri 4/15/2022, Normal      finasteride (PROSCAR) 5 mg tablet Take 1 tablet (5 mg total) by mouth daily, Starting Fri 8/6/2021, Normal      fluticasone (FLONASE) 50 mcg/act nasal spray 2 sprays into each nostril daily, Starting Mon 10/7/2019, Normal      losartan (Cozaar) 50 mg tablet Take 1 tablet (50 mg total) by mouth daily, Starting Thu 2/3/2022, Normal      mometasone (ELOCON) 0 1 % cream Apply topically daily, Starting Fri 4/15/2022, Normal      omeprazole (PriLOSEC) 20 mg delayed release capsule take 1 capsule by mouth once daily, Normal      simvastatin (ZOCOR) 40 mg tablet Take 1 tablet (40 mg total) by mouth daily, Starting Tue 11/23/2021, Normal      tamsulosin (FLOMAX) 0 4 mg take 1 capsule by mouth once daily, Normal      zolpidem (AMBIEN) 10 mg tablet Take 1 tablet (10 mg total) by mouth daily at bedtime as needed for sleep, Starting Mon 10/7/2019, Normal             No discharge procedures on file      PDMP Review       Value Time User    PDMP Reviewed  Yes 6/1/2021  7:41 AM Acacia Barrios PA-C          ED Provider  Electronically Signed by           Kimmy Gonsales,   04/30/22 6742

## 2022-05-02 ENCOUNTER — TELEPHONE (OUTPATIENT)
Dept: OBGYN CLINIC | Facility: MEDICAL CENTER | Age: 72
End: 2022-05-02

## 2022-05-02 ENCOUNTER — OFFICE VISIT (OUTPATIENT)
Dept: OBGYN CLINIC | Facility: OTHER | Age: 72
End: 2022-05-02
Payer: MEDICARE

## 2022-05-02 VITALS
WEIGHT: 193.6 LBS | HEART RATE: 67 BPM | BODY MASS INDEX: 29.34 KG/M2 | SYSTOLIC BLOOD PRESSURE: 136 MMHG | HEIGHT: 68 IN | DIASTOLIC BLOOD PRESSURE: 83 MMHG

## 2022-05-02 DIAGNOSIS — Z96.611 STATUS POST TOTAL SHOULDER ARTHROPLASTY, RIGHT: ICD-10-CM

## 2022-05-02 DIAGNOSIS — M24.811 INTERNAL DERANGEMENT OF RIGHT SHOULDER: Primary | ICD-10-CM

## 2022-05-02 PROCEDURE — 99214 OFFICE O/P EST MOD 30 MIN: CPT | Performed by: PHYSICIAN ASSISTANT

## 2022-05-02 NOTE — TELEPHONE ENCOUNTER
I can see at 6  It's my only open slot today    Next office day is Thursday which is his scheduled appointment

## 2022-05-02 NOTE — TELEPHONE ENCOUNTER
Patient sees Dr Samantha Csaas  Patient calling in extreme pain since last Wednesday, (10) he cannot lift his arm, he states worst pain ever  He is having discoloration around the surgery incision  He has an appointment on Thursday 5/5, but is concerned  He might have done something       # [phone]

## 2022-05-02 NOTE — PROGRESS NOTES
Assessment  Diagnoses and all orders for this visit:    Internal derangement of right shoulder  -     CT shoulder right arthrogram; Future  -     FL injection right shoulder (arthrogram); Future    Status post total shoulder arthroplasty, right  -     CT shoulder right arthrogram; Future        Discussion and Plan:    Clinical exam is consistent with rotator cuff tear  CT arthrogram will be arranged  1  CT arthrogram right shoulder  2  Follow up after study to discuss results and treatment options based on those results  3  Tylenol and Ibuprofen PRN  4  Ice PRN  5  Activities to tolerance  Avoid activities and positions that cause pain  Eugenio Sheikh will be going away 5/15 to [place] and returning the following week  Hopefully we can get the study prior to his trip     Subjective:   Patient ID: Jodie Grimm is a 66 y o  male      Eugenio Sheikh presents to the office with chief complaint of right shoulder pain  He is 11 months from anatomic total shoulder  He was doing well until 5 days ago when he starting having severe pain  Admits to losing balance 9 days ago and falling back onto outstretched arm (from a squatting position)  Pain occurs with attempted motion  Pain incompletely resolves with rest   Pain improves with ice  Denies significant improvement with OTC medication  He went to the ER a few days ago and was diagnosed with calcific tendinitis  Eugenio Sheikh is unable to elevate his right arm which he was able to do prior to last week  The following portions of the patient's history were reviewed and updated as appropriate: allergies, current medications, past family history, past medical history, past social history, past surgical history and problem list     Review of Systems   Constitutional: Negative for chills and fever  HENT: Negative for hearing loss  Eyes: Negative for visual disturbance  Respiratory: Negative for shortness of breath  Cardiovascular: Negative for chest pain  Gastrointestinal: Negative for abdominal pain  Musculoskeletal:        As reviewed in the HPI   Skin: Negative for rash  Neurological:        As reviewed in the HPI   Psychiatric/Behavioral: Negative for agitation  Objective:  /83   Pulse 67   Ht 5' 8" (1 727 m)   Wt 87 8 kg (193 lb 9 6 oz)   BMI 29 44 kg/m²       Right Shoulder Exam     Tenderness   The patient is experiencing no tenderness  Range of Motion   Active abduction: 90   External rotation: 70 (contralateral 45)   Forward flexion: 50 (passive full)   Internal rotation 0 degrees: Sacrum     Muscle Strength   External rotation: 5/5   Supraspinatus: 3/5   Subscapularis: 3/5     Tests   Apprehension: negative  Cuellar test: positive  Impingement: positive  Drop arm: positive    Other   Erythema: absent  Scars: present (healed deltopectoral incision)  Sensation: normal  Pulse: present    Comments:  Ecchymosis anterior shoulder             Physical Exam  Constitutional:       Appearance: He is well-developed  HENT:      Head: Normocephalic  Pulmonary:      Effort: No respiratory distress  Breath sounds: No wheezing  Musculoskeletal:      Cervical back: Normal range of motion  Skin:     General: Skin is warm and dry  Neurological:      Mental Status: He is alert and oriented to person, place, and time  Psychiatric:         Behavior: Behavior normal          Thought Content:  Thought content normal          Judgment: Judgment normal        xrays from the ER were reviewed: no hardware complication, no fracture or dislocation, calcific tendinitis

## 2022-05-03 ENCOUNTER — LAB (OUTPATIENT)
Dept: LAB | Facility: HOSPITAL | Age: 72
End: 2022-05-03
Attending: INTERNAL MEDICINE
Payer: MEDICARE

## 2022-05-03 DIAGNOSIS — R19.5 LOOSE STOOLS: ICD-10-CM

## 2022-05-03 DIAGNOSIS — E78.00 HYPERCHOLESTEROLEMIA: ICD-10-CM

## 2022-05-03 DIAGNOSIS — M25.572 CHRONIC PAIN OF LEFT ANKLE: ICD-10-CM

## 2022-05-03 DIAGNOSIS — G89.29 CHRONIC PAIN OF LEFT ANKLE: ICD-10-CM

## 2022-05-03 DIAGNOSIS — L98.9 SKIN LESION: ICD-10-CM

## 2022-05-03 LAB
ALBUMIN SERPL BCP-MCNC: 4.2 G/DL (ref 3.5–5)
ALP SERPL-CCNC: 66 U/L (ref 46–116)
ALT SERPL W P-5'-P-CCNC: 38 U/L (ref 12–78)
ANION GAP SERPL CALCULATED.3IONS-SCNC: 5 MMOL/L (ref 4–13)
AST SERPL W P-5'-P-CCNC: 25 U/L (ref 5–45)
BASOPHILS # BLD AUTO: 0.04 THOUSANDS/ΜL (ref 0–0.1)
BASOPHILS NFR BLD AUTO: 1 % (ref 0–1)
BILIRUB SERPL-MCNC: 0.83 MG/DL (ref 0.2–1)
BUN SERPL-MCNC: 12 MG/DL (ref 5–25)
CALCIUM SERPL-MCNC: 9.3 MG/DL (ref 8.3–10.1)
CHLORIDE SERPL-SCNC: 108 MMOL/L (ref 100–108)
CHOLEST SERPL-MCNC: 226 MG/DL
CO2 SERPL-SCNC: 27 MMOL/L (ref 21–32)
CREAT SERPL-MCNC: 0.94 MG/DL (ref 0.6–1.3)
EOSINOPHIL # BLD AUTO: 0.21 THOUSAND/ΜL (ref 0–0.61)
EOSINOPHIL NFR BLD AUTO: 4 % (ref 0–6)
ERYTHROCYTE [DISTWIDTH] IN BLOOD BY AUTOMATED COUNT: 12.2 % (ref 11.6–15.1)
GFR SERPL CREATININE-BSD FRML MDRD: 80 ML/MIN/1.73SQ M
GLUCOSE P FAST SERPL-MCNC: 96 MG/DL (ref 65–99)
HCT VFR BLD AUTO: 46.3 % (ref 36.5–49.3)
HDLC SERPL-MCNC: 41 MG/DL
HGB BLD-MCNC: 15.7 G/DL (ref 12–17)
IMM GRANULOCYTES # BLD AUTO: 0.01 THOUSAND/UL (ref 0–0.2)
IMM GRANULOCYTES NFR BLD AUTO: 0 % (ref 0–2)
LDLC SERPL CALC-MCNC: 149 MG/DL (ref 0–100)
LYMPHOCYTES # BLD AUTO: 1.6 THOUSANDS/ΜL (ref 0.6–4.47)
LYMPHOCYTES NFR BLD AUTO: 33 % (ref 14–44)
MCH RBC QN AUTO: 28.8 PG (ref 26.8–34.3)
MCHC RBC AUTO-ENTMCNC: 33.9 G/DL (ref 31.4–37.4)
MCV RBC AUTO: 85 FL (ref 82–98)
MONOCYTES # BLD AUTO: 0.49 THOUSAND/ΜL (ref 0.17–1.22)
MONOCYTES NFR BLD AUTO: 10 % (ref 4–12)
NEUTROPHILS # BLD AUTO: 2.54 THOUSANDS/ΜL (ref 1.85–7.62)
NEUTS SEG NFR BLD AUTO: 52 % (ref 43–75)
NRBC BLD AUTO-RTO: 0 /100 WBCS
PLATELET # BLD AUTO: 243 THOUSANDS/UL (ref 149–390)
PMV BLD AUTO: 9 FL (ref 8.9–12.7)
POTASSIUM SERPL-SCNC: 4.3 MMOL/L (ref 3.5–5.3)
PROT SERPL-MCNC: 7.5 G/DL (ref 6.4–8.2)
RBC # BLD AUTO: 5.45 MILLION/UL (ref 3.88–5.62)
SODIUM SERPL-SCNC: 140 MMOL/L (ref 136–145)
TRIGL SERPL-MCNC: 180 MG/DL
URATE SERPL-MCNC: 6.9 MG/DL (ref 4.2–8)
WBC # BLD AUTO: 4.89 THOUSAND/UL (ref 4.31–10.16)

## 2022-05-03 PROCEDURE — 80053 COMPREHEN METABOLIC PANEL: CPT

## 2022-05-03 PROCEDURE — 36415 COLL VENOUS BLD VENIPUNCTURE: CPT

## 2022-05-03 PROCEDURE — 85025 COMPLETE CBC W/AUTO DIFF WBC: CPT

## 2022-05-03 PROCEDURE — 80061 LIPID PANEL: CPT

## 2022-05-03 PROCEDURE — 84550 ASSAY OF BLOOD/URIC ACID: CPT

## 2022-05-03 PROCEDURE — 86618 LYME DISEASE ANTIBODY: CPT

## 2022-05-03 NOTE — NURSING NOTE
Call placed to patient to discuss upcoming appointment at [facility] radiology department and complete consultation with patient  Patient is having right shoulder CT arthrogram utilizing fluoroscopy  guidance  Reviewed patient's allergies, no current anticoagulant medication present , also discussed the pre and post procedure expectations  Reminded patient of location and time expected for procedure, Patient expressed understanding by verbalizing and repeating instructions  Patient is considering call central scheduling to see if he could get date change, he has a delivery set up the same day and wants to be present  Informed him that it will all depend on availability of CT but the instruction would not change

## 2022-05-04 LAB — B BURGDOR IGG+IGM SER-ACNC: 27

## 2022-05-13 ENCOUNTER — HOSPITAL ENCOUNTER (OUTPATIENT)
Dept: RADIOLOGY | Facility: HOSPITAL | Age: 72
Discharge: HOME | End: 2022-05-13
Payer: MEDICARE

## 2022-05-13 DIAGNOSIS — M24.811 INTERNAL DERANGEMENT OF RIGHT SHOULDER: ICD-10-CM

## 2022-05-13 DIAGNOSIS — Z96.611 STATUS POST TOTAL SHOULDER ARTHROPLASTY, RIGHT: ICD-10-CM

## 2022-05-13 PROCEDURE — G1004 CDSM NDSC: HCPCS

## 2022-05-13 PROCEDURE — 73200 CT UPPER EXTREMITY W/O DYE: CPT

## 2022-05-13 PROCEDURE — 20610 DRAIN/INJ JOINT/BURSA W/O US: CPT

## 2022-05-13 PROCEDURE — 23350 INJECTION FOR SHOULDER X-RAY: CPT

## 2022-05-13 PROCEDURE — 77002 NEEDLE LOCALIZATION BY XRAY: CPT

## 2022-05-13 RX ORDER — LIDOCAINE HYDROCHLORIDE 10 MG/ML
5 INJECTION, SOLUTION EPIDURAL; INFILTRATION; INTRACAUDAL; PERINEURAL
Status: COMPLETED | OUTPATIENT
Start: 2022-05-13 | End: 2022-05-13

## 2022-05-13 RX ORDER — SODIUM CHLORIDE 9 MG/ML
50 INJECTION INTRAVENOUS
Status: COMPLETED | OUTPATIENT
Start: 2022-05-13 | End: 2022-05-13

## 2022-05-13 RX ADMIN — LIDOCAINE HYDROCHLORIDE 5 ML: 10 INJECTION, SOLUTION EPIDURAL; INFILTRATION; INTRACAUDAL; PERINEURAL at 13:06

## 2022-05-13 RX ADMIN — SODIUM CHLORIDE 5 ML: 9 INJECTION, SOLUTION INTRAMUSCULAR; INTRAVENOUS; SUBCUTANEOUS at 13:06

## 2022-05-13 RX ADMIN — IOHEXOL 5 ML: 300 INJECTION, SOLUTION INTRAVENOUS at 13:03

## 2022-05-26 ENCOUNTER — OFFICE VISIT (OUTPATIENT)
Dept: OBGYN CLINIC | Facility: OTHER | Age: 72
End: 2022-05-26
Payer: MEDICARE

## 2022-05-26 VITALS
BODY MASS INDEX: 29.55 KG/M2 | WEIGHT: 195 LBS | HEART RATE: 61 BPM | SYSTOLIC BLOOD PRESSURE: 133 MMHG | HEIGHT: 68 IN | DIASTOLIC BLOOD PRESSURE: 79 MMHG

## 2022-05-26 DIAGNOSIS — S46.811D FULL THICKNESS TEAR OF RIGHT SUBSCAPULARIS TENDON, SUBSEQUENT ENCOUNTER: Primary | ICD-10-CM

## 2022-05-26 DIAGNOSIS — Z96.611 STATUS POST TOTAL SHOULDER ARTHROPLASTY, RIGHT: ICD-10-CM

## 2022-05-26 PROBLEM — S46.811A FULL THICKNESS TEAR OF RIGHT SUBSCAPULARIS TENDON: Status: ACTIVE | Noted: 2022-05-26

## 2022-05-26 PROCEDURE — 99214 OFFICE O/P EST MOD 30 MIN: CPT | Performed by: ORTHOPAEDIC SURGERY

## 2022-05-26 NOTE — PROGRESS NOTES
Assessment  Diagnoses and all orders for this visit:    Full thickness tear of right subscapularis tendon, subsequent encounter    Status post total shoulder arthroplasty, right      Discussion and Plan:    Discussed CT findings with the patient in detail which does show a subscapularis tear  Explained that the radiologist also mentioned loosening of the [facility] component however I do not see any loosening indeed the findings of the CT arthrogram are somewhat expected findings when I utilized a augmented glenoid component for posterior wear, the pegs are all stable on that study  Revision to reverse total shoulder is not recommended at this time as his symptoms are tolerable but certainly would be the surgical option if he was unable to function or has significant pain, thankfully at this time he does not have either of those issues  Follow up as needed  Subjective:   Patient ID: Ashish Hernandez is a 66 y o  male      HPI  Patient presents today to discuss the findings of his right shoulder CT scan  He is s/p anatomic right total shoulder 6/01/21  He was doing well until he fell back onto outstretched arm (from a squatting position)  Since the last visit symptoms have improved and tolerable  The following portions of the patient's history were reviewed and updated as appropriate: allergies, current medications, past family history, past medical history, past social history, past surgical history and problem list     Review of Systems   Constitutional: Negative for chills and fever  HENT: Negative for drooling and hearing loss  Eyes: Negative for visual disturbance  Respiratory: Negative for cough and shortness of breath  Cardiovascular: Negative for chest pain  Gastrointestinal: Negative for abdominal pain  Skin: Negative for rash  Psychiatric/Behavioral: Negative for agitation         Objective:  /79   Pulse 61   Ht 5' 8" (1 727 m)   Wt 88 5 kg (195 lb)   BMI 29 65 kg/m² Right Shoulder Exam     Range of Motion   The patient has normal right shoulder ROM  Muscle Strength   Abduction: 5/5   External rotation: 5/5     Other   Erythema: absent  Sensation: normal  Pulse: present            Physical Exam  Vitals reviewed  Constitutional:       Appearance: He is well-developed  HENT:      Head: Normocephalic  Eyes:      Pupils: Pupils are equal, round, and reactive to light  Pulmonary:      Effort: Pulmonary effort is normal    Skin:     General: Skin is warm and dry  I have personally reviewed pertinent films in PACS and my interpretation is as follows  CT arthrogram right shoulder demonstrates a full thickness retracted subscapularis tear    A somewhat normal appearance of an augmented glenoid component is seen, eccentric reaming was performed and is likely why there is some dye leaking underneath the anterior aspect of the glenoid component      Scribe Attestation    I,:  Robert Khan am acting as a scribe while in the presence of the attending physician :       I,:  Asif Reynoso MD personally performed the services described in this documentation    as scribed in my presence :

## 2022-06-15 ENCOUNTER — EVALUATION (OUTPATIENT)
Dept: PHYSICAL THERAPY | Facility: CLINIC | Age: 72
End: 2022-06-15
Payer: MEDICARE

## 2022-06-15 DIAGNOSIS — S93.402D MODERATE LEFT ANKLE SPRAIN, SUBSEQUENT ENCOUNTER: Primary | ICD-10-CM

## 2022-06-15 PROCEDURE — 97164 PT RE-EVAL EST PLAN CARE: CPT | Performed by: PHYSICAL THERAPIST

## 2022-06-15 PROCEDURE — 97140 MANUAL THERAPY 1/> REGIONS: CPT | Performed by: PHYSICAL THERAPIST

## 2022-06-15 NOTE — PROGRESS NOTES
PT Re-evaluation    Today's date: 6/15/2022  Patient name: Keily Comer  : 1950  MRN: [id number]  Referring provider: [place] Hitchcock*  Dx:   Encounter Diagnosis     ICD-10-CM    1  Moderate left ankle sprain, subsequent encounter  S93 402D                   Assessment/Plan    The pt is a 67 y/o male presenting to PT w/ chronic instability in the L ankle due to past ankle sprains  The pt has decreased ankle ROM and strength  There is noticeable balance deficits on his L side  Due to the pt's lifestyle they would benefit from PT to address the ROM, strength, instability, and pain  He did attend 1 PT session 6 weeks ago and is now committed to attending PT on a regular basis  Planned Interventions: balance activities, stretching, strengthening, ankle stability exercises     Frequency: 2x/week for 8 weeks     STG 4 weeks   1  The pt will be able to SLS for 20 sec on the L leg  2  The pt will have 5 degrees of AROM of the L ankle  LTG 8 weeks  1  The pt will be able to SLS for 30 sec on the L leg  2  The pt will have a 1-2/10 pain after continuous activity  3  The pt will have 5/5 eversion of the L ankle  4  Good L LE balance  5  The pt will be able to complete 5 single leg heel raises bilaterally w/o fatigue  Subjective  YUSUF/History of Impairment: Problem with ankles, when he is on his feet a lot a throbbing pain comes on  Pt has had 3-4 ankle sprains in the past, a couple really bad ones in years past but nothing recent  He has soreness after doing activity  Has not been icing it cause he has been focused on a pain in his shoulder  Mentioned he stepped on it on 22 and it gave way a little bit  He is an Ex- and used to carry on ankle, was thinking maybe that could have caused an issue  He likes to walk barefoot around the home and around outside as much as possible  Xray - showed damage to ATFL   Waking at night?  Only wakes sometimes if he rolls onto it or if the bed sheets are too tight around it  PMH: shoulder replacement; 3-4 ankle sprains  Aggravating Factors: being on feet a long time   Relieving Factors: elevate  Pain Ratin/10; worst 7/10   Social History:    Part time job: security - on feet, running around    [employer] - shifts will L foot; does not like putting that foot down on bike for fear of it giving out so they got a different bike for that    reason  Home Environment:    Stairs - 14 steps, railing on the left going down; when it's flared up depends on the railing   Pt goals: pain free and have it healed up       Objective    OBJECTIVE:     Posture:    Ankle   Pt stands in slightly outward positioning; neutral arch     Gait:   Pt walks with toes pointed outward; good heel off and toe off        Swelling: No noticeable swelling     ROM -  AROM:    Ankle DF L -3  R 5   Ankle PF L 20  R 21   Ankle Ev L 18   R 18   Ankle Inv L 15  R 15    PROM:    Ankle DF L 5  R 10   Ankle PF L 25 p  R 22   Ankle Ev L 18  R 18   Ankle Inv L 20   R 20         STRENGTH/MYOTOME -   RIGHT:   Knee Flexion 5/5    Knee Ext 5/5    Ankle DF 5/5    Ankle PF 4 before fatigue   Ankle Inv 5/5    Ankle Ev 5/5     LEFT:    Knee Flexion 5/5    Knee Ext 5/5    Ankle DF 4/5    Ankle PF 1 before fatigue    Ankle Inv 4+/5 p   Ankle Ev 4/5 no pain      PALPATION   TTP on ATFL L       INTEGUMENTARY   Skin intact R/L      TESTS -    L Anterior Drawer (-)          FUNCTIONAL TESTS -    SLS - L 15 sec   R 30 sec         Precautions: None     Beginning HEP   Calf stretch 30"x3   B heel raises 20x   Balance on one foot 10"x5    Manuals HEP 6/15           TFM L ATF  8'                                                  Neuro Re-Ed             SLS L 6/15 5" 5 On disc          Tandem walk on foam 12'             Side stepping on foam 12'             Frieda step overs             TB kicks with R for L LE balance             Bosu mini squats                          Ther Ex             B calf raises 6/15 10           TB DF/PF             TB Ev L 6/15 Green 15           Towel Toe Scrunches              Calf Stretch              S/L L ankle eversion 6/15 10                                     Ther Activity                                       Gait Training                                       Modalities             Ice prn

## 2022-06-21 ENCOUNTER — OFFICE VISIT (OUTPATIENT)
Dept: PHYSICAL THERAPY | Facility: CLINIC | Age: 72
End: 2022-06-21
Payer: MEDICARE

## 2022-06-21 DIAGNOSIS — S93.402D MODERATE LEFT ANKLE SPRAIN, SUBSEQUENT ENCOUNTER: Primary | ICD-10-CM

## 2022-06-21 PROCEDURE — 97140 MANUAL THERAPY 1/> REGIONS: CPT | Performed by: PHYSICAL THERAPIST

## 2022-06-21 PROCEDURE — 97112 NEUROMUSCULAR REEDUCATION: CPT | Performed by: PHYSICAL THERAPIST

## 2022-06-21 NOTE — PROGRESS NOTES
Daily Note     Today's date: 2022  Patient name: Dave Workman  : 1950  MRN: [id number]  Referring provider: AZ Zamora*  Dx:   Encounter Diagnosis     ICD-10-CM    1  Moderate left ankle sprain, subsequent encounter  P67 580P                   Subjective:  Pt reports feeling much better after TFM L ATF last session  Objective:  See treatment diary below      Assessment:  Pt presents with chronic instability in the L ankle due to past ankle sprains  The pt has decreased ankle ROM and strength  There is noticeable balance deficits on his L side, but improved from last week  He did fatigue by the end of the session, but no increase in L ankle pain  Due to the pt's lifestyle they would benefit from PT to address the ROM, strength, instability, and pain  Plan:  Try tandem walk on foam FWD + BWD  Add calf stretching          Precautions: None   POC Due:  8/10/22    Beginning HEP   Calf stretch 30"x3   B heel raises 20x   Balance on one foot 10"x5    Manuals HEP 6/15 6/21          TFM L ATF  8' 10'                                                 Neuro Re-Ed             SLS L on black disc 6/15 5" 5 - no disc 10" 10          Tandem walk on foam 12'   3 laps FWD          Side stepping on foam 12'   3 laps          Frieda step overs   NV          TB kicks with R for L LE balance - hip abd, flex, ext   10 ea          Bosu mini squats dome down   20                       Ther Ex             B calf raises 6/15 10 20          TB DF/PF             TB Ev L 6/15 Green 15 Blue 20          Towel Toe Scrunches              Calf Stretch              S/L L ankle eversion 6/15 10 20          Bike    L6 5'                       Ther Activity                                       Gait Training                                       Modalities             Ice prn

## 2022-06-23 ENCOUNTER — OFFICE VISIT (OUTPATIENT)
Dept: PHYSICAL THERAPY | Facility: CLINIC | Age: 72
End: 2022-06-23
Payer: MEDICARE

## 2022-06-23 DIAGNOSIS — S93.402D MODERATE LEFT ANKLE SPRAIN, SUBSEQUENT ENCOUNTER: Primary | ICD-10-CM

## 2022-06-23 PROCEDURE — 97112 NEUROMUSCULAR REEDUCATION: CPT | Performed by: PHYSICAL THERAPIST

## 2022-06-23 PROCEDURE — 97140 MANUAL THERAPY 1/> REGIONS: CPT | Performed by: PHYSICAL THERAPIST

## 2022-06-23 NOTE — PROGRESS NOTES
Daily Note     Today's date: 2022  Patient name: Ap Yanes  : 1950  MRN: [id number]  Referring provider: AZ Henriquez*  Dx:   Encounter Diagnosis     ICD-10-CM    1  Moderate left ankle sprain, subsequent encounter  S93 649D                   Subjective:  Pt reports feeling really good after last session  Manual tx helps  Objective:  See treatment diary below      Assessment:  Pt presents with chronic instability in the L ankle due to past ankle sprains  The pt has decreased ankle ROM and strength  There is noticeable balance deficits on his L side, but improved moderately just since last session  He will continue to benefit from skilled PT to address the ROM, strength, instability, and pain  Plan:  Add 2-3# for S/L eversion           Precautions: None   POC Due:  8/10/22    Beginning HEP   Calf stretch 30"x3   B heel raises 20x   Balance on one foot 10"x5    Manuals HEP 6/15 6/21 6/23         TFM L ATF  8' 10' 10'                                                Neuro Re-Ed             SLS L on black disc 6/15 5" 5 - no disc 10" 10 10" 10         Tandem walk on foam 12' FWD + BWD   3 laps FWD only 3 laps ea         Side stepping on foam 12'   3 laps 3 laps         Frieda step overs    NV         TB kicks with R for L LE balance - hip abd, flex, ext   Blue 10 ea Blue 15 ea         Bosu mini squats dome down   20 20                      Ther Ex             B calf raises 6/15 10 20 20         TB DF/PF             TB Ev L 6/15 Green 15 Blue 20 Blue 20         Towel Toe Scrunches              S/L L ankle eversion 6/15 10 20 20         Bike    L6 5' L5 6'         Wedge calf stretch L    15" 5         Ther Activity                                       Gait Training                                       Modalities             Ice prn

## 2022-06-28 ENCOUNTER — OFFICE VISIT (OUTPATIENT)
Dept: PHYSICAL THERAPY | Facility: CLINIC | Age: 72
End: 2022-06-28
Payer: MEDICARE

## 2022-06-28 DIAGNOSIS — S93.402D MODERATE LEFT ANKLE SPRAIN, SUBSEQUENT ENCOUNTER: Primary | ICD-10-CM

## 2022-06-28 PROCEDURE — 97140 MANUAL THERAPY 1/> REGIONS: CPT

## 2022-06-28 PROCEDURE — 97112 NEUROMUSCULAR REEDUCATION: CPT

## 2022-06-28 NOTE — PROGRESS NOTES
Daily Note     Today's date: 2022  Patient name: Lacy Kta  : 1950  MRN: [id number]  Referring provider: AZ Robbins*  Dx:   Encounter Diagnosis     ICD-10-CM    1  Moderate left ankle sprain, subsequent encounter  S93 402D                   Subjective:  Pt reports feeling really good since starting PT, pain has improved  Objective:  See treatment diary below  Survey Monkey given () and FOTO given ()      Assessment:  Pt presents with chronic instability in the L ankle due to past ankle sprains  The pt has decreased ankle ROM and strength  There is noticeable balance deficits on his L side, but improved moderately just since last session  He will continue to benefit from skilled PT to address the ROM, strength, instability, and pain  Introduced hurdles to balance stability, pt was challenged and required vcs to slow down  Pt response well to Manual tx  Plan:  Add 2-3# for S/L eversion     Possible d/c on      Precautions: None   POC Due:  8/10/22    Beginning HEP   Calf stretch 30"x3   B heel raises 20x   Balance on one foot 10"x5    Manuals HEP 6/15 6/21 6/23 6/28        TFM L ATF  8' 10' 10' 10'                                               Neuro Re-Ed             SLS L on black disc 6/15 5" 5 - no disc 10" 10 10" 10 10" 10        Tandem walk on foam 12' FWD + BWD   3 laps FWD only 3 laps ea +hurdles 3laps fwd        Side stepping on foam 12'   3 laps 3 laps +hurdles 3laps        Frieda step overs    NV D/c        TB kicks with R for L LE balance - hip abd, flex, ext   Blue 10 ea Blue 15 ea Blue 15 ea        Bosu mini squats dome down   20 20 20                     Ther Ex             Bike    L6 5' L5 6' L5 6'        Wedge calf stretch L    15" 5 15" 5        B calf raises 6/15 10 20 20 20        TB DF/PF             TB Ev L 6/15 Green 15 Blue 20 Blue 20 Blue 20        Towel Toe Scrunches              S/L L ankle eversion 6/15 10 20 20 20 Ther Activity                                       Gait Training                                       Modalities             Ice prn

## 2022-06-30 ENCOUNTER — OFFICE VISIT (OUTPATIENT)
Dept: PHYSICAL THERAPY | Facility: CLINIC | Age: 72
End: 2022-06-30
Payer: MEDICARE

## 2022-06-30 DIAGNOSIS — S93.402D MODERATE LEFT ANKLE SPRAIN, SUBSEQUENT ENCOUNTER: Primary | ICD-10-CM

## 2022-06-30 PROCEDURE — 97110 THERAPEUTIC EXERCISES: CPT | Performed by: PHYSICAL THERAPIST

## 2022-06-30 PROCEDURE — 97140 MANUAL THERAPY 1/> REGIONS: CPT | Performed by: PHYSICAL THERAPIST

## 2022-06-30 PROCEDURE — 97112 NEUROMUSCULAR REEDUCATION: CPT | Performed by: PHYSICAL THERAPIST

## 2022-06-30 NOTE — PROGRESS NOTES
Daily Note     Today's date: 2022  Patient name: Deanna Crook  : 1950  MRN: [id number]  Referring provider: AZ Mcginnis*  Dx:   Encounter Diagnosis     ICD-10-CM    1  Moderate left ankle sprain, subsequent encounter  S93 896J                   Subjective:  Pt reports feeling really good since starting PT  Pain is minimal at most            Objective:  See treatment diary below Survey Monkey given () and FOTO given ()      Assessment:  Pt presents with chronic instability in the L ankle due to past ankle sprains  The pt has decreased ankle ROM and strength  There is noticeable balance deficits on his L side, but improved moderately just since last session  He will continue to benefit from skilled PT to address the ROM, strength, instability, and pain  Balance is showing moderate improvement with less pain  Pt did however have significant balance deficits with addition of bosu lunges today  Good tolerance of 2# for S/L eversion         Plan:          Precautions: None   POC Due:  8/10/22    Beginning HEP   Calf stretch 30"x3   B heel raises 20x   Balance on one foot 10"x5    Manuals HEP 6/15 6/21 6/23 6/28 6/30       TFM L ATF  8' 10' 10' 10' 8'                                              Neuro Re-Ed             SLS L on black disc 6/15 5" 5 - no disc 10" 10 10" 10 10" 10 10" 10       Tandem walk on foam 12' FWD + BWD   3 laps FWD only 3 laps ea +hurdles 3laps fwd 3 laps + maira FWD       Side stepping on foam 12'   3 laps 3 laps +hurdles 3laps 3 laps + maira       TB kicks with R for L LE balance - hip abd, flex, ext   Blue 10 ea Blue 15 ea Blue 15 ea Blue 15 ea       Bosu mini squats dome down   20 20 20 20       Lunges with L onto bosu dome up FWD + SIDE      10 ea                    Ther Ex             Bike    L6 5' L5 6' L5 6' L5 6'       Wedge calf stretch L    15" 5 15" 5 15" 5       B calf raises 6/15 10 20 20 20 20       TB DF/PF             TB Ev L 6/15 Green 15 Blue 20 Blue 20 Blue 20 Blue 20       Towel Toe Scrunches              S/L L ankle eversion 6/15 10 20 20 20 2# 20                                 Ther Activity                                       Gait Training                                       Modalities             Ice prn

## 2022-07-05 ENCOUNTER — OFFICE VISIT (OUTPATIENT)
Dept: PHYSICAL THERAPY | Facility: CLINIC | Age: 72
End: 2022-07-05
Payer: MEDICARE

## 2022-07-05 DIAGNOSIS — S93.402D MODERATE LEFT ANKLE SPRAIN, SUBSEQUENT ENCOUNTER: Primary | ICD-10-CM

## 2022-07-05 PROCEDURE — 97112 NEUROMUSCULAR REEDUCATION: CPT

## 2022-07-05 PROCEDURE — 97110 THERAPEUTIC EXERCISES: CPT

## 2022-07-05 NOTE — PROGRESS NOTES
Daily Note     Today's date: 2022  Patient name: Peng Parrish  : 1950  MRN: [id number]  Referring provider: AZ Salas*  Dx:   Encounter Diagnosis     ICD-10-CM    1  Moderate left ankle sprain, subsequent encounter  S93 402D                   Subjective:  Pt reports feeling really good since starting PT, believes he can continue at home  Objective:  See treatment diary below   Survey Monkey given () and FOTO given ()      Assessment:  Pt presents with chronic instability in the L ankle due to past ankle sprains  The pt has decreased ankle ROM and strength  There is noticeable balance deficits on his L side, but improved moderately just since last session  He will continue to benefit from skilled PT to address the ROM, strength, instability, and pain  Reviewed HEP and self-stm with pt, answered pt questions and concerns to pt satisfaction  Pt balance stability has improved to previous visits         Plan:  D/c to HEP     Precautions: None   POC Due:  8/10/22    Beginning HEP   Calf stretch 30"x3   B heel raises 20x   Balance on one foot 10"x5    Manuals HEP 6/15 6/21 6/23 6/28 6/30 7/5      TFM L ATF  8' 10' 10' 10' 8' 8'                                             Neuro Re-Ed             SLS L on black disc 6/15 5" 5 - no disc 10" 10 10" 10 10" 10 10" 10 10" 10      Tandem walk on foam 12' FWD + BWD   3 laps FWD only 3 laps ea +hurdles 3laps fwd 3 laps + maira FWD 3+ laps hurdles fwd      Side stepping on foam 12'   3 laps 3 laps +hurdles 3laps 3 laps + maira 3laps +hurdles      TB kicks with R for L LE balance - hip abd, flex, ext   Blue 10 ea Blue 15 ea Blue 15 ea Blue 15 ea Blue 15      Bosu mini squats dome down   20 20 20 20 20 UE support      Lunges with L onto bosu dome up FWD + SIDE      10 ea 10 ea UE support                   Ther Ex             Bike    L6 5' L5 6' L5 6' L5 6' L5 6'      Wedge calf stretch L    15" 5 15" 5 15" 5 15" 5      B calf raises 6/15 10 20 20 20 20 20      TB DF/PF             TB Ev L 6/15 Green 15 Blue 20 Blue 20 Blue 20 Blue 20 [id number]      Towel Toe Scrunches              S/L L ankle eversion 6/15 10 20 20 20 2# 20 2# 20                                Ther Activity                                       Gait Training                                       Modalities             Ice prn

## 2022-07-07 ENCOUNTER — OFFICE VISIT (OUTPATIENT)
Dept: PHYSICAL THERAPY | Facility: CLINIC | Age: 72
End: 2022-07-07
Payer: MEDICARE

## 2022-07-07 DIAGNOSIS — S93.402D MODERATE LEFT ANKLE SPRAIN, SUBSEQUENT ENCOUNTER: Primary | ICD-10-CM

## 2022-07-07 PROCEDURE — 97110 THERAPEUTIC EXERCISES: CPT

## 2022-07-07 PROCEDURE — 97112 NEUROMUSCULAR REEDUCATION: CPT

## 2022-07-07 NOTE — PROGRESS NOTES
Daily Note     Today's date: 2022  Patient name: Dhara Randhawa  : 1950  MRN: [id number]  Referring provider: AZ Queen*  Dx:   Encounter Diagnosis     ICD-10-CM    1  Moderate left ankle sprain, subsequent encounter  S93 203D                   Subjective:  Pt reports feeling good, will continue his HEP  Objective:  See treatment diary below   Survey Monkey given () and FOTO given ()      Assessment:  Pt presents with chronic instability in the L ankle due to past ankle sprains  The pt has decreased ankle ROM and strength  There is noticeable balance deficits on his L side, but improved moderately just since last session  He will continue to benefit from skilled PT to address the ROM, strength, instability, and pain  Reviewed HEP and self-stm with pt, answered pt questions and concerns to pt satisfaction         Plan:  D/c to HEP     Precautions: None   POC Due:  8/10/22    Beginning HEP   Calf stretch 30"x3   B heel raises 20x   Balance on one foot 10"x5    Manuals HEP    TFM L ATF  8' 8' 8                        Neuro Re-Ed       SLS L on black disc 6/15 10" 10 10" 10 10" 10   Tandem walk on foam 12' FWD + BWD  3 laps + maira FWD 3+ laps hurdles fwd     Side stepping on foam 12'  3 laps + maira 3laps +hurdles    TB kicks with R for L LE balance - hip abd, flex, ext  Blue 15 ea Blue 15 Blue 15   Bosu mini squats dome down  20 20 UE support 20 UE support   Lunges with L onto bosu dome up FWD + SIDE  10 ea 10 ea UE support 10 ea UE support          Ther Ex       Bike   L5 6' L5 6' L5 6'   Wedge calf stretch L  15" 5 15" 5 15" 5   B calf raises 6/15 20 20 20   TB DF/PF       TB Ev L 6/15 Blue 20 [id number] Blue 20   Towel Toe Scrunches        S/L L ankle eversion 6/15 2# 20 2# 20 2# 20                 Ther Activity                     Gait Training                     Modalities       Ice prn

## 2022-07-12 ENCOUNTER — APPOINTMENT (OUTPATIENT)
Dept: PHYSICAL THERAPY | Facility: CLINIC | Age: 72
End: 2022-07-12
Payer: MEDICARE

## 2022-07-14 ENCOUNTER — APPOINTMENT (OUTPATIENT)
Dept: PHYSICAL THERAPY | Facility: CLINIC | Age: 72
End: 2022-07-14
Payer: MEDICARE

## 2022-07-19 ENCOUNTER — APPOINTMENT (OUTPATIENT)
Dept: PHYSICAL THERAPY | Facility: CLINIC | Age: 72
End: 2022-07-19
Payer: MEDICARE

## 2022-07-21 ENCOUNTER — APPOINTMENT (OUTPATIENT)
Dept: PHYSICAL THERAPY | Facility: CLINIC | Age: 72
End: 2022-07-21
Payer: MEDICARE

## 2022-08-05 DIAGNOSIS — N13.8 ENLARGED PROSTATE WITH URINARY OBSTRUCTION: ICD-10-CM

## 2022-08-05 DIAGNOSIS — N40.1 ENLARGED PROSTATE WITH URINARY OBSTRUCTION: ICD-10-CM

## 2022-08-05 RX ORDER — FINASTERIDE 5 MG/1
5 TABLET, FILM COATED ORAL DAILY
Qty: 90 TABLET | Refills: 3 | Status: SHIPPED | OUTPATIENT
Start: 2022-08-05

## 2022-10-12 PROBLEM — R05.9 COUGH: Status: RESOLVED | Noted: 2021-10-19 | Resolved: 2022-10-12

## 2022-10-21 ENCOUNTER — OFFICE VISIT (OUTPATIENT)
Dept: FAMILY MEDICINE CLINIC | Facility: HOSPITAL | Age: 72
End: 2022-10-21
Payer: MEDICARE

## 2022-10-21 VITALS
HEART RATE: 55 BPM | WEIGHT: 192 LBS | OXYGEN SATURATION: 94 % | DIASTOLIC BLOOD PRESSURE: 78 MMHG | HEIGHT: 68 IN | BODY MASS INDEX: 29.1 KG/M2 | SYSTOLIC BLOOD PRESSURE: 125 MMHG

## 2022-10-21 DIAGNOSIS — M25.512 CHRONIC LEFT SHOULDER PAIN: ICD-10-CM

## 2022-10-21 DIAGNOSIS — Z23 NEED FOR INFLUENZA VACCINATION: Primary | ICD-10-CM

## 2022-10-21 DIAGNOSIS — E78.00 HYPERCHOLESTEROLEMIA: ICD-10-CM

## 2022-10-21 DIAGNOSIS — G89.29 CHRONIC LEFT SHOULDER PAIN: ICD-10-CM

## 2022-10-21 DIAGNOSIS — Z00.00 MEDICARE ANNUAL WELLNESS VISIT, SUBSEQUENT: ICD-10-CM

## 2022-10-21 DIAGNOSIS — Z12.5 SCREENING FOR PROSTATE CANCER: ICD-10-CM

## 2022-10-21 PROCEDURE — G0008 ADMIN INFLUENZA VIRUS VAC: HCPCS

## 2022-10-21 PROCEDURE — G0439 PPPS, SUBSEQ VISIT: HCPCS | Performed by: INTERNAL MEDICINE

## 2022-10-21 PROCEDURE — 90662 IIV NO PRSV INCREASED AG IM: CPT

## 2022-10-21 NOTE — PATIENT INSTRUCTIONS
Medicare Preventive Visit Patient Instructions  Thank you for completing your Welcome to Medicare Visit or Medicare Annual Wellness Visit today  Your next wellness visit will be due in one year (10/22/2023)  The screening/preventive services that you may require over the next 5-10 years are detailed below  Some tests may not apply to you based off risk factors and/or age  Screening tests ordered at today's visit but not completed yet may show as past due  Also, please note that scanned in results may not display below  Preventive Screenings:  Service Recommendations Previous Testing/Comments   Colorectal Cancer Screening  · Colonoscopy    · Fecal Occult Blood Test (FOBT)/Fecal Immunochemical Test (FIT)  · Fecal DNA/Cologuard Test  · Flexible Sigmoidoscopy Age: 39-70 years old   Colonoscopy: every 10 years (May be performed more frequently if at higher risk)  OR  FOBT/FIT: every 1 year  OR  Cologuard: every 3 years  OR  Sigmoidoscopy: every 5 years  Screening may be recommended earlier than age 39 if at higher risk for colorectal cancer  Also, an individualized decision between you and your healthcare provider will decide whether screening between the ages of 74-80 would be appropriate   Colonoscopy: 01/26/2022  FOBT/FIT: Not on file  Cologuard: Not on file  Sigmoidoscopy: Not on file    Screening Current     Prostate Cancer Screening Individualized decision between patient and health care provider in men between ages of 53-78   Medicare will cover every 12 months beginning on the day after your 50th birthday PSA: 1 9 ng/mL           Hepatitis C Screening Once for adults born between 1945 and 1965  More frequently in patients at high risk for Hepatitis C Hep C Antibody: 10/15/2021    Screening Current   Diabetes Screening 1-2 times per year if you're at risk for diabetes or have pre-diabetes Fasting glucose: 96 mg/dL (5/3/2022)  A1C: 5 4 % (5/7/2021)  Screening Current   Cholesterol Screening Once every 5 years if you don't have a lipid disorder  May order more often based on risk factors  Lipid panel: 05/03/2022  Screening Not Indicated  History Lipid Disorder      Other Preventive Screenings Covered by Medicare:  1  Abdominal Aortic Aneurysm (AAA) Screening: covered once if your at risk  You're considered to be at risk if you have a family history of AAA or a male between the age of 73-68 who smoking at least 100 cigarettes in your lifetime  2  Lung Cancer Screening: covers low dose CT scan once per year if you meet all of the following conditions: (1) Age 50-69; (2) No signs or symptoms of lung cancer; (3) Current smoker or have quit smoking within the last 15 years; (4) You have a tobacco smoking history of at least 20 pack years (packs per day x number of years you smoked); (5) You get a written order from a healthcare provider  3  Glaucoma Screening: covered annually if you're considered high risk: (1) You have diabetes OR (2) Family history of glaucoma OR (3)  aged 48 and older OR (3)  American aged 72 and older  3  Osteoporosis Screening: covered every 2 years if you meet one of the following conditions: (1) Have a vertebral abnormality; (2) On glucocorticoid therapy for more than 3 months; (3) Have primary hyperparathyroidism; (4) On osteoporosis medications and need to assess response to drug therapy  5  HIV Screening: covered annually if you're between the age of 12-76  Also covered annually if you are younger than 13 and older than 72 with risk factors for HIV infection  For pregnant patients, it is covered up to 3 times per pregnancy      Immunizations:  Immunization Recommendations   Influenza Vaccine Annual influenza vaccination during flu season is recommended for all persons aged >= 6 months who do not have contraindications   Pneumococcal Vaccine   * Pneumococcal conjugate vaccine = PCV13 (Prevnar 13), PCV15 (Vaxneuvance), PCV20 (Prevnar 20)  * Pneumococcal polysaccharide vaccine = PPSV23 (Pneumovax) Adults 2364 years old: 1-3 doses may be recommended based on certain risk factors  Adults 72 years old: 1-2 doses may be recommended based off what pneumonia vaccine you previously received   Hepatitis B Vaccine 3 dose series if at intermediate or high risk (ex: diabetes, end stage renal disease, liver disease)   Tetanus (Td) Vaccine - COST NOT COVERED BY MEDICARE PART B Following completion of primary series, a booster dose should be given every 10 years to maintain immunity against tetanus  Td may also be given as tetanus wound prophylaxis  Tdap Vaccine - COST NOT COVERED BY MEDICARE PART B Recommended at least once for all adults  For pregnant patients, recommended with each pregnancy  Shingles Vaccine (Shingrix) - COST NOT COVERED BY MEDICARE PART B  2 shot series recommended in those aged 48 and above     Health Maintenance Due:      Topic Date Due   • Colorectal Cancer Screening  01/25/2027   • Hepatitis C Screening  Completed     Immunizations Due:      Topic Date Due   • COVID-19 Vaccine (3 - Booster for Pfizer series) 07/09/2021   • Influenza Vaccine (1) 09/01/2022     Advance Directives   What are advance directives? Advance directives are legal documents that state your wishes and plans for medical care  These plans are made ahead of time in case you lose your ability to make decisions for yourself  Advance directives can apply to any medical decision, such as the treatments you want, and if you want to donate organs  What are the types of advance directives? There are many types of advance directives, and each state has rules about how to use them  You may choose a combination of any of the following:  · Living will: This is a written record of the treatment you want  You can also choose which treatments you do not want, which to limit, and which to stop at a certain time  This includes surgery, medicine, IV fluid, and tube feedings     · Durable power of  for [facility]): This is a written record that states who you want to make healthcare choices for you when you are unable to make them for yourself  This person, called a proxy, is usually a family member or a friend  You may choose more than 1 proxy  · Do not resuscitate (DNR) order:  A DNR order is used in case your heart stops beating or you stop breathing  It is a request not to have certain forms of treatment, such as CPR  A DNR order may be included in other types of advance directives  · Medical directive: This covers the care that you want if you are in a coma, near death, or unable to make decisions for yourself  You can list the treatments you want for each condition  Treatment may include pain medicine, surgery, blood transfusions, dialysis, IV or tube feedings, and a ventilator (breathing machine)  · Values history: This document has questions about your views, beliefs, and how you feel and think about life  This information can help others choose the care that you would choose  Why are advance directives important? An advance directive helps you control your care  Although spoken wishes may be used, it is better to have your wishes written down  Spoken wishes can be misunderstood, or not followed  Treatments may be given even if you do not want them  An advance directive may make it easier for your family to make difficult choices about your care  Weight Management   Why it is important to manage your weight:  Being overweight increases your risk of health conditions such as heart disease, high blood pressure, type 2 diabetes, and certain types of cancer  It can also increase your risk for osteoarthritis, sleep apnea, and other respiratory problems  Aim for a slow, steady weight loss  Even a small amount of weight loss can lower your risk of health problems  How to lose weight safely:  A safe and healthy way to lose weight is to eat fewer calories and get regular exercise   You can lose up about 1 pound a week by decreasing the number of calories you eat by 500 calories each day  Healthy meal plan for weight management:  A healthy meal plan includes a variety of foods, contains fewer calories, and helps you stay healthy  A healthy meal plan includes the following:  · Eat whole-grain foods more often  A healthy meal plan should contain fiber  Fiber is the part of grains, fruits, and vegetables that is not broken down by your body  Whole-grain foods are healthy and provide extra fiber in your diet  Some examples of whole-grain foods are whole-wheat breads and pastas, oatmeal, brown rice, and bulgur  · Eat a variety of vegetables every day  Include dark, leafy greens such as spinach, kale, jean carlos greens, and mustard greens  Eat yellow and orange vegetables such as carrots, sweet potatoes, and winter squash  · Eat a variety of fruits every day  Choose fresh or canned fruit (canned in its own juice or light syrup) instead of juice  Fruit juice has very little or no fiber  · Eat low-fat dairy foods  Drink fat-free (skim) milk or 1% milk  Eat fat-free yogurt and low-fat cottage cheese  Try low-fat cheeses such as mozzarella and other reduced-fat cheeses  · Choose meat and other protein foods that are low in fat  Choose beans or other legumes such as split peas or lentils  Choose fish, skinless poultry (chicken or turkey), or lean cuts of red meat (beef or pork)  Before you cook meat or poultry, cut off any visible fat  · Use less fat and oil  Try baking foods instead of frying them  Add less fat, such as margarine, sour cream, regular salad dressing and mayonnaise to foods  Eat fewer high-fat foods  Some examples of high-fat foods include french fries, doughnuts, ice cream, and cakes  · Eat fewer sweets  Limit foods and drinks that are high in sugar  This includes candy, cookies, regular soda, and sweetened drinks  Exercise:  Exercise at least 30 minutes per day on most days of the week  Some examples of exercise include walking, biking, dancing, and swimming  You can also fit in more physical activity by taking the stairs instead of the elevator or parking farther away from stores  Ask your healthcare provider about the best exercise plan for you  © Copyright [address] 2018 Information is for End User's use only and may not be sold, redistributed or otherwise used for commercial purposes   All illustrations and images included in CareNotes® are the copyrighted property of [employer]  or [address] [place] [address]

## 2022-10-21 NOTE — PROGRESS NOTES
Assessment and Plan:     Problem List Items Addressed This Visit    None     Visit Diagnoses     Need for influenza vaccination    -  Primary    Relevant Orders    influenza vaccine, high-dose, PF 0 7 mL (FLUZONE HIGH-DOSE) (Completed)    Medicare annual wellness visit, subsequent        Chronic left shoulder pain        Relevant Orders    Ambulatory Referral to Sports Medicine        BMI Counseling: Body mass index is 29 19 kg/m²  The BMI is above normal  Nutrition recommendations include encouraging healthy choices of fruits and vegetables and increasing intake of lean protein  Exercise recommendations include exercising 3-5 times per week  Rationale for BMI follow-up plan is due to patient being overweight or obese  In gym regualrly with silver sneakers    Depression Screening and Follow-up Plan: Patient was screened for depression during today's encounter  They screened negative with a PHQ-2 score of 0  Preventive health issues were discussed with patient, and age appropriate screening tests were ordered as noted in patient's After Visit Summary  Personalized health advice and appropriate referrals for health education or preventive services given if needed, as noted in patient's After Visit Summary  History of Present Illness:     Patient presents for a Medicare Wellness Visit    Here for medicare wellness     Patient Care Team:  Deana Cardona DO as PCP - MD Elias Carter DO Linna Irons, DO     Review of Systems:     Review of Systems   Musculoskeletal: Positive for back pain  Some flare doing outdoor work- stacking wood etc - consider to see Dr Allie Bradley if it flares up   All other systems reviewed and are negative         Problem List:     Patient Active Problem List   Diagnosis   • Ureterolithiasis   • Lumbar disc disease   • Gastroesophageal reflux disease without esophagitis   • Nephrolithiasis   • Ureteral calculus   • Benign nodular prostatic hyperplasia with lower urinary tract symptoms   • Chronic pain of both ankles   • Cutaneous skin tags   • Eczema of both external ears   • Enlarged prostate   • Hemangioma of skin   • Hypercholesterolemia   • Insomnia   • Left inguinal hernia   • Lipoma of abdominal wall   • Chronic pain syndrome   • Postlaminectomy syndrome, lumbar   • Paraesophageal hernia   • Internal derangement of right shoulder   • Low back pain   • Asthma   • Allergic rhinitis   • Multiple pigmented nevi   • Family history of diabetes mellitus in mother   • Status post total shoulder arthroplasty, right   • Full thickness tear of right subscapularis tendon      Past Medical and Surgical History:     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    • Asthma    • Chronic pain     back   • GERD (gastroesophageal reflux disease)    • Hiatal hernia 1994   • Hyperlipidemia    • Hypertension    • Kidney stone     r stent- suppose to be removed Fri 7/13   • PONV (postoperative nausea and vomiting)    • Shortness of breath      Past Surgical History:   Procedure Laterality Date   • ABDOMINAL ADHESION SURGERY N/A 7/11/2018    Procedure: LYSIS ADHESIONS EXTENSIVE;  Surgeon: Xiomara Lizama MD;  [facility];  Service: General   • ABDOMINAL SURGERY     • BACK SURGERY      x2   • CARPAL TUNNEL RELEASE     • COLONOSCOPY     • CYSTOSCOPY  08/07/2020   • CYSTOSCOPY W/ URETERAL STENT REMOVAL  01/26/2018   • ESOPHAGOGASTRODUODENOSCOPY N/A 7/11/2018    Procedure: ESOPHAGOGASTRODUODENOSCOPY (EGD) INTRAOPERATIVE ;  Surgeon: Xiomara Lizama MD;  [facility];  Service: General   • FL INJECTION RIGHT SHOULDER (ARTHROGRAM)  5/13/2022   • HALLUX VALGUS CORRECTION Bilateral     1st MTP joint    • HERNIA REPAIR     • HIATAL HERNIA REPAIR  1994   • INCISIONAL HERNIA REPAIR N/A 7/11/2018    Procedure: [place] X2;  Surgeon: Xiomara Lizama MD;  [facility];  Service: General   • KNEE SURGERY Bilateral    • LITHOTRIPSY     • PERIPHERAL NEUROPLASTY OF FINGER / HAND / Tiney Camp      left middle finger   • VA CYSTO/URETERO W/LITHOTRIPSY &INDWELL STENT INSRT Left 1/3/2018    Procedure: Ang Dalalina PYELOGRAM AND INSERTION STENT Belkis Pérez;  Surgeon: Nguyen Huber MD;  [facility];  Service: Urology   • VA CYSTO/URETERO W/LITHOTRIPSY &INDWELL STENT INSRT Right 6/8/2018    Procedure: CYSTOSCOPY URETEROSCOPY WITH LITHOTRIPSY HOLMIUM LASER, RETROGRADE PYELOGRAM AND INSERTION STENT URETERAL;  Surgeon: Corey Duran MD;  [facility];  Service: Urology   • VA FRAGMENT KIDNEY STONE/ ESWL Left 1/18/2018    Procedure: ESWL;  Surgeon: Corey Duran MD;  [facility];  Service: Urology   • VA LAP, REPAIR PARAESOPHAGEAL HERNIA, INCL FUNDOPLASTY W/ MESH N/A 7/11/2018    Procedure: REPAIR RECURRENT HERNIA PARAESOPHAGEAL  LAPAROSCOPIC;KLAUDIA Kaufman;  Surgeon: Danish Randolph MD;  [facility];  Service: General   • VA RECONSTR TOTAL SHOULDER IMPLANT Right 6/1/2021    Procedure: TOTAL SHOULDER ARTHROPLASTY ANATOMIC, biceps tenodesis;   Surgeon: Claudia Degroot MD;  [facility];  Service: Orthopedics   • ROTATOR CUFF REPAIR Right    • SHOULDER ARTHROSCOPY Bilateral    • SHOULDER SURGERY        Family History:     Family History   Problem Relation Age of Onset   • Diabetes Mother    • Hypertension Mother    • Brain cancer Mother    • Hypertension Sister    • Nephrolithiasis Sister    • COPD Father    • Cancer Maternal Grandmother    • Aortic aneurysm Maternal Uncle         x3 maternal uncles       Social History:     Social History     Socioeconomic History   • Marital status: /Civil Union     Spouse name: None   • Number of children: None   • Years of education: None   • Highest education level: None   Occupational History   • None   Tobacco Use   • Smoking status: Never Smoker   • Smokeless tobacco: Never Used   Vaping Use   • Vaping Use: Never used   Substance and Sexual Activity   • Alcohol use: Yes     Alcohol/week: 1 0 standard drink     Types: 1 Cans of beer per week     Comment: once a month    • Drug use: No   • Sexual activity: Not Currently     Comment: flomax is helping    Other Topics Concern   • None   Social History Narrative    Dental care, regularly    Lives with spouse     Living situation: Supportive and safe    No advance directive on file     Social Determinants of Health     Financial Resource Strain: Low Risk    • Difficulty of Paying Living Expenses: Not very hard   Food Insecurity: Not on file   Transportation Needs: No Transportation Needs   • Lack of Transportation (Medical): No   • Lack of Transportation (Non-Medical):  No   Physical Activity: Not on file   Stress: Not on file   Social Connections: Not on file   Intimate Partner Violence: Not on file   Housing Stability: Not on file      Medications and Allergies:     Current Outpatient Medications   Medication Sig Dispense Refill   • Coenzyme Q10 (CO Q 10) 100 MG CAPS Take by mouth     • finasteride (PROSCAR) 5 mg tablet Take 1 tablet (5 mg total) by mouth daily 90 tablet 3   • fluticasone (FLONASE) 50 mcg/act nasal spray 2 sprays into each nostril daily (Patient taking differently: 2 sprays into each nostril daily as needed) 1 Bottle 5   • losartan (Cozaar) 50 mg tablet Take 1 tablet (50 mg total) by mouth daily 90 tablet 3   • mometasone (ELOCON) 0 1 % cream Apply topically daily 45 g 2   • omeprazole (PriLOSEC) 20 mg delayed release capsule take 1 capsule by mouth once daily 30 capsule 11   • rosuvastatin (CRESTOR) 20 MG tablet Take 1 tablet (20 mg total) by mouth daily 90 tablet 3   • tamsulosin (FLOMAX) 0 4 mg take 1 capsule by mouth once daily 90 capsule 3   • zolpidem (AMBIEN) 10 mg tablet Take 1 tablet (10 mg total) by mouth daily at bedtime as needed for sleep 30 tablet 1   • diclofenac sodium (VOLTAREN) 50 mg EC tablet Take 1 tablet (50 mg total) by mouth 2 (two) times a day (Patient not taking: Reported on 10/21/2022) 60 tablet 5     No current facility-administered medications for this visit  Allergies   Allergen Reactions   • Meperidine GI Intolerance   • Horse-Derived Products    • Lisinopril Cough     Other reaction(s): Cough      Immunizations:     Immunization History   Administered Date(s) Administered   • COVID-19 PFIZER VACCINE 0 3 ML IM 01/14/2021, 02/09/2021   • H1N1, All Formulations 01/20/2010   • INFLUENZA 11/01/2014, 09/14/2015, 09/13/2016, 10/02/2018, 09/20/2020, 10/07/2021   • Influenza Split High Dose Preservative Free IM 09/13/2016   • Influenza, high dose seasonal 0 7 mL 10/21/2022   • Influenza, seasonal, injectable 09/19/2017   • Pneumococcal Conjugate 13-Valent 09/13/2016   • Pneumococcal Polysaccharide PPV23 09/19/2017   • Tdap 04/20/2017   • Zoster Vaccine Recombinant 04/29/2019   • influenza, trivalent, adjuvanted 10/07/2019      Health Maintenance:         Topic Date Due   • Colorectal Cancer Screening  01/25/2027   • Hepatitis C Screening  Completed         Topic Date Due   • COVID-19 Vaccine (3 - Booster for Yeager Peter series) 07/09/2021      Medicare Screening Tests and Risk Assessments:     Fidel Witt is here for his Subsequent Wellness visit  Health Risk Assessment:   Patient rates overall health as good  Patient feels that their physical health rating is same  Patient is satisfied with their life  Eyesight was rated as same  Hearing was rated as same  Patient feels that their emotional and mental health rating is same  Patients states they are never, rarely angry  Patient states they are sometimes unusually tired/fatigued  Pain experienced in the last 7 days has been some  Patient's pain rating has been 5/10  Patient states that he has experienced no weight loss or gain in last 6 months  Depression Screening:   PHQ-2 Score: 0      Fall Risk Screening:    In the past year, patient has experienced: no history of falling in past year      Home Safety:  Patient does not have trouble with stairs inside or outside of their home  Patient has working smoke alarms and has no working carbon monoxide detector  Home safety hazards include: none  Nutrition:   Current diet is Regular  Medications:   Patient is currently taking over-the-counter supplements  OTC medications include: see medication list  Patient is able to manage medications  Activities of Daily Living (ADLs)/Instrumental Activities of Daily Living (IADLs):   Walk and transfer into and out of bed and chair?: Yes  Dress and groom yourself?: Yes    Bathe or shower yourself?: Yes    Feed yourself?  Yes  Do your laundry/housekeeping?: Yes  Manage your money, pay your bills and track your expenses?: Yes  Make your own meals?: Yes    Do your own shopping?: Yes    Previous Hospitalizations:   Any hospitalizations or ED visits within the last 12 months?: No      Advance Care Planning:   Living will: Yes    Advanced directive: Yes    Advanced directive counseling given: Yes    End of Life Decisions reviewed with patient: Yes      Comments: Full code but if no hi ope for recovery would not want long term support   wife Micheline Marks is medical poa    PREVENTIVE SCREENINGS      Cardiovascular Screening:    General: Screening Not Indicated and History Lipid Disorder      Diabetes Screening:     General: Screening Current      Colorectal Cancer Screening:     General: Screening Current      Abdominal Aortic Aneurysm (AAA) Screening:    Risk factors include: age between 73-69 yo        Lung Cancer Screening:     General: Screening Not Indicated      Hepatitis C Screening:    General: Screening Current    Screening, Brief Intervention, and Referral to Treatment (SBIRT)    Screening  Typical number of drinks in a day: 0    Single Item Drug Screening:  How often have you used an illegal drug (including marijuana) or a prescription medication for non-medical reasons in the past year? never    Single Item Drug Screen Score: 0  Interpretation: Negative screen for possible drug use disorder    No exam data present     Physical Exam:     /78   Pulse 55   Ht 5' 8" (1 727 m)   Wt 87 1 kg (192 lb)   SpO2 94%   BMI 29 19 kg/m²     Physical Exam  Vitals and nursing note reviewed  Constitutional:       General: He is not in acute distress  HENT:      Right Ear: There is no impacted cerumen  Left Ear: There is no impacted cerumen  Ears:      Comments:       Nose: No congestion  Cardiovascular:      Rate and Rhythm: Normal rate and regular rhythm  Heart sounds: No murmur heard  Pulmonary:      Breath sounds: No wheezing or rhonchi  Abdominal:      General: Abdomen is flat  Palpations: Abdomen is soft  Tenderness: There is no guarding or rebound  Musculoskeletal:         General: Tenderness present  No swelling or deformity  Cervical back: No rigidity or tenderness  Comments: Mild lumbar flattening   left shoulder anterior tenderness   Skin:     Findings: No erythema  Neurological:      General: No focal deficit present  Mental Status: He is oriented to person, place, and time  Motor: No weakness  Coordination: Coordination normal    Psychiatric:         Mood and Affect: Mood normal          Thought Content:  Thought content normal           Radha Masters,

## 2022-11-03 ENCOUNTER — APPOINTMENT (OUTPATIENT)
Dept: LAB | Facility: HOSPITAL | Age: 72
End: 2022-11-03
Attending: INTERNAL MEDICINE

## 2022-11-03 DIAGNOSIS — Z12.5 SCREENING FOR PROSTATE CANCER: ICD-10-CM

## 2022-11-03 DIAGNOSIS — E78.00 HYPERCHOLESTEROLEMIA: ICD-10-CM

## 2022-11-03 LAB
ALBUMIN SERPL BCP-MCNC: 3.8 G/DL (ref 3.5–5)
ALP SERPL-CCNC: 61 U/L (ref 46–116)
ALT SERPL W P-5'-P-CCNC: 38 U/L (ref 12–78)
ANION GAP SERPL CALCULATED.3IONS-SCNC: 5 MMOL/L (ref 4–13)
AST SERPL W P-5'-P-CCNC: 20 U/L (ref 5–45)
BASOPHILS # BLD AUTO: 0.02 THOUSANDS/ÂΜL (ref 0–0.1)
BASOPHILS NFR BLD AUTO: 0 % (ref 0–1)
BILIRUB SERPL-MCNC: 0.77 MG/DL (ref 0.2–1)
BUN SERPL-MCNC: 11 MG/DL (ref 5–25)
CALCIUM SERPL-MCNC: 9.2 MG/DL (ref 8.3–10.1)
CHLORIDE SERPL-SCNC: 109 MMOL/L (ref 96–108)
CHOLEST SERPL-MCNC: 140 MG/DL
CO2 SERPL-SCNC: 26 MMOL/L (ref 21–32)
CREAT SERPL-MCNC: 0.82 MG/DL (ref 0.6–1.3)
EOSINOPHIL # BLD AUTO: 0.28 THOUSAND/ÂΜL (ref 0–0.61)
EOSINOPHIL NFR BLD AUTO: 5 % (ref 0–6)
ERYTHROCYTE [DISTWIDTH] IN BLOOD BY AUTOMATED COUNT: 12.1 % (ref 11.6–15.1)
GFR SERPL CREATININE-BSD FRML MDRD: 87 ML/MIN/1.73SQ M
GLUCOSE P FAST SERPL-MCNC: 95 MG/DL (ref 65–99)
HCT VFR BLD AUTO: 45.1 % (ref 36.5–49.3)
HDLC SERPL-MCNC: 50 MG/DL
HGB BLD-MCNC: 15.2 G/DL (ref 12–17)
IMM GRANULOCYTES # BLD AUTO: 0.01 THOUSAND/UL (ref 0–0.2)
IMM GRANULOCYTES NFR BLD AUTO: 0 % (ref 0–2)
LDLC SERPL CALC-MCNC: 65 MG/DL (ref 0–100)
LYMPHOCYTES # BLD AUTO: 1.51 THOUSANDS/ÂΜL (ref 0.6–4.47)
LYMPHOCYTES NFR BLD AUTO: 27 % (ref 14–44)
MCH RBC QN AUTO: 28.7 PG (ref 26.8–34.3)
MCHC RBC AUTO-ENTMCNC: 33.7 G/DL (ref 31.4–37.4)
MCV RBC AUTO: 85 FL (ref 82–98)
MONOCYTES # BLD AUTO: 0.55 THOUSAND/ÂΜL (ref 0.17–1.22)
MONOCYTES NFR BLD AUTO: 10 % (ref 4–12)
NEUTROPHILS # BLD AUTO: 3.29 THOUSANDS/ÂΜL (ref 1.85–7.62)
NEUTS SEG NFR BLD AUTO: 58 % (ref 43–75)
NRBC BLD AUTO-RTO: 0 /100 WBCS
PLATELET # BLD AUTO: 240 THOUSANDS/UL (ref 149–390)
PMV BLD AUTO: 8.9 FL (ref 8.9–12.7)
POTASSIUM SERPL-SCNC: 3.9 MMOL/L (ref 3.5–5.3)
PROT SERPL-MCNC: 7.6 G/DL (ref 6.4–8.4)
PSA SERPL-MCNC: 1.3 NG/ML (ref 0–4)
RBC # BLD AUTO: 5.3 MILLION/UL (ref 3.88–5.62)
SODIUM SERPL-SCNC: 140 MMOL/L (ref 135–147)
TRIGL SERPL-MCNC: 125 MG/DL
WBC # BLD AUTO: 5.66 THOUSAND/UL (ref 4.31–10.16)

## 2022-11-21 ENCOUNTER — OFFICE VISIT (OUTPATIENT)
Dept: OBGYN CLINIC | Facility: CLINIC | Age: 72
End: 2022-11-21

## 2022-11-21 ENCOUNTER — APPOINTMENT (OUTPATIENT)
Dept: RADIOLOGY | Facility: CLINIC | Age: 72
End: 2022-11-21

## 2022-11-21 VITALS
HEIGHT: 68 IN | DIASTOLIC BLOOD PRESSURE: 82 MMHG | WEIGHT: 193 LBS | BODY MASS INDEX: 29.25 KG/M2 | SYSTOLIC BLOOD PRESSURE: 120 MMHG

## 2022-11-21 DIAGNOSIS — M25.512 LEFT SHOULDER PAIN, UNSPECIFIED CHRONICITY: ICD-10-CM

## 2022-11-21 DIAGNOSIS — G89.29 CHRONIC LEFT SHOULDER PAIN: ICD-10-CM

## 2022-11-21 DIAGNOSIS — M19.012 ARTHRITIS OF LEFT SHOULDER REGION: Primary | ICD-10-CM

## 2022-11-21 DIAGNOSIS — M25.512 CHRONIC LEFT SHOULDER PAIN: ICD-10-CM

## 2022-11-21 NOTE — PATIENT INSTRUCTIONS
Explained the patient that he has she has left shoulder pain due to moderate to severe glenohumeral arthritis as well as some evidence of impingement his examination today  Explained that he should receive significantly for ultrasound-guided corticosteroid injection intra-articular into the joint

## 2022-11-21 NOTE — PROGRESS NOTES
1  Arthritis of left shoulder region        2  Left shoulder pain, unspecified chronicity  XR shoulder 2+ vw left      3  Chronic left shoulder pain  Ambulatory Referral to Sports Medicine        Orders Placed This Encounter   Procedures   • XR shoulder 2+ vw left        IMAGING STUDIES: (I personally reviewed images in PACS and report):  Xray left shoulder 11/21/22: Moderate to severe GHOA  PAST REPORTS:        ASSESSMENT/PLAN:  Left Shoulder GH OA  Left Shoulder impingement    Repeat X-ray next visit: None    Return for Follow-up for Ultrasound Guided Injection tomorrow  Patient Instructions   Explained the patient that he has she has left shoulder pain due to moderate to severe glenohumeral arthritis as well as some evidence of impingement his examination today  Explained that he should receive significantly for ultrasound-guided corticosteroid injection intra-articular into the joint          __________________________________________________________________________    HISTORY OF PRESENT ILLNESS:  Chronic intermittent left anterior shoulder pain ongoing for years  Patient's past medical history significant for contralateral right-sided shoulder replacement approximately 1 year ago performed by Dr Bradley Wing  For the left shoulder describes achy throbbing sore pain with no injury event  Worse with activity around the house including mowing gardening as well as exercising  Taking some anti-inflammatory medications  Would like to avoid shoulder replacement if possible            Review of Systems      Following history reviewed and update:    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    • Asthma    • Chronic pain     back   • GERD (gastroesophageal reflux disease)    • Hiatal hernia 1994   • Hyperlipidemia    • Hypertension    • Kidney stone     r stent- suppose to be removed Fri 7/13   • PONV (postoperative nausea and vomiting)    • Shortness of breath      Past Surgical History:   Procedure Laterality Date   • ABDOMINAL ADHESION SURGERY N/A 7/11/2018    Procedure: LYSIS ADHESIONS EXTENSIVE;  Surgeon: Delvin Primrose, MD;  [facility];  Service: General   • ABDOMINAL SURGERY     • BACK SURGERY      x2   • CARPAL TUNNEL RELEASE     • COLONOSCOPY     • CYSTOSCOPY  08/07/2020   • CYSTOSCOPY W/ URETERAL STENT REMOVAL  01/26/2018   • ESOPHAGOGASTRODUODENOSCOPY N/A 7/11/2018    Procedure: ESOPHAGOGASTRODUODENOSCOPY (EGD) INTRAOPERATIVE ;  Surgeon: Delvin Primrose, MD;  [facility];  Service: General   • FL INJECTION RIGHT SHOULDER (ARTHROGRAM)  5/13/2022   • HALLUX VALGUS CORRECTION Bilateral     1st MTP joint    • HERNIA REPAIR     • HIATAL HERNIA REPAIR  1994   • INCISIONAL HERNIA REPAIR N/A 7/11/2018    Procedure: REPAIR HERNIA INCISIONAL X2;  Surgeon: Delvin Primrose, MD;  [facility];  Service: General   • KNEE SURGERY Bilateral    • LITHOTRIPSY     • PERIPHERAL NEUROPLASTY OF FINGER / HAND / Assunta Jose      left middle finger   • MT CYSTO/URETERO W/LITHOTRIPSY &INDWELL STENT INSRT Left 1/3/2018    Procedure: Fidelina Furlong AND INSERTION STENT Charisse Mccord;  Surgeon: Lizett Steiner MD;  [facility];  Service: Urology   • MT CYSTO/URETERO W/LITHOTRIPSY &INDWELL STENT INSRT Right 6/8/2018    Procedure: CYSTOSCOPY URETEROSCOPY WITH LITHOTRIPSY HOLMIUM LASER, RETROGRADE PYELOGRAM AND INSERTION STENT URETERAL;  Surgeon: Lourdes Burrows MD;  [facility];  Service: Urology   • MT FRAGMENT KIDNEY STONE/ ESWL Left 1/18/2018    Procedure: ESWL;  Surgeon: Lourdes Burrows MD;  [facility];  Service: Urology   • MT LAP, REPAIR PARAESOPHAGEAL HERNIA, INCL FUNDOPLASTY W/ MESH N/A 7/11/2018    Procedure: REPAIR RECURRENT HERNIA PARAESOPHAGEAL  LAPAROSCOPIC;KLAUDIA Bertrand;  Surgeon: Delvin Primrose, MD;  [facility];  Service: General   • MT RECONSTR TOTAL SHOULDER IMPLANT Right 6/1/2021 Procedure: TOTAL SHOULDER ARTHROPLASTY ANATOMIC, biceps tenodesis; Surgeon: Jose Garcia MD;  [facility];  Service: Orthopedics   • ROTATOR CUFF REPAIR Right    • SHOULDER ARTHROSCOPY Bilateral    • SHOULDER SURGERY       Social History   Social History     Substance and Sexual Activity   Alcohol Use Yes   • Alcohol/week: 1 0 standard drink   • Types: 1 Cans of beer per week    Comment: once a month      Social History     Substance and Sexual Activity   Drug Use No     Social History     Tobacco Use   Smoking Status Never   Smokeless Tobacco Never     Family History   Problem Relation Age of Onset   • Diabetes Mother    • Hypertension Mother    • Brain cancer Mother    • Hypertension Sister    • Nephrolithiasis Sister    • COPD Father    • Cancer Maternal Grandmother    • Aortic aneurysm Maternal Uncle         x3 maternal uncles      Allergies   Allergen Reactions   • Meperidine GI Intolerance   • Horse-Derived Products    • Lisinopril Cough     Other reaction(s): Cough          Physical Exam  /82   Ht 5' 8" (1 727 m)   Wt 87 5 kg (193 lb)   BMI 29 35 kg/m²     Constitutional:  see vital signs  Gen: well-developed, normocephalic/atraumatic, well-groomed  Eyes: No inflammation or discharge of conjunctiva or lids; sclera clear   Pharynx: no inflammation, lesion, or mass of lips  Neck: supple, no masses, non-distended  MSK: no inflammation, lesion, mass, or clubbing of nails and digits except for other than mentioned below  SKIN: no visible rashes or skin lesions  Pulmonary/Chest: Effort normal  No respiratory distress     NEURO: cranial nerves grossly intact  PSYCH:  Alert and oriented to person, place, and time; recent and remote memory intact; mood normal, no depression, anxiety, or agitation, judgment and insight good and intact     Ortho Exam  LEFT SHOULDER:  Erythema: no  Swelling: no  Increased Warmth: no    Tenderness: none    ROM  Touchdown sign: intact  External Rotation: 80  Internal Rotation: intact    Strength  Abduction: 5/5  ER: 5/5  IR: 5/5    Drop-Arm: negative  Emptycan: +  Belly Press:   Lift-off Test:    Cuellar: negative  Neer: +  Cross-Arm:   Speeds:       RIGHT SHOULDER:  Strength  Abduction: 5/5  ER: 5/5  IR: 5/5    ROM  Touchdown sign: intact    Empty can: negative      __________________________________________________________________________  Procedures

## 2022-11-22 ENCOUNTER — OFFICE VISIT (OUTPATIENT)
Dept: OBGYN CLINIC | Facility: OTHER | Age: 72
End: 2022-11-22

## 2022-11-22 DIAGNOSIS — G89.29 CHRONIC LEFT SHOULDER PAIN: ICD-10-CM

## 2022-11-22 DIAGNOSIS — M25.512 CHRONIC LEFT SHOULDER PAIN: ICD-10-CM

## 2022-11-22 DIAGNOSIS — M19.012 ARTHRITIS OF LEFT SHOULDER REGION: Primary | ICD-10-CM

## 2022-11-22 RX ORDER — TRIAMCINOLONE ACETONIDE 40 MG/ML
40 INJECTION, SUSPENSION INTRA-ARTICULAR; INTRAMUSCULAR
Status: COMPLETED | OUTPATIENT
Start: 2022-11-22 | End: 2022-11-22

## 2022-11-22 RX ADMIN — TRIAMCINOLONE ACETONIDE 40 MG: 40 INJECTION, SUSPENSION INTRA-ARTICULAR; INTRAMUSCULAR at 08:17

## 2022-11-22 NOTE — PROGRESS NOTES
1  Arthritis of left shoulder region  Large joint arthrocentesis: L glenohumeral      2  Chronic left shoulder pain  Large joint arthrocentesis: L glenohumeral        Orders Placed This Encounter   Procedures   • Large joint arthrocentesis: L glenohumeral      Past Report:   Xray left shoulder 11/21/22: Moderate to severe GHOA       Past Procedures:   None to left shoulder   Previously right shoulder CSI and then replacement     ASSESSMENT/PLAN:  Left Shoulder GH OA  Left Shoulder impingement     Received U/S CSI today       Return Follow up every 3-6 months as needed  for symptoms relief     Patient Instructions   red flags and risks of injection include but are not limited to infection <0 072% as referenced in some sources, nerve or artery penetration, and if steroids are used-skin dimpling <1%, hypo-pigmentation <1%  Recommended no submerging underwater in a tub, pool, ocean, lake, jacuzzi, hot tub, or any other body of water for 1 week until needle wound closes due to risk of infection  May take showers  Clean needle site with soap and water and keep covered at all times with sterile bandage such as a band-aid until fully healed  Educated if any symptoms including fevers, chills, swelling, or worsening symptoms occur then to call office or go to hospital for immediate care if physician unavailable due to possible infection or other complication which is a serious medical problem   Patient expressed understanding and agreed to proceed with procedure             __________________________________________________________________________    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    • Asthma    • Chronic pain     back   • GERD (gastroesophageal reflux disease)    • Hiatal hernia 1994   • Hyperlipidemia    • Hypertension    • Kidney stone     r stent- suppose to be removed Fri 7/13   • PONV (postoperative nausea and vomiting)    • Shortness of breath      Allergies   Allergen Reactions   • Meperidine GI Intolerance   • Horse-Derived Products    • Lisinopril Cough     Other reaction(s): Cough          There were no vitals taken for this visit     __________________________________________________________________________  Large joint arthrocentesis: L glenohumeral  Universal Protocol:  Procedure performed by: (Dr Corinne Hoar DO )  Consent: Verbal consent obtained  Risks and benefits: risks, benefits and alternatives were discussed  Consent given by: patient  Patient understanding: patient states understanding of the procedure being performed  Patient consent: the patient's understanding of the procedure matches consent given  Site marked: the operative site was marked  Radiology Images displayed and confirmed   If images not available, report reviewed: imaging studies available  Required items: required blood products, implants, devices, and special equipment available  Patient identity confirmed: verbally with patient    Supporting Documentation  Indications: pain   Procedure Details  Location: shoulder - L glenohumeral  Preparation: Patient was prepped and draped in the usual sterile fashion  Needle size: 22 G (22 G 3 5 inch needle )  Ultrasound guidance: yes  Approach: posterior  Medications administered: 40 mg triamcinolone acetonide 40 mg/mL    Patient tolerance: patient tolerated the procedure well with no immediate complications  Dressing:  Sterile dressing applied    4mL Naropin 0 5% NDC 31631-820-58 LOT [id number] EXP 01/26

## 2023-01-23 DIAGNOSIS — N13.8 ENLARGED PROSTATE WITH URINARY OBSTRUCTION: ICD-10-CM

## 2023-01-23 DIAGNOSIS — N40.1 ENLARGED PROSTATE WITH URINARY OBSTRUCTION: ICD-10-CM

## 2023-01-23 RX ORDER — TAMSULOSIN HYDROCHLORIDE 0.4 MG/1
CAPSULE ORAL
Qty: 90 CAPSULE | Refills: 3 | Status: SHIPPED | OUTPATIENT
Start: 2023-01-23

## 2023-02-21 ENCOUNTER — TELEPHONE (OUTPATIENT)
Dept: FAMILY MEDICINE CLINIC | Facility: HOSPITAL | Age: 73
End: 2023-02-21

## 2023-02-23 NOTE — TELEPHONE ENCOUNTER
Spoke w/kelsey, no need to send rx, she did not feel she needed pred so Mayra Cadena took hers  He is starting to feel better   CR

## 2023-02-27 ENCOUNTER — TELEPHONE (OUTPATIENT)
Dept: OBGYN CLINIC | Facility: HOSPITAL | Age: 73
End: 2023-02-27

## 2023-02-27 NOTE — TELEPHONE ENCOUNTER
Caller: Patient     Doctor: Alexis Mendez     Reason for call: Patient would like to cancel [facility] scheduled for 3/6   His shoulder is feeling better    Call back#: [phone]

## 2023-03-10 DIAGNOSIS — Z83.3 FAMILY HISTORY OF DIABETES MELLITUS IN MOTHER: ICD-10-CM

## 2023-03-10 RX ORDER — LOSARTAN POTASSIUM 50 MG/1
50 TABLET ORAL DAILY
Qty: 90 TABLET | Refills: 3 | Status: SHIPPED | OUTPATIENT
Start: 2023-03-10

## 2023-04-21 PROBLEM — D17.1 LIPOMA OF ABDOMINAL WALL: Status: RESOLVED | Noted: 2018-01-12 | Resolved: 2023-04-21

## 2023-06-07 DIAGNOSIS — E78.00 HYPERCHOLESTEROLEMIA: ICD-10-CM

## 2023-06-07 RX ORDER — ROSUVASTATIN CALCIUM 20 MG/1
20 TABLET, COATED ORAL DAILY
Qty: 90 TABLET | Refills: 3 | Status: SHIPPED | OUTPATIENT
Start: 2023-06-07

## 2023-08-10 DIAGNOSIS — N40.1 ENLARGED PROSTATE WITH URINARY OBSTRUCTION: ICD-10-CM

## 2023-08-10 DIAGNOSIS — N13.8 ENLARGED PROSTATE WITH URINARY OBSTRUCTION: ICD-10-CM

## 2023-08-10 RX ORDER — FINASTERIDE 5 MG/1
5 TABLET, FILM COATED ORAL DAILY
Qty: 90 TABLET | Refills: 3 | Status: SHIPPED | OUTPATIENT
Start: 2023-08-10

## 2023-10-24 ENCOUNTER — OFFICE VISIT (OUTPATIENT)
Dept: FAMILY MEDICINE CLINIC | Facility: HOSPITAL | Age: 73
End: 2023-10-24
Payer: MEDICARE

## 2023-10-24 VITALS
HEART RATE: 59 BPM | DIASTOLIC BLOOD PRESSURE: 72 MMHG | HEIGHT: 68 IN | SYSTOLIC BLOOD PRESSURE: 124 MMHG | OXYGEN SATURATION: 96 % | BODY MASS INDEX: 29.25 KG/M2 | WEIGHT: 193 LBS

## 2023-10-24 DIAGNOSIS — Z23 ENCOUNTER FOR IMMUNIZATION: Primary | ICD-10-CM

## 2023-10-24 DIAGNOSIS — M25.511 PAIN IN JOINT OF RIGHT SHOULDER: ICD-10-CM

## 2023-10-24 DIAGNOSIS — F51.01 PRIMARY INSOMNIA: ICD-10-CM

## 2023-10-24 PROCEDURE — 90662 IIV NO PRSV INCREASED AG IM: CPT

## 2023-10-24 PROCEDURE — G0008 ADMIN INFLUENZA VIRUS VAC: HCPCS

## 2023-10-24 PROCEDURE — 99214 OFFICE O/P EST MOD 30 MIN: CPT | Performed by: INTERNAL MEDICINE

## 2023-10-24 RX ORDER — ZOLPIDEM TARTRATE 10 MG/1
10 TABLET ORAL
Qty: 30 TABLET | Refills: 1 | Status: SHIPPED | OUTPATIENT
Start: 2023-10-24

## 2023-10-24 RX ORDER — HYDROCODONE BITARTRATE AND ACETAMINOPHEN 5; 325 MG/1; MG/1
1 TABLET ORAL EVERY 6 HOURS PRN
Qty: 30 TABLET | Refills: 0 | Status: SHIPPED | OUTPATIENT
Start: 2023-10-24 | End: 2023-10-29

## 2023-10-24 NOTE — PROGRESS NOTES
Assessment/Plan:     Diagnosis ICD-10-CM Associated Orders   1. Encounter for immunization  Z23 influenza vaccine, high-dose, PF 0.7 mL (FLUZONE HIGH-DOSE)      2. Primary insomnia  F51.01 zolpidem (AMBIEN) 10 mg tablet      3. Pain in joint of right shoulder  M25.511 HYDROcodone-acetaminophen (NORCO) 5-325 mg per tablet          Problem List Items Addressed This Visit          Other    Insomnia     5 mg dose not work- uses it 1-2 x a month- wishes 10 mg dose         Relevant Medications    zolpidem (AMBIEN) 10 mg tablet     Other Visit Diagnoses       Encounter for immunization    -  Primary    Relevant Orders    influenza vaccine, high-dose, PF 0.7 mL (FLUZONE HIGH-DOSE) (Completed)    Pain in joint of right shoulder        Relevant Medications    HYDROcodone-acetaminophen (NORCO) 5-325 mg per tablet              No follow-ups on file. Subjective:    Patient ID: Wali Steiner is a 74 y.o. male    1 shoulder pains- still awakens at night if rolling over arms- to  see ortho for injection  2. Htn- well controlled  3. The following portions of the patient's history were reviewed and updated as appropriate: allergies, current medications and problem list.     Review of Systems   Respiratory:  Negative for shortness of breath. Musculoskeletal:  Positive for arthralgias and back pain.          Objective:      Current Outpatient Medications:     Coenzyme Q10 (CO Q 10) 100 MG CAPS, Take by mouth, Disp: , Rfl:     diclofenac sodium (VOLTAREN) 50 mg EC tablet, Take 1 tablet (50 mg total) by mouth 2 (two) times a day, Disp: 60 tablet, Rfl: 5    finasteride (PROSCAR) 5 mg tablet, Take 1 tablet (5 mg total) by mouth daily, Disp: 90 tablet, Rfl: 3    fluticasone (FLONASE) 50 mcg/act nasal spray, 2 sprays into each nostril daily (Patient taking differently: 2 sprays into each nostril daily as needed), Disp: 1 Bottle, Rfl: 5    HYDROcodone-acetaminophen (NORCO) 5-325 mg per tablet, Take 1 tablet by mouth every 6 (six) hours as needed for pain for up to 5 days Max Daily Amount: 4 tablets, Disp: 30 tablet, Rfl: 0    losartan (Cozaar) 50 mg tablet, Take 1 tablet (50 mg total) by mouth daily, Disp: 90 tablet, Rfl: 3    mometasone (ELOCON) 0.1 % cream, Apply topically daily, Disp: 45 g, Rfl: 2    omeprazole (PriLOSEC) 20 mg delayed release capsule, take 1 capsule by mouth once daily, Disp: 30 capsule, Rfl: 11    rosuvastatin (CRESTOR) 20 MG tablet, Take 1 tablet (20 mg total) by mouth daily, Disp: 90 tablet, Rfl: 3    tamsulosin (FLOMAX) 0.4 mg, take 1 capsule by mouth once daily, Disp: 90 capsule, Rfl: 3    zolpidem (AMBIEN) 10 mg tablet, Take 1 tablet (10 mg total) by mouth daily at bedtime as needed for sleep, Disp: 30 tablet, Rfl: 1    Blood pressure 124/72, pulse 59, height 5' 8" (1.727 m), weight 87.5 kg (193 lb), SpO2 96 %. Physical Exam  Vitals and nursing note reviewed. Constitutional:       General: He is not in acute distress. HENT:      Head: Normocephalic and atraumatic. Right Ear: Tympanic membrane normal. There is no impacted cerumen. Left Ear: Tympanic membrane normal. There is no impacted cerumen. Ears:      Comments:       Nose: No congestion. Eyes:      General:         Right eye: No discharge. Left eye: No discharge. Cardiovascular:      Rate and Rhythm: Normal rate and regular rhythm. Heart sounds: No murmur heard. Pulmonary:      Breath sounds: No wheezing or rhonchi. Abdominal:      General: Abdomen is flat. Palpations: Abdomen is soft. Tenderness: There is no abdominal tenderness. There is no guarding or rebound. Musculoskeletal:         General: Tenderness present. No swelling or deformity. Cervical back: No rigidity or tenderness. Comments: Mild lumbar flattening  Imild left subscapular tenderness   Skin:     Findings: No erythema. Neurological:      General: No focal deficit present.       Mental Status: He is alert and oriented to person, place, and time. Motor: No weakness. Coordination: Coordination normal.   Psychiatric:         Mood and Affect: Mood normal.         Thought Content:  Thought content normal.

## 2023-11-19 ENCOUNTER — APPOINTMENT (EMERGENCY)
Dept: RADIOLOGY | Facility: HOSPITAL | Age: 73
DRG: 101 | End: 2023-11-19
Payer: MEDICARE

## 2023-11-19 ENCOUNTER — HOSPITAL ENCOUNTER (INPATIENT)
Facility: HOSPITAL | Age: 73
LOS: 1 days | Discharge: HOME | DRG: 101 | End: 2023-11-20
Attending: EMERGENCY MEDICINE | Admitting: INTERNAL MEDICINE
Payer: MEDICARE

## 2023-11-19 DIAGNOSIS — R56.9 SEIZURE (HCC): Primary | ICD-10-CM

## 2023-11-19 LAB
2HR DELTA HS TROPONIN: -1 NG/L
4HR DELTA HS TROPONIN: 3 NG/L
ABO GROUP BLD: NORMAL
ALBUMIN SERPL BCP-MCNC: 4.2 G/DL (ref 3.5–5)
ALP SERPL-CCNC: 49 U/L (ref 34–104)
ALT SERPL W P-5'-P-CCNC: 24 U/L (ref 7–52)
AMPHETAMINES SERPL QL SCN: NEGATIVE
ANION GAP SERPL CALCULATED.3IONS-SCNC: 13 MMOL/L
APAP SERPL-MCNC: <2 UG/ML (ref 10–20)
APTT PPP: 25 SECONDS (ref 23–37)
AST SERPL W P-5'-P-CCNC: 19 U/L (ref 13–39)
ATRIAL RATE: 84 BPM
BARBITURATES UR QL: NEGATIVE
BASOPHILS # BLD AUTO: 0.04 THOUSANDS/ÂΜL (ref 0–0.1)
BASOPHILS NFR BLD AUTO: 0 % (ref 0–1)
BENZODIAZ UR QL: POSITIVE
BILIRUB SERPL-MCNC: 0.59 MG/DL (ref 0.2–1)
BLD GP AB SCN SERPL QL: NEGATIVE
BUN SERPL-MCNC: 10 MG/DL (ref 5–25)
CALCIUM SERPL-MCNC: 9.3 MG/DL (ref 8.4–10.2)
CARDIAC TROPONIN I PNL SERPL HS: 4 NG/L
CARDIAC TROPONIN I PNL SERPL HS: 5 NG/L
CARDIAC TROPONIN I PNL SERPL HS: 8 NG/L
CHLORIDE SERPL-SCNC: 107 MMOL/L (ref 96–108)
CO2 SERPL-SCNC: 22 MMOL/L (ref 21–32)
COCAINE UR QL: NEGATIVE
CREAT SERPL-MCNC: 0.88 MG/DL (ref 0.6–1.3)
EOSINOPHIL # BLD AUTO: 0.3 THOUSAND/ÂΜL (ref 0–0.61)
EOSINOPHIL NFR BLD AUTO: 3 % (ref 0–6)
ERYTHROCYTE [DISTWIDTH] IN BLOOD BY AUTOMATED COUNT: 11.9 % (ref 11.6–15.1)
ETHANOL SERPL-MCNC: <10 MG/DL
FLUAV RNA RESP QL NAA+PROBE: NEGATIVE
FLUBV RNA RESP QL NAA+PROBE: NEGATIVE
GFR SERPL CREATININE-BSD FRML MDRD: 84 ML/MIN/1.73SQ M
GLUCOSE SERPL-MCNC: 113 MG/DL (ref 65–140)
GLUCOSE SERPL-MCNC: 121 MG/DL (ref 65–140)
HCT VFR BLD AUTO: 43.7 % (ref 36.5–49.3)
HGB BLD-MCNC: 15.3 G/DL (ref 12–17)
IMM GRANULOCYTES # BLD AUTO: 0.06 THOUSAND/UL (ref 0–0.2)
IMM GRANULOCYTES NFR BLD AUTO: 1 % (ref 0–2)
INR PPP: 1.2 (ref 0.84–1.19)
LACTATE SERPL-SCNC: 2 MMOL/L (ref 0.5–2)
LACTATE SERPL-SCNC: 6.5 MMOL/L (ref 0.5–2)
LYMPHOCYTES # BLD AUTO: 4.32 THOUSANDS/ÂΜL (ref 0.6–4.47)
LYMPHOCYTES NFR BLD AUTO: 46 % (ref 14–44)
MCH RBC QN AUTO: 29.1 PG (ref 26.8–34.3)
MCHC RBC AUTO-ENTMCNC: 35 G/DL (ref 31.4–37.4)
MCV RBC AUTO: 83 FL (ref 82–98)
METHADONE UR QL: NEGATIVE
MONOCYTES # BLD AUTO: 1.12 THOUSAND/ÂΜL (ref 0.17–1.22)
MONOCYTES NFR BLD AUTO: 12 % (ref 4–12)
NEUTROPHILS # BLD AUTO: 3.52 THOUSANDS/ÂΜL (ref 1.85–7.62)
NEUTS SEG NFR BLD AUTO: 38 % (ref 43–75)
NRBC BLD AUTO-RTO: 0 /100 WBCS
OPIATES UR QL SCN: NEGATIVE
OXYCODONE+OXYMORPHONE UR QL SCN: NEGATIVE
P AXIS: 50 DEGREES
PCP UR QL: NEGATIVE
PLATELET # BLD AUTO: 251 THOUSANDS/UL (ref 149–390)
PMV BLD AUTO: 8.8 FL (ref 8.9–12.7)
POTASSIUM SERPL-SCNC: 3.6 MMOL/L (ref 3.5–5.3)
PR INTERVAL: 178 MS
PROT SERPL-MCNC: 6.8 G/DL (ref 6.4–8.4)
PROTHROMBIN TIME: 15.1 SECONDS (ref 11.6–14.5)
QRS AXIS: -35 DEGREES
QRSD INTERVAL: 90 MS
QT INTERVAL: 380 MS
QTC INTERVAL: 449 MS
RBC # BLD AUTO: 5.25 MILLION/UL (ref 3.88–5.62)
RH BLD: POSITIVE
RSV RNA RESP QL NAA+PROBE: NEGATIVE
SALICYLATES SERPL-MCNC: <5 MG/DL (ref 3–20)
SARS-COV-2 RNA RESP QL NAA+PROBE: NEGATIVE
SODIUM SERPL-SCNC: 142 MMOL/L (ref 135–147)
SPECIMEN EXPIRATION DATE: NORMAL
T WAVE AXIS: 21 DEGREES
T4 FREE SERPL-MCNC: 0.68 NG/DL (ref 0.61–1.12)
THC UR QL: NEGATIVE
TSH SERPL DL<=0.05 MIU/L-ACNC: 4.5 UIU/ML (ref 0.45–4.5)
VENTRICULAR RATE: 84 BPM
WBC # BLD AUTO: 9.36 THOUSAND/UL (ref 4.31–10.16)

## 2023-11-19 PROCEDURE — 70450 CT HEAD/BRAIN W/O DYE: CPT

## 2023-11-19 PROCEDURE — 82077 ASSAY SPEC XCP UR&BREATH IA: CPT

## 2023-11-19 PROCEDURE — 96361 HYDRATE IV INFUSION ADD-ON: CPT

## 2023-11-19 PROCEDURE — 86850 RBC ANTIBODY SCREEN: CPT | Performed by: EMERGENCY MEDICINE

## 2023-11-19 PROCEDURE — 86900 BLOOD TYPING SEROLOGIC ABO: CPT | Performed by: EMERGENCY MEDICINE

## 2023-11-19 PROCEDURE — 86901 BLOOD TYPING SEROLOGIC RH(D): CPT | Performed by: EMERGENCY MEDICINE

## 2023-11-19 PROCEDURE — 85610 PROTHROMBIN TIME: CPT | Performed by: EMERGENCY MEDICINE

## 2023-11-19 PROCEDURE — 99223 1ST HOSP IP/OBS HIGH 75: CPT | Performed by: PSYCHIATRY & NEUROLOGY

## 2023-11-19 PROCEDURE — 84484 ASSAY OF TROPONIN QUANT: CPT | Performed by: EMERGENCY MEDICINE

## 2023-11-19 PROCEDURE — 96374 THER/PROPH/DIAG INJ IV PUSH: CPT

## 2023-11-19 PROCEDURE — 80179 DRUG ASSAY SALICYLATE: CPT

## 2023-11-19 PROCEDURE — 84439 ASSAY OF FREE THYROXINE: CPT

## 2023-11-19 PROCEDURE — 71045 X-RAY EXAM CHEST 1 VIEW: CPT

## 2023-11-19 PROCEDURE — 80053 COMPREHEN METABOLIC PANEL: CPT | Performed by: EMERGENCY MEDICINE

## 2023-11-19 PROCEDURE — 93005 ELECTROCARDIOGRAM TRACING: CPT

## 2023-11-19 PROCEDURE — 85025 COMPLETE CBC W/AUTO DIFF WBC: CPT | Performed by: EMERGENCY MEDICINE

## 2023-11-19 PROCEDURE — 96375 TX/PRO/DX INJ NEW DRUG ADDON: CPT

## 2023-11-19 PROCEDURE — 99285 EMERGENCY DEPT VISIT HI MDM: CPT

## 2023-11-19 PROCEDURE — 93010 ELECTROCARDIOGRAM REPORT: CPT | Performed by: INTERNAL MEDICINE

## 2023-11-19 PROCEDURE — 85730 THROMBOPLASTIN TIME PARTIAL: CPT | Performed by: EMERGENCY MEDICINE

## 2023-11-19 PROCEDURE — 84443 ASSAY THYROID STIM HORMONE: CPT

## 2023-11-19 PROCEDURE — 83605 ASSAY OF LACTIC ACID: CPT | Performed by: EMERGENCY MEDICINE

## 2023-11-19 PROCEDURE — 72125 CT NECK SPINE W/O DYE: CPT

## 2023-11-19 PROCEDURE — 99223 1ST HOSP IP/OBS HIGH 75: CPT | Performed by: INTERNAL MEDICINE

## 2023-11-19 PROCEDURE — 80143 DRUG ASSAY ACETAMINOPHEN: CPT

## 2023-11-19 PROCEDURE — 80307 DRUG TEST PRSMV CHEM ANLYZR: CPT

## 2023-11-19 PROCEDURE — 0241U HB NFCT DS VIR RESP RNA 4 TRGT: CPT

## 2023-11-19 PROCEDURE — 82948 REAGENT STRIP/BLOOD GLUCOSE: CPT

## 2023-11-19 PROCEDURE — 36415 COLL VENOUS BLD VENIPUNCTURE: CPT

## 2023-11-19 RX ORDER — LANOLIN ALCOHOL/MO/W.PET/CERES
6 CREAM (GRAM) TOPICAL
Status: DISCONTINUED | OUTPATIENT
Start: 2023-11-19 | End: 2023-11-20 | Stop reason: HOSPADM

## 2023-11-19 RX ORDER — MIDAZOLAM HYDROCHLORIDE 2 MG/2ML
INJECTION, SOLUTION INTRAMUSCULAR; INTRAVENOUS
Status: COMPLETED
Start: 2023-11-19 | End: 2023-11-19

## 2023-11-19 RX ORDER — ATORVASTATIN CALCIUM 40 MG/1
40 TABLET, FILM COATED ORAL
Status: DISCONTINUED | OUTPATIENT
Start: 2023-11-19 | End: 2023-11-20 | Stop reason: HOSPADM

## 2023-11-19 RX ORDER — TAMSULOSIN HYDROCHLORIDE 0.4 MG/1
0.4 CAPSULE ORAL
Status: DISCONTINUED | OUTPATIENT
Start: 2023-11-19 | End: 2023-11-20 | Stop reason: HOSPADM

## 2023-11-19 RX ORDER — FINASTERIDE 5 MG/1
5 TABLET, FILM COATED ORAL DAILY
Status: DISCONTINUED | OUTPATIENT
Start: 2023-11-19 | End: 2023-11-20 | Stop reason: HOSPADM

## 2023-11-19 RX ORDER — MIDAZOLAM HYDROCHLORIDE 2 MG/2ML
2 INJECTION, SOLUTION INTRAMUSCULAR; INTRAVENOUS ONCE
Status: COMPLETED | OUTPATIENT
Start: 2023-11-19 | End: 2023-11-19

## 2023-11-19 RX ORDER — ONDANSETRON 2 MG/ML
4 INJECTION INTRAMUSCULAR; INTRAVENOUS ONCE
Status: COMPLETED | OUTPATIENT
Start: 2023-11-19 | End: 2023-11-19

## 2023-11-19 RX ORDER — ACETAMINOPHEN 325 MG/1
975 TABLET ORAL ONCE
Status: COMPLETED | OUTPATIENT
Start: 2023-11-19 | End: 2023-11-19

## 2023-11-19 RX ORDER — LORAZEPAM 2 MG/ML
1 INJECTION INTRAMUSCULAR EVERY 8 HOURS PRN
Status: DISCONTINUED | OUTPATIENT
Start: 2023-11-19 | End: 2023-11-20 | Stop reason: HOSPADM

## 2023-11-19 RX ORDER — MIDAZOLAM HYDROCHLORIDE 2 MG/2ML
INJECTION, SOLUTION INTRAMUSCULAR; INTRAVENOUS
Status: DISPENSED
Start: 2023-11-19 | End: 2023-11-19

## 2023-11-19 RX ORDER — LORAZEPAM 1 MG/1
1 TABLET ORAL
Status: COMPLETED | OUTPATIENT
Start: 2023-11-19 | End: 2023-11-20

## 2023-11-19 RX ORDER — LOSARTAN POTASSIUM 50 MG/1
50 TABLET ORAL DAILY
Status: DISCONTINUED | OUTPATIENT
Start: 2023-11-20 | End: 2023-11-20 | Stop reason: HOSPADM

## 2023-11-19 RX ORDER — PANTOPRAZOLE SODIUM 40 MG/1
40 TABLET, DELAYED RELEASE ORAL
Status: DISCONTINUED | OUTPATIENT
Start: 2023-11-20 | End: 2023-11-20 | Stop reason: HOSPADM

## 2023-11-19 RX ADMIN — TAMSULOSIN HYDROCHLORIDE 0.4 MG: 0.4 CAPSULE ORAL at 16:22

## 2023-11-19 RX ADMIN — MIDAZOLAM 2 MG: 1 INJECTION INTRAMUSCULAR; INTRAVENOUS at 11:57

## 2023-11-19 RX ADMIN — ACETAMINOPHEN 975 MG: 325 TABLET, FILM COATED ORAL at 13:57

## 2023-11-19 RX ADMIN — MIDAZOLAM HYDROCHLORIDE 2 MG: 2 INJECTION, SOLUTION INTRAMUSCULAR; INTRAVENOUS at 11:57

## 2023-11-19 RX ADMIN — SODIUM CHLORIDE 1000 ML: 0.9 INJECTION, SOLUTION INTRAVENOUS at 12:43

## 2023-11-19 RX ADMIN — Medication 6 MG: at 21:28

## 2023-11-19 RX ADMIN — ATORVASTATIN CALCIUM 40 MG: 40 TABLET, FILM COATED ORAL at 16:22

## 2023-11-19 RX ADMIN — FINASTERIDE 5 MG: 5 TABLET, FILM COATED ORAL at 16:22

## 2023-11-19 RX ADMIN — ONDANSETRON 4 MG: 2 INJECTION INTRAMUSCULAR; INTRAVENOUS at 13:57

## 2023-11-19 NOTE — ED PROVIDER NOTES
History  Chief Complaint   Patient presents with    Medical Problem     Went out to blow leaves at 1030, bystander found pt down with seizure like activity hitting head. EMS reported deviated L gaze. Per wife pt was complaining of HA for 2-3 days. Patient is a 78-year-old male with a significant past medical history of hypertension, hyperlipidemia, presenting for evaluation of reported seizure-like activity. He arrives via EMS after he was reportedly witnessed having some tonic-clonic seizure activity outside of his home. Family reports that he went outside to blow some leaves when he was found by bystanders having seizure activity. He had supposedly been reporting a headache approximately a week ago but family states that this had been started to improved prior to the events today. He had not had any recent infections, fevers, or viral type symptoms. He did not have any head injuries that the family was aware of. History otherwise limited        Prior to Admission Medications   Prescriptions Last Dose Informant Patient Reported? Taking?    Coenzyme Q10 (CO Q 10) 100 MG CAPS 2023 Self Yes Yes   Sig: Take by mouth   diclofenac sodium (VOLTAREN) 50 mg EC tablet Not Taking  No No   Sig: Take 1 tablet (50 mg total) by mouth 2 (two) times a day   Patient not taking: Reported on 2023   finasteride (PROSCAR) 5 mg tablet 2023  No Yes   Sig: Take 1 tablet (5 mg total) by mouth daily   fluticasone (FLONASE) 50 mcg/act nasal spray Past Month Self No Yes   Si sprays into each nostril daily   Patient taking differently: 2 sprays into each nostril daily as needed   losartan (Cozaar) 50 mg tablet 2023  No Yes   Sig: Take 1 tablet (50 mg total) by mouth daily   mometasone (ELOCON) 0.1 % cream Past Month  No Yes   Sig: Apply topically daily   omeprazole (PriLOSEC) 20 mg delayed release capsule Not Taking Self No No   Sig: take 1 capsule by mouth once daily   Patient not taking: Reported on 11/19/2023   rosuvastatin (CRESTOR) 20 MG tablet 11/18/2023  No Yes   Sig: Take 1 tablet (20 mg total) by mouth daily   tamsulosin (FLOMAX) 0.4 mg 11/18/2023  No Yes   Sig: take 1 capsule by mouth once daily   zolpidem (AMBIEN) 10 mg tablet Past Week  No Yes   Sig: Take 1 tablet (10 mg total) by mouth daily at bedtime as needed for sleep      Facility-Administered Medications: None       Past Medical History:   Diagnosis Date    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     Asthma     Chronic pain     back    GERD (gastroesophageal reflux disease)     Hiatal hernia 1994    Hyperlipidemia     Hypertension     Kidney stone     r stent- suppose to be removed Fri 7/13    PONV (postoperative nausea and vomiting)     Shortness of breath        Past Surgical History:   Procedure Laterality Date    ABDOMINAL ADHESION SURGERY N/A 7/11/2018    Procedure: LYSIS ADHESIONS EXTENSIVE;  Surgeon: Iqra Magana MD;  Location[facility];  Service: General    ABDOMINAL SURGERY      BACK SURGERY      x2    CARPAL TUNNEL RELEASE      COLONOSCOPY      CYSTOSCOPY  08/07/2020    CYSTOSCOPY W/ URETERAL STENT REMOVAL  01/26/2018    ESOPHAGOGASTRODUODENOSCOPY N/A 7/11/2018    Procedure: ESOPHAGOGASTRODUODENOSCOPY (EGD) INTRAOPERATIVE ;  Surgeon: Iqra Magana MD;  [facility];  Service: General    FL INJECTION RIGHT SHOULDER (ARTHROGRAM)  5/13/2022    HALLUX VALGUS CORRECTION Bilateral     1st MTP joint     HERNIA REPAIR      HIATAL HERNIA REPAIR  1994    INCISIONAL HERNIA REPAIR N/A 7/11/2018    Procedure: REPAIR HERNIA INCISIONAL X2;  Surgeon: Iqra Magana MD;  [facility];  Service: General    KNEE SURGERY Bilateral     LITHOTRIPSY      PERIPHERAL NEUROPLASTY OF FINGER / HAND / Chris Chacon      left middle finger    WY ARTHROPLASTY GLENOHUMERAL JOINT TOTAL SHOULDER Right 6/1/2021    Procedure: TOTAL SHOULDER ARTHROPLASTY ANATOMIC, biceps tenodesis;   Surgeon: Vicky Rodas MD;  [facility];  Service: Orthopedics MS CYSTO/URETERO W/LITHOTRIPSY &INDWELL STENT INSRT Left 1/3/2018    Procedure: Roberto Yamile AND INSERTION STENT Tami Stevenson;  Surgeon: Jeison Fields MD;  [facility];  Service: Urology    MS CYSTO/URETERO W/LITHOTRIPSY &INDWELL STENT INSRT Right 6/8/2018    Procedure: CYSTOSCOPY URETEROSCOPY WITH LITHOTRIPSY HOLMIUM LASER, RETROGRADE PYELOGRAM AND INSERTION STENT URETERAL;  Surgeon: Sirena Paul MD;  [facility];  Service: Urology    MS LAPS RPR PARAESPHGL HRNA INCL FUNDPLSTY [address] N/A 7/11/2018    Procedure: REPAIR RECURRENT HERNIA PARAESOPHAGEAL  LAPAROSCOPIC;TOUPE Doris Kanner;  Surgeon: Lindsey Kaplan MD;  [facility];  Service: General    MS LITHOTRIPSY XTRCORP SHOCK WAVE Left 1/18/2018    Procedure: ESWL;  Surgeon: Sirena Paul MD;  [facility];  Service: Urology    ROTATOR CUFF REPAIR Right     SHOULDER ARTHROSCOPY Bilateral     SHOULDER SURGERY         Family History   Problem Relation Age of Onset    Diabetes Mother     Hypertension Mother     Brain cancer Mother     Hypertension Sister     Nephrolithiasis Sister     COPD Father     Cancer Maternal Grandmother     Aortic aneurysm Maternal Uncle         x3 maternal uncles      I have reviewed and agree with the history as documented. E-Cigarette/Vaping    E-Cigarette Use Never User      E-Cigarette/Vaping Substances    Nicotine No     THC No     CBD No     Flavoring No     Other No     Unknown No      Social History     Tobacco Use    Smoking status: Never    Smokeless tobacco: Never   Vaping Use    Vaping Use: Never used   Substance Use Topics    Alcohol use:  Yes     Alcohol/week: 1.0 standard drink of alcohol     Types: 1 Cans of beer per week     Comment: once a month     Drug use: No        Review of Systems   Unable to perform ROS: Mental status change       Physical Exam  ED Triage Vitals   Temperature Pulse Respirations Blood Pressure SpO2   11/19/23 1214 11/19/23 1140 11/19/23 1140 11/19/23 1140 11/19/23 1145   98.2 °F (36.8 °C) 104 21 131/70 93 %      Temp Source Heart Rate Source Patient Position - Orthostatic VS BP Location FiO2 (%)   11/19/23 1214 11/19/23 1300 11/19/23 1300 11/19/23 1300 --   Rectal Monitor Lying Right arm       Pain Score       11/19/23 1357       3             Orthostatic Vital Signs  Vitals:    11/19/23 1400 11/19/23 1500 11/19/23 1545 11/19/23 1901   BP: 144/82 142/73 135/75 138/76   Pulse: 70 59 59 60   Patient Position - Orthostatic VS: Lying Lying Lying        Physical Exam  Constitutional:       Appearance: He is ill-appearing. HENT:      Head: Normocephalic and atraumatic. Right Ear: External ear normal.      Left Ear: External ear normal.      Nose: Nose normal.      Mouth/Throat:      Mouth: Mucous membranes are dry. Eyes:      Pupils: Pupils are equal, round, and reactive to light. Neck:      Comments: Cervical collar in place  Cardiovascular:      Rate and Rhythm: Normal rate. Pulses: Normal pulses. Heart sounds: Normal heart sounds. No murmur heard. No friction rub. No gallop. Pulmonary:      Effort: Pulmonary effort is normal. No respiratory distress. Breath sounds: Normal breath sounds. Abdominal:      General: Abdomen is flat. Palpations: Abdomen is soft. Tenderness: There is no abdominal tenderness. Genitourinary:     Comments: Urinary incontinence  Neurological:      Comments: Patient agitated. Inconsistently following commands, but moving all extremities with full strength and purposefully.          ED Medications  Medications   midazolam (VERSED) 2 mg/2 mL injection **ADS Override Pull** (  Not Given 11/19/23 1207)   finasteride (PROSCAR) tablet 5 mg (5 mg Oral Given 11/19/23 1622)   losartan (COZAAR) tablet 50 mg (has no administration in time range)   pantoprazole (PROTONIX) EC tablet 40 mg (has no administration in time range)   atorvastatin (LIPITOR) tablet 40 mg (40 mg Oral Given 11/19/23 1622)   tamsulosin (FLOMAX) capsule 0.4 mg (0.4 mg Oral Given 11/19/23 1622)   LORazepam (ATIVAN) injection 1 mg (has no administration in time range)   melatonin tablet 6 mg (6 mg Oral Given 11/19/23 2128)   phenol (CHLORASEPTIC) 1.4 % mucosal liquid 1 spray (has no administration in time range)   midazolam (VERSED) injection 2 mg (2 mg Intravenous Given 11/19/23 1157)   sodium chloride 0.9 % bolus 1,000 mL (0 mL Intravenous Stopped 11/19/23 1447)   acetaminophen (TYLENOL) tablet 975 mg (975 mg Oral Given 11/19/23 1357)   ondansetron (ZOFRAN) injection 4 mg (4 mg Intravenous Given 11/19/23 1357)   LORazepam (ATIVAN) tablet 1 mg (1 mg Oral Given 11/20/23 0001)   Gadobutrol injection (SINGLE-DOSE) SOLN 8 mL (8 mL Intravenous Given 11/20/23 0046)       Diagnostic Studies  Results Reviewed       Procedure Component Value Units Date/Time    HS Troponin I 4hr [[id number]]  (Normal) Collected: 11/19/23 1653    Lab Status: Final result Specimen: Blood from Arm, Right Updated: 11/19/23 1732     hs TnI 4hr 8 ng/L      Delta 4hr hsTnI 3 ng/L     Rapid drug screen, urine [[id number]]  (Abnormal) Collected: 11/19/23 1628    Lab Status: Final result Specimen: Urine, Clean Catch Updated: 11/19/23 1657     Amph/Meth UR Negative     Barbiturate Ur Negative     Benzodiazepine Urine Positive     Cocaine Urine Negative     Methadone Urine Negative     Opiate Urine Negative     PCP Ur Negative     THC Urine Negative     Oxycodone Urine Negative    Narrative:      Presumptive report. If requested, specimen will be sent to reference lab for confirmation. FOR MEDICAL PURPOSES ONLY. IF CONFIRMATION NEEDED PLEASE CONTACT THE LAB WITHIN 5 DAYS.     Drug Screen Cutoff Levels:  AMPHETAMINE/METHAMPHETAMINES  1000 ng/mL  BARBITURATES     200 ng/mL  BENZODIAZEPINES     200 ng/mL  COCAINE      300 ng/mL  METHADONE      300 ng/mL  OPIATES      300 ng/mL  PHENCYCLIDINE     25 ng/mL  THC       50 ng/mL  OXYCODONE      100 ng/mL    HS Troponin I 2hr [[id number]]  (Normal) Collected: 11/19/23 1340    Lab Status: Final result Specimen: Blood from Line, Venous Updated: 11/19/23 1446     hs TnI 2hr 4 ng/L      Delta 2hr hsTnI -1 ng/L     Lactic acid 2 Hours [[id number]]  (Normal) Collected: 11/19/23 1340    Lab Status: Final result Specimen: Blood from Line, Venous Updated: 11/19/23 1426     LACTIC ACID 2.0 mmol/L     Narrative:      Result may be elevated if tourniquet was used during collection. T4, free [[id number]]  (Normal) Collected: 11/19/23 1147    Lab Status: Final result Specimen: Blood from Arm, Left Updated: 11/19/23 1320     Free T4 0.68 ng/dL     Narrative:        "Therapeutic range for patients medicated with thyroid disorders: 0.61-1.24 ng/dL."    FLU/RSV/COVID - if FLU/RSV clinically relevant [[id number]]  (Normal) Collected: 11/19/23 1203    Lab Status: Final result Specimen: Nares from Nose Updated: 11/19/23 1308     SARS-CoV-2 Negative     INFLUENZA A PCR Negative     INFLUENZA B PCR Negative     RSV PCR Negative    Narrative:      FOR PEDIATRIC PATIENTS - copy/paste COVID Guidelines URL to browser: [email or web address] ashx    SARS-CoV-2 assay is a Nucleic Acid Amplification assay intended for the  qualitative detection of nucleic acid from SARS-CoV-2 in nasopharyngeal  swabs. Results are for the presumptive identification of SARS-CoV-2 RNA. Positive results are indicative of infection with SARS-CoV-2, the virus  causing COVID-19, but do not rule out bacterial infection or co-infection  with other viruses. Laboratories within the [facility] and its  territories are required to report all positive results to the appropriate  public health authorities. Negative results do not preclude SARS-CoV-2  infection and should not be used as the sole basis for treatment or other  patient management decisions.  Negative results must be combined with  clinical observations, patient history, and epidemiological information. This test has not been FDA cleared or approved. This test has been authorized by FDA under an Emergency Use Authorization  (EUA). This test is only authorized for the duration of time the  declaration that circumstances exist justifying the authorization of the  emergency use of an in vitro diagnostic tests for detection of SARS-CoV-2  virus and/or diagnosis of COVID-19 infection under section 564(b)(1) of  the Act, 21 U. S.C. 848VDI-0(U)(0), unless the authorization is terminated  or revoked sooner. The test has been validated but independent review by FDA  and CLIA is pending. Test performed using [employer] GeneXpert: This RT-PCR assay targets N2,  a region unique to SARS-CoV-2. A conserved region in the E-gene was chosen  for pan-Sarbecovirus detection which includes SARS-CoV-2. According to CMS-2020-01-R, this platform meets the definition of high-throughput technology. TSH, 3rd generation with Free T4 reflex [[id number]]  (Abnormal) Collected: 11/19/23 1147    Lab Status: Final result Specimen: Blood from Arm, Left Updated: 11/19/23 1249     TSH 3RD GENERATON 0.394 uIU/mL     Salicylate level [[id number]]  (Normal) Collected: 11/19/23 1203    Lab Status: Final result Specimen: Blood from Arm, Right Updated: 24/54/39 2402     Salicylate Lvl <5 mg/dL     Acetaminophen level-If concentration is detectable, please discuss with medical  on call. [[id number]]  (Abnormal) Collected: 11/19/23 1203    Lab Status: Final result Specimen: Blood from Arm, Right Updated: 11/19/23 1241     Acetaminophen Level <2 ug/mL     Lactic acid, plasma (w/reflex if result > 2.0) [[id number]]  (Abnormal) Collected: 11/19/23 1147    Lab Status: Final result Specimen: Blood from Arm, Left Updated: 11/19/23 1240     LACTIC ACID 6.5 mmol/L     Narrative:      Result may be elevated if tourniquet was used during collection.     Ethanol [[id number]]  (Normal) Collected: 11/19/23 1203    Lab Status: Final result Specimen: Blood from Arm, Right Updated: 11/19/23 1237     Ethanol Lvl <10 mg/dL     HS Troponin 0hr (reflex protocol) [[id number]]  (Normal) Collected: 11/19/23 1147    Lab Status: Final result Specimen: Blood from Arm, Left Updated: 11/19/23 1224     hs TnI 0hr 5 ng/L     APTT [[id number]]  (Normal) Collected: 11/19/23 1147    Lab Status: Final result Specimen: Blood from Arm, Left Updated: 11/19/23 1218     PTT 25 seconds     Protime-INR [[id number]]  (Abnormal) Collected: 11/19/23 1147    Lab Status: Final result Specimen: Blood from Arm, Left Updated: 11/19/23 1218     Protime 15.1 seconds      INR 1.20    Comprehensive metabolic panel [[id number]] Collected: 11/19/23 1147    Lab Status: Final result Specimen: Blood from Arm, Left Updated: 11/19/23 1214     Sodium 142 mmol/L      Potassium 3.6 mmol/L      Chloride 107 mmol/L      CO2 22 mmol/L      ANION GAP 13 mmol/L      BUN 10 mg/dL      Creatinine 0.88 mg/dL      Glucose 113 mg/dL      Calcium 9.3 mg/dL      AST 19 U/L      ALT 24 U/L      Alkaline Phosphatase 49 U/L      Total Protein 6.8 g/dL      Albumin 4.2 g/dL      Total Bilirubin 0.59 mg/dL      eGFR 84 ml/min/1.73sq m     Narrative:      [facility] guidelines for Chronic Kidney Disease (CKD):     Stage 1 with normal or high GFR (GFR > 90 mL/min/1.73 square meters)    Stage 2 Mild CKD (GFR = 60-89 mL/min/1.73 square meters)    Stage 3A Moderate CKD (GFR = 45-59 mL/min/1.73 square meters)    Stage 3B Moderate CKD (GFR = 30-44 mL/min/1.73 square meters)    Stage 4 Severe CKD (GFR = 15-29 mL/min/1.73 square meters)    Stage 5 End Stage CKD (GFR <15 mL/min/1.73 square meters)  Note: GFR calculation is accurate only with a steady state creatinine    CBC and differential [[id number]]  (Abnormal) Collected: 11/19/23 1147    Lab Status: Final result Specimen: Blood from Arm, Left Updated: 11/19/23 1203     WBC 9.36 Thousand/uL      RBC 5.25 Million/uL      Hemoglobin 15.3 g/dL      Hematocrit 43.7 %      MCV 83 fL      MCH 29.1 pg      MCHC 35.0 g/dL      RDW 11.9 %      MPV 8.8 fL      Platelets 850 Thousands/uL      nRBC 0 /100 WBCs      Neutrophils Relative 38 %      Immat GRANS % 1 %      Lymphocytes Relative 46 %      Monocytes Relative 12 %      Eosinophils Relative 3 %      Basophils Relative 0 %      Neutrophils Absolute 3.52 Thousands/µL      Immature Grans Absolute 0.06 Thousand/uL      Lymphocytes Absolute 4.32 Thousands/µL      Monocytes Absolute 1.12 Thousand/µL      Eosinophils Absolute 0.30 Thousand/µL      Basophils Absolute 0.04 Thousands/µL     Fingerstick Glucose (POCT) [[id number]]  (Normal) Collected: 11/19/23 1144    Lab Status: Final result Updated: 11/19/23 1145     POC Glucose 121 mg/dl                    CT spine cervical without contrast   Final Result by Melody Mccann MD (11/19 1244)      No cervical spine fracture or traumatic malalignment. Workstation performed: [id number]         CT head without contrast   Final Result by Melody Mccann MD (11/19 1242)      No acute intracranial abnormality. Workstation performed: [id number]         XR chest 1 view portable    (Results Pending)   MRI brain seizure wo and w contrast    (Results Pending)         Procedures  Procedures      ED Course  ED Course as of 11/20/23 0601   Sun Nov 19, 2023   1242 LACTIC ACID(!!): 6.5   1306 Procedure Note: EKG  Date/Time: 11/19/23 1:06 PM   Interpreted by: Leanne Angelo   Indications / Diagnosis: seizure  ECG reviewed by me, the ED Provider: yes   The EKG demonstrates:  Rhythm: normal sinus  Intervals: normal intervals  Axis: left axis  QRS/Blocks: normal QRS  ST Changes: No acute ST Changes, no STD/NEETU.   1309 Neurology bedside evaluating patient. 1358 Discussed with neurology. They are recommending to admit to the medical service. No indication for LP at this time as per neurology recommendations. Medical Decision Making  Patient is a 57-year-old male presenting for evaluation of suspected seizure. Based on history and evaluation, differential diagnosis includes but is not limited to: Seizure, acute cranial bleed, electrolyte derangement, thyroid derangement, anemia, arrhythmia, ACS. Plan: CBC, CMP, lactic, troponin, ECG, chest x-ray, CT head, CT cervical spine, reassessment, will give the patient 2 mg Versed secondary to agitation in order to facilitate imaging    Labs unremarkable other than a lactic acidosis which I suspect to be in the setting of a recent seizure. Chest x-ray without acute cardiopulmonary disease on my independent interpretation. CT head and cervical spine without acute emergent pathology on radiology capitation and my independent review. I discussed this case with neurology who evaluated the patient bedside. They are agreeable that this is likely secondary to seizure-like activity. The patient has slowly become more oriented, following commands, no obvious neurological deficits. At the recommendations of neurology, they requesting admission for further evaluation on an inpatient basis. I specifically discussed the need for CSF studies and if it was necessary to obtain a lumbar puncture here in the emergency department. Neurology declines need for LP at this time and wanted to defer until further work-up was completed including a likely inpatient MRI. Patient as well as family seem to understand this plan and are agreeable. All questions answered. Patient admitted. Amount and/or Complexity of Data Reviewed  Labs: ordered. Decision-making details documented in ED Course. Radiology: ordered. Risk  OTC drugs. Prescription drug management. Decision regarding hospitalization.           Disposition  Final diagnoses:   Seizure ([address])     Time reflects when diagnosis was documented in both MDM as applicable and the Disposition within this note       Time User Action Codes Description Comment    11/19/2023 12:43 PM Jeovanny Varghese Add [R56.9] Seizure [facility])           ED Disposition       ED Disposition   Admit    Condition   Stable    Date/Time   Sun Nov 19, 2023  2:19 PM    Comment   Case was discussed with LUIS and the patient's admission status was agreed to be Admission Status: inpatient status to the service of Dr. Deondre Mathur . Follow-up Information       Follow up With Specialties Details Why Contact Info Additional [address] Neurology [facility] Neurology Follow up in 6 week(s)  [address] Neurology [facility], [place], [place], [address]            Current Discharge Medication List        CONTINUE these medications which have NOT CHANGED    Details   Coenzyme Q10 (CO Q 10) 100 MG CAPS Take by mouth      finasteride (PROSCAR) 5 mg tablet Take 1 tablet (5 mg total) by mouth daily  Qty: 90 tablet, Refills: 3    Associated Diagnoses: Enlarged prostate with urinary obstruction      fluticasone (FLONASE) 50 mcg/act nasal spray 2 sprays into each nostril daily  Qty: 1 Bottle, Refills: 5    Associated Diagnoses:  Allergic rhinitis      losartan (Cozaar) 50 mg tablet Take 1 tablet (50 mg total) by mouth daily  Qty: 90 tablet, Refills: 3    Associated Diagnoses: Family history of diabetes mellitus in mother      mometasone (ELOCON) 0.1 % cream Apply topically daily  Qty: 45 g, Refills: 2    Associated Diagnoses: Dermatitis      rosuvastatin (CRESTOR) 20 MG tablet Take 1 tablet (20 mg total) by mouth daily  Qty: 90 tablet, Refills: 3    Associated Diagnoses: Hypercholesterolemia      tamsulosin (FLOMAX) 0.4 mg take 1 capsule by mouth once daily  Qty: 90 capsule, Refills: 3    Associated Diagnoses: Enlarged prostate with urinary obstruction      zolpidem (AMBIEN) 10 mg tablet Take 1 tablet (10 mg total) by mouth daily at bedtime as needed for sleep  Qty: 30 tablet, Refills: 1    Associated Diagnoses: Primary insomnia      diclofenac sodium (VOLTAREN) 50 mg EC tablet Take 1 tablet (50 mg total) by mouth 2 (two) times a day  Qty: 60 tablet, Refills: 5    Associated Diagnoses: Pain in joint of right shoulder      omeprazole (PriLOSEC) 20 mg delayed release capsule take 1 capsule by mouth once daily  Qty: 30 capsule, Refills: 11    Associated Diagnoses: Gastroesophageal reflux disease without esophagitis           No discharge procedures on file. PDMP Review         Value Time User    PDMP Reviewed  Yes 10/24/2023 11:44 AM Jaspreet Johnson DO             ED Provider  Attending physically available and evaluated Rolando Cox. I managed the patient along with the ED Attending.     Electronically Signed by           Marci Syed DO  11/20/23 7617

## 2023-11-19 NOTE — H&P
[address] [facility]  H&P  Name: Tasia Nissen 74 y.o. male I MRN: [id number]  Unit/Bed#: ED 25 I Date of Admission: 11/19/2023   Date of Service: 11/19/2023 I Hospital Day: 0      Assessment/Plan   * Seizure [facility])  Assessment & Plan  Patient presenting with new onset seizure. Reported witnessed tonic-clonic seizures while doing yard work at home. He was reported urinary continence and a tongue bite on presentation. Patient reports no recent illness or sickness. He received a flu shot on the 10th and developed a headache a few days later that resolved spontaneously on its own. Work-up in ED was reported to be negative. He was given IV Versed for postictal agitation reported on presentation. Neurology evaluated in ED and recommends admission for further testing. We will hold off on AED unless second seizure declares itself. As per neurology they recommend Keppra 2 g bolus followed by 500 mg twice daily dosing -----if second seizure occurs. As needed Ativan  MRI with and without contrast for seizure protocol  EEG testing. Would defer to neurology regarding necessity of EMU. Continue with telemetry monitoring  Seizure and fall precaution  DL 13 [employer] form will need to be completed and faxed prior to discharge pending further work-up. Patient cannot drive pending final work-up  Neurology reports no indication for LP currently. We will continue to monitor temperature curve closely.   Patient is afebrile    Asthma  Assessment & Plan  Bronchodilator therapy    Hyperlipidemia  Assessment & Plan  Statin    HTN (hypertension)  Assessment & Plan  Losartan/Flomax    Gastroesophageal reflux disease without esophagitis  Assessment & Plan  PPI       VTE Prophylaxis: Enoxaparin (Lovenox)  / sequential compression device   Code Status: fc  POLST: There is no POLST form on file for this patient (pre-hospital)  Discussion with family: at bedside    Anticipated Length of Stay:  Patient will be admitted on an Inpatient basis with an anticipated length of stay of  > 2 midnights. Justification for Hospital Stay: Need to monitor symptoms    Total Time for Visit, including Counseling / Coordination of Care:  85 .  Greater than 50% of this total time spent on direct patient counseling and coordination of care. Chief Complaint: Patient presents with new onset seizures    History of Present Illness:    Rodrick Paez is a 74 y.o. male who presents with new onset seizure. He is a very pleasant 25-year-old gentleman with known history of asthma, hyperlipidemia hypertension gastroesophageal reflux disease and insomnia who presents to the hospital with reported witnessed tonic-clonic seizure-like activity. Patient reportedly was doing yard work at home. Patient presents to the hospital with a reported 5 to 10-minute episode of convulsive activity as per wife. He presents with noticed recent sick contacts illness or fever or trauma. He reports getting a flu shot on the 10th followed by severe headaches that lasted for several days and then spontaneously stopped. Wife also reports that he has intermittent floaters in his visual field that is chronic. James Ribera He presents to the hospital with a tongue bite and incontinent to urine. He was evaluated by neurology who recommended inpatient work-up for further testing. Patient's mother was diagnosed with a brain mass in her 76s requiring resection as per family. Review of Systems:    Review of Systems   Constitutional:  Negative for chills, diaphoresis and fatigue. Respiratory:  Negative for chest tightness and shortness of breath. Gastrointestinal:  Negative for abdominal distention and abdominal pain. Musculoskeletal:  Negative for arthralgias and back pain. Neurological:  Negative for dizziness and headaches. Psychiatric/Behavioral:  Negative for agitation.         Past Medical and Surgical History:     Past Medical History:   Diagnosis Date 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     Asthma     Chronic pain     back    GERD (gastroesophageal reflux disease)     Hiatal hernia 1994    Hyperlipidemia     Hypertension     Kidney stone     r stent- suppose to be removed Fri 7/13    PONV (postoperative nausea and vomiting)     Shortness of breath        Past Surgical History:   Procedure Laterality Date    ABDOMINAL ADHESION SURGERY N/A 7/11/2018    Procedure: LYSIS ADHESIONS EXTENSIVE;  Surgeon: Traci Car MD;  Location: [facility];  Service: General    ABDOMINAL SURGERY      BACK SURGERY      x2    CARPAL TUNNEL RELEASE      COLONOSCOPY      CYSTOSCOPY  08/07/2020    CYSTOSCOPY W/ URETERAL STENT REMOVAL  01/26/2018    ESOPHAGOGASTRODUODENOSCOPY N/A 7/11/2018    Procedure: ESOPHAGOGASTRODUODENOSCOPY (EGD) INTRAOPERATIVE ;  Surgeon: Traci Car MD;  [facility];  Service: General    FL INJECTION RIGHT SHOULDER (ARTHROGRAM)  5/13/2022    HALLUX VALGUS CORRECTION Bilateral     1st MTP joint     HERNIA REPAIR      HIATAL HERNIA REPAIR  1994    INCISIONAL HERNIA REPAIR N/A 7/11/2018    Procedure: REPAIR HERNIA INCISIONAL X2;  Surgeon: Traci Car MD;  [facility];  Service: General    KNEE SURGERY Bilateral     LITHOTRIPSY      PERIPHERAL NEUROPLASTY OF FINGER / HAND / Red Rushing      left middle finger    AR ARTHROPLASTY GLENOHUMERAL JOINT TOTAL SHOULDER Right 6/1/2021    Procedure: TOTAL SHOULDER ARTHROPLASTY ANATOMIC, biceps tenodesis;   Surgeon: You Daley MD;  [facility];  Service: Orthopedics    AR CYSTO/URETERO W/LITHOTRIPSY &INDWELL STENT INSRT Left 1/3/2018    Procedure: Annabella Tobias PYELOGRAM AND INSERTION STENT Fariba Haver;  Surgeon: Juliet Red MD;  [facility];  Service: Urology    AR CYSTO/URETERO W/LITHOTRIPSY &INDWELL STENT INSRT Right 6/8/2018    Procedure: CYSTOSCOPY URETEROSCOPY WITH LITHOTRIPSY HOLMIUM LASER, RETROGRADE PYELOGRAM AND INSERTION STENT URETERAL;  Surgeon: Khadijah Goldstein MD;  [facility];  Service: Urology    NV LAPS RPR PARAESPHGL HRNA INCL FUNDPLSTY [address] N/A 7/11/2018    Procedure: REPAIR RECURRENT HERNIA PARAESOPHAGEAL  LAPAROSCOPIC;KLAUDIA Lama Spine;  Surgeon: Lashon Musa MD;  [facility];  Service: General    NV LITHOTRIPSY XTRCORP SHOCK WAVE Left 1/18/2018    Procedure: ESWL;  Surgeon: Khadijah Goldstein MD;  [facility];  Service: Urology    ROTATOR CUFF REPAIR Right     SHOULDER ARTHROSCOPY Bilateral     SHOULDER SURGERY         Meds/Allergies:    Prior to Admission medications    Medication Sig Start Date End Date Taking? Authorizing Provider   Coenzyme Q10 (CO Q 10) 100 MG CAPS Take by mouth    Historical Provider, MD   diclofenac sodium (VOLTAREN) 50 mg EC tablet Take 1 tablet (50 mg total) by mouth 2 (two) times a day 4/21/23   Maylene Severin,    finasteride (PROSCAR) 5 mg tablet Take 1 tablet (5 mg total) by mouth daily 8/10/23   Maylene Severin, DO   fluticasone (FLONASE) 50 mcg/act nasal spray 2 sprays into each nostril daily  Patient taking differently: 2 sprays into each nostril daily as needed 10/7/19   Maylene Severin, DO   losartan (Cozaar) 50 mg tablet Take 1 tablet (50 mg total) by mouth daily 3/10/23   Esau Calero MD   mometasone (ELOCON) 0.1 % cream Apply topically daily 4/15/22   Maylene Severin, DO   omeprazole (PriLOSEC) 20 mg delayed release capsule take 1 capsule by mouth once daily 1/2/20   Maylene Severin, DO   rosuvastatin (CRESTOR) 20 MG tablet Take 1 tablet (20 mg total) by mouth daily 6/7/23   Maylene Severin, DO   tamsulosin (FLOMAX) 0.4 mg take 1 capsule by mouth once daily 1/23/23   Maylene Severin, DO   zolpidem (AMBIEN) 10 mg tablet Take 1 tablet (10 mg total) by mouth daily at bedtime as needed for sleep 10/24/23   Maylene Severin, DO     I have reviewed home medications with patient family member. Allergies:    Allergies   Allergen Reactions    Meperidine GI Intolerance    Horse-Derived Products     Lisinopril Cough     Other reaction(s): Cough       Social History:     Marital Status: /Civil Union   Occupation: retired  Patient Pre-hospital Living Situation: home  Patient Pre-hospital Level of Mobility: amb  Patient Pre-hospital Diet Restrictions: denies  Substance Use History:   Social History     Substance and Sexual Activity   Alcohol Use Yes    Alcohol/week: 1.0 standard drink of alcohol    Types: 1 Cans of beer per week    Comment: once a month      Social History     Tobacco Use   Smoking Status Never   Smokeless Tobacco Never     Social History     Substance and Sexual Activity   Drug Use No       Family History:    Family History   Problem Relation Age of Onset    Diabetes Mother     Hypertension Mother     Brain cancer Mother     Hypertension Sister     Nephrolithiasis Sister     COPD Father     Cancer Maternal Grandmother     Aortic aneurysm Maternal Uncle         x3 maternal uncles        Physical Exam:     Vitals:   Blood Pressure: 144/82 (11/19/23 1400)  Pulse: 70 (11/19/23 1400)  Temperature: 98.2 °F (36.8 °C) (11/19/23 1214)  Temp Source: Rectal (11/19/23 1214)  Respirations: 20 (11/19/23 1400)  SpO2: 100 % (11/19/23 1400)    Physical Exam  Constitutional:       Appearance: Normal appearance. Cardiovascular:      Rate and Rhythm: Normal rate and regular rhythm. Abdominal:      General: Abdomen is flat. Palpations: Abdomen is soft. Musculoskeletal:         General: No swelling. Normal range of motion. Neurological:      General: No focal deficit present. Mental Status: He is alert and oriented to person, place, and time. Psychiatric:         Mood and Affect: Mood normal.         Additional Data:     Lab Results: I have personally reviewed pertinent reports.       Results from last 7 days   Lab Units 11/19/23  1147   WBC Thousand/uL 9.36   HEMOGLOBIN g/dL 15.3   HEMATOCRIT % 43.7   PLATELETS Thousands/uL 251   NEUTROS PCT % 38*   LYMPHS PCT % 46*   MONOS PCT % 12   EOS PCT % 3     Results from last 7 days   Lab Units 11/19/23  1147   SODIUM mmol/L 142   POTASSIUM mmol/L 3.6   CHLORIDE mmol/L 107   CO2 mmol/L 22   BUN mg/dL 10   CREATININE mg/dL 0.88   ANION GAP mmol/L 13   CALCIUM mg/dL 9.3   ALBUMIN g/dL 4.2   TOTAL BILIRUBIN mg/dL 0.59   ALK PHOS U/L 49   ALT U/L 24   AST U/L 19   GLUCOSE RANDOM mg/dL 113     Results from last 7 days   Lab Units 11/19/23  1147   INR  1.20*     Results from last 7 days   Lab Units 11/19/23  1144   POC GLUCOSE mg/dl 121         Results from last 7 days   Lab Units 11/19/23  1340 11/19/23  1147   LACTIC ACID mmol/L 2.0 6.5*       Imaging: I have personally reviewed pertinent reports. CT spine cervical without contrast   Final Result by Alvin Denney MD (11/19 0429)      No cervical spine fracture or traumatic malalignment. Workstation performed: [id number]         CT head without contrast   Final Result by Alvin Denney MD (11/19 5349)      No acute intracranial abnormality. Workstation performed: [id number]         XR chest 1 view portable    (Results Pending)   MRI inpatient order    (Results Pending)         ** Please Note: This note has been constructed using a voice recognition system.  **

## 2023-11-19 NOTE — ASSESSMENT & PLAN NOTE
74year old male with asthma, HLD, HTN, GERD and insomnia who presented with witnessed tonic-clonic seizure activity while doing yard work. There was reported urinary incontinence and tongue bite. Patient's wife reports there has been no recent illnesses/sick contacts. She reported he received his flu shot on 11/10 and developed a headache a few days later that resolved on its own. BP on arrival 131/70. Patient received IV Versed 2 mg x 1 for postictal agitation in ED. Patient is back to baseline at this time and exam non focal. Unclear etiology of seizure at this time. Plan:  - Hold on AED initiation at this time. - If patient has another seizure, recommend loading with Keppra 2 g IV x1 and initiating 500 mg BID  - Ativan PRN for seizure activity > 2 minutes  - Routine EEG pending   - If EEG unremarkable, then no further inpatient neurology recommendations at that time. - Telemetry  - Seizure precautions  - [employer] form completed and faxed. Patient made aware of driving restriction.  - Monitor neuro exam; notify with any changes  - Medical management and supportive care per primary team. Correction of any metabolic or infectious disturbances. Results:  - CT head: No acute intracranial abnormality.  - MRI brain w/wo contrast: No acute intracranial pathology.    - Labs on presentation: lactic acid 6.5, TSH 4.504, free T4 0.68, COVID/flu/RSV negative, coma panel negative, UDS positive for benzodiazepines

## 2023-11-19 NOTE — PLAN OF CARE
Problem: PAIN - ADULT  Goal: Verbalizes/displays adequate comfort level or baseline comfort level  Description: Interventions:  - Encourage patient to monitor pain and request assistance  - Assess pain using appropriate pain scale  - Administer analgesics based on type and severity of pain and evaluate response  - Implement non-pharmacological measures as appropriate and evaluate response  - Consider cultural and social influences on pain and pain management  - Notify physician/advanced practitioner if interventions unsuccessful or patient reports new pain  Outcome: Progressing     Problem: INFECTION - ADULT  Goal: Absence or prevention of progression during hospitalization  Description: INTERVENTIONS:  - Assess and monitor for signs and symptoms of infection  - Monitor lab/diagnostic results  - Monitor all insertion sites, i.e. indwelling lines, tubes, and drains  - Monitor endotracheal if appropriate and nasal secretions for changes in amount and color  - [place] appropriate cooling/warming therapies per order  - Administer medications as ordered  - Instruct and encourage patient and family to use good hand hygiene technique  - Identify and instruct in appropriate isolation precautions for identified infection/condition  Outcome: Progressing  Goal: Absence of fever/infection during neutropenic period  Description: INTERVENTIONS:  - Monitor WBC    Outcome: Progressing     Problem: SAFETY ADULT  Goal: Patient will remain free of falls  Description: INTERVENTIONS:  - Educate patient/family on patient safety including physical limitations  - Instruct patient to call for assistance with activity   - Consult OT/PT to assist with strengthening/mobility   - Keep Call bell within reach  - Keep bed low and locked with side rails adjusted as appropriate  - Keep care items and personal belongings within reach  - Initiate and maintain comfort rounds  - Make Fall Risk Sign visible to staff  - Offer Toileting every 2 Hours, in advance of need  - Initiate/Maintain bed alarm  - Apply yellow socks and bracelet for high fall risk patients  - Consider moving patient to room near nurses station  Outcome: Progressing  Goal: Maintain or return to baseline ADL function  Description: INTERVENTIONS:  -  Assess patient's ability to carry out ADLs; assess patient's baseline for ADL function and identify physical deficits which impact ability to perform ADLs (bathing, care of mouth/teeth, toileting, grooming, dressing, etc.)  - Assess/evaluate cause of self-care deficits   - Assess range of motion  - Assess patient's mobility; develop plan if impaired  - Assess patient's need for assistive devices and provide as appropriate  - Encourage maximum independence but intervene and supervise when necessary  - Involve family in performance of ADLs  - Assess for home care needs following discharge   - Consider OT consult to assist with ADL evaluation and planning for discharge  - Provide patient education as appropriate  Outcome: Progressing  Goal: Maintains/Returns to pre admission functional level  Description: INTERVENTIONS:  - Perform AM-PAC 6 Click Basic Mobility/ Daily Activity assessment daily.  - Set and communicate daily mobility goal to care team and patient/family/caregiver. - Collaborate with rehabilitation services on mobility goals if consulted  - Perform Range of Motion 2 times a day. - Reposition patient every 2 hours.   - Dangle patient 2 times a day  - Stand patient 2 times a day  - Ambulate patient 2 times a day  - Out of bed to chair 2 times a day   - Out of bed for meals 2 times a day  - Out of bed for toileting  - Record patient progress and toleration of activity level   Outcome: Progressing     Problem: DISCHARGE PLANNING  Goal: Discharge to home or other facility with appropriate resources  Description: INTERVENTIONS:  - Identify barriers to discharge w/patient and caregiver  - Arrange for needed discharge resources and transportation as appropriate  - Identify discharge learning needs (meds, wound care, etc.)  - Arrange for interpretive services to assist at discharge as needed  - Refer to Case Management Department for coordinating discharge planning if the patient needs post-hospital services based on physician/advanced practitioner order or complex needs related to functional status, cognitive ability, or social support system  Outcome: Progressing     Problem: Knowledge Deficit  Goal: Patient/family/caregiver demonstrates understanding of disease process, treatment plan, medications, and discharge instructions  Description: Complete learning assessment and assess knowledge base.   Interventions:  - Provide teaching at level of understanding  - Provide teaching via preferred learning methods  Outcome: Progressing     Problem: NEUROSENSORY - ADULT  Goal: Achieves stable or improved neurological status  Description: INTERVENTIONS  - Monitor and report changes in neurological status  - Monitor vital signs such as temperature, blood pressure, glucose, and any other labs ordered   - Initiate measures to prevent increased intracranial pressure  - Monitor for seizure activity and implement precautions if appropriate      Outcome: Progressing  Goal: Remains free of injury related to seizures activity  Description: INTERVENTIONS  - Maintain airway, patient safety  and administer oxygen as ordered  - Monitor patient for seizure activity, document and report duration and description of seizure to physician/advanced practitioner  - If seizure occurs,  ensure patient safety during seizure  - Reorient patient post seizure  - Seizure pads on all 4 side rails  - Instruct patient/family to notify RN of any seizure activity including if an aura is experienced  - Instruct patient/family to call for assistance with activity based on nursing assessment  - Administer anti-seizure medications if ordered    Outcome: Progressing  Goal: Achieves maximal functionality and self care  Description: INTERVENTIONS  - Monitor swallowing and airway patency with patient fatigue and changes in neurological status  - Encourage and assist patient to increase activity and self care.    - Encourage visually impaired, hearing impaired and aphasic patients to use assistive/communication devices  Outcome: Progressing

## 2023-11-19 NOTE — CONSULTS
Consultation - Neurology   Mariann Alonso 74 y.o. male MRN: [id number]  Unit/Bed#: ED 22 Encounter: [id number]      Assessment/Plan     Seizure [facility])  Assessment & Plan  74year old male with asthma, HLD, HTN, GERD and insomnia who presented with witnessed tonic-clonic seizure activity while doing yard work. There was reported urinary incontinence and tongue bite. Patient's wife reports there has been no recent illnesses/sick contacts. She reported he received his flu shot on 11/10 and developed a headache a few days later that resolved on its own. BP on arrival 131/70. Labs remarkable for lactic acidosis (6.5). Initial troponin WNL. CT head negative for acute intracranial abnormality. CT C-spine unremarkable. Patient received IV Versed 2 mg x 1 for postictal agitation. Plan:  - Hold on AED initiation at this time. - If patient is to have another seizure, recommend loading with Keppra 2 g IV x1 and initiating 500 mg BID  - Ativan prn   - Recommend obtaining MRI with/without contrast seizure protocol  - Recommend obtaining routine EEG. Can hold on video monitoring at this time  - Telemetry  - Seizure precautions  - [employer] form will need to be completed and faxed prior to discharge. Patient will need to be made aware that they are not to drive. - Medical management and supportive care per primary team. Correction of any metabolic or infectious disturbances. Plan discussed with Attending Neurologist, please see attestation for further input/recommendations. Mariann Alonso will need follow up in in 6 weeks with epilepsy attending or advance practitioner. He will not require outpatient neurological testing.     History of Present Illness     Reason for Consult / Principal Problem: Seizure   Hx and PE limited by: Patient poor historian after receiving Versed during neuro exam, history obtained per family present at bedside  HPI: Mariann Alonso is a 74 y.o. male with asthma, HLD, HTN, GERD and insomnia who presented with witnessed tonic-clonic seizure activity while doing yard work. Per his wife who was present at bedside, she reported it lasted approximately 5 to 10 minutes however she is unsure of the exact time. There was associated urinary incontinence and tongue bite. Patient's wife reports there has been no recent illnesses/sick contacts. She reported he received his flu shot on 11/10 and developed a headache a few days later that resolved on its own. BP on arrival 131/70. Labs remarkable for lactic acidosis (6.5). Initial troponin WNL. CT head negative for acute intracranial abnormality. CT C-spine unremarkable. Patient received IV Versed 2 mg x 1 for postictal agitation. Patient's family denied him having a history of seizures or family history of seizures. Patient had aseptic meningitis over 40 years ago after [place] from the zoo. Patient's mother was diagnosed with a brain tumor where she had a resection, chemo and radiation done. This was diagnosed in her 76s and she passed away when she was 80. They are not sure what type of tumor it was. Inpatient consult to Neurology  Consult performed by: ORTIZ Mosley  Consult ordered by: Sekou Smith MD          Review of Systems   Unable to perform ROS: Mental status change   Neurological:  Positive for seizures. Patient drowsy, is not answering ROS questions aside from saying "no" when asked if there is any pain.     Historical Information   Past Medical History:   Diagnosis Date    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     Asthma     Chronic pain     back    GERD (gastroesophageal reflux disease)     Hiatal hernia 1994    Hyperlipidemia     Hypertension     Kidney stone     r stent- suppose to be removed Fri 7/13    PONV (postoperative nausea and vomiting)     Shortness of breath      Past Surgical History:   Procedure Laterality Date    ABDOMINAL ADHESION SURGERY N/A 7/11/2018    Procedure: LYSIS ADHESIONS EXTENSIVE;  Surgeon: Hoang Hannon MD;  Location: [facility];  Service: General    ABDOMINAL SURGERY      BACK SURGERY      x2    CARPAL TUNNEL RELEASE      COLONOSCOPY      CYSTOSCOPY  08/07/2020    CYSTOSCOPY W/ URETERAL STENT REMOVAL  01/26/2018    ESOPHAGOGASTRODUODENOSCOPY N/A 7/11/2018    Procedure: ESOPHAGOGASTRODUODENOSCOPY (EGD) INTRAOPERATIVE ;  Surgeon: Hoang Hannon MD;  [facility];  Service: General    FL INJECTION RIGHT SHOULDER (ARTHROGRAM)  5/13/2022    HALLUX VALGUS CORRECTION Bilateral     1st MTP joint     HERNIA REPAIR      HIATAL HERNIA REPAIR  1994    INCISIONAL HERNIA REPAIR N/A 7/11/2018    Procedure: REPAIR HERNIA INCISIONAL X2;  Surgeon: Hoang Hannon MD;  [facility];  Service: General    KNEE SURGERY Bilateral     LITHOTRIPSY      PERIPHERAL NEUROPLASTY OF FINGER / HAND / Zigmund Mortimer      left middle finger    UT ARTHROPLASTY GLENOHUMERAL JOINT TOTAL SHOULDER Right 6/1/2021    Procedure: TOTAL SHOULDER ARTHROPLASTY ANATOMIC, biceps tenodesis;   Surgeon: Jessica Moulton MD;  [facility];  Service: Orthopedics    UT CYSTO/URETERO W/LITHOTRIPSY &INDWELL STENT INSRT Left 1/3/2018    Procedure: [place] PYELOGRAM AND INSERTION STENT Tammie Sick;  Surgeon: Kimberly Matamoros MD;  [facility];  Service: Urology    UT CYSTO/URETERO W/LITHOTRIPSY &INDWELL STENT INSRT Right 6/8/2018    Procedure: CYSTOSCOPY URETEROSCOPY WITH LITHOTRIPSY HOLMIUM LASER, RETROGRADE PYELOGRAM AND INSERTION STENT URETERAL;  Surgeon: Abida Justice MD;  [facility];  Service: Urology    UT LAPS RPR PARAESPHGL HRNA INCL [address] N/A 7/11/2018    Procedure: REPAIR RECURRENT HERNIA PARAESOPHAGEAL  LAPAROSCOPIC;KLAUDIA Cuellar;  Surgeon: Hoang Hannon MD;  [facility];  Service: General    UT LITHOTRIPSY XTRCORP SHOCK WAVE Left 1/18/2018    Procedure: ESWL;  Surgeon: Abida Justice MD;  [facility];  Service: Urology    ROTATOR CUFF REPAIR Right     SHOULDER ARTHROSCOPY Bilateral     SHOULDER SURGERY       Social History   Social History     Substance and Sexual Activity   Alcohol Use Yes    Alcohol/week: 1.0 standard drink of alcohol    Types: 1 Cans of beer per week    Comment: once a month      Social History     Substance and Sexual Activity   Drug Use No     E-Cigarette/Vaping    E-Cigarette Use Never User      E-Cigarette/Vaping Substances    Nicotine No     THC No     CBD No     Flavoring No     Other No     Unknown No      Social History     Tobacco Use   Smoking Status Never   Smokeless Tobacco Never     Family History:   Family History   Problem Relation Age of Onset    Diabetes Mother     Hypertension Mother     Brain cancer Mother     Hypertension Sister     Nephrolithiasis Sister     COPD Father     Cancer Maternal Grandmother     Aortic aneurysm Maternal Uncle         x3 maternal uncles        Review of previous medical records was completed. Meds/Allergies   all current active meds have been reviewed, current meds:   Current Facility-Administered Medications   Medication Dose Route Frequency    midazolam (VERSED) 2 mg/2 mL injection **ADS Override Pull**       , and PTA meds:   Prior to Admission Medications   Prescriptions Last Dose Informant Patient Reported? Taking?    Coenzyme Q10 (CO Q 10) 100 MG CAPS  Self Yes No   Sig: Take by mouth   diclofenac sodium (VOLTAREN) 50 mg EC tablet   No No   Sig: Take 1 tablet (50 mg total) by mouth 2 (two) times a day   finasteride (PROSCAR) 5 mg tablet   No No   Sig: Take 1 tablet (5 mg total) by mouth daily   fluticasone (FLONASE) 50 mcg/act nasal spray  Self No No   Si sprays into each nostril daily   Patient taking differently: 2 sprays into each nostril daily as needed   losartan (Cozaar) 50 mg tablet   No No   Sig: Take 1 tablet (50 mg total) by mouth daily   mometasone (ELOCON) 0.1 % cream   No No   Sig: Apply topically daily   omeprazole (PriLOSEC) 20 mg delayed release capsule  Self No No   Sig: take 1 capsule by mouth once daily   rosuvastatin (CRESTOR) 20 MG tablet   No No   Sig: Take 1 tablet (20 mg total) by mouth daily   tamsulosin (FLOMAX) 0.4 mg   No No   Sig: take 1 capsule by mouth once daily   zolpidem (AMBIEN) 10 mg tablet   No No   Sig: Take 1 tablet (10 mg total) by mouth daily at bedtime as needed for sleep      Facility-Administered Medications: None       Allergies   Allergen Reactions    Meperidine GI Intolerance    Horse-Derived Products     Lisinopril Cough     Other reaction(s): Cough       Objective   Vitals:Blood pressure 128/72, pulse 73, temperature 98.2 °F (36.8 °C), temperature source Rectal, resp. rate 20, SpO2 100 %. ,There is no height or weight on file to calculate BMI. No intake or output data in the 24 hours ending 11/19/23 1343    Invasive Devices: Invasive Devices       Peripheral Intravenous Line  Duration             Peripheral IV 11/19/23 Left Antecubital <1 day    Peripheral IV 11/19/23 Right Antecubital <1 day                    Physical Exam  Vitals reviewed. Exam conducted with a chaperone present (Family present at bedside during neuro eval). Constitutional:       General: He is not in acute distress. Appearance: He is normal weight. He is not ill-appearing. Comments: Patient is drowsy during neuro exam (s/p Versed 2 mg IV)   HENT:      Head: Normocephalic and atraumatic. Right Ear: External ear normal.      Left Ear: External ear normal.      Nose: Nose normal.      Mouth/Throat:      Mouth: Mucous membranes are moist.   Eyes:      General:         Right eye: No discharge. Left eye: No discharge. Extraocular Movements: Extraocular movements intact. Conjunctiva/sclera: Conjunctivae normal.      Pupils: Pupils are equal, round, and reactive to light. Neck:      Comments: C-collar in place  Cardiovascular:      Rate and Rhythm: Normal rate.    Pulmonary:      Effort: Pulmonary effort is normal. No respiratory distress. Musculoskeletal:         General: Normal range of motion. Right lower leg: No edema. Left lower leg: No edema. Skin:     General: Skin is warm and dry. Coloration: Skin is not jaundiced or pale. Neurological:      Motor: Motor strength is normal.     Coordination: Finger-Nose-Finger Test normal.      Comments: See below       Neurologic Exam     Mental Status     Patient is drowsy (received Versed prior to neuro exam), however oriented to person. Stated he was in the hospital, however did not know the name of the building. Stated Thanksgiving was coming up with cueing, however was unsure of the month/year. Did not know his current situation. He was able to follow simple commands. Decreased attention and concentration. He was able to repeat phrases and name objects. Speech is clear without obvious dysarthria or aphasia. Cranial Nerves     CN III, IV, VI   Pupils are equal, round, and reactive to light. Conjugate gaze present. EOMs intact. No nystagmus seen. Visual field testing somewhat limited secondary to patient comprehension, no obvious VF deficits. Blink to threat intact bilaterally. Facial features symmetric at rest and with movement. Tongue laceration present on the tip of his tongue. Hearing WNL. Motor Exam   Muscle bulk: normal  Right arm pronator drift: absent  Left arm pronator drift: absent    Strength   Strength 5/5 throughout. Sensory Exam   Light touch normal.     Gait, Coordination, and Reflexes     Gait  Gait: (Deferred for safety)    Coordination   Finger to nose coordination: normal    Tremor   Resting tremor: absent  Intention tremor: absent  Action tremor: absent  No seizure-like activity noted during exam       Lab Results: I have personally reviewed pertinent reports.   , CBC:   Results from last 7 days   Lab Units 11/19/23  1147   WBC Thousand/uL 9.36   RBC Million/uL 5.25   HEMOGLOBIN g/dL 15.3   HEMATOCRIT % 43.7   MCV fL 83   PLATELETS Thousands/uL 251   , BMP/CMP:   Results from last 7 days   Lab Units 11/19/23  1147   SODIUM mmol/L 142   POTASSIUM mmol/L 3.6   CHLORIDE mmol/L 107   CO2 mmol/L 22   BUN mg/dL 10   CREATININE mg/dL 0.88   CALCIUM mg/dL 9.3   AST U/L 19   ALT U/L 24   ALK PHOS U/L 49   EGFR ml/min/1.73sq m 84   ,  Coagulation:   Results from last 7 days   Lab Units 11/19/23  1147   INR  1.20*   ,   Imaging Studies: I have personally reviewed pertinent reports. and I have personally reviewed pertinent films in PACS CT head  EKG, Pathology, and Other Studies: I have personally reviewed pertinent reports.    and I have personally reviewed pertinent films in PACS  VTE Prophylaxis: Patient currently in ED    Code Status: Prior

## 2023-11-19 NOTE — Clinical Note
Case was discussed with LUIS and the patient's admission status was agreed to be Admission Status: inpatient status to the service of Dr. Lobato

## 2023-11-19 NOTE — ED ATTENDING ATTESTATION
11/19/2023  ISandro MD, saw and evaluated the patient. I have discussed the patient with the resident/non-physician practitioner and agree with the resident's/non-physician practitioner's findings, Plan of Care, and MDM as documented in the resident's/non-physician practitioner's note, except where noted. All available labs and Radiology studies were reviewed. I was present for key portions of any procedure(s) performed by the resident/non-physician practitioner and I was immediately available to provide assistance. At this point I agree with the current assessment done in the Emergency Department. I have conducted an independent evaluation of this patient a history and physical is as follows:    ED Course         Critical Care Time  CriticalCare Time    Date/Time: 11/20/2023 10:18 PM    Performed by: Sandro Levin MD  Authorized by: Sandro Levin MD    Critical care provider statement:     Critical care time (minutes):  32    Critical care time was exclusive of:  Separately billable procedures and treating other patients and teaching time    Critical care was necessary to treat or prevent imminent or life-threatening deterioration of the following conditions:  CNS failure or compromise    Critical care was time spent personally by me on the following activities:  Development of treatment plan with patient or surrogate, discussions with consultants, obtaining history from patient or surrogate, evaluation of patient's response to treatment, examination of patient, re-evaluation of patient's condition, review of old charts, ordering and review of radiographic studies, ordering and review of laboratory studies and ordering and performing treatments and interventions      75 yo male with hx of gerd, insomnia here for presumed seizure activity while doing yard work was noted to have seizure like activity and then unresponsive. Pt upon arrival to ed unable to provide hx, repetitive.  Pt  with no hx of seizure. Pt per wife has been having a headache for last three days. No fever recently. Vss, afebrile, lungs cta, rrr, abdomen soft nontender, repetitive, not answering questions or following commands, moving all extremities, no nystagmus, no gaze deviation. Ct head with no bleed, cardiac workup, urine, EKG. Pt given versed for postictal agitation.

## 2023-11-19 NOTE — ASSESSMENT & PLAN NOTE
Patient presenting with new onset seizure. Reported witnessed tonic-clonic seizures while doing yard work at home. He was reported urinary continence and a tongue bite on presentation. Patient reports no recent illness or sickness. He received a flu shot on the 10th and developed a headache a few days later that resolved spontaneously on its own. Work-up in ED was reported to be negative. He was given IV Versed for postictal agitation reported on presentation. Neurology evaluated in ED and recommends admission for further testing. We will hold off on AED unless second seizure declares itself. As per neurology they recommend Keppra 2 g bolus followed by 500 mg twice daily dosing -----if second seizure occurs. As needed Ativan  MRI with and without contrast for seizure protocol  EEG testing. Would defer to neurology regarding necessity of EMU. Continue with telemetry monitoring  Seizure and fall precaution  DL 13 [employer] form will need to be completed and faxed prior to discharge pending further work-up. Patient cannot drive pending final work-up  Neurology reports no indication for LP currently. We will continue to monitor temperature curve closely.   Patient is afebrile

## 2023-11-20 ENCOUNTER — APPOINTMENT (OUTPATIENT)
Dept: RADIOLOGY | Facility: HOSPITAL | Age: 73
DRG: 101 | End: 2023-11-20
Payer: MEDICARE

## 2023-11-20 ENCOUNTER — APPOINTMENT (INPATIENT)
Dept: NEUROLOGY | Facility: CLINIC | Age: 73
DRG: 101 | End: 2023-11-20
Payer: MEDICARE

## 2023-11-20 VITALS
BODY MASS INDEX: 28.2 KG/M2 | WEIGHT: 186.07 LBS | RESPIRATION RATE: 16 BRPM | HEART RATE: 80 BPM | TEMPERATURE: 98 F | DIASTOLIC BLOOD PRESSURE: 58 MMHG | HEIGHT: 68 IN | OXYGEN SATURATION: 94 % | SYSTOLIC BLOOD PRESSURE: 119 MMHG

## 2023-11-20 PROBLEM — E87.20 LACTIC ACIDOSIS: Status: ACTIVE | Noted: 2023-11-20

## 2023-11-20 PROCEDURE — 70553 MRI BRAIN STEM W/O & W/DYE: CPT

## 2023-11-20 PROCEDURE — 95816 EEG AWAKE AND DROWSY: CPT

## 2023-11-20 PROCEDURE — A9585 GADOBUTROL INJECTION: HCPCS | Performed by: STUDENT IN AN ORGANIZED HEALTH CARE EDUCATION/TRAINING PROGRAM

## 2023-11-20 PROCEDURE — 95819 EEG AWAKE AND ASLEEP: CPT | Performed by: PSYCHIATRY & NEUROLOGY

## 2023-11-20 PROCEDURE — 99239 HOSP IP/OBS DSCHRG MGMT >30: CPT | Performed by: STUDENT IN AN ORGANIZED HEALTH CARE EDUCATION/TRAINING PROGRAM

## 2023-11-20 RX ORDER — GADOBUTROL 604.72 MG/ML
8 INJECTION INTRAVENOUS
Status: COMPLETED | OUTPATIENT
Start: 2023-11-20 | End: 2023-11-20

## 2023-11-20 RX ADMIN — LOSARTAN POTASSIUM 50 MG: 50 TABLET, FILM COATED ORAL at 09:41

## 2023-11-20 RX ADMIN — GADOBUTROL 8 ML: 604.72 INJECTION INTRAVENOUS at 00:46

## 2023-11-20 RX ADMIN — PANTOPRAZOLE SODIUM 40 MG: 40 TABLET, DELAYED RELEASE ORAL at 06:36

## 2023-11-20 RX ADMIN — FINASTERIDE 5 MG: 5 TABLET, FILM COATED ORAL at 09:41

## 2023-11-20 RX ADMIN — LORAZEPAM 1 MG: 1 TABLET ORAL at 00:01

## 2023-11-20 NOTE — DISCHARGE SUMMARY
[address] [facility]  Discharge- Meme Ibanez 1950, 74 y.o. male MRN: [id number]  Unit/Bed#: [facility] 730-01 Encounter: [id number]  Primary Care Provider: Sophie Conley DO   Date and time admitted to hospital: 11/19/2023 11:36 AM    Lactic acidosis  Assessment & Plan  Present on admission with Lactic acid level 6.5, suspect secondary to seizures  With IVF and supportive measures, Lactic improved to 2.0      Asthma  Assessment & Plan  Bronchodilator therapy    Hyperlipidemia  Assessment & Plan  Statin    HTN (hypertension)  Assessment & Plan  Losartan/Flomax    Gastroesophageal reflux disease without esophagitis  Assessment & Plan  PPI    * Seizure [facility])  Assessment & Plan  Patient presenting with new onset seizure. Reported witnessed tonic-clonic seizures while doing yard work at home. He was reported urinary continence and a tongue bite on presentation. Patient reports no recent illness or sickness. He received a flu shot on the 10th and developed a headache a few days later that resolved spontaneously on its own. Work-up in ED was reported to be negative. He was given IV Versed for postictal agitation reported on presentation. Neurology evaluated in ED and recommends admission for further testing. We will hold off on AED unless second seizure declares itself. As per neurology they recommend Keppra 2 g bolus followed by 500 mg twice daily dosing -----if second seizure occurs. As needed Ativan  MRI with and without contrast for seizure protocol  EEG testing. Would defer to neurology regarding necessity of EMU. Continue with telemetry monitoring  Seizure and fall precaution  DL 13 [employer] form will need to be completed and faxed prior to discharge pending further work-up. Patient cannot drive pending final work-up  Neurology reports no indication for LP currently. We will continue to monitor temperature curve closely.   Patient is afebrile      Medical Problems       Resolved Problems  Date Reviewed: 11/20/2023   None       Discharging Physician / Practitioner: Allen Gannon  PCP: Sean Cole DO  Admission Date:   Admission Orders (From admission, onward)       Ordered        11/19/23 [facility]                          Discharge Date: 11/20/23    Consultations During Hospital Stay:  Lactic Acid Level 6.5-2.0    Procedures Performed:   None    Significant Findings / Test Results:   EEG- Unremarkable  MRI brain- No acute intracranial pathology  CT head- NO acute intracranial pathology    Incidental Findings:   N/A    Test Results Pending at Discharge (will require follow up): None     Outpatient Tests Requested: Outpatient followup with neurology in 4-6 weeks    Complications:  None    Reason for Admission: Seizure    Hospital Course:   Laisha Dumont is a 74 y.o. male patient who originally presented to the hospital on 11/19/2023 due to apparent seizure. Patient was doing yardwork which he described as not being very strenuous when he had this episode. He was brought to the ED where CT imaging was unremarkable. Lactic 6.5 but this resolved with IVF and supportive care. Neurology recommended holding off of AED and obtaining MRI and EEG. Overnight there were no significant events and patient stated he was feeling well by the time he was reevaluated in the morning. MRI and EEG were unremarkable so patient was discharged home without AED medication. He was discharged home with plans to follow up in the outpatient setting with neurology. Please see above list of diagnoses and related plan for additional information.      Condition at Discharge: good    Discharge Day Visit / Exam:   Subjective:  Laying in bed, no acute distress  Vitals: Blood Pressure: 119/58 (11/20/23 0827)  Pulse: 80 (11/20/23 0827)  Temperature: 98 °F (36.7 °C) (11/20/23 0827)  Temp Source: Oral (11/19/23 1545)  Respirations: 16 (11/20/23 0827)  Height: 5' 8" (172.7 cm) (11/19/23 1545)  Weight - Scale: 84.4 kg (186 lb 1.1 oz) (11/19/23 1545)  SpO2: 94 % (11/20/23 0827)  Exam:   Physical Exam  Vitals and nursing note reviewed. Constitutional:       General: He is not in acute distress. Appearance: He is well-developed. He is not toxic-appearing or diaphoretic. HENT:      Head: Normocephalic and atraumatic. Mouth/Throat:      Mouth: Mucous membranes are moist.   Eyes:      General: No scleral icterus. Extraocular Movements: Extraocular movements intact. Conjunctiva/sclera: Conjunctivae normal.   Cardiovascular:      Rate and Rhythm: Normal rate and regular rhythm. Pulses: Normal pulses. Heart sounds: No murmur heard. No friction rub. No gallop. Pulmonary:      Effort: Pulmonary effort is normal. No respiratory distress. Breath sounds: Normal breath sounds. No wheezing, rhonchi or rales. Abdominal:      General: Abdomen is flat. Bowel sounds are normal. There is no distension. Palpations: Abdomen is soft. There is no mass. Tenderness: There is no abdominal tenderness. There is no guarding. Musculoskeletal:         General: No swelling. Cervical back: Neck supple. Right lower leg: No edema. Left lower leg: No edema. Lymphadenopathy:      Cervical: No cervical adenopathy. Skin:     General: Skin is warm and dry. Capillary Refill: Capillary refill takes less than 2 seconds. Coloration: Skin is not jaundiced. Findings: No rash. Neurological:      General: No focal deficit present. Mental Status: He is alert and oriented to person, place, and time. Sensory: No sensory deficit. Motor: No weakness. Psychiatric:         Mood and Affect: Mood normal.          Discussion with Family: Updated  (wife) at bedside. Discharge instructions/Information to patient and family:   See after visit summary for information provided to patient and family.       Provisions for Follow-Up Care:  See after visit summary for information related to follow-up care and any pertinent home health orders. Mobility at time of Discharge:   Basic Mobility Inpatient Raw Score: 24  JH-HLM Goal: 8: Walk 250 feet or more  JH-HLM Achieved: 7: Walk 25 feet or more  HLM Goal NOT achieved. Continue to encourage mobility in post discharge setting. Disposition:   Home    Planned Readmission: No     Discharge Statement:  I spent 45 minutes discharging the patient. This time was spent on the day of discharge. I had direct contact with the patient on the day of discharge. Greater than 50% of the total time was spent examining patient, answering all patient questions, arranging and discussing plan of care with patient as well as directly providing post-discharge instructions. Additional time then spent on discharge activities. Discharge Medications:  See after visit summary for reconciled discharge medications provided to patient and/or family.       **Please Note: This note may have been constructed using a voice recognition system**

## 2023-11-20 NOTE — PROGRESS NOTES
Progress Note - Neurology   Paulino Magallanes 74 y.o. male [id number]  Unit/Bed#: [facility] 730/[facility] 730-01    Assessment:    * Seizure [facility])  Assessment & Plan  74year old male with asthma, HLD, HTN, GERD and insomnia who presented with witnessed tonic-clonic seizure activity while doing yard work. There was reported urinary incontinence and tongue bite. Patient's wife reports there has been no recent illnesses/sick contacts. She reported he received his flu shot on 11/10 and developed a headache a few days later that resolved on its own. BP on arrival 131/70. Patient received IV Versed 2 mg x 1 for postictal agitation in ED. Patient is back to baseline at this time and exam non focal. Unclear etiology of seizure at this time. Plan:  - Hold on AED initiation at this time. - If patient has another seizure, recommend loading with Keppra 2 g IV x1 and initiating 500 mg BID  - Ativan PRN for seizure activity > 2 minutes  - Routine EEG pending   - If EEG unremarkable, then no further inpatient neurology recommendations at that time. - Telemetry  - Seizure precautions  - [employer] form completed and faxed. Patient made aware of driving restriction.  - Monitor neuro exam; notify with any changes  - Medical management and supportive care per primary team. Correction of any metabolic or infectious disturbances. Results:  - CT head: No acute intracranial abnormality.  - MRI brain w/wo contrast: No acute intracranial pathology. - Labs on presentation: lactic acid 6.5, TSH 4.504, free T4 0.68, COVID/flu/RSV negative, coma panel negative, UDS positive for benzodiazepines      Paulino Magallanes will need follow up in in 6 weeks with epilepsy attending/AP . He will not require outpatient neurological testing. Case and treatment plan reviewed with attending neurologist, Dr. Kathy Figueroa. Please see attending attestation for any further recommendations. Subjective:   Patient resting in bed.  He reports he is doing well and denies any new complaints at this time, including weakness, numbness/tingling, ambulatory dysfunction, dizziness/lightheadedness, or headaches. He reports remembering being in the garden doing work yesterday but the next thing he remembers after that is waking up in the hospital. He was told that he was witnessed to have a seizure. He does endorse tongue bite. He denies any personal or familial history of seizures. His mother has a history of a brain tumor. He reports being well up until this occurred and was eating and drinking appropriately.         Past Medical History:   Diagnosis Date    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     Asthma     Chronic pain     back    GERD (gastroesophageal reflux disease)     Hiatal hernia 1994    Hyperlipidemia     Hypertension     Kidney stone     r stent- suppose to be removed Fri 7/13    PONV (postoperative nausea and vomiting)     Shortness of breath      Past Surgical History:   Procedure Laterality Date    ABDOMINAL ADHESION SURGERY N/A 7/11/2018    Procedure: LYSIS ADHESIONS EXTENSIVE;  Surgeon: Gino Hoyt MD;  Location: [facility];  Service: General    ABDOMINAL SURGERY      BACK SURGERY      x2    CARPAL TUNNEL RELEASE      COLONOSCOPY      CYSTOSCOPY  08/07/2020    CYSTOSCOPY W/ URETERAL STENT REMOVAL  01/26/2018    ESOPHAGOGASTRODUODENOSCOPY N/A 7/11/2018    Procedure: ESOPHAGOGASTRODUODENOSCOPY (EGD) INTRAOPERATIVE ;  Surgeon: Gino Hoyt MD;  [facility];  Service: General    FL INJECTION RIGHT SHOULDER (ARTHROGRAM)  5/13/2022    HALLUX VALGUS CORRECTION Bilateral     1st MTP joint     HERNIA REPAIR      HIATAL HERNIA REPAIR  1994    INCISIONAL HERNIA REPAIR N/A 7/11/2018    Procedure: REPAIR HERNIA INCISIONAL X2;  Surgeon: Gino Hoyt MD;  [facility];  Service: General    KNEE SURGERY Bilateral     LITHOTRIPSY      PERIPHERAL NEUROPLASTY OF FINGER / HAND / [facility]      left middle finger    [place] ARTHROPLASTY GLENOHUMERAL JOINT TOTAL SHOULDER Right 6/1/2021    Procedure: TOTAL SHOULDER ARTHROPLASTY ANATOMIC, biceps tenodesis; Surgeon: Jessica Moulton MD;  [facility];  Service: Orthopedics    AR CYSTO/URETERO W/LITHOTRIPSY &INDWELL STENT INSRT Left 1/3/2018    Procedure: [place] PYELOGRAM AND INSERTION STENT Tammie Sick;  Surgeon: Kimberly Matamoros MD;  [facility];  Service: Urology    AR CYSTO/URETERO W/LITHOTRIPSY &INDWELL STENT INSRT Right 6/8/2018    Procedure: CYSTOSCOPY URETEROSCOPY WITH LITHOTRIPSY HOLMIUM LASER, RETROGRADE PYELOGRAM AND INSERTION STENT URETERAL;  Surgeon: Abiad Justice MD;  [facility];  Service: Urology    AR LAPS RPR PARAESPHGL HRNA INCL [address] [place] [address] W/MESH N/A 7/11/2018    Procedure: REPAIR RECURRENT HERNIA PARAESOPHAGEAL  LAPAROSCOPIC;KLAUDIA Cuellar;  Surgeon: Hoang Hannon MD;  [facility];  Service: General    AR LITHOTRIPSY XTRCORP SHOCK WAVE Left 1/18/2018    Procedure: ESWL;  Surgeon: Abida Justice MD;  [facility];  Service: Urology    ROTATOR CUFF REPAIR Right     SHOULDER ARTHROSCOPY Bilateral     SHOULDER SURGERY       Family History   Problem Relation Age of Onset    Diabetes Mother     Hypertension Mother     Brain cancer Mother     Hypertension Sister     Nephrolithiasis Sister     COPD Father     Cancer Maternal Grandmother     Aortic aneurysm Maternal Uncle         x3 maternal uncles      Social History     Socioeconomic History    Marital status: /Civil Union     Spouse name: Not on file    Number of children: Not on file    Years of education: Not on file    Highest education level: Not on file   Occupational History    Not on file   Tobacco Use    Smoking status: Never    Smokeless tobacco: Never   Vaping Use    Vaping Use: Never used   Substance and Sexual Activity    Alcohol use:  Yes     Alcohol/week: 1.0 standard drink of alcohol     Types: 1 Cans of beer per week     Comment: once a month     Drug use: No    Sexual activity: Not Currently     Comment: flomax is helping    Other Topics Concern    Not on file   Social History Narrative    Dental care, regularly    Lives with spouse     Living situation: Supportive and safe    No advance directive on file     Social Determinants of Health     Financial Resource Strain: Low Risk  (10/21/2022)    Overall Financial Resource Strain (CARDIA)     Difficulty of Paying Living Expenses: Not very hard   Food Insecurity: Not on file   Transportation Needs: No Transportation Needs (10/21/2022)    PRAPARE - Transportation     Lack of Transportation (Medical): No     Lack of Transportation (Non-Medical): No   Physical Activity: Not on file   Stress: Not on file   Social Connections: Not on file   Intimate Partner Violence: Not on file   Housing Stability: Not on file         Medications: All current active meds have been reviewed and current meds:  Scheduled Meds:  Current Facility-Administered Medications   Medication Dose Route Frequency Provider Last Rate    atorvastatin  40 mg Oral Daily With [employer], DO      finasteride  5 mg Oral Daily Hetul Dickens, DO      LORazepam  1 mg Intramuscular Q8H PRN Hetul Dickens, DO      losartan  50 mg Oral Daily Hetul Dickens, DO      melatonin  6 mg Oral HS Sheng Spruce Granados      pantoprazole  40 mg Oral Early Morning Hetul Dickens, DO      phenol  1 spray Mouth/Throat Q2H PRN Mancil Senior      tamsulosin  0.4 mg Oral Daily With [employer], DO       Continuous Infusions:   PRN Meds:. LORazepam    phenol       ROS:   A 12 system ROS was completed. Other than the above mentioned complaints in the HPI and those commented on below, all remaining systems were negative. Vitals:   /58   Pulse 80   Temp 98 °F (36.7 °C)   Resp 16   Ht 5' 8" (1.727 m)   Wt 84.4 kg (186 lb 1.1 oz)   SpO2 94%   BMI 28.29 kg/m²       Physical Exam:   Physical Exam  Vitals and nursing note reviewed.    Constitutional:       General: He is not in acute distress. Appearance: Normal appearance. He is not ill-appearing. HENT:      Head: Normocephalic. Mouth/Throat:      Mouth: Mucous membranes are moist.      Pharynx: Oropharynx is clear. Comments: R lateral tongue laceration  Eyes:      General: No scleral icterus. Right eye: No discharge. Left eye: No discharge. Extraocular Movements: Extraocular movements intact and EOM normal.      Conjunctiva/sclera: Conjunctivae normal.   Cardiovascular:      Rate and Rhythm: Normal rate. Pulmonary:      Effort: Pulmonary effort is normal. No respiratory distress. Musculoskeletal:         General: Normal range of motion. Cervical back: Normal range of motion. Skin:     General: Skin is warm and dry. Coloration: Skin is not jaundiced or pale. Neurological:      Mental Status: He is alert and oriented to person, place, and time. Coordination: Finger-Nose-Finger Test normal.   Psychiatric:         Mood and Affect: Mood normal.         Behavior: Behavior normal.       Neurologic Exam     Mental Status   Oriented to person, place, and time. Level of consciousness: alert  Able to follow commands appropriately. No aphasia or dysarthria noted. Cranial Nerves     CN II   Right visual field deficit: none  Left visual field deficit: none     CN III, IV, VI   Extraocular motions are normal.     CN V   Facial sensation intact. CN VII   Facial expression full, symmetric.      CN VIII   Hearing: intact    CN IX, X   Palate: symmetric    CN XI   CN XI normal.     CN XII   CN XII normal.     Motor Exam   Muscle bulk: normal  Bilateral UE strength 5/5 deltoids, biceps, triceps, hand   Bilateral LE strength 5/5 hip flexion, dorsiflexion, plantar flexion     Sensory Exam   Light touch normal.     Gait, Coordination, and Reflexes     Coordination   Finger to nose coordination: normal    Tremor   Resting tremor: absent  Intention tremor: absent    Reflexes Right plantar: normal  Left plantar: normal        Labs: I have personally reviewed pertinent reports.    Recent Results (from the past 24 hour(s))   Fingerstick Glucose (POCT)    Collection Time: 11/19/23 11:44 AM   Result Value Ref Range    POC Glucose 121 65 - 140 mg/dl   ECG 12 lead    Collection Time: 11/19/23 11:45 AM   Result Value Ref Range    Ventricular Rate 84 BPM    Atrial Rate 84 BPM    CO Interval 178 ms    QRSD Interval 90 ms    QT Interval 380 ms    QTC Interval 449 ms    P Axis 50 degrees    QRS Axis -35 degrees    T Wave Axis 21 degrees   CBC and differential    Collection Time: 11/19/23 11:47 AM   Result Value Ref Range    WBC 9.36 4.31 - 10.16 Thousand/uL    RBC 5.25 3.88 - 5.62 Million/uL    Hemoglobin 15.3 12.0 - 17.0 g/dL    Hematocrit 43.7 36.5 - 49.3 %    MCV 83 82 - 98 fL    MCH 29.1 26.8 - 34.3 pg    MCHC 35.0 31.4 - 37.4 g/dL    RDW 11.9 11.6 - 15.1 %    MPV 8.8 (L) 8.9 - 12.7 fL    Platelets 225 727 - 131 Thousands/uL    nRBC 0 /100 WBCs    Neutrophils Relative 38 (L) 43 - 75 %    Immat GRANS % 1 0 - 2 %    Lymphocytes Relative 46 (H) 14 - 44 %    Monocytes Relative 12 4 - 12 %    Eosinophils Relative 3 0 - 6 %    Basophils Relative 0 0 - 1 %    Neutrophils Absolute 3.52 1.85 - 7.62 Thousands/µL    Immature Grans Absolute 0.06 0.00 - 0.20 Thousand/uL    Lymphocytes Absolute 4.32 0.60 - 4.47 Thousands/µL    Monocytes Absolute 1.12 0.17 - 1.22 Thousand/µL    Eosinophils Absolute 0.30 0.00 - 0.61 Thousand/µL    Basophils Absolute 0.04 0.00 - 0.10 Thousands/µL   Comprehensive metabolic panel    Collection Time: 11/19/23 11:47 AM   Result Value Ref Range    Sodium 142 135 - 147 mmol/L    Potassium 3.6 3.5 - 5.3 mmol/L    Chloride 107 96 - 108 mmol/L    CO2 22 21 - 32 mmol/L    ANION GAP 13 mmol/L    BUN 10 5 - 25 mg/dL    Creatinine 0.88 0.60 - 1.30 mg/dL    Glucose 113 65 - 140 mg/dL    Calcium 9.3 8.4 - 10.2 mg/dL    AST 19 13 - 39 U/L    ALT 24 7 - 52 U/L    Alkaline Phosphatase 49 34 - 104 U/L    Total Protein 6.8 6.4 - 8.4 g/dL    Albumin 4.2 3.5 - 5.0 g/dL    Total Bilirubin 0.59 0.20 - 1.00 mg/dL    eGFR 84 ml/min/1.73sq m   APTT    Collection Time: 11/19/23 11:47 AM   Result Value Ref Range    PTT 25 23 - 37 seconds   HS Troponin 0hr (reflex protocol)    Collection Time: 11/19/23 11:47 AM   Result Value Ref Range    hs TnI 0hr 5 "Refer to ACS Flowchart"- see link ng/L   Lactic acid, plasma (w/reflex if result > 2.0)    Collection Time: 11/19/23 11:47 AM   Result Value Ref Range    LACTIC ACID 6.5 (HH) 0.5 - 2.0 mmol/L   Protime-INR    Collection Time: 11/19/23 11:47 AM   Result Value Ref Range    Protime 15.1 (H) 11.6 - 14.5 seconds    INR 1.20 (H) 0.84 - 1.19   Type and screen    Collection Time: 11/19/23 11:47 AM   Result Value Ref Range    ABO Grouping O     Rh Factor Positive     Antibody Screen Negative     Specimen Expiration Date 20231122    TSH, 3rd generation with Free T4 reflex    Collection Time: 11/19/23 11:47 AM   Result Value Ref Range    TSH 3RD GENERATON 4.504 (H) 0.450 - 4.500 uIU/mL   T4, free    Collection Time: 11/19/23 11:47 AM   Result Value Ref Range    Free T4 0.68 0.61 - 1.12 ng/dL   FLU/RSV/COVID - if FLU/RSV clinically relevant    Collection Time: 11/19/23 12:03 PM    Specimen: Nose; Nares   Result Value Ref Range    SARS-CoV-2 Negative Negative    INFLUENZA A PCR Negative Negative    INFLUENZA B PCR Negative Negative    RSV PCR Negative Negative   Ethanol    Collection Time: 11/19/23 12:03 PM   Result Value Ref Range    Ethanol Lvl <09 <64 mg/dL   Salicylate level    Collection Time: 11/19/23 12:03 PM   Result Value Ref Range    Salicylate Lvl <5 3 - 20 mg/dL   Acetaminophen level-If concentration is detectable, please discuss with medical  on call.     Collection Time: 11/19/23 12:03 PM   Result Value Ref Range    Acetaminophen Level <2 (L) 10 - 20 ug/mL   HS Troponin I 2hr    Collection Time: 11/19/23  1:40 PM   Result Value Ref Range    hs TnI 2hr 4 "Refer to ACS Flowchart"- see link ng/L    Delta 2hr hsTnI -1 <20 ng/L   Lactic acid 2 Hours    Collection Time: 11/19/23  1:40 PM   Result Value Ref Range    LACTIC ACID 2.0 0.5 - 2.0 mmol/L   Rapid drug screen, urine    Collection Time: 11/19/23  4:28 PM   Result Value Ref Range    Amph/Meth UR Negative Negative    Barbiturate Ur Negative Negative    Benzodiazepine Urine Positive (A) Negative    Cocaine Urine Negative Negative    Methadone Urine Negative Negative    Opiate Urine Negative Negative    PCP Ur Negative Negative    THC Urine Negative Negative    Oxycodone Urine Negative Negative   HS Troponin I 4hr    Collection Time: 11/19/23  4:53 PM   Result Value Ref Range    hs TnI 4hr 8 "Refer to ACS Flowchart"- see link ng/L    Delta 4hr hsTnI 3 <20 ng/L       Imaging: I have personally reviewed pertinent imaging in PACS, and I have personally reviewed PACS reports. EKG, Pathology, and Other Studies: I have personally reviewed pertinent reports. Total time spent today 34 minutes. Greater than 50% of total time was spent with the patient and / or family counseling and / or coordination of care. A description of the counseling / coordination of care: Patient seen and evaluated. Case reviewed with attending. Chart thoroughly reviewed including imaging and labs. Coordination of care with primary team. Discussion of imaging, driving restriction, and follow up with patient.

## 2023-11-20 NOTE — PLAN OF CARE
Problem: SAFETY ADULT  Goal: Patient will remain free of falls  Description: INTERVENTIONS:  - Educate patient/family on patient safety including physical limitations  - Instruct patient to call for assistance with activity   - Consult OT/PT to assist with strengthening/mobility   - Keep Call bell within reach  - Keep bed low and locked with side rails adjusted as appropriate  - Keep care items and personal belongings within reach  - Initiate and maintain comfort rounds  - Apply yellow socks and bracelet for high fall risk patients  - Consider moving patient to room near nurses station  Outcome: Progressing     Problem: DISCHARGE PLANNING  Goal: Discharge to home or other facility with appropriate resources  Description: INTERVENTIONS:  - Identify barriers to discharge w/patient and caregiver  - Arrange for needed discharge resources and transportation as appropriate  - Identify discharge learning needs (meds, wound care, etc.)  - Arrange for interpretive services to assist at discharge as needed  - Refer to Case Management Department for coordinating discharge planning if the patient needs post-hospital services based on physician/advanced practitioner order or complex needs related to functional status, cognitive ability, or social support system  Outcome: Progressing

## 2023-11-20 NOTE — ASSESSMENT & PLAN NOTE
Present on admission with Lactic acid level 6.5, suspect secondary to seizures  With IVF and supportive measures, Lactic improved to 2.0

## 2023-11-20 NOTE — RESTORATIVE TECHNICIAN NOTE
Restorative Technician Note      Patient Name: Aubrey Yousif     Note Type: Mobility  Patient Position Upon Consult: Supine  Activity Performed: Ambulated; Dangled; Stood  Assistive Device: Other (Comment) (none)  Education Provided: Yes  Patient Position at End of Consult: Supine;  All needs within reach; Bed/Chair alarm activated      [facility] NAS HEARD, Restorative Technician, [employer]

## 2023-11-20 NOTE — CASE MANAGEMENT
Case Management Assessment & Discharge Planning Note    Patient name Kathleen Swan  Location [facility] 730/[facility] 730-01 MRN [id number]  : 1950 Date 2023       Current Admission Date: 2023  Current Admission Diagnosis:Seizure [facility])   Patient Active Problem List    Diagnosis Date Noted    Lactic acidosis 2023    Seizure ([address]) 2023    Full thickness tear of right subscapularis tendon 2022    Status post total shoulder arthroplasty, right 2021    Family history of diabetes mellitus in mother 10/07/2019    Paraesophageal hernia 2018    Chronic pain syndrome 2018    Postlaminectomy syndrome, lumbar 2018    Ureteral calculus 2018    Cutaneous skin tags 2018    Hemangioma of skin 2018    Left inguinal hernia 2018    Multiple pigmented nevi 2018    Enlarged prostate 2018    Ureterolithiasis 2018    Lumbar disc disease 2018    Gastroesophageal reflux disease without esophagitis 2018    Nephrolithiasis 2018    Low back pain 2017    Benign nodular prostatic hyperplasia with lower urinary tract symptoms 2017    Chronic pain of both ankles 2017    Internal derangement of right shoulder 10/02/2015    Allergic rhinitis 10/02/2015    Eczema of both external ears 10/03/2014    HTN (hypertension) 10/02/2014    Insomnia 10/02/2014    Asthma 10/02/2014    Hyperlipidemia 2014      LOS (days): 1  Geometric Mean LOS (GMLOS) (days): 2.70  Days to GMLOS:1.7     OBJECTIVE:    Risk of Unplanned Readmission Score: 7.49         Current admission status: Inpatient       Preferred Pharmacy:   [address] [place] [address] - [address]   [facility] [address]  Phone: [phone] Fax: [phone] [address] [place] [address]   [address] [place] [address]  Phone: [phone] Fax: [phone]    [facility] [address] , [address] [place] [address] 11[address] [facility] [address]  Phone: [phone] Fax: [phone]    Primary Care Provider: Jennifer Riley DO    Primary Insurance: MEDICARE  Secondary Insurance: [employer]    ASSESSMENT:  Zander Proxies    There are no active Health Care Proxies on file. Readmission Root Cause  30 Day Readmission: No    Patient Information  Admitted from[de-identified] Home  Mental Status: Alert  During Assessment patient was accompanied by: Not accompanied during assessment  Assessment information provided by[de-identified] Patient  Primary Caregiver: Self  Support Systems: Spouse/significant other, Family members  Home entry access options.  Select all that apply.: No steps to enter home  Type of Current Residence: 2 story home  Upon entering residence, is there a bedroom on the main floor (no further steps)?: No  A bedroom is located on the following floor levels of residence (select all that apply):: 2nd Floor  Upon entering residence, is there a bathroom on the main floor (no further steps)?: Yes  Number of steps to 2nd floor from main floor: One Flight  Living Arrangements: Lives w/ Spouse/significant other    Activities of Daily Living Prior to Admission  Functional Status: Independent  Completes ADLs independently?: Yes  Ambulates independently?: Yes  Does patient use assisted devices?: No  Does patient currently own DME?: No  Does patient have a history of Outpatient Therapy (PT/OT)?: Yes  Does the patient have a history of Short-Term Rehab?: No  Does patient have a history of HHC?: No  Does patient currently have [facility]?: No         Patient Information Continued  Income Source: Pension/FCI  Does patient have prescription coverage?: Yes  Does patient have a history of substance abuse?: No  Does patient have a history of Mental Health Diagnosis?: No         Means of Transportation  Means of Transport to [facility][de-identified] Drives Self        DISCHARGE DETAILS:    Discharge planning discussed with[de-identified] patient  Freedom of Choice: Yes     CM contacted family/caregiver?: No- see comments  Were Treatment Team discharge recommendations reviewed with patient/caregiver?: Yes  Did patient/caregiver verbalize understanding of patient care needs?: Yes  Were patient/caregiver advised of the risks associated with not following Treatment Team discharge recommendations?: Yes    Contacts  Patient Contacts: Macario Haque  (wife)  Relationship to Patient[de-identified] Family  Contact Method: Phone  Phone Number: [phone]  Reason/Outcome: Emergency Contact                              Transport at Discharge : Family

## 2023-11-21 ENCOUNTER — TELEPHONE (OUTPATIENT)
Dept: NEUROLOGY | Facility: CLINIC | Age: 73
End: 2023-11-21

## 2023-11-21 ENCOUNTER — TRANSITIONAL CARE MANAGEMENT (OUTPATIENT)
Dept: FAMILY MEDICINE CLINIC | Facility: HOSPITAL | Age: 73
End: 2023-11-21

## 2023-11-21 NOTE — TELEPHONE ENCOUNTER
SLB/11/19-11/20/Seizures    Armando Simon will need follow up in in 6 weeks with epilepsy attending/AP . He will not require outpatient neurological testing. HFU 1/8/24 @ 2:15 with Mikayla in [place].

## 2023-11-22 ENCOUNTER — OFFICE VISIT (OUTPATIENT)
Dept: FAMILY MEDICINE CLINIC | Facility: HOSPITAL | Age: 73
End: 2023-11-22
Payer: MEDICARE

## 2023-11-22 VITALS
WEIGHT: 189 LBS | OXYGEN SATURATION: 97 % | BODY MASS INDEX: 28.64 KG/M2 | HEIGHT: 68 IN | HEART RATE: 55 BPM | SYSTOLIC BLOOD PRESSURE: 128 MMHG | DIASTOLIC BLOOD PRESSURE: 78 MMHG

## 2023-11-22 DIAGNOSIS — R56.9 SEIZURE (HCC): Primary | ICD-10-CM

## 2023-11-22 DIAGNOSIS — E87.20 LACTIC ACIDOSIS: ICD-10-CM

## 2023-11-22 DIAGNOSIS — S80.211A ABRASION OF RIGHT KNEE, INITIAL ENCOUNTER: ICD-10-CM

## 2023-11-22 DIAGNOSIS — I10 PRIMARY HYPERTENSION: ICD-10-CM

## 2023-11-22 DIAGNOSIS — R79.89 ELEVATED TSH: ICD-10-CM

## 2023-11-22 DIAGNOSIS — K21.9 GASTROESOPHAGEAL REFLUX DISEASE WITHOUT ESOPHAGITIS: Chronic | ICD-10-CM

## 2023-11-22 PROCEDURE — 99496 TRANSJ CARE MGMT HIGH F2F 7D: CPT | Performed by: INTERNAL MEDICINE

## 2023-11-22 NOTE — PROGRESS NOTES
Assessment & Plan     1. Seizure ([address])    2. Abrasion of right knee, initial encounter    3. Lactic acidosis    4. Elevated TSH    5. Gastroesophageal reflux disease without esophagitis  Assessment & Plan:  Holding prilosec- use prn tums or mylanta      6. Primary hypertension      BMI Counseling: Body mass index is 28.74 kg/m². The BMI is above normal. Nutrition recommendations include encouraging healthy choices of fruits and vegetables, moderation in carbohydrate intake and reducing intake of saturated and trans fat. Rationale for BMI follow-up plan is due to patient being overweight or obese. Subjective     Transitional Care Management Review:   Armando Simon is a 74 y.o. male here for TCM follow up. During the TCM phone call patient stated:  TCM Call       Date and time call was made  11/21/2023  1:03 PM    Hospital care reviewed  Records reviewed    Patient was hospitialized at  [facility]    Date of Admission  11/19/23    Date of discharge  11/20/23    Diagnosis  Seizure    Disposition  Home    Were the patients medications reviewed and updated  Yes    Current Symptoms  None          TCM Call       Post hospital issues  None    Should patient be enrolled in anticoag monitoring? No    Scheduled for follow up? Yes    Patients specialists  Neurologist    Did you obtain your prescribed medications  Yes    Do you need help managing your prescriptions or medications  No    Is transportation to your appointment needed  No    I have advised the patient to call PCP with any new or worsening symptoms  Lela Davis MA          Here for tcm   Had seizure  on Sunday am 11/19/23 after having cake and coffee for breakfast and went outside      Review of Systems   Constitutional:  Negative for fatigue. Respiratory:  Negative for cough and shortness of breath. Cardiovascular:  Negative for chest pain and palpitations. Gastrointestinal:  Negative for abdominal pain.    All other systems reviewed and are negative. Objective     /78   Pulse 55   Ht 5' 8" (1.727 m)   Wt 85.7 kg (189 lb)   SpO2 97%   BMI 28.74 kg/m²      Physical Exam  Vitals and nursing note reviewed. Constitutional:       Appearance: He is not ill-appearing. HENT:      Head: Normocephalic. Mouth/Throat:      Pharynx: No posterior oropharyngeal erythema. Eyes:      General:         Right eye: No discharge. Cardiovascular:      Rate and Rhythm: Normal rate and regular rhythm. Pulmonary:      Effort: No respiratory distress. Abdominal:      Palpations: Abdomen is soft. Tenderness: There is no abdominal tenderness. Musculoskeletal:         General: No tenderness. Skin:     Comments: Abrasion right iner  knee- no purulent draiange   Neurological:      Mental Status: He is alert and oriented to person, place, and time. Cranial Nerves: No cranial nerve deficit. Sensory: No sensory deficit. Motor: No weakness. Psychiatric:         Mood and Affect: Mood normal.         Thought Content:  Thought content normal.         Judgment: Judgment normal.       Medications have been reviewed by provider in current encounter    Kera Martínez DO

## 2023-12-07 NOTE — PROGRESS NOTES
"Pharmacy to dose TPN - Daily Progress Note  Patient: Martina Hardwick  MRN#: [id number]  Attending: No name on file  Admission Date: 1120    TPN # 3 per Dr Salinas  Indication:Prolonged Paralytic Ileus     Principal Problem:    Colonic inertia  Active Problems:    Severe malnutrition    Subjective/Objective  65 y.o. female 154.9 cm (61\") 48.8 kg (107 lb 9.4 oz)  Results from last 7 days   Lab Units 23  0845 23  0620 23  0753 23  0513   SODIUM mmol/L 130* 132*   < > 132*   POTASSIUM mmol/L 4.1 3.9   < > 3.5   CHLORIDE mmol/L 100 104   < > 103   CO2 mmol/L 22.0 20.0*   < > 14.4*   BUN mg/dL 11 13   < > 29*   CREATININE mg/dL 0.64 0.72   < > 1.27*   CALCIUM mg/dL 8.2* 8.3*   < > 8.5*   ALBUMIN g/dL  --   --   --  2.0*   BILIRUBIN mg/dL  --   --   --  0.6   ALK PHOS U/L  --   --   --  125*   ALT (SGPT) U/L  --   --   --  73*   AST (SGOT) U/L  --   --   --  75*   GLUCOSE mg/dL 165* 184*   < > 76   MAGNESIUM mg/dL 2.1 1.5*   < > 1.8   PHOSPHORUS mg/dL 2.1* 1.2*   < > 3.3   TRIGLYCERIDES mg/dL  --  145  --   --     < > = values in this interval not displayed.      Corrected Ca = 9.8    I/O last 3 completed shifts:  In: 920 [P.O.:240; Blood:680]  Out: 62.5 [Drains:62.5]    Diet Orders (active) (From admission, onward)       Start     Ordered    23 1800  Dietary Nutrition Supplements Boost Plus (Ensure Enlive, Ensure Plus)  Daily With Lunch & Dinner       23 1636    23 163  Diet: Liquid Diets; Full Liquid; Fluid Consistency: Thin (IDDSI 0)  Diet Effective Now         23 1636                  Additional insulin administration while previous TPN infusin units  Additional electrolyte administration while previous TPN infusing: 15 mmol Kphos today   Acid suppression: pantoprazole 40 mg IV  Stool in last 24 hours:      Daily Assessment  Current macronutrients: protein 70 gm/day, dextrose 13786 kcal/day, lipids 100mL Fluid rate: 75 ml/hr    Plan    Will advance TPN to goal. " 8/28/2018    Bryan Cline  1950  [id number]      Assessment  -Nephrolithiasis s/p ureteroscopy (6/8/18)    Discussion/Plan  Bob Richardson is a 69 y o  male being managed by Dr Hu Diaz  -PVR is 84cc  -Uroflow total volume voided 241cc, average 5 ml/s  -Patient will continue to take Flomax daily  Prescription refill was provided to patient as he states he will be traveling to [place] in the next few months   -Follow up in 5 months with [facility]  Encouraged patient to continue hydrating with water  He was instructed to call with any issues  -All questions answered, patient and wife agrees with plan      History of Present Illness  69 y o  male with a history of nephrolithiasis s/p ureteroscopy (6/8/18) presents today for follow up  At last office visit, cystoscopy was performed on 7/13/18 with removal of ureteral stent and garcias catheter  He underwent repair of paraesophageal hernia on 7/11/18, and postoperatively developed urinary retention and discharge home with garcias catheter in place  Patient states that he continues to take tamsulosin 0 4 milligrams which was provided to him at discharge  He reports urinary symptoms have significantly improved  He feels he empties bladder to completion and has a relatively strong urinary stream   Patient does note that he has had prior issues with urinary retention after surgeries  He denies any episodes of gross hematuria or dysuria  Review of Systems  Review of Systems   Constitutional: Negative  HENT: Negative  Respiratory: Negative  Cardiovascular: Negative  Gastrointestinal: Negative  Genitourinary: Negative for decreased urine volume, difficulty urinating, dysuria, flank pain, hematuria and urgency  Frequency: Occasional    Musculoskeletal: Negative  Skin: Negative  Neurological: Negative  Psychiatric/Behavioral: Negative            Past Medical History  Past Medical History:   Diagnosis Date    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     Asthma     Chronic pain     back    GERD (gastroesophageal reflux disease)     Hiatal hernia 1994    Hyperlipidemia     Hypertension     Kidney stone     r stent- suppose to be removed Fri 7/13       Past Social History  Past Surgical History:   Procedure Laterality Date    ABDOMINAL ADHESION SURGERY N/A 7/11/2018    Procedure: LYSIS ADHESIONS EXTENSIVE;  Surgeon: Huong Haney MD;  [facility];  Service: General    ABDOMINAL SURGERY      BACK SURGERY      x2    CARPAL TUNNEL RELEASE      COLONOSCOPY      CYSTOSCOPY W/ URETERAL STENT REMOVAL  01/26/2018    ESOPHAGOGASTRODUODENOSCOPY N/A 7/11/2018    Procedure: ESOPHAGOGASTRODUODENOSCOPY (EGD) INTRAOPERATIVE ;  Surgeon: Huong Haney MD;  [facility];  Service: General   [facility] N/A 7/11/2018    Procedure: REPAIR HERNIA INCISIONAL X2;  Surgeon: Huong Haney MD;  [facility];  Service: General    KNEE SURGERY Bilateral     LITHOTRIPSY      PERIPHERAL NEUROPLASTY OF FINGER / HAND / [facility]      left middle finger    WI CYSTO/URETERO W/LITHOTRIPSY &INDWELL STENT INSRT Left 1/3/2018    Procedure: CYSTOSCOPY , RETROGRADE PYELOGRAM AND INSERTION STENT Larry Mcfadden;  Surgeon: Africa Frye MD;  [facility];  Service: Urology    WI CYSTO/URETERO W/LITHOTRIPSY &INDWELL STENT INSRT Right 6/8/2018    Procedure: CYSTOSCOPY URETEROSCOPY WITH LITHOTRIPSY HOLMIUM LASER, RETROGRADE PYELOGRAM AND INSERTION STENT URETERAL;  Surgeon: Charisse Tyler MD;  [facility];  Service: Urology    [facility] ESWL Left 1/18/2018    Procedure: ESWL;  Surgeon: Charisse Tyler MD;  [facility];  Service: Urology    WI LAP, REPAIR PARAESOPHAGEAL HERNIA, INCL FUNDOPLASTY W/ MESH N/A 7/11/2018    Procedure: REPAIR RECURRENT HERNIA PARAESOPHAGEAL  LAPAROSCOPIC;KLAUDIA Carlos; TPN with the following macros:              Protein/Dextrose/Lipids: 70g/1200kcal/100mL              Volume: 1800 ml (75 mL/hr over 24 hrs daily)     Mag is better today, increasing phos in TPN again today.  Based on the above labs, will add the following electrolytes/additives to the TPN.               Sodium Chloride: 40 mEq              Sodium Acetate: 80 mEq              Sodium Phosphate: 40 mEq              Potassium Chloride: 50 mEq              Potassium Acetate: 0 mEq              Potassium Phosphate: 22 mEq              Calcium Gluconate: 9 mEq              Magnesium Sulfate: 20 mEq              MVI for TPN              Trace Elements              Labs to be ordered: CMP, Mg, phos  Submitted by: Lowell Manriquez, [facility]         Surgeon: Marcellus Lizama MD;  Location[facility];  Service: General    SHOULDER SURGERY         Past Family History  Family History   Problem Relation Age of Onset    Diabetes Mother     Hypertension Mother     Brain cancer Mother     Hypertension Sister     Nephrolithiasis Sister        Past Social history  Social History     Social History    Marital status: /Civil Union     Spouse name: N/A    Number of children: N/A    Years of education: N/A     Occupational History    Not on file  Social History Main Topics    Smoking status: Never Smoker    Smokeless tobacco: Never Used    Alcohol use 0 6 oz/week     1 Cans of beer per week      Comment: once a month     Drug use: No    Sexual activity: Not Currently      Comment: flomax is helping      Other Topics Concern    Not on file     Social History Narrative    No narrative on file       Current Medications  Current Outpatient Prescriptions   Medication Sig Dispense Refill    albuterol (PROAIR HFA) 90 mcg/act inhaler Inhale every 4 (four) hours as needed        Coenzyme Q10 (CO Q 10) 100 MG CAPS Take by mouth      HYDROcodone-acetaminophen (NORCO) 5-325 mg per tablet Take 1 tablet by mouth every 6 (six) hours as needed for pain      losartan-hydrochlorothiazide (HYZAAR) 50-12 5 mg per tablet TAKE ONE TABLET BY MOUTH ONCE DAILY 90 tablet 3    Omeprazole 20 MG TBEC Take by mouth daily        simvastatin (ZOCOR) 40 mg tablet TAKE ONE TABLET BY MOUTH ONCE DAILY 90 tablet 3    tamsulosin (FLOMAX) 0 4 mg I capsule daily 90 capsule 3    zolpidem (AMBIEN) 10 mg tablet Take by mouth daily at bedtime as needed         No current facility-administered medications for this visit  Allergies  Allergies   Allergen Reactions    Meperidine GI Intolerance    Lisinopril Cough     Other reaction(s): Cough       Past Medical History, Social History, Family History, medications and allergies were reviewed      Vitals  Vitals:    08/28/18 1052   BP: 122/76   Pulse: 72   Weight: 87 1 kg (192 lb)   Height: 5' 7 5" (1 715 m)       Physical Exam  Physical Exam   Constitutional: He is oriented to person, place, and time  He appears well-developed and well-nourished  HENT:   Head: Normocephalic  Eyes: Pupils are equal, round, and reactive to light  Neck: Normal range of motion  Cardiovascular: Normal rate and regular rhythm  Pulmonary/Chest: Effort normal    Abdominal: Soft  Normal appearance  He exhibits no distension  There is no tenderness  There is no CVA tenderness  Genitourinary:   Genitourinary Comments: Negative CVA or suprapubic tenderness   Musculoskeletal: Normal range of motion  Neurological: He is alert and oriented to person, place, and time  He has normal reflexes  Skin: Skin is warm and dry  Psychiatric: He has a normal mood and affect  His behavior is normal  Judgment and thought content normal        Results    I have personally reviewed all pertinent lab results and reviewed with patient  Lab Results   Component Value Date    PSA 2 5 05/30/2018    PSA 2 9 04/18/2017    PSA 2 2 10/21/2015     Lab Results   Component Value Date    GLUCOSE 88 10/21/2015    CALCIUM 7 7 (L) 07/12/2018     07/12/2018    K 3 8 07/12/2018    CO2 27 07/12/2018     07/12/2018    BUN 12 07/12/2018    CREATININE 1 07 07/12/2018     Lab Results   Component Value Date    WBC 12 99 (H) 07/12/2018    HGB 13 3 07/12/2018    HCT 39 0 07/12/2018    MCV 83 07/12/2018     07/12/2018     No results found for this or any previous visit (from the past 1 hour(s))

## 2023-12-11 ENCOUNTER — TELEPHONE (OUTPATIENT)
Dept: FAMILY MEDICINE CLINIC | Facility: HOSPITAL | Age: 73
End: 2023-12-11

## 2023-12-11 NOTE — TELEPHONE ENCOUNTER
Found form on dr. Nabila Butts desk,  notified wife that she is not in today but will have her work on it tomorrow - wife said the longer it takes to hand the license in the longer it takes to get it back

## 2023-12-11 NOTE — TELEPHONE ENCOUNTER
Wife looking for forms that were dropped off for dr morrow to complete - regarding his seizure and surrendering his license to [facility]

## 2023-12-22 DIAGNOSIS — R56.9 SEIZURE (HCC): Primary | ICD-10-CM

## 2023-12-22 NOTE — PROGRESS NOTES
Telephone Call Documentation    Rajendra Lee       1950             [place] called [place] their physician and determined he could return to driving.    In discussion about potential causes , I would like him to have an echo done and a cardiology consult to review for other causes such as arrythmia . Had overnight monitoring at [facility] and eeg but I don't see that echo had been done   2

## 2023-12-29 ENCOUNTER — APPOINTMENT (OUTPATIENT)
Dept: LAB | Facility: HOSPITAL | Age: 73
End: 2023-12-29
Payer: MEDICARE

## 2023-12-29 DIAGNOSIS — E78.00 HYPERCHOLESTEROLEMIA: ICD-10-CM

## 2023-12-29 DIAGNOSIS — Z12.5 SCREENING FOR PROSTATE CANCER: ICD-10-CM

## 2023-12-29 DIAGNOSIS — K21.9 GASTROESOPHAGEAL REFLUX DISEASE WITHOUT ESOPHAGITIS: Chronic | ICD-10-CM

## 2023-12-29 DIAGNOSIS — I10 PRIMARY HYPERTENSION: ICD-10-CM

## 2023-12-29 DIAGNOSIS — R79.89 ELEVATED TSH: ICD-10-CM

## 2023-12-29 DIAGNOSIS — R56.9 SEIZURE (HCC): ICD-10-CM

## 2023-12-29 LAB
ALBUMIN SERPL BCP-MCNC: 4.4 G/DL (ref 3.5–5)
ALP SERPL-CCNC: 59 U/L (ref 34–104)
ALT SERPL W P-5'-P-CCNC: 26 U/L (ref 7–52)
ANION GAP SERPL CALCULATED.3IONS-SCNC: 6 MMOL/L
AST SERPL W P-5'-P-CCNC: 19 U/L (ref 13–39)
BASOPHILS # BLD AUTO: 0.03 THOUSANDS/ÂΜL (ref 0–0.1)
BASOPHILS NFR BLD AUTO: 1 % (ref 0–1)
BILIRUB SERPL-MCNC: 0.83 MG/DL (ref 0.2–1)
BUN SERPL-MCNC: 12 MG/DL (ref 5–25)
CALCIUM SERPL-MCNC: 9.4 MG/DL (ref 8.4–10.2)
CHLORIDE SERPL-SCNC: 106 MMOL/L (ref 96–108)
CHOLEST SERPL-MCNC: 150 MG/DL
CO2 SERPL-SCNC: 28 MMOL/L (ref 21–32)
CREAT SERPL-MCNC: 0.83 MG/DL (ref 0.6–1.3)
EOSINOPHIL # BLD AUTO: 0.25 THOUSAND/ÂΜL (ref 0–0.61)
EOSINOPHIL NFR BLD AUTO: 4 % (ref 0–6)
ERYTHROCYTE [DISTWIDTH] IN BLOOD BY AUTOMATED COUNT: 12 % (ref 11.6–15.1)
GFR SERPL CREATININE-BSD FRML MDRD: 86 ML/MIN/1.73SQ M
GLUCOSE P FAST SERPL-MCNC: 96 MG/DL (ref 65–99)
HCT VFR BLD AUTO: 47.5 % (ref 36.5–49.3)
HDLC SERPL-MCNC: 53 MG/DL
HGB BLD-MCNC: 15.7 G/DL (ref 12–17)
IMM GRANULOCYTES # BLD AUTO: 0.02 THOUSAND/UL (ref 0–0.2)
IMM GRANULOCYTES NFR BLD AUTO: 0 % (ref 0–2)
LDLC SERPL CALC-MCNC: 70 MG/DL (ref 0–100)
LYMPHOCYTES # BLD AUTO: 1.61 THOUSANDS/ÂΜL (ref 0.6–4.47)
LYMPHOCYTES NFR BLD AUTO: 26 % (ref 14–44)
MCH RBC QN AUTO: 28.3 PG (ref 26.8–34.3)
MCHC RBC AUTO-ENTMCNC: 33.1 G/DL (ref 31.4–37.4)
MCV RBC AUTO: 86 FL (ref 82–98)
MONOCYTES # BLD AUTO: 0.56 THOUSAND/ÂΜL (ref 0.17–1.22)
MONOCYTES NFR BLD AUTO: 9 % (ref 4–12)
NEUTROPHILS # BLD AUTO: 3.71 THOUSANDS/ÂΜL (ref 1.85–7.62)
NEUTS SEG NFR BLD AUTO: 60 % (ref 43–75)
NRBC BLD AUTO-RTO: 0 /100 WBCS
PLATELET # BLD AUTO: 238 THOUSANDS/UL (ref 149–390)
PMV BLD AUTO: 9.1 FL (ref 8.9–12.7)
POTASSIUM SERPL-SCNC: 3.8 MMOL/L (ref 3.5–5.3)
PROT SERPL-MCNC: 7.1 G/DL (ref 6.4–8.4)
PSA SERPL-MCNC: 2.01 NG/ML (ref 0–4)
RBC # BLD AUTO: 5.55 MILLION/UL (ref 3.88–5.62)
SODIUM SERPL-SCNC: 140 MMOL/L (ref 135–147)
TRIGL SERPL-MCNC: 133 MG/DL
TSH SERPL DL<=0.05 MIU/L-ACNC: 3.49 UIU/ML (ref 0.45–4.5)
WBC # BLD AUTO: 6.18 THOUSAND/UL (ref 4.31–10.16)

## 2023-12-29 PROCEDURE — 36415 COLL VENOUS BLD VENIPUNCTURE: CPT

## 2023-12-29 PROCEDURE — 85025 COMPLETE CBC W/AUTO DIFF WBC: CPT

## 2023-12-29 PROCEDURE — G0103 PSA SCREENING: HCPCS

## 2023-12-29 PROCEDURE — 80061 LIPID PANEL: CPT

## 2023-12-29 PROCEDURE — 80053 COMPREHEN METABOLIC PANEL: CPT

## 2023-12-29 PROCEDURE — 84443 ASSAY THYROID STIM HORMONE: CPT

## 2024-01-08 ENCOUNTER — OFFICE VISIT (OUTPATIENT)
Dept: NEUROLOGY | Facility: CLINIC | Age: 74
End: 2024-01-08
Payer: MEDICARE

## 2024-01-08 VITALS
HEIGHT: 68 IN | RESPIRATION RATE: 16 BRPM | HEART RATE: 74 BPM | BODY MASS INDEX: 29.86 KG/M2 | DIASTOLIC BLOOD PRESSURE: 74 MMHG | SYSTOLIC BLOOD PRESSURE: 134 MMHG | WEIGHT: 197 LBS | OXYGEN SATURATION: 95 % | TEMPERATURE: 98 F

## 2024-01-08 DIAGNOSIS — R56.9 NEW ONSET SEIZURE (HCC): Primary | ICD-10-CM

## 2024-01-08 PROCEDURE — 99215 OFFICE O/P EST HI 40 MIN: CPT | Performed by: NURSE PRACTITIONER
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
## 2024-01-08 NOTE — PATIENT INSTRUCTIONS
- Continue off of medications  - Have sleep deprived EEG  - Call the office with further events concerning for seizures  - Follow up in 3 months or sooner if needed    FIRST AID FOR SEIZURES    What to Do If You Witness a Seizure:     Generalized convulsive (called a generalized tonic-clonic or grand mal seizure). During this seizure, the person may cry out, suddenly stiffen up, make jerking movements, and fall.  The person may turn pale or blue from difficulty breathing.    Actions:  Stay calm. Talk in a soothing voice and if possible keep onlookers away.  Prevent injury. Move objects away that the person might hit while jerking uncontrollably.   Time when the seizure starts and ends. Most seizures stop after only a few minutes.  Turn him or her gently onto one side. This will help keep the airway clear.   Never place anything in his/her mouth or give him/her anything by mouth during a seizure.   -- Do not give the person water, pills, or food until fully alert.   Loosen tight clothing or jewelry around his/her neck.  Make the person as comfortable as possible.   Place something soft under their head.   Do not hold the person down. If the person having a seizure thrashes around there is no need for you to restrain them. They are more likely to be combative if restrained. Remember to consider your safety as well.   Keep onlookers away.   Be sensitive and supportive, and ask others to do the same.   Stay with the person until he/she is fully alert.    Complex partial seizure (confusional spells). During this kind of seizure, the person may have a glassy stare; give no response or inappropriate responses when questioned; sit, stand, or walk about aimlessly; make lip smacking or chewing motions; fidget with clothes; appear to be drunk, drugged, or confused.    Actions:  Make sure the person is safe and won’t harm themselves.  Try to remove harmful objects from around the person (tools, utensils, glasses).  Do NOT be  aggressive or attempt to restrain the person. They are more likely to be combative if restrained. Remember to consider your safety as well.  Help prevent the person from wandering, and direct the person to chair or safe position.  Never place anything in his/her mouth or give him/her anything by mouth during a seizure.   -- Do not give the person water, pills, or food until fully alert.   Keep onlookers away.   Be sensitive and supportive, and ask others to do the same.   Stay with the person until he/she is fully alert.    CALL 911 if:  A convulsive seizure lasts more than 5 minutes.  The person turns blue during the seizure.  The person does not start breathing after the seizure. Begin mouth to mouth resuscitation if this would occur.  The person has one seizure right after the other without coming back to normal consciousness between the seizures.  The person has not regained consciousness or is still confused after 30 minutes.  You know the person does not have epilepsy.  You know the person has diabetes or low blood sugar.  The person is pregnant, ill, or injured.   The seizure occurred in water, because the person may have inhaled water.  The person requests an ambulance or medical help.     Rescue medication  Your doctor may prescribe a rescue medication such as lorazepam (Ativan), diazepam (Valium / Diastat), or clonazepam (Klonopin) to terminate a seizure or if you have a history of cluster of seizures.  Follow the instructions given by your doctor for these medications    Recognizing Common Seizures (examples)   Simple partial seizures: Isolated twitching, numbness, sweating, dizziness, nausea/vomiting, disturbances to hearing, vision, smell or taste. No loss of consciousness occurs, and the person remains aware of his/her environment.   Complex partial seizures: Staring, motionless, picking at clothes, smacking lips, swallowing repeatedly or wandering around. The person is not aware of their surroundings  and is not fully responsive.  Atonic seizures: “Drop attacks” or sudden, rapid fall to ground with rapid recovery.   Myoclonic seizures: Brief forceful jerks which can affect the whole body or just part of it.   Absence seizures: May appear to be daydreaming or “spacing out.” The person is momentarily unresponsive and unaware of what is happening around him/her.   Tonic seizures: Stiffening of part or of the entire body.  Generalized Tonic-Clonic Seizures. “Grand-mal seizure.” Sudden loss of consciousness with body stiffening followed by continuous jerking movements. A blue tinge around the mouth is likely but lack of oxygen is rare. Loss of bladder and/or bowel control may occur.  SEIZURE SAFETY    Don’t let fear of seizures keep you at home. Be smart about your activities to make sure you are safe. The guidelines below can help you be as safe as possible.    Make sure the people around you are aware of:  What happens when you have a seizure  Correct seizure first aid  First aid for choking (consider taking a basic life support class)  When they should know to call 911 or for medical help    Avoid common triggers of seizures:  Missing your medications  Not getting enough sleep  Drinking alcohol  Using illegal drugs    Safety measures for at home:  In the bathroom:   Do not take baths in the bathtub. Instead, take only showers. Try to have a family member available while you are in the shower.  Make sure the drain in the bathtub/shower is working properly to avoid pooling of water.  You can consider a fitted shower seat. Recessed soap trays can minimize injury if you would happen to fall in the shower.   Bathroom doors can be hung to open outwards, so that the door can still be opened if you fall against the door.   Do not lock the bathroom door. Use an “Occupied” sign on the door or other signal to let others know you are in the bathroom.  Safety locks can be obtained from the [employer].  On your  water heater, set your water temperature to a warm temperature that is not scalding to avoid burns from very hot water.   Put non-skid strips/ in the bathtub.  Use an electric shaver.  Avoid any electrical appliances (including electric shaver) in the bathroom or near water.  Use shatterproof glass for mirrors.    In the kitchen:   When possible, cook using a microwave.  Only cook or use electrical appliances when someone else is in the house and available.   As much as possible, grill food and avoid fryers or frying food.  Use the back burners of the stove and turn the pot handles toward the back of the stove.  Avoid carrying hot pans, pots of boiling water, or very hot food. Serve food or liquids directly from the stove. At the least, minimize the distance hot food is carried.   Use precut foods or food processers to limit the need to use knives.   Use plastic or durable cups, dishes, and containers instead of breakable glass items.    In the bedroom  Low level and wide beds (like a futon) can reduce risk of injury of falling out of bed. If there is a high risk of falling out of bed, you can consider simply putting the mattress on the floor.  Avoid sleeping on top bunks of bunk beds.   Place a soft carpet on the floor.    Around the house  Pad sharp corners of tables, chairs, etc. Round tables and furniture can be considered to avoid sharp corners.   Avoid open flames. Place a screen in front of fireplaces and don’t build a fire alone.   Allow for open spaces with furniture.  Avoid loose throw rugs or slippery floors. Non-slip kathie or cushioned kathie can help reduce injury from a fall.   Fireproof fabrics and furniture are best, and especially important if you smoke.  Doors and windows with safety glass are safer if someone falls against them.  Avoid lights and heaters that could easily be knocked over.  Use curling irons or clothing irons that have automatic shut off switches.  Make sure motor driven  equipment or lawn mowers have “dead man’s” handles or seats so they will turn off if you release pressure.    Safety measures when away from home  Driving  [place] law mandates that you cannot drive for 6 months after your most recent seizure.   [place] law mandates that you cannot drive for 6 months after your most recent seizure.    Work/Travel  Wear a medical alert bracelet or necklace at all times.  Wear a helmet and use protective clothing/equipment when appropriate.  Consider telling co-workers and travel companions that you have epilepsy and what to do if you have a seizure.  Avoid irregular shifts or disruptions of a regular sleep pattern.  Take your medications on time and keep an updated list of medications in your purse or wallet.  Do not climb to heights or operate heavy machinery.  Stand back from the edges of roads or bus/train platforms when traveling.  If you wander when you have a seizure, take a friend along for the trip.    Recreation  Swimming can be dangerous. Do not swim alone, in open water, or in murky water that you cannot see the bottom.   Caregivers should be with you in the pool at all times and must be physically able to get you out of the water.  Use a flotation device.   Scuba diving is not recommended since during a seizure people are unaware of their surroundings.  Having a seizure underwater can be deadly and can endanger the lives of others.  Kayaking and canoeing can be especially dangerous  People with epilepsy are at a higher risk of becoming trapped underneath a canoe or kayak.  Wear a helmet when playing contact sports, biking, or if there is a risk of falling.  Patients with epilepsy are at a higher risk of head injury when playing contact sports.  Avoid riding a bike, running, or other activities around busy roads, steep hills, or secluded areas.   Exercise on soft surfaces.  Theme [place]: many people with epilepsy can go on rides depending on their type of  seizures.  For some people, stress or excitement can trigger a seizure.   Rollercoasters (especially if you are upside-down) are more dangerous for people with epilepsy.      Medications  Take your medications on time. If you have trouble remembering, set alarms on your phone. You can visit www.ehwhqnl1juufjou.com to set up reminders through text message to help you remember to take your medications.  Use a pillbox to help you keep track of your medications.  When out of the house, take any needed medications with you. Consider keeping one or two extra doses with you in case you are unexpectedly away from home longer than planned.  If you realize you missed a dose of your medications and it is less than 2 hours until your next dose, skip the missed dose. Do not double up your medication dose. If it is more than 2 hours until your next dose, you can take the missed dose.   Avoid drinking alcohol, since this can enhance effects of your seizure medications.  Be aware of common and major side effects of your medications.  Keep your medications out of the reach of children.    Parenting:  Childproof your home as much as possible.  It is possible that you could drop your baby if you have a seizure while holding or feeding them.  If you are nursing a baby, sit on the floor or bed with your back supported so the baby will not fall far if you should lose consciousness.  Feed the baby while he or she is seated in an infant seat.  Dress, change, and sponge bathe the baby on the floor.  Move the baby around in a stroller or small crib.  Keep a young baby in a playpen when you are alone, and a toddler in an indoor play yard, or childproof one room and use safety weiner at the doors.  When out of the house, use a bungee-type cord or restraint harness so your child cannot wander away if you have a seizure that affects your awareness.

## 2024-01-08 NOTE — ASSESSMENT & PLAN NOTE
Patient with new onset single unprovoked seizure. He was hospitalized in November for since event, semiology consistent with generalized tonic clonic with tongue bite and urinary incontinence. Onset of seizure not witnessed so difficult to determine if focal or generalized onset. His neurologic exam is non focal at today's visit. His EEG was normal during hospitalization. MRI brain with non-specific periventricular and subcortical white matter changes. There were two left frontal developmental venous angiomas which was discussed with patient. Also reviewed mastoid effusions, patient does not wish to be seen by ENT for evaluation at this time.     Overall, his risk factors for seizures include history of aseptic meningitis, head injury at 8 years old, and his developmental venous angiomas. No risk factors for psychogenic non epileptic events. Since this was a single unprovoked seizure, no AED therapy is necessary at this time. Discussed that if there is a second event, we would want to start AED therapy at that time but reviewed that it is possible to have a single seizure without recurrence.     We discussed seizure safety and first aid. Written information provided as well to review at home. Discussed 911 precautions for seizure > 5 minutes.      He is back to driving. His PCP completed seizure reporting form and was cleared by FADY MARIE to return to driving. This paperwork was reviewed and was completed accurately. Given the decision to return to driving is ultimately up to the discretion of FADY MARIE, he will continue to drive. We did review that he is at highest risk of seizure recurrence within the first 6 months and would recommend avoiding driving if he does not have to just in case there is another seizure. His wife has been doing most of the driving. Also discussed that if he is not feeling well, he should not drive that day.     To complete evaluation, recommended sleep deprived EEG. If there are further  events, the patient or his wife will call the office and would recommend AED therapy be started at that time. Follow up in 3 months or sooner if needed with epilepsy attending.

## 2024-01-24 ENCOUNTER — HOSPITAL ENCOUNTER (OUTPATIENT)
Dept: NEUROLOGY | Facility: HOSPITAL | Age: 74
Discharge: HOME | End: 2024-01-24
Payer: MEDICARE

## 2024-01-24 DIAGNOSIS — R56.9 NEW ONSET SEIZURE (HCC): ICD-10-CM

## 2024-01-24 PROCEDURE — 95819 EEG AWAKE AND ASLEEP: CPT

## 2024-01-24 PROCEDURE — 95819 EEG AWAKE AND ASLEEP: CPT | Performed by: PSYCHIATRY & NEUROLOGY

## 2024-01-26 ENCOUNTER — TELEPHONE (OUTPATIENT)
Dept: NEUROLOGY | Facility: CLINIC | Age: 74
End: 2024-01-26

## 2024-01-26 NOTE — TELEPHONE ENCOUNTER
----- Message from ORTIZ Vernon sent at 1/25/2024  4:45 PM EST -----  EEG normal. Follow up as scheduled.   ----- Message -----  From: Dhruv Cline MD  Sent: 1/25/2024   4:27 PM EST  To: ORTIZ Vernon

## 2024-02-07 ENCOUNTER — HOSPITAL ENCOUNTER (OUTPATIENT)
Dept: NON INVASIVE DIAGNOSTICS | Age: 74
Discharge: HOME | End: 2024-02-07
Payer: MEDICARE

## 2024-02-07 VITALS
HEIGHT: 68 IN | WEIGHT: 197 LBS | BODY MASS INDEX: 29.86 KG/M2 | SYSTOLIC BLOOD PRESSURE: 134 MMHG | DIASTOLIC BLOOD PRESSURE: 74 MMHG | HEART RATE: 52 BPM

## 2024-02-07 DIAGNOSIS — R56.9 SEIZURE (HCC): ICD-10-CM

## 2024-02-07 LAB
AORTIC ROOT: 2.7 CM
APICAL FOUR CHAMBER EJECTION FRACTION: 68 %
ASCENDING AORTA: 3.1 CM
BSA FOR ECHO PROCEDURE: 2.03 M2
E WAVE DECELERATION TIME: 270 MS
E/A RATIO: 0.81
FRACTIONAL SHORTENING: 33 (ref 28–44)
INTERVENTRICULAR SEPTUM IN DIASTOLE (PARASTERNAL SHORT AXIS VIEW): 1.1 CM
INTERVENTRICULAR SEPTUM: 1.1 CM (ref 0.6–1.1)
LAAS-AP2: 16.7 CM2
LAAS-AP4: 22.8 CM2
LEFT ATRIUM AREA SYSTOLE SINGLE PLANE A4C: 0 CM2
LEFT ATRIUM SIZE: 3.6 CM
LEFT ATRIUM VOLUME (MOD BIPLANE): 56 ML
LEFT ATRIUM VOLUME INDEX (MOD BIPLANE): 27.6 ML/M2
LEFT INTERNAL DIMENSION IN SYSTOLE: 3 CM (ref 2.1–4)
LEFT VENTRICLE DIASTOLIC VOLUME (MOD BIPLANE): 121 ML
LEFT VENTRICLE DIASTOLIC VOLUME INDEX (MOD BIPLANE): 59.6 ML/M2
LEFT VENTRICLE SYSTOLIC VOLUME (MOD BIPLANE): 34 ML
LEFT VENTRICLE SYSTOLIC VOLUME INDEX (MOD BIPLANE): 16.7 ML/M2
LEFT VENTRICULAR INTERNAL DIMENSION IN DIASTOLE: 4.5 CM (ref 3.5–6)
LEFT VENTRICULAR POSTERIOR WALL IN END DIASTOLE: 1 CM
LEFT VENTRICULAR STROKE VOLUME: 56 ML
LV EF: 72 %
LVSV (TEICH): 56 ML
MV E'TISSUE VEL-LAT: 11 CM/S
MV E'TISSUE VEL-SEP: 7 CM/S
MV PEAK A VEL: 0.73 M/S
MV PEAK E VEL: 59 CM/S
MV STENOSIS PRESSURE HALF TIME: 78 MS
MV VALVE AREA P 1/2 METHOD: 2.8
RIGHT ATRIUM AREA SYSTOLE A4C: 10.1 CM2
RIGHT VENTRICLE ID DIMENSION: 2.9 CM
SL CV LEFT ATRIUM LENGTH A2C: 5.4 CM
SL CV LV EF: 60
SL CV PED ECHO LEFT VENTRICLE DIASTOLIC VOLUME (MOD BIPLANE) 2D: 91 ML
SL CV PED ECHO LEFT VENTRICLE SYSTOLIC VOLUME (MOD BIPLANE) 2D: 36 ML
TR MAX PG: 26 MMHG
TR PEAK VELOCITY: 2.5 M/S
TRICUSPID ANNULAR PLANE SYSTOLIC EXCURSION: 2.9 CM
TRICUSPID VALVE PEAK REGURGITATION VELOCITY: 2.54 M/S

## 2024-02-07 PROCEDURE — 93306 TTE W/DOPPLER COMPLETE: CPT | Performed by: INTERNAL MEDICINE

## 2024-02-07 PROCEDURE — 93306 TTE W/DOPPLER COMPLETE: CPT

## 2024-02-16 DIAGNOSIS — N13.8 ENLARGED PROSTATE WITH URINARY OBSTRUCTION: ICD-10-CM

## 2024-02-16 DIAGNOSIS — N40.1 ENLARGED PROSTATE WITH URINARY OBSTRUCTION: ICD-10-CM

## 2024-02-16 RX ORDER — TAMSULOSIN HYDROCHLORIDE 0.4 MG/1
CAPSULE ORAL
Qty: 90 CAPSULE | Refills: 1 | Status: SHIPPED | OUTPATIENT
Start: 2024-02-16

## 2024-02-21 ENCOUNTER — CONSULT (OUTPATIENT)
Dept: CARDIOLOGY CLINIC | Facility: CLINIC | Age: 74
End: 2024-02-21
Payer: MEDICARE

## 2024-02-21 VITALS
SYSTOLIC BLOOD PRESSURE: 120 MMHG | HEIGHT: 68 IN | DIASTOLIC BLOOD PRESSURE: 72 MMHG | BODY MASS INDEX: 29.55 KG/M2 | WEIGHT: 195 LBS | HEART RATE: 59 BPM

## 2024-02-21 DIAGNOSIS — I10 PRIMARY HYPERTENSION: ICD-10-CM

## 2024-02-21 DIAGNOSIS — I44.4 LAFB (LEFT ANTERIOR FASCICULAR BLOCK): ICD-10-CM

## 2024-02-21 DIAGNOSIS — E78.2 MIXED HYPERLIPIDEMIA: Primary | ICD-10-CM

## 2024-02-21 PROCEDURE — 99204 OFFICE O/P NEW MOD 45 MIN: CPT | Performed by: INTERNAL MEDICINE

## 2024-02-21 NOTE — PROGRESS NOTES
Consultation - Cardiology   Rajendra Lee 73 y.o. male MRN: [id number]    Encounter: [id number]    Assessment/Plan     Assessment:     Hyperlipidemia  Hypertension    Plan:    Hyperlipidemia: Continue with rosuvastatin. LDL 70.    Hypertension: Continue with losartan. BP is currently controlled.     Echocardiogram reviewed shows normal Ef and no valvular disease.     Resting EKG from the hospital shows Normal Sinus Rhythm with left axis.       History of Present Illness   Physician Requesting Consult: No att. providers found  Reason for Consult / Principal Problem: Hyperlipidemia  HPI: Rajendra Lee is a 73 y.o. year old male who presents with a history of hyperlipidemia. He recently had a seizure back in January. He has had no recurrence.     He has a history of hypertension and hyperlipidemia. He has no prior history of cad, chf or arrhythmia.     He has no chest pain, dyspnea or palpitations.     Family Hx: Mother HTN and hyperlipidemia.     Review of Systems   Constitutional: Negative.   HENT: Negative.     Eyes: Negative.    Cardiovascular: Negative.    Respiratory: Negative.     Endocrine: Negative.    Hematologic/Lymphatic: Negative.    Skin: Negative.    Musculoskeletal: Negative.    Gastrointestinal: Negative.    Genitourinary: Negative.    Neurological: Negative.    Psychiatric/Behavioral: Negative.     Allergic/Immunologic: Negative.        Historical Information   Past Medical History:   Diagnosis Date    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     Asthma     Chronic pain     back    GERD (gastroesophageal reflux disease)     Hiatal hernia 1994    Hyperlipidemia     Hypertension     Kidney stone     r stent- suppose to be removed Fri 7/13    PONV (postoperative nausea and vomiting)     Shortness of breath      Past Surgical History:   Procedure Laterality Date    ABDOMINAL ADHESION SURGERY N/A 7/11/2018    Procedure: LYSIS ADHESIONS EXTENSIVE;  Surgeon: Audi Lara MD;  Location:  MAIN OR;  Service:  General    ABDOMINAL SURGERY      BACK SURGERY      x2    CARPAL TUNNEL RELEASE      COLONOSCOPY      CYSTOSCOPY  08/07/2020    CYSTOSCOPY W/ URETERAL STENT REMOVAL  01/26/2018    ESOPHAGOGASTRODUODENOSCOPY N/A 7/11/2018    Procedure: ESOPHAGOGASTRODUODENOSCOPY (EGD) INTRAOPERATIVE ;  Surgeon: Audi Lara MD;  [facility];  Service: General    FL INJECTION RIGHT SHOULDER (ARTHROGRAM)  5/13/2022    HALLUX VALGUS CORRECTION Bilateral     1st MTP joint     HERNIA REPAIR      HIATAL HERNIA REPAIR  1994    INCISIONAL HERNIA REPAIR N/A 7/11/2018    Procedure: REPAIR HERNIA INCISIONAL X2;  Surgeon: Audi Lara MD;  [facility];  Service: General    KNEE SURGERY Bilateral     LITHOTRIPSY      PERIPHERAL NEUROPLASTY OF FINGER / HAND / ARM      left middle finger    [place] ARTHROPLASTY GLENOHUMERAL JOINT TOTAL SHOULDER Right 6/1/2021    Procedure: TOTAL SHOULDER ARTHROPLASTY ANATOMIC, biceps tenodesis;  Surgeon: Josue Preston MD;  [facility];  Service: Orthopedics    NY CYSTO/URETERO W/LITHOTRIPSY &INDWELL STENT INSRT Left 1/3/2018    Procedure: CYSTOSCOPY , RETROGRADE PYELOGRAM AND INSERTION STENT URETERAL, FULGERATION;  Surgeon: Conner Adames MD;  [facility];  Service: Urology    NY CYSTO/URETERO W/LITHOTRIPSY &INDWELL STENT INSRT Right 6/8/2018    Procedure: CYSTOSCOPY URETEROSCOPY WITH LITHOTRIPSY HOLMIUM LASER, RETROGRADE PYELOGRAM AND INSERTION STENT URETERAL;  Surgeon: Seth Figueroa MD;  [facility];  Service: Urology    NY LAPS RPR PARAESPHGL HRNA INCL FUNDPLSTY W/MESH N/A 7/11/2018    Procedure: REPAIR RECURRENT HERNIA PARAESOPHAGEAL  LAPAROSCOPIC;TOUPE FUNDIPLICATION;  Surgeon: Audi Lara MD;  [facility];  Service: General    NY LITHOTRIPSY XTRCORP SHOCK WAVE Left 1/18/2018    Procedure: ESWL;  Surgeon: Seth Figueroa MD;  [facility];  Service: Urology    ROTATOR CUFF REPAIR Right     SHOULDER ARTHROSCOPY Bilateral      SHOULDER SURGERY         Social History:  Social History     Substance and Sexual Activity   Alcohol Use Yes    Alcohol/week: 1.0 standard drink of alcohol    Types: 1 Cans of beer per week    Comment: once a month      Social History     Substance and Sexual Activity   Drug Use No     Social History     Tobacco Use   Smoking Status Never   Smokeless Tobacco Never       Family History:   Family History   Problem Relation Age of Onset    Diabetes Mother     Hypertension Mother     Brain cancer Mother     Hypertension Sister     Nephrolithiasis Sister     COPD Father     Cancer Maternal Grandmother     Aortic aneurysm Maternal Uncle         x3 maternal uncles        Meds/Allergies   Allergies   Allergen Reactions    Meperidine GI Intolerance    Horse-Derived Products     Lisinopril Cough     Other reaction(s): Cough       Current Outpatient Medications:     Coenzyme Q10 (CO Q 10) 100 MG CAPS, Take by mouth, Disp: , Rfl:     finasteride (PROSCAR) 5 mg tablet, Take 1 tablet (5 mg total) by mouth daily, Disp: 90 tablet, Rfl: 3    losartan (Cozaar) 50 mg tablet, Take 1 tablet (50 mg total) by mouth daily, Disp: 90 tablet, Rfl: 3    mometasone (ELOCON) 0.1 % cream, Apply topically daily, Disp: 45 g, Rfl: 2    rosuvastatin (CRESTOR) 20 MG tablet, Take 1 tablet (20 mg total) by mouth daily, Disp: 90 tablet, Rfl: 3    tamsulosin (FLOMAX) 0.4 mg, Take 1 capsule by mouth once daily, Disp: 90 capsule, Rfl: 1    Vitals:   Pulse: 59  Blood Pressue: 120/72  Weight: 88.5 kg (195 lb)      Physical Exam  Constitutional:       General: He is not in acute distress.     Appearance: He is well-developed. He is not diaphoretic.   HENT:      Head: Normocephalic.   Eyes:      General: No scleral icterus.        Right eye: No discharge.      Conjunctiva/sclera: Conjunctivae normal.   Neck:      Vascular: No JVD.   Cardiovascular:      Rate and Rhythm: Normal rate and regular rhythm.      Heart sounds: No murmur heard.     No friction rub. No  "gallop.   Pulmonary:      Effort: Pulmonary effort is normal. No respiratory distress.      Breath sounds: Normal breath sounds. No wheezing or rales.   Abdominal:      General: Bowel sounds are normal. There is no distension.      Palpations: Abdomen is soft.      Tenderness: There is no abdominal tenderness. There is no rebound.   Musculoskeletal:         General: No tenderness or deformity.      Cervical back: Normal range of motion.   Skin:     General: Skin is warm and dry.   Neurological:      Mental Status: He is alert and oriented to person, place, and time.         [unfilled]    Invasive Devices       None                   Lab Results   Component Value Date     10/21/2015     12/29/2023    CO2 28 12/29/2023    ANIONGAP 8 10/21/2015    BUN 12 12/29/2023    CREATININE 0.83 12/29/2023    EGFR 86 12/29/2023    GLUCOSE 88 10/21/2015    CALCIUM 9.4 12/29/2023    AST 19 12/29/2023    ALT 26 12/29/2023    ALKPHOS 59 12/29/2023    PROT 7.3 10/21/2015    BILITOT 0.69 10/21/2015     Lab Results   Component Value Date    WBC 6.18 12/29/2023    HGB 15.7 12/29/2023     12/29/2023     No components found for: \"TROP\"    Imaging:     EKG: MUSE: Normal Sinus Rhythm LAFB.     Counseling / Coordination of Care  Total floor / unit time spent today 45 minutes.  Greater than 50% of total time was spent with the patient and / or family counseling and / or coordination of care.  A description of the counseling / coordination of care.      "

## 2024-04-18 ENCOUNTER — RA CDI HCC (OUTPATIENT)
Dept: OTHER | Facility: HOSPITAL | Age: 74
End: 2024-04-18

## 2024-04-24 ENCOUNTER — OFFICE VISIT (OUTPATIENT)
Dept: FAMILY MEDICINE CLINIC | Facility: HOSPITAL | Age: 74
End: 2024-04-24
Payer: MEDICARE

## 2024-04-24 VITALS
DIASTOLIC BLOOD PRESSURE: 72 MMHG | BODY MASS INDEX: 28.79 KG/M2 | SYSTOLIC BLOOD PRESSURE: 132 MMHG | WEIGHT: 190 LBS | HEIGHT: 68 IN | HEART RATE: 79 BPM | OXYGEN SATURATION: 97 %

## 2024-04-24 DIAGNOSIS — Z00.00 MEDICARE ANNUAL WELLNESS VISIT, SUBSEQUENT: Primary | ICD-10-CM

## 2024-04-24 DIAGNOSIS — N40.1 ENLARGED PROSTATE WITH URINARY OBSTRUCTION: ICD-10-CM

## 2024-04-24 DIAGNOSIS — N13.8 ENLARGED PROSTATE WITH URINARY OBSTRUCTION: ICD-10-CM

## 2024-04-24 DIAGNOSIS — I10 PRIMARY HYPERTENSION: ICD-10-CM

## 2024-04-24 DIAGNOSIS — Z83.3 FAMILY HISTORY OF DIABETES MELLITUS IN MOTHER: ICD-10-CM

## 2024-04-24 PROBLEM — R56.9 NEW ONSET SEIZURE (HCC): Status: RESOLVED | Noted: 2023-11-19 | Resolved: 2024-04-24

## 2024-04-24 PROCEDURE — G0439 PPPS, SUBSEQ VISIT: HCPCS | Performed by: INTERNAL MEDICINE

## 2024-04-24 RX ORDER — LOSARTAN POTASSIUM 50 MG/1
50 TABLET ORAL DAILY
Qty: 90 TABLET | Refills: 3 | Status: SHIPPED | OUTPATIENT
Start: 2024-04-24

## 2024-04-24 RX ORDER — FINASTERIDE 5 MG/1
5 TABLET, FILM COATED ORAL DAILY
Qty: 90 TABLET | Refills: 3 | Status: SHIPPED | OUTPATIENT
Start: 2024-04-24

## 2024-04-24 RX ORDER — TAMSULOSIN HYDROCHLORIDE 0.4 MG/1
CAPSULE ORAL
Qty: 90 CAPSULE | Refills: 3 | Status: SHIPPED | OUTPATIENT
Start: 2024-04-24

## 2024-04-24 NOTE — PROGRESS NOTES
Assessment and Plan:     Problem List Items Addressed This Visit        Cardiovascular and Mediastinum    HTN (hypertension)    Relevant Medications    losartan (Cozaar) 50 mg tablet       Other Pediatrics    Family history of diabetes mellitus in mother    Relevant Medications    losartan (Cozaar) 50 mg tablet   Other Visit Diagnoses     Medicare annual wellness visit, subsequent    -  Primary    Enlarged prostate with urinary obstruction        Relevant Medications    finasteride (PROSCAR) 5 mg tablet    tamsulosin (FLOMAX) 0.4 mg          Depression Screening and Follow-up Plan: Patient was screened for depression during today's encounter. They screened negative with a PHQ-2 score of 0.      Preventive health issues were discussed with patient, and age appropriate screening tests were ordered as noted in patient's After Visit Summary.  Personalized health advice and appropriate referrals for health education or preventive services given if needed, as noted in patient's After Visit Summary.     History of Present Illness:     Patient presents for a Medicare Wellness Visit    Here medicare wellness       Patient Care Team:  Caitie Toscano DO as PCP - General  MD Solomon Tian DO Erin Fly, DO     Review of Systems:     Review of Systems   Constitutional:  Negative for fatigue and fever.   HENT:  Negative for congestion.    Respiratory:  Negative for shortness of breath.    All other systems reviewed and are negative.       Problem List:     Patient Active Problem List   Diagnosis   • Ureterolithiasis   • Lumbar disc disease   • Gastroesophageal reflux disease without esophagitis   • Nephrolithiasis   • Ureteral calculus   • HTN (hypertension)   • Benign nodular prostatic hyperplasia with lower urinary tract symptoms   • Chronic pain of both ankles   • Cutaneous skin tags   • Eczema of both external ears   • Enlarged prostate   • Hemangioma of skin   • Hyperlipidemia   • Insomnia   • Left inguinal hernia    • Chronic pain syndrome   • Postlaminectomy syndrome, lumbar   • Paraesophageal hernia   • Internal derangement of right shoulder   • Low back pain   • Asthma   • Allergic rhinitis   • Multiple pigmented nevi   • Family history of diabetes mellitus in mother   • Status post total shoulder arthroplasty, right   • Full thickness tear of right subscapularis tendon   • Lactic acidosis   • Abrasion of right knee   • Elevated TSH      Past Medical and Surgical History:     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    • Asthma    • Chronic pain     back   • GERD (gastroesophageal reflux disease)    • Hiatal hernia 1994   • Hyperlipidemia    • Hypertension    • Kidney stone     r stent- suppose to be removed Fri 7/13   • PONV (postoperative nausea and vomiting)    • Shortness of breath      Past Surgical History:   Procedure Laterality Date   • ABDOMINAL ADHESION SURGERY N/A 7/11/2018    Procedure: LYSIS ADHESIONS EXTENSIVE;  Surgeon: Audi Lara MD;  [facility];  Service: General   • ABDOMINAL SURGERY     • BACK SURGERY      x2   • CARPAL TUNNEL RELEASE     • COLONOSCOPY     • CYSTOSCOPY  08/07/2020   • CYSTOSCOPY W/ URETERAL STENT REMOVAL  01/26/2018   • ESOPHAGOGASTRODUODENOSCOPY N/A 7/11/2018    Procedure: ESOPHAGOGASTRODUODENOSCOPY (EGD) INTRAOPERATIVE ;  Surgeon: Audi Lara MD;  [facility];  Service: General   • FL INJECTION RIGHT SHOULDER (ARTHROGRAM)  5/13/2022   • HALLUX VALGUS CORRECTION Bilateral     1st MTP joint    • HERNIA REPAIR     • HIATAL HERNIA REPAIR  1994   • INCISIONAL HERNIA REPAIR N/A 7/11/2018    Procedure: REPAIR HERNIA INCISIONAL X2;  Surgeon: Audi Lara MD;  [facility];  Service: General   • KNEE SURGERY Bilateral    • LITHOTRIPSY     • PERIPHERAL NEUROPLASTY OF FINGER / HAND / ARM      left middle finger   • [place] ARTHROPLASTY GLENOHUMERAL JOINT TOTAL SHOULDER Right 6/1/2021    Procedure: TOTAL SHOULDER ARTHROPLASTY  ANATOMIC, biceps tenodesis;  Surgeon: Josue Preston MD;  [facility];  Service: Orthopedics   • MT CYSTO/URETERO W/LITHOTRIPSY &INDWELL STENT INSRT Left 1/3/2018    Procedure: CYSTOSCOPY , RETROGRADE PYELOGRAM AND INSERTION STENT URETERAL, FULGERATION;  Surgeon: Conner Adames MD;  [facility];  Service: Urology   • MT CYSTO/URETERO W/LITHOTRIPSY &INDWELL STENT INSRT Right 6/8/2018    Procedure: CYSTOSCOPY URETEROSCOPY WITH LITHOTRIPSY HOLMIUM LASER, RETROGRADE PYELOGRAM AND INSERTION STENT URETERAL;  Surgeon: Seth Figueroa MD;  [facility];  Service: Urology   • MT LAPS RPR PARAESPHGL HRNA INCL FUNDPLSTY W/MESH N/A 7/11/2018    Procedure: REPAIR RECURRENT HERNIA PARAESOPHAGEAL  LAPAROSCOPIC;TOUPE FUNDIPLICATION;  Surgeon: Audi Lara MD;  [facility];  Service: General   • MT LITHOTRIPSY XTRCORP SHOCK WAVE Left 1/18/2018    Procedure: ESWL;  Surgeon: Seth Figueroa MD;  [facility];  Service: Urology   • ROTATOR CUFF REPAIR Right    • SHOULDER ARTHROSCOPY Bilateral    • SHOULDER SURGERY        Family History:     Family History   Problem Relation Age of Onset   • Diabetes Mother    • Hypertension Mother    • Brain cancer Mother    • Hypertension Sister    • Nephrolithiasis Sister    • COPD Father    • Cancer Maternal Grandmother    • Aortic aneurysm Maternal Uncle         x3 maternal uncles       Social History:     Social History     Socioeconomic History   • Marital status: /Civil Union     Spouse name: None   • Number of children: None   • Years of education: None   • Highest education level: None   Occupational History   • None   Tobacco Use   • Smoking status: Never   • Smokeless tobacco: Never   Vaping Use   • Vaping status: Never Used   Substance and Sexual Activity   • Alcohol use: Yes     Alcohol/week: 1.0 standard drink of alcohol     Types: 1 Cans of beer per week     Comment: once a month    • Drug use: No   • Sexual activity: Not  Currently     Comment: flomax is helping    Other Topics Concern   • None   Social History Narrative    Dental care, regularly    Lives with spouse     Living situation: Supportive and safe    No advance directive on file     Social Determinants of Health     Financial Resource Strain: Low Risk  (10/21/2022)    Overall Financial Resource Strain (CARDIA)    • Difficulty of Paying Living Expenses: Not very hard   Food Insecurity: Unknown (4/19/2024)    Hunger Vital Sign    • Worried About Running Out of Food in the Last Year: Not on file    • Ran Out of Food in the Last Year: Never true   Transportation Needs: No Transportation Needs (4/19/2024)    PRAPARE - Transportation    • Lack of Transportation (Medical): No    • Lack of Transportation (Non-Medical): No   Physical Activity: Not on file   Stress: Not on file   Social Connections: Not on file   Intimate Partner Violence: Not on file   Housing Stability: Unknown (4/19/2024)    Housing Stability Vital Sign    • Unable to Pay for Housing in the Last Year: No    • Number of Places Lived in the Last Year: Not on file    • Unstable Housing in the Last Year: Not on file      Medications and Allergies:     Current Outpatient Medications   Medication Sig Dispense Refill   • Coenzyme Q10 (CO Q 10) 100 MG CAPS Take by mouth     • finasteride (PROSCAR) 5 mg tablet Take 1 tablet (5 mg total) by mouth daily 90 tablet 3   • losartan (Cozaar) 50 mg tablet Take 1 tablet (50 mg total) by mouth daily 90 tablet 3   • mometasone (ELOCON) 0.1 % cream Apply topically daily 45 g 2   • rosuvastatin (CRESTOR) 20 MG tablet Take 1 tablet (20 mg total) by mouth daily 90 tablet 3   • tamsulosin (FLOMAX) 0.4 mg take 1 capsule by mouth once daily 90 capsule 3     No current facility-administered medications for this visit.     Allergies   Allergen Reactions   • Meperidine GI Intolerance   • Horse-Derived Products    • Lisinopril Cough     Other reaction(s): Cough      Immunizations:      Immunization History   Administered Date(s) Administered   • COVID-19 PFIZER VACCINE 0.3 ML IM 01/14/2021, 02/09/2021   • H1N1, All Formulations 01/20/2010   • INFLUENZA 11/01/2014, 09/14/2015, 09/13/2016, 10/02/2018, 09/20/2020, 10/07/2021, 10/21/2022   • Influenza Split High Dose Preservative Free IM 09/13/2016   • Influenza, high dose seasonal 0.7 mL 10/21/2022, 10/24/2023   • Influenza, seasonal, injectable 09/19/2017   • Pneumococcal Conjugate 13-Valent 09/13/2016   • Pneumococcal Polysaccharide PPV23 09/19/2017   • Tdap 04/20/2017   • Zoster Vaccine Recombinant 04/29/2019   • influenza, trivalent, adjuvanted 10/07/2019      Health Maintenance:         Topic Date Due   • Colorectal Cancer Screening  01/25/2027   • Hepatitis C Screening  Completed         Topic Date Due   • COVID-19 Vaccine (3 - 2023-24 season) 09/01/2023      Medicare Screening Tests and Risk Assessments:     Rajendra is here for his Subsequent Wellness visit.     Health Risk Assessment:   Patient rates overall health as good. Patient feels that their physical health rating is same. Patient is satisfied with their life. Eyesight was rated as same. Hearing was rated as same. Patient feels that their emotional and mental health rating is same. Patients states they are never, rarely angry. Patient states they are sometimes unusually tired/fatigued. Pain experienced in the last 7 days has been some. Patient's pain rating has been 6/10. Patient states that he has experienced no weight loss or gain in last 6 months.     Depression Screening:   PHQ-2 Score: 0      Fall Risk Screening:   In the past year, patient has experienced: no history of falling in past year      Home Safety:  Patient does not have trouble with stairs inside or outside of their home. Patient has working smoke alarms and has no working carbon monoxide detector. Home safety hazards include: none.     Nutrition:   Current diet is Regular.     Medications:   Patient is currently  taking over-the-counter supplements. OTC medications include: see medication list. Patient is able to manage medications.     Activities of Daily Living (ADLs)/Instrumental Activities of Daily Living (IADLs):   Walk and transfer into and out of bed and chair?: Yes  Dress and groom yourself?: Yes    Bathe or shower yourself?: Yes    Feed yourself? Yes  Do your laundry/housekeeping?: Yes  Manage your money, pay your bills and track your expenses?: Yes  Make your own meals?: Yes    Do your own shopping?: Yes    Previous Hospitalizations:   Any hospitalizations or ED visits within the last 12 months?: No      Advance Care Planning:   Living will: Yes    Durable POA for healthcare: Yes    Advanced directive: Yes    Advanced directive counseling given: Yes    End of Life Decisions reviewed with patient: Yes    Provider agrees with end of life decisions: Yes      Cognitive Screening:   Provider or family/friend/caregiver concerned regarding cognition?: No    PREVENTIVE SCREENINGS      Cardiovascular Screening:    General: Screening Not Indicated, History Lipid Disorder and Risks and Benefits Discussed      Diabetes Screening:     General: Screening Current      Colorectal Cancer Screening:     General: Screening Current      Prostate Cancer Screening:    General: Screening Not Indicated      Osteoporosis Screening:    General: Risks and Benefits Discussed      Abdominal Aortic Aneurysm (AAA) Screening:    Risk factors include: age between 65-76 yo        Lung Cancer Screening:     General: Screening Not Indicated      Hepatitis C Screening:    General: Screening Current    Screening, Brief Intervention, and Referral to Treatment (SBIRT)    Screening  Typical number of drinks in a day: 0  Typical number of drinks in a week: 0  Interpretation: Low risk drinking behavior.    AUDIT-C Screenin) How often did you have a drink containing alcohol in the past year? monthly or less  2) How many drinks did you have on a typical  "day when you were drinking in the past year? 0  3) How often did you have 6 or more drinks on one occasion in the past year? never    AUDIT-C Score: 1  Interpretation: Score 0-3 (male): Negative screen for alcohol misuse    Single Item Drug Screening:  How often have you used an illegal drug (including marijuana) or a prescription medication for non-medical reasons in the past year? 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 Preston    No results found.     Physical Exam:     /72   Pulse 79   Ht 5' 8\" (1.727 m)   Wt 86.2 kg (190 lb)   SpO2 97%   BMI 28.89 kg/m²     Physical Exam  Vitals and nursing note reviewed.   Constitutional:       General: He is not in acute distress.     Appearance: Normal appearance.   HENT:      Head: Normocephalic.      Right Ear: There is impacted cerumen.      Left Ear: Tympanic membrane normal.      Ears:      Comments: Bilateral minimal scaling     Nose: No congestion.      Mouth/Throat:      Pharynx: No posterior oropharyngeal erythema.   Eyes:      General:         Right eye: No discharge.         Left eye: No discharge.   Cardiovascular:      Rate and Rhythm: Normal rate and regular rhythm.      Heart sounds: No murmur heard.  Pulmonary:      Breath sounds: No wheezing or rhonchi.   Abdominal:      Tenderness: There is no abdominal tenderness. There is no guarding.   Musculoskeletal:         General: No swelling or tenderness.      Cervical back: No tenderness.   Skin:     Findings: No erythema.   Neurological:      Mental Status: He is alert.   Psychiatric:         Mood and Affect: Mood normal.         Thought Content: Thought content normal.         Judgment: Judgment normal.          Caitie Fly, DO  "
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
## 2024-04-24 NOTE — PATIENT INSTRUCTIONS
Use debrox 2x weekMedicare Preventive Visit Patient Instructions  Thank you for completing your Welcome to Medicare Visit or Medicare Annual Wellness Visit today. Your next wellness visit will be due in one year (4/25/2025).  The screening/preventive services that you may require over the next 5-10 years are detailed below. Some tests may not apply to you based off risk factors and/or age. Screening tests ordered at today's visit but not completed yet may show as past due. Also, please note that scanned in results may not display below.  Preventive Screenings:  Service Recommendations Previous Testing/Comments   Colorectal Cancer Screening  Colonoscopy    Fecal Occult Blood Test (FOBT)/Fecal Immunochemical Test (FIT)  Fecal DNA/Cologuard Test  Flexible Sigmoidoscopy Age: 45-75 years old   Colonoscopy: every 10 years (May be performed more frequently if at higher risk)  OR  FOBT/FIT: every 1 year  OR  Cologuard: every 3 years  OR  Sigmoidoscopy: every 5 years  Screening may be recommended earlier than age 45 if at higher risk for colorectal cancer. Also, an individualized decision between you and your healthcare provider will decide whether screening between the ages of 76-85 would be appropriate. Colonoscopy: 01/26/2022  FOBT/FIT: Not on file  Cologuard: Not on file  Sigmoidoscopy: Not on file    Screening Current     Prostate Cancer Screening Individualized decision between patient and health care provider in men between ages of 55-69   Medicare will cover every 12 months beginning on the day after your 50th birthday PSA: 2.01 ng/mL     Screening Not Indicated     Hepatitis C Screening Once for adults born between 1945 and 1965  More frequently in patients at high risk for Hepatitis C Hep C Antibody: 10/15/2021    Screening Current   Diabetes Screening 1-2 times per year if you're at risk for diabetes or have pre-diabetes Fasting glucose: 96 mg/dL (12/29/2023)  A1C: 5.4 % (5/7/2021)  Screening Current   Cholesterol  Screening Once every 5 years if you don't have a lipid disorder. May order more often based on risk factors. Lipid panel: 12/29/2023  Screening Not Indicated  History Lipid Disorder      Other Preventive Screenings Covered by Medicare:  Abdominal Aortic Aneurysm (AAA) Screening: covered once if your at risk. You're considered to be at risk if you have a family history of AAA or a male between the age of 65-75 who smoking at least 100 cigarettes in your lifetime.  Lung Cancer Screening: covers low dose CT scan once per year if you meet all of the following conditions: (1) Age 55-77; (2) No signs or symptoms of lung cancer; (3) Current smoker or have quit smoking within the last 15 years; (4) You have a tobacco smoking history of at least 20 pack years (packs per day x number of years you smoked); (5) You get a written order from a healthcare provider.  Glaucoma Screening: covered annually if you're considered high risk: (1) You have diabetes OR (2) Family history of glaucoma OR (3)  aged 50 and older OR (4)  American aged 65 and older  Osteoporosis Screening: covered every 2 years if you meet one of the following conditions: (1) Have a vertebral abnormality; (2) On glucocorticoid therapy for more than 3 months; (3) Have primary hyperparathyroidism; (4) On osteoporosis medications and need to assess response to drug therapy.  HIV Screening: covered annually if you're between the age of 15-65. Also covered annually if you are younger than 15 and older than 65 with risk factors for HIV infection. For pregnant patients, it is covered up to 3 times per pregnancy.    Immunizations:  Immunization Recommendations   Influenza Vaccine Annual influenza vaccination during flu season is recommended for all persons aged >= 6 months who do not have contraindications   Pneumococcal Vaccine   * Pneumococcal conjugate vaccine = PCV13 (Prevnar 13), PCV15 (Vaxneuvance), PCV20 (Prevnar 20)  * Pneumococcal  polysaccharide vaccine = PPSV23 (Pneumovax) Adults 19-65 yo with certain risk factors or if 65+ yo  If never received any pneumonia vaccine: recommend Prevnar 20 (PCV20)  Give PCV20 if previously received 1 dose of PCV13 or PPSV23   Hepatitis B Vaccine 3 dose series if at intermediate or high risk (ex: diabetes, end stage renal disease, liver disease)   Respiratory syncytial virus (RSV) Vaccine - COVERED BY MEDICARE PART D  * RSVPreF3 (Arexvy) CDC recommends that adults 60 years of age and older may receive a single dose of RSV vaccine using shared clinical decision-making (SCDM)   Tetanus (Td) Vaccine - COST NOT COVERED BY MEDICARE PART B Following completion of primary series, a booster dose should be given every 10 years to maintain immunity against tetanus. Td may also be given as tetanus wound prophylaxis.   Tdap Vaccine - COST NOT COVERED BY MEDICARE PART B Recommended at least once for all adults. For pregnant patients, recommended with each pregnancy.   Shingles Vaccine (Shingrix) - COST NOT COVERED BY MEDICARE PART B  2 shot series recommended in those 19 years and older who have or will have weakened immune systems or those 50 years and older     Health Maintenance Due:      Topic Date Due    Colorectal Cancer Screening  01/25/2027    Hepatitis C Screening  Completed     Immunizations Due:      Topic Date Due    COVID-19 Vaccine (3 - 2023-24 season) 09/01/2023     Advance Directives   What are advance directives?  Advance directives are legal documents that state your wishes and plans for medical care. These plans are made ahead of time in case you lose your ability to make decisions for yourself. Advance directives can apply to any medical decision, such as the treatments you want, and if you want to donate organs.   What are the types of advance directives?  There are many types of advance directives, and each state has rules about how to use them. You may choose a combination of any of the  following:  Living will:  This is a written record of the treatment you want. You can also choose which treatments you do not want, which to limit, and which to stop at a certain time. This includes surgery, medicine, IV fluid, and tube feedings.   Durable power of  for healthcare (DPAHC):  This is a written record that states who you want to make healthcare choices for you when you are unable to make them for yourself. This person, called a proxy, is usually a family member or a friend. You may choose more than 1 proxy.  Do not resuscitate (DNR) order:  A DNR order is used in case your heart stops beating or you stop breathing. It is a request not to have certain forms of treatment, such as CPR. A DNR order may be included in other types of advance directives.  Medical directive:  This covers the care that you want if you are in a coma, near death, or unable to make decisions for yourself. You can list the treatments you want for each condition. Treatment may include pain medicine, surgery, blood transfusions, dialysis, IV or tube feedings, and a ventilator (breathing machine).  Values history:  This document has questions about your views, beliefs, and how you feel and think about life. This information can help others choose the care that you would choose.  Why are advance directives important?  An advance directive helps you control your care. Although spoken wishes may be used, it is better to have your wishes written down. Spoken wishes can be misunderstood, or not followed. Treatments may be given even if you do not want them. An advance directive may make it easier for your family to make difficult choices about your care.   Weight Management   Why it is important to manage your weight:  Being overweight increases your risk of health conditions such as heart disease, high blood pressure, type 2 diabetes, and certain types of cancer. It can also increase your risk for osteoarthritis, sleep apnea, and  other respiratory problems. Aim for a slow, steady weight loss. Even a small amount of weight loss can lower your risk of health problems.  How to lose weight safely:  A safe and healthy way to lose weight is to eat fewer calories and get regular exercise. You can lose up about 1 pound a week by decreasing the number of calories you eat by 500 calories each day.   Healthy meal plan for weight management:  A healthy meal plan includes a variety of foods, contains fewer calories, and helps you stay healthy. A healthy meal plan includes the following:  Eat whole-grain foods more often.  A healthy meal plan should contain fiber. Fiber is the part of grains, fruits, and vegetables that is not broken down by your body. Whole-grain foods are healthy and provide extra fiber in your diet. Some examples of whole-grain foods are whole-wheat breads and pastas, oatmeal, brown rice, and bulgur.  Eat a variety of vegetables every day.  Include dark, leafy greens such as spinach, kale, jean carlos greens, and mustard greens. Eat yellow and orange vegetables such as carrots, sweet potatoes, and winter squash.   Eat a variety of fruits every day.  Choose fresh or canned fruit (canned in its own juice or light syrup) instead of juice. Fruit juice has very little or no fiber.  Eat low-fat dairy foods.  Drink fat-free (skim) milk or 1% milk. Eat fat-free yogurt and low-fat cottage cheese. Try low-fat cheeses such as mozzarella and other reduced-fat cheeses.  Choose meat and other protein foods that are low in fat.  Choose beans or other legumes such as split peas or lentils. Choose fish, skinless poultry (chicken or turkey), or lean cuts of red meat (beef or pork). Before you cook meat or poultry, cut off any visible fat.   Use less fat and oil.  Try baking foods instead of frying them. Add less fat, such as margarine, sour cream, regular salad dressing and mayonnaise to foods. Eat fewer high-fat foods. Some examples of high-fat foods  include french fries, doughnuts, ice cream, and cakes.  Eat fewer sweets.  Limit foods and drinks that are high in sugar. This includes candy, cookies, regular soda, and sweetened drinks.  Exercise:  Exercise at least 30 minutes per day on most days of the week. Some examples of exercise include walking, biking, dancing, and swimming. You can also fit in more physical activity by taking the stairs instead of the elevator or parking farther away from stores. Ask your healthcare provider about the best exercise plan for you.      © Copyright [employer] 2018 Information is for End User's use only and may not be sold, redistributed or otherwise used for commercial purposes. All illustrations and images included in CareNotes® are the copyrighted property of [employer] or [employer]

## 2024-04-25 ENCOUNTER — OFFICE VISIT (OUTPATIENT)
Dept: NEUROLOGY | Facility: CLINIC | Age: 74
End: 2024-04-25
Payer: MEDICARE

## 2024-04-25 VITALS
BODY MASS INDEX: 29.04 KG/M2 | HEIGHT: 68 IN | WEIGHT: 191.6 LBS | TEMPERATURE: 97.2 F | RESPIRATION RATE: 14 BRPM | HEART RATE: 52 BPM | DIASTOLIC BLOOD PRESSURE: 62 MMHG | OXYGEN SATURATION: 98 % | SYSTOLIC BLOOD PRESSURE: 128 MMHG

## 2024-04-25 DIAGNOSIS — E78.00 HYPERCHOLESTEROLEMIA: ICD-10-CM

## 2024-04-25 DIAGNOSIS — R56.9 SINGLE UNPROVOKED SEIZURE (HCC): Primary | ICD-10-CM

## 2024-04-25 PROBLEM — E87.20 LACTIC ACIDOSIS: Status: RESOLVED | Noted: 2023-11-20 | Resolved: 2024-04-25

## 2024-04-25 PROCEDURE — 99215 OFFICE O/P EST HI 40 MIN: CPT | Performed by: PSYCHIATRY & NEUROLOGY

## 2024-04-25 NOTE — ED PROVIDER NOTE - CROS ED PSYCH ALL NEG
Quality 431: Preventive Care And Screening: Unhealthy Alcohol Use - Screening: Patient not identified as an unhealthy alcohol user when screened for unhealthy alcohol use using a systematic screening method Quality 137: Melanoma: Continuity Of Care - Recall System: Patient information entered into a recall system that includes: target date for the next exam specified AND a process to follow up with patients regarding missed or unscheduled appointments Quality 47: Advance Care Plan: Advance Care Planning discussed and documented in the medical record; patient did not wish or was not able to name a surrogate decision maker or provide an advance care plan. Quality 226: Preventive Care And Screening: Tobacco Use: Screening And Cessation Intervention: Patient screened for tobacco use and is an ex/non-smoker Quality 358: Patient-Centered Surgical Risk Assessment And Communication: Documentation of patient-specific risk assessment with a risk calculator based on multi-institutional clinical data, the specific risk calculator used, and communication of risk assessment from risk calculator with the patient or family. Detail Level: Detailed Quality 130: Documentation Of Current Medications In The Medical Record: Current Medications Documented negative...

## 2024-04-25 NOTE — PROGRESS NOTES
Review of Systems   Constitutional:  Negative for appetite change, fatigue and fever.   HENT:  Positive for tinnitus (BOTH EARS). Negative for hearing loss, trouble swallowing and voice change.    Eyes: Negative.  Negative for photophobia, pain and visual disturbance.   Respiratory: Negative.  Negative for shortness of breath.    Cardiovascular: Negative.  Negative for palpitations.   Gastrointestinal: Negative.  Negative for nausea and vomiting.   Endocrine: Negative.  Negative for cold intolerance.   Genitourinary: Negative.  Negative for dysuria, frequency and urgency.   Musculoskeletal:  Positive for back pain. Negative for gait problem, myalgias, neck pain and neck stiffness.   Skin: Negative.  Negative for rash.   Allergic/Immunologic: Negative.    Neurological: Negative.  Negative for dizziness, tremors, seizures, syncope, facial asymmetry, speech difficulty, weakness, light-headedness, numbness and headaches.   Hematological: Negative.  Does not bruise/bleed easily.   Psychiatric/Behavioral: Negative.  Negative for confusion, hallucinations and sleep disturbance.    All other systems reviewed and are negative.

## 2024-04-25 NOTE — PATIENT INSTRUCTIONS
Plan:   Single Unprovoked Seizure  Two EEG studies that are normal  MRI brain study is also normal; developmental venous anomalies are a common finding (benign variant) and not associated with a focal epilepsy  - please call the office if you have episodes of confusion, staring spells, auras (intense emotional sensations of ronak vu, impending doom), or unresponsive behavior. (Maximal duration 5 minutes)  - call 911/EMS if he has another convulsive seizure or if there is any neurological deficit lasting more than 5 minutes.    - you may resume driving as long as your 's license has been re-instated.  - if you have another seizure then you would qualify for a diagnosis of a seizure disorder.  Then you will need to start an antiseizure medication.    I discussed seizure safety and seizure first aid with the patient.  Limits would be no unprotected heights (ladders, standing on chairs/tables), should not swim alone (need partners or ), cooking with a partner to avoid burn injuries, showers instead of baths.  I reviewed that convulsive seizures typically last about 2 minutes with about 15-30 minutes of recovery.  Should a seizure last more than 5 minutes or patient fails to recover after 30 minutes or there are multiple seizures in a day or if there is a suspected head injury then EMS should be called or they go to the nearest emergency room.  If he only experiences altered awareness, confusion, or focal seizures, then they may call the office for guidance.  Seizure first aid consists of preventing the patient from wandering or removal of dangerous objects or prevention of injury, for convulsive seizures, position the patient on the ground, may place a pillow under the head, turn the patient to his side, no objects or reaching for the mouth, no restraints but prevent injuries by removing glasses or sharp/heavy objects, and time the duration of the seizure.      We discussed issues related to driving.  I  explained to the patient that the law states that all patients who have a seizure must be reported to the [place].  Patients cannot legally drive for 6 months after seizure in the state of [place] (6 months in the state of [place] and 3 months in the state of [place].)  He is not cleared to return to driving until he has been without a seizure for at least 6 months and cleared by the appropriate state DMV/DOT.  I also informed the patient that, although exceptions can be granted for patients with provoked seizures, exclusively nocturnal seizures, prolonged auras, or exclusively simple partial seizures, these exceptions are granted by the [place]/[employer] and not by the physician.     FIRST SEIZURE    What is important to know about first seizures?  Many people have a seizure at some point in their lives. Often seizures are triggered by another medical condition, anesthesia, or some drugs/medications, but sometimes seizures can occur without any clear trigger.   After a single seizure without a clear cause, there is about a 25-30% chance of having another seizure. Based on test results and the clinical history, a patient’s risk of having another seizure may be as high as 60-80% and your doctor may recommend that you take a medication to prevent additional seizures.     What tests can I expect to be performed?  A complete history and neurologic examination  An electroencephalogram (EEG) test, which measures the electrical signals of the brain  Magnetic resonance imaging (MRI) of your brain, which looks at the structure of the brain  Blood tests  Other tests may be needed depending on your epilepsy type or your response to treatment    Should your first seizure be treated with seizure medications?  The answer to this question needs to be discussed with your doctor.   If all tests are normal, the risk of having another seizure may be as low as 25-30%. In this situation, some people decide not to take a  medication.    Based on the type of seizure or if the tests come back abnormal, the risk of another seizure may be higher. In this case, patients are typically counseled to start a medication to decrease the likelihood of having another seizure.   Your doctor will be able to assess your personal situation, help you decide if medications are best for you and if so, determine which medication would be best.     How is a seizure treated?  If treatment is recommended, patients are typically first started on a seizure medication. There are many different kinds of medications used to treat epilepsy and each medication may work differently for each individual person.  Your doctor will help choose a medication based on your history and what kind of epilepsy you have.   The goals with medications are to stop all seizures and NOT cause any problems with medication side effects.  If you continue to have seizures that do not stop with medications alone, other treatments like surgery, special diets, or implanted devices may be considered.     Please see the handout “What is a seizure and what is epilepsy?” for more information.

## 2024-04-26 RX ORDER — ROSUVASTATIN CALCIUM 20 MG/1
20 TABLET, COATED ORAL DAILY
Qty: 90 TABLET | Refills: 1 | Status: SHIPPED | OUTPATIENT
Start: 2024-04-26

## 2024-05-21 ENCOUNTER — OFFICE VISIT (OUTPATIENT)
Dept: FAMILY MEDICINE CLINIC | Facility: HOSPITAL | Age: 74
End: 2024-05-21
Payer: MEDICARE

## 2024-05-21 VITALS
HEIGHT: 68 IN | OXYGEN SATURATION: 96 % | HEART RATE: 47 BPM | WEIGHT: 189 LBS | DIASTOLIC BLOOD PRESSURE: 68 MMHG | SYSTOLIC BLOOD PRESSURE: 126 MMHG | BODY MASS INDEX: 28.64 KG/M2

## 2024-05-21 DIAGNOSIS — H61.21 RIGHT EAR IMPACTED CERUMEN: ICD-10-CM

## 2024-05-21 DIAGNOSIS — I10 PRIMARY HYPERTENSION: Primary | ICD-10-CM

## 2024-05-21 PROCEDURE — 99212 OFFICE O/P EST SF 10 MIN: CPT | Performed by: INTERNAL MEDICINE

## 2024-05-21 PROCEDURE — 69209 REMOVE IMPACTED EAR WAX UNI: CPT | Performed by: INTERNAL MEDICINE

## 2024-05-21 NOTE — PROGRESS NOTES
"Assessment/Plan:     Diagnosis ICD-10-CM Associated Orders   1. Primary hypertension  I10 Ear cerumen removal      2. Right ear impacted cerumen  H61.21 Ear cerumen removal          Problem List Items Addressed This Visit        Cardiovascular and Mediastinum    HTN (hypertension) - Primary    Relevant Orders    Ear cerumen removal (Completed)   Other Visit Diagnoses     Right ear impacted cerumen        Relevant Orders    Ear cerumen removal (Completed)            No follow-ups on file.      Subjective:    Patient ID: Rajendra Lee is a 73 y.o. male    Here for cerumen impaction on right hear with hearing loss        The following portions of the patient's history were reviewed and updated as appropriate: allergies, current medications and problem list.     Review of Systems   HENT:  Positive for hearing loss.    All other systems reviewed and are negative.        Objective:      Current Outpatient Medications:   •  Coenzyme Q10 (CO Q 10) 100 MG CAPS, Take by mouth, Disp: , Rfl:   •  finasteride (PROSCAR) 5 mg tablet, Take 1 tablet (5 mg total) by mouth daily, Disp: 90 tablet, Rfl: 3  •  losartan (Cozaar) 50 mg tablet, Take 1 tablet (50 mg total) by mouth daily, Disp: 90 tablet, Rfl: 3  •  mometasone (ELOCON) 0.1 % cream, Apply topically daily, Disp: 45 g, Rfl: 2  •  rosuvastatin (CRESTOR) 20 MG tablet, Take 1 tablet by mouth once daily, Disp: 90 tablet, Rfl: 1  •  tamsulosin (FLOMAX) 0.4 mg, take 1 capsule by mouth once daily, Disp: 90 capsule, Rfl: 3    Blood pressure 126/68, pulse (!) 47, height 5' 8\" (1.727 m), weight 85.7 kg (189 lb), SpO2 96%.     Physical Exam  Vitals and nursing note reviewed.   Constitutional:       General: He is not in acute distress.  HENT:      Head: Normocephalic.      Right Ear: There is impacted cerumen.      Left Ear: Tympanic membrane normal.   Neurological:      Mental Status: He is alert.      Ear cerumen removal    Date/Time: 5/21/2024 3:00 PM    Performed by: Caitie Toscano"   Authorized by: Caitie Toscano DO  Universal Protocol:  Consent: Verbal consent obtained.  Consent given by: patient    Procedure details:     Location:  R ear    Procedure type: irrigation only      Approach:  External  Post-procedure details:     Complication:  None    Hearing quality:  Improved    Patient tolerance of procedure:  Tolerated well, no immediate complications

## 2024-07-11 ENCOUNTER — APPOINTMENT (OUTPATIENT)
Dept: RADIOLOGY | Facility: OTHER | Age: 74
End: 2024-07-11
Payer: MEDICARE

## 2024-07-11 ENCOUNTER — OFFICE VISIT (OUTPATIENT)
Dept: OBGYN CLINIC | Facility: OTHER | Age: 74
End: 2024-07-11
Payer: MEDICARE

## 2024-07-11 VITALS
HEIGHT: 68 IN | SYSTOLIC BLOOD PRESSURE: 119 MMHG | WEIGHT: 183.8 LBS | DIASTOLIC BLOOD PRESSURE: 73 MMHG | HEART RATE: 56 BPM | BODY MASS INDEX: 27.86 KG/M2

## 2024-07-11 DIAGNOSIS — M19.012 PRIMARY OSTEOARTHRITIS OF LEFT SHOULDER: Primary | ICD-10-CM

## 2024-07-11 DIAGNOSIS — M25.512 ACUTE PAIN OF LEFT SHOULDER: ICD-10-CM

## 2024-07-11 PROCEDURE — 73030 X-RAY EXAM OF SHOULDER: CPT

## 2024-07-11 PROCEDURE — 20610 DRAIN/INJ JOINT/BURSA W/O US: CPT | Performed by: ORTHOPAEDIC SURGERY

## 2024-07-11 PROCEDURE — 99213 OFFICE O/P EST LOW 20 MIN: CPT | Performed by: ORTHOPAEDIC SURGERY

## 2024-07-11 RX ORDER — TRIAMCINOLONE ACETONIDE 40 MG/ML
40 INJECTION, SUSPENSION INTRA-ARTICULAR; INTRAMUSCULAR
Status: COMPLETED | OUTPATIENT
Start: 2024-07-11 | End: 2024-07-11

## 2024-07-11 RX ORDER — BUPIVACAINE HYDROCHLORIDE 2.5 MG/ML
2 INJECTION, SOLUTION INFILTRATION; PERINEURAL
Status: COMPLETED | OUTPATIENT
Start: 2024-07-11 | End: 2024-07-11

## 2024-07-11 RX ADMIN — BUPIVACAINE HYDROCHLORIDE 2 ML: 2.5 INJECTION, SOLUTION INFILTRATION; PERINEURAL at 10:00

## 2024-07-11 RX ADMIN — TRIAMCINOLONE ACETONIDE 40 MG: 40 INJECTION, SUSPENSION INTRA-ARTICULAR; INTRAMUSCULAR at 10:00

## 2024-07-11 NOTE — PATIENT INSTRUCTIONS
CORTICOSTEROID INJECTION  What is a corticosteroid?  Injuries or disease such as arthritis, bursitis or tendonitis result in inflammation.  In turn, this inflammation can cause swelling and pain.  A local injection of a corticosteroid is provided to diminish inflammation.  By doing so, it will also decrease pain and swelling which is making you uncomfortable.     Is this the same thing as a Cortisone Injection?  Cortisone® is a brand name of a corticosteroid used commonly in the past.  Today I commonly use a more water-soluble corticosteroid named Kenalog®.    Will the injection hurt?  As with any injection, you may feel pain at the time of the injection. Typically, I use a local anesthetic (Marcaine®) in addition to the corticosteroid to determine if the injection has been placed in the appropriate location.  Hence it is important to monitor your symptoms 4-6 hours after the injection, as the area will be anesthetized (numb) while the local anesthetic is working.  Once the local anesthetic wears off, the intensity of pain can be the same as it was prior to the injection, or even worse.  This does not mean that the injection is not working.  The corticosteroid may take 24-72 hours to begin having a positive effect.    If you do experience an increase in pain, the use of an ice pack on the area for 20 minutes at a time should help.  It is also helpful to take an oral anti-inflammatory such as Tylenol® or Motrin® if you are able to medically do so.  For this reason it is best to avoid activities that put stress on the area the first 24 hours after the injection.    How long will pain relief last?  This will vary according to the type and severity of the symptoms being treated and the severity of the condition.  Symptom relief may last weeks to months.  I typically couple injections with physical therapy so that the underlying problem causing the inflammation may be treated as the pain diminishes.  If the combination  is not successful, you may be a surgical candidate.    I have read bad things about steroids.  Will these things happen to me?  Corticosteroids, when utilized properly, are safe and effective drugs.  When used in a low dose, potential adverse reactions are very rare.  Some patients may experience a sensation of flushing for several days.  Very rarely, there can be a local reaction which may include increased discomfort for a period of time in the areas that has been injected.  A steroid should not be used over and over again.  Multiple injections in the same area can produce adverse effects such as tissue atrophy and degeneration of tendon or cartilage.  A small percentage of patients (< 0.1%) may develop an infection in the joint after injection.  This is a treatable problem, but if neglected, may result in permanent disability.  Signs of infection include redness, swelling, discharge, fevers, increasing pain and drainage from the injection site.  This represents an emergency and you should contact our office immediately or seek treatment in the ER if after hours.    If I have diabetes, will this injection affect me?  If you are diabetic, an injection of a corticosteroid can raise your blood sugar level, requiring more insulin for a brief period of time.  This may necessitate careful blood sugar maintenance.  If the elevated sugars are not able to be controlled, contact your diabetic doctor for guidance.

## 2024-07-11 NOTE — PROGRESS NOTES
"  Assessment  Diagnoses and all orders for this visit:    Primary osteoarthritis of left shoulder      Discussion and Plan:    The patient has an examination consistent with left shoulder osteoarthritis.  I have discussed with the patient the pathophysiology of this diagnosis and reviewed how the examination correlates with this diagnosis.  Surgical vs conservative treatment options were discussed at length and after discussing these treatment options, the patient elected for a CS injection today.  Injection can be repeated in 4-6 mos if needed.  Explained the definitive treatment would be total shoulder arthoplasty.      Subjective:   Patient ID: Rajendra Lee is a 73 y.o. male      HPI  The patient presents with a chief complaint of left shoulder pain.   The pain began several year(s) ago and is not associated with an acute injury.  Patient was seen by Dr. Tavares 11/21/22 and provided with a CS injection for left GH OA which lasted for a long time.  The patient describes the pain as aching and dull in intensity,  intermittent in timing, and localizes the pain to the  left glenohumeral joint.  The pain is worse with movement and relieved by rest.  The pain is not associated with numbness and tingling.  The pain is not associated with constitutional symptoms. The patient is not awoken at night by the pain.           The following portions of the patient's history were reviewed and updated as appropriate: allergies, current medications, past family history, past medical history, past social history, past surgical history and problem list.      Objective:  /73 (BP Location: Right arm, Patient Position: Sitting, Cuff Size: Standard)   Pulse 56   Ht 5' 8\" (1.727 m)   Wt 83.4 kg (183 lb 12.8 oz)   BMI 27.95 kg/m²       Left Shoulder Exam     Tenderness   The patient is experiencing no tenderness.     Range of Motion   External rotation:  50   Forward flexion:  160   Internal rotation 0 degrees:  Sacrum "     Muscle Strength   Abduction: 4/5   External rotation: 4/5     Other   Erythema: absent  Sensation: normal  Pulse: present             Physical Exam  Vitals reviewed.   Constitutional:       Appearance: He is well-developed.   HENT:      Head: Normocephalic.   Eyes:      Pupils: Pupils are equal, round, and reactive to light.   Pulmonary:      Effort: Pulmonary effort is normal.   Abdominal:      General: Abdomen is flat. There is no distension.   Skin:     General: Skin is warm and dry.         Large joint arthrocentesis: L glenohumeral  Universal Protocol:  Consent: Verbal consent obtained.  Consent given by: patient  Patient understanding: patient states understanding of the procedure being performed  Site marked: the operative site was marked  Patient identity confirmed: verbally with patient  Supporting Documentation  Indications: pain   Procedure Details  Location: shoulder - L glenohumeral  Needle size: 22 G  Ultrasound guidance: no  Approach: posterior  Medications administered: 2 mL bupivacaine 0.25 %; 40 mg triamcinolone acetonide 40 mg/mL    Patient tolerance: patient tolerated the procedure well with no immediate complications  Dressing:  Sterile dressing applied          I have personally reviewed pertinent films in PACS and my interpretation is as follows.    Left shoulder x-rays demonstrates moderate GH degenerative changes with large inferior osteophyte.    Scribe Attestation      I,:  Harleen Dexter am acting as a scribe while in the presence of the attending physician.:       I,:  Josue Preston MD personally performed the services described in this documentation    as scribed in my presence.:

## 2024-07-23 ENCOUNTER — NURSE TRIAGE (OUTPATIENT)
Age: 74
End: 2024-07-23

## 2024-07-23 ENCOUNTER — OFFICE VISIT (OUTPATIENT)
Dept: UROLOGY | Facility: AMBULATORY SURGERY CENTER | Age: 74
End: 2024-07-23
Payer: MEDICARE

## 2024-07-23 VITALS
BODY MASS INDEX: 28.04 KG/M2 | DIASTOLIC BLOOD PRESSURE: 78 MMHG | SYSTOLIC BLOOD PRESSURE: 126 MMHG | HEART RATE: 57 BPM | HEIGHT: 68 IN | WEIGHT: 185 LBS | OXYGEN SATURATION: 96 %

## 2024-07-23 DIAGNOSIS — N13.8 ENLARGED PROSTATE WITH URINARY OBSTRUCTION: ICD-10-CM

## 2024-07-23 DIAGNOSIS — R97.20 INCREASED PROSTATE SPECIFIC ANTIGEN (PSA) VELOCITY: ICD-10-CM

## 2024-07-23 DIAGNOSIS — N20.0 NEPHROLITHIASIS: ICD-10-CM

## 2024-07-23 DIAGNOSIS — N40.1 ENLARGED PROSTATE WITH URINARY OBSTRUCTION: ICD-10-CM

## 2024-07-23 DIAGNOSIS — R39.12 WEAK URINARY STREAM: Primary | ICD-10-CM

## 2024-07-23 LAB — POST-VOID RESIDUAL VOLUME, ML POC: 0 ML

## 2024-07-23 PROCEDURE — 99213 OFFICE O/P EST LOW 20 MIN: CPT

## 2024-07-23 PROCEDURE — 51798 US URINE CAPACITY MEASURE: CPT

## 2024-07-23 RX ORDER — TAMSULOSIN HYDROCHLORIDE 0.4 MG/1
0.8 CAPSULE ORAL
Qty: 180 CAPSULE | Refills: 3 | Status: SHIPPED | OUTPATIENT
Start: 2024-07-23

## 2024-07-23 RX ORDER — TAMSULOSIN HYDROCHLORIDE 0.4 MG/1
0.8 CAPSULE ORAL
Qty: 180 CAPSULE | Refills: 3 | Status: SHIPPED | OUTPATIENT
Start: 2024-07-23 | End: 2024-07-23

## 2024-07-23 NOTE — PROGRESS NOTES
Office Visit- Urology  Rajendra Lee 1950 MRN: [id number]      Assessment/Discussion/Plan    73 y.o. male managed by     Discussion/Plan      Nephrolithiasis  -Patient notes mild flank discomfort  Will obtain updated imaging with a CT stone study  -Follow-up after imaging    2.  Prostate cancer screening/Increased PSA velocity   -Last PSA was 2.01 (4.02 in December 2023)1.3 (2.6 on finasteride)  in November 2022  -IDALIA today with an enlarged prostate. No overt indurated nodularity but right prostate lobe right-sided soft asymmetry  -Obtain updated PSA   -Given increased PSA velocity on finasteride, elevated PSA, prostate exam today will obtain MRI of the prostate to determine presence of PI-RADS lesions.  Would plan to proceed with MRI fusion transperineal prostate biopsy as clinically indicated by presence of PI-RADS lesions  -Follow-up after imaging    3.  BPH with LUTS  -Maintained on Flomax 0.4 mg and finasteride 5 mg   -Patient with weaker stream  Trial increasing Flomax 0.8 mg  -Reassess at follow-up         Chief Complaint:   Rajendra is a 73 y.o. male presenting to the office for a follow up visit regarding nephrolithiasis        Subjective    Patient is a 73-year-old male with a history of nephrolithiasis requiring ureteroscopy intervention in 2018 and known BPH maintained on Flomax 0.4 mg and finasteride 5 mg who presents to the office for follow-up.  He was last in the office in June 2021.  At that point in time he was advised to follow-up on as-needed basis.  Who presents to the office today due to concern for weaker urinary stream.  He has been taking Flomax and finasteride on a daily basis.  He denies any dysuria or gross hematuria.  He did have PSA test in December 2023 which returned at 2.01 (4.02 when corrected for finasteride).  Previous to this PSA was measuring at 1.3 November 2022.  No known family history of prostate cancer.  Patient states he has also been having intermittent flank pain.   He is not sure if it is musculoskeletal back pain or not but notes that it is predominantly when he wakes up in the morning.  He does have a history of nephrolithiasis and not had any updated imaging since 2020.  At that point in time he had bilateral 2 to 3 mm calculi      AUA SYMPTOM SCORE      Flowsheet Row Most Recent Value   AUA SYMPTOM SCORE    How often have you had a sensation of not emptying your bladder completely after you finished urinating? 3 (P)     How often have you had to urinate again less than two hours after you finished urinating? 1 (P)     How often have you found you stopped and started again several times when you urinate? 2 (P)     How often have you found it difficult to postpone urination? 2 (P)     How often have you had a weak urinary stream? 3 (P)     How often have you had to push or strain to begin urination? 1 (P)     How many times did you most typically get up to urinate from the time you went to bed at night until the time you got up in the morning? 1 (P)     Quality of Life: If you were to spend the rest of your life with your urinary condition just the way it is now, how would you feel about that? 4 (P)     AUA SYMPTOM SCORE 13 (P)              ROS:   Review of Systems   Constitutional: Negative.  Negative for chills, fatigue and fever.   HENT: Negative.     Respiratory:  Negative for shortness of breath.    Cardiovascular:  Negative for chest pain.   Gastrointestinal: Negative.  Negative for abdominal pain.   Endocrine: Negative.    Musculoskeletal: Negative.    Skin: Negative.    Neurological: Negative.  Negative for dizziness and light-headedness.   Hematological: Negative.    Psychiatric/Behavioral: Negative.           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     Asthma     Chronic pain     back    GERD (gastroesophageal reflux disease)     Hiatal hernia 1994    Hyperlipidemia     Hypertension     Kidney stone     r  stent- suppose to be removed Fri 7/13    PONV (postoperative nausea and vomiting)     Shortness of breath        Past Surgical History  Past Surgical History:   Procedure Laterality Date    ABDOMINAL ADHESION SURGERY N/A 7/11/2018    Procedure: LYSIS ADHESIONS EXTENSIVE;  Surgeon: Audi Lara MD;  Location: [facility];  Service: General    ABDOMINAL SURGERY      BACK SURGERY      x2    CARPAL TUNNEL RELEASE      COLONOSCOPY      CYSTOSCOPY  08/07/2020    CYSTOSCOPY W/ URETERAL STENT REMOVAL  01/26/2018    ESOPHAGOGASTRODUODENOSCOPY N/A 7/11/2018    Procedure: ESOPHAGOGASTRODUODENOSCOPY (EGD) INTRAOPERATIVE ;  Surgeon: Audi Lara MD;  Location[facility];  Service: General    FL INJECTION RIGHT SHOULDER (ARTHROGRAM)  5/13/2022    HALLUX VALGUS CORRECTION Bilateral     1st MTP joint     HERNIA REPAIR      HIATAL HERNIA REPAIR  1994    INCISIONAL HERNIA REPAIR N/A 7/11/2018    Procedure: REPAIR HERNIA INCISIONAL X2;  Surgeon: Audi Lara MD;  [facility];  Service: General    KNEE SURGERY Bilateral     LITHOTRIPSY      PERIPHERAL NEUROPLASTY OF FINGER / HAND / ARM      left middle finger    MD ARTHROPLASTY GLENOHUMERAL JOINT TOTAL SHOULDER Right 6/1/2021    Procedure: TOTAL SHOULDER ARTHROPLASTY ANATOMIC, biceps tenodesis;  Surgeon: Josue Preston MD;  [facility];  Service: Orthopedics    MD CYSTO/URETERO W/LITHOTRIPSY &INDWELL STENT INSRT Left 1/3/2018    Procedure: CYSTOSCOPY , RETROGRADE PYELOGRAM AND INSERTION STENT URETERAL, FULGERATION;  Surgeon: Conner Adames MD;  [facility];  Service: Urology    MD CYSTO/URETERO W/LITHOTRIPSY &INDWELL STENT INSRT Right 6/8/2018    Procedure: CYSTOSCOPY URETEROSCOPY WITH LITHOTRIPSY HOLMIUM LASER, RETROGRADE PYELOGRAM AND INSERTION STENT URETERAL;  Surgeon: Seth Figueroa MD;  [facility];  Service: Urology    MD LAPS RPR PARAESPHGL HRNA INCL FUNDPLSTY W/MESH N/A 7/11/2018    Procedure: REPAIR RECURRENT HERNIA  PARAESOPHAGEAL  LAPAROSCOPIC;TOUPE FUNDIPLICATION;  Surgeon: Audi Lara MD;  [facility];  Service: General    NE LITHOTRIPSY XTRCORP SHOCK WAVE Left 1/18/2018    Procedure: ESWL;  Surgeon: Seth Figueroa MD;  [facility];  Service: Urology    ROTATOR CUFF REPAIR Right     SHOULDER ARTHROSCOPY Bilateral     SHOULDER SURGERY         Past Family History  Family History   Problem Relation Age of Onset    Diabetes Mother     Hypertension Mother     Brain cancer Mother     Hypertension Sister     Nephrolithiasis Sister     COPD Father     Cancer Maternal Grandmother     Aortic aneurysm Maternal Uncle         x3 maternal uncles        Past Social history  Social History     Socioeconomic History    Marital status: /Civil Union     Spouse name: Not on file    Number of children: Not on file    Years of education: Not on file    Highest education level: Not on file   Occupational History    Not on file   Tobacco Use    Smoking status: Never    Smokeless tobacco: Never   Vaping Use    Vaping status: Never Used   Substance and Sexual Activity    Alcohol use: Yes     Alcohol/week: 1.0 standard drink of alcohol     Types: 1 Cans of beer per week     Comment: once a month     Drug use: No    Sexual activity: Not Currently     Comment: flomax is helping    Other Topics Concern    Not on file   Social History Narrative    Dental care, regularly    Lives with spouse     Living situation: Supportive and safe    No advance directive on file     Social Determinants of Health     Financial Resource Strain: Low Risk  (10/21/2022)    Overall Financial Resource Strain (CARDIA)     Difficulty of Paying Living Expenses: Not very hard   Food Insecurity: Unknown (4/19/2024)    Hunger Vital Sign     Worried About Running Out of Food in the Last Year: Not on file     Ran Out of Food in the Last Year: Never true   Transportation Needs: No Transportation Needs (4/19/2024)    PRAPARE - Transportation     Lack of  "Transportation (Medical): No     Lack of Transportation (Non-Medical): No   Physical Activity: Not on file   Stress: Not on file   Social Connections: Not on file   Intimate Partner Violence: Not on file   Housing Stability: Unknown (4/19/2024)    Housing Stability Vital Sign     Unable to Pay for Housing in the Last Year: No     Number of Times Moved in the Last Year: Not on file     Homeless in the Last Year: Not on file       Current Medications  Current Outpatient Medications   Medication Sig Dispense Refill    Coenzyme Q10 (CO Q 10) 100 MG CAPS Take by mouth      finasteride (PROSCAR) 5 mg tablet Take 1 tablet (5 mg total) by mouth daily 90 tablet 3    losartan (Cozaar) 50 mg tablet Take 1 tablet (50 mg total) by mouth daily 90 tablet 3    mometasone (ELOCON) 0.1 % cream Apply topically daily 45 g 2    rosuvastatin (CRESTOR) 20 MG tablet Take 1 tablet by mouth once daily 90 tablet 1    tamsulosin (FLOMAX) 0.4 mg take 1 capsule by mouth once daily 90 capsule 3     No current facility-administered medications for this visit.       Allergies  Allergies   Allergen Reactions    Meperidine GI Intolerance    Lisinopril Cough     Other reaction(s): Cough       OBJECTIVE    Vitals   Vitals:    07/23/24 1031   Weight: 83 kg (183 lb)   Height: 5' 8\" (1.727 m)       PVR:    Physical Exam  Constitutional:       General: He is not in acute distress.     Appearance: Normal appearance. He is normal weight. He is not ill-appearing or toxic-appearing.   HENT:      Head: Normocephalic and atraumatic.   Eyes:      Conjunctiva/sclera: Conjunctivae normal.   Cardiovascular:      Rate and Rhythm: Normal rate.   Pulmonary:      Effort: Pulmonary effort is normal. No respiratory distress.   Genitourinary:     Comments: On IDALIA prostate does feel significantly enlarged.  Upon palpation of the right side of the prostate possible soft asymmetry appreciated on compared to left.  There is no indurated nodularity appreciated on my " exam  Skin:     General: Skin is warm and dry.   Neurological:      General: No focal deficit present.      Mental Status: He is alert and oriented to person, place, and time.      Cranial Nerves: No cranial nerve deficit.   Psychiatric:         Mood and Affect: Mood normal.         Behavior: Behavior normal.         Thought Content: Thought content normal.          Labs:     Lab Results   Component Value Date    PSA 2.01 12/29/2023    PSA 1.3 11/03/2022    PSA 1.9 05/07/2021    PSA 2.6 06/10/2020     Lab Results   Component Value Date    CREATININE 0.83 12/29/2023      Lab Results   Component Value Date    HGBA1C 5.4 05/07/2021     Lab Results   Component Value Date    GLUCOSE 88 10/21/2015    CALCIUM 9.4 12/29/2023     10/21/2015    K 3.8 12/29/2023    CO2 28 12/29/2023     12/29/2023    BUN 12 12/29/2023    CREATININE 0.83 12/29/2023       I have personally reviewed all pertinent lab results and reviewed with patient    Imaging       Gil Mackey PA-C  Date: 7/23/2024 Time: 10:32 AM  [facility]    This note was written using fluency dictation software. Please excuse any resulting minor grammatical errors.

## 2024-07-23 NOTE — TELEPHONE ENCOUNTER
Rec'd call from pharmacy regarding sig for patients flomax:  Sig: Take 2 capsules (0.8 mg total) by mouth daily with dinner take 1 capsule by mouth once daily   Seeking clarification if patient is supposed to take 2 daily, one in AM and one at bedtime.  Please clarify, note did not specify.

## 2024-07-23 NOTE — TELEPHONE ENCOUNTER
Called and spoke with patient. Informed on AP's note. Pharmacist states that they have a cancellation on the medication. Informed that they should have received a new script sent over around the same time as the cancellation. He states they have not received it.

## 2024-07-24 ENCOUNTER — APPOINTMENT (OUTPATIENT)
Dept: LAB | Facility: HOSPITAL | Age: 74
End: 2024-07-24
Payer: MEDICARE

## 2024-07-24 DIAGNOSIS — R97.20 INCREASED PROSTATE SPECIFIC ANTIGEN (PSA) VELOCITY: ICD-10-CM

## 2024-07-24 LAB
ANION GAP SERPL CALCULATED.3IONS-SCNC: 9 MMOL/L (ref 4–13)
BUN SERPL-MCNC: 12 MG/DL (ref 5–25)
CALCIUM SERPL-MCNC: 9.7 MG/DL (ref 8.4–10.2)
CHLORIDE SERPL-SCNC: 105 MMOL/L (ref 96–108)
CO2 SERPL-SCNC: 27 MMOL/L (ref 21–32)
CREAT SERPL-MCNC: 0.91 MG/DL (ref 0.6–1.3)
GFR SERPL CREATININE-BSD FRML MDRD: 82 ML/MIN/1.73SQ M
GLUCOSE P FAST SERPL-MCNC: 95 MG/DL (ref 65–99)
POTASSIUM SERPL-SCNC: 4 MMOL/L (ref 3.5–5.3)
PSA FREE MFR SERPL: 17.82 %
PSA FREE SERPL-MCNC: 0.27 NG/ML
PSA SERPL-MCNC: 1.51 NG/ML (ref 0–4)
SODIUM SERPL-SCNC: 141 MMOL/L (ref 135–147)

## 2024-07-24 PROCEDURE — 36415 COLL VENOUS BLD VENIPUNCTURE: CPT

## 2024-07-24 PROCEDURE — 84154 ASSAY OF PSA FREE: CPT

## 2024-07-24 PROCEDURE — 80048 BASIC METABOLIC PNL TOTAL CA: CPT

## 2024-07-24 PROCEDURE — 84153 ASSAY OF PSA TOTAL: CPT

## 2024-07-27 ENCOUNTER — HOSPITAL ENCOUNTER (OUTPATIENT)
Dept: CT IMAGING | Facility: HOSPITAL | Age: 74
Discharge: HOME | End: 2024-07-27
Payer: MEDICARE

## 2024-07-27 DIAGNOSIS — N20.0 NEPHROLITHIASIS: ICD-10-CM

## 2024-07-27 PROCEDURE — 74176 CT ABD & PELVIS W/O CONTRAST: CPT

## 2024-07-31 ENCOUNTER — HOSPITAL ENCOUNTER (OUTPATIENT)
Facility: MEDICAL CENTER | Age: 74
Discharge: HOME | End: 2024-07-31
Payer: MEDICARE

## 2024-07-31 DIAGNOSIS — R97.20 INCREASED PROSTATE SPECIFIC ANTIGEN (PSA) VELOCITY: ICD-10-CM

## 2024-07-31 PROCEDURE — 72197 MRI PELVIS W/O & W/DYE: CPT

## 2024-07-31 PROCEDURE — A9585 GADOBUTROL INJECTION: HCPCS

## 2024-07-31 PROCEDURE — 76377 3D RENDER W/INTRP POSTPROCES: CPT

## 2024-07-31 RX ORDER — GADOBUTROL 604.72 MG/ML
8 INJECTION INTRAVENOUS
Status: COMPLETED | OUTPATIENT
Start: 2024-07-31 | End: 2024-07-31

## 2024-07-31 RX ADMIN — GADOBUTROL 8 ML: 604.72 INJECTION INTRAVENOUS at 11:03

## 2024-08-05 DIAGNOSIS — N40.1 ENLARGED PROSTATE WITH URINARY OBSTRUCTION: ICD-10-CM

## 2024-08-05 DIAGNOSIS — N13.8 ENLARGED PROSTATE WITH URINARY OBSTRUCTION: ICD-10-CM

## 2024-08-05 RX ORDER — TAMSULOSIN HYDROCHLORIDE 0.4 MG/1
0.8 CAPSULE ORAL
Qty: 200 CAPSULE | Refills: 1 | Status: SHIPPED | OUTPATIENT
Start: 2024-08-05

## 2024-08-05 NOTE — TELEPHONE ENCOUNTER
"NOT A DUPLICATE  Patient checked with pharmacy and they do not have a new prescription with the dosage change to 2 capsules daily. Prescription dated 07/23/24 shows \"Phone In\" class.     Reason for call:   [] Refill   [] Prior Auth  [x] Other: resend    Office:   [x] Specialty/Provider - uro / [place]    Medication: tamsulosin    Dose/Frequency: 0.4mg (2 capsules qd)    Quantity: 180    Pharmacy: [facility] - [place] - [address] [facility].     Does the patient have enough for 3 days?   [] Yes   [x] No - Send as HP to POD    "

## 2024-08-06 ENCOUNTER — TELEPHONE (OUTPATIENT)
Dept: UROLOGY | Facility: AMBULATORY SURGERY CENTER | Age: 74
End: 2024-08-06

## 2024-08-06 NOTE — TELEPHONE ENCOUNTER
----- Message from Gil Mackey PA-C sent at 8/6/2024  1:07 PM EDT -----  Please call patient to inform him that at this point in time his MRI does not reveal any concern for prostate cancer.  Overall indicates a low risk of clinically significant prostate cancer.  This finding will be discussed further at follow-up appointment scheduled in a couple days.  In addition there is no evidence of any obstructing ureteral stone to be the cause of patient's flank pain

## 2024-08-06 NOTE — TELEPHONE ENCOUNTER
Called patient to let him know that his MRI came back and  does not reveal any concern for prostate cancer.  Overall indicates a low risk of clinically significant prostate cancer.  This finding will be discussed further at follow-up appointment scheduled in a couple days.  In addition there is no evidence of any obstructing ureteral stone to be the cause of patient's flank pain. Patient Is aware of his results.

## 2024-08-13 ENCOUNTER — OFFICE VISIT (OUTPATIENT)
Dept: UROLOGY | Facility: AMBULATORY SURGERY CENTER | Age: 74
End: 2024-08-13
Payer: MEDICARE

## 2024-08-13 VITALS
SYSTOLIC BLOOD PRESSURE: 124 MMHG | BODY MASS INDEX: 27.74 KG/M2 | DIASTOLIC BLOOD PRESSURE: 80 MMHG | OXYGEN SATURATION: 97 % | HEIGHT: 68 IN | WEIGHT: 183 LBS | HEART RATE: 65 BPM

## 2024-08-13 DIAGNOSIS — N20.0 NEPHROLITHIASIS: ICD-10-CM

## 2024-08-13 DIAGNOSIS — N40.1 BENIGN NODULAR PROSTATIC HYPERPLASIA WITH LOWER URINARY TRACT SYMPTOMS: ICD-10-CM

## 2024-08-13 DIAGNOSIS — Z12.5 SCREENING FOR PROSTATE CANCER: ICD-10-CM

## 2024-08-13 DIAGNOSIS — R97.20 INCREASED PROSTATE SPECIFIC ANTIGEN (PSA) VELOCITY: Primary | ICD-10-CM

## 2024-08-13 PROCEDURE — 99214 OFFICE O/P EST MOD 30 MIN: CPT

## 2024-08-13 NOTE — ASSESSMENT & PLAN NOTE
Lab Results   Component Value Date    PSA 1.510 07/24/2024    PSA 2.01 12/29/2023    PSA 1.3 11/03/2022   IDALIA at last office visit with an enlarged prostate and right sided soft asymmetry  There has been a minimal rise in PSA velocity which prompted a multiparametric MRI of the prostate along with some right-sided asymmetry  Multiparametric MRI of the prostate on 7/31/2024 with PI-RADS 2 and prostate size calculated at 104 g  Discussed with patient that there is no need to proceed with any prostate biopsy at this time, his PSA strongly correlates with his prostate size  Plan to follow-up in 6 months with repeat PSA

## 2024-08-13 NOTE — PROGRESS NOTES
Assessment and plan:     Nephrolithiasis  CT scan from 7/27/2024 demonstrated multiple bilateral renal calculi measuring up to 8 mm, no hydronephrosis noted  Continue with diet and lifestyle modifications such as increasing water and fluid intake to 2 to 3 L of water daily  Instructed patient that if he is having any kidney stone event/concern for passage he can call our office and we would be happy to order repeat imaging  He has undergone several procedures in the past for kidney stone removal    Benign nodular prostatic hyperplasia with lower urinary tract symptoms  Currently utilizing Flomax 0.8 mg and finasteride 5 mg  Today in the office he states that since increasing the Flomax he has noticed an improvement in his urinary symptoms  Discussed with him that if symptoms start to worsen we can do further workup in the office for bladder outlet obstruction with a cystoscopy  On most recent MRI of the prostate it is noted that he has a 104 g prostate based off of prostate size if he were interested in surgery in the future he would most likely be a good candidate for HoLEP  Plan to follow-up in 6 months with PVR and AUA    Screening for prostate cancer  Lab Results   Component Value Date    PSA 1.510 07/24/2024    PSA 2.01 12/29/2023    PSA 1.3 11/03/2022   IDALIA at last office visit with an enlarged prostate and right sided soft asymmetry  There has been a minimal rise in PSA velocity which prompted a multiparametric MRI of the prostate along with some right-sided asymmetry  Multiparametric MRI of the prostate on 7/31/2024 with PI-RADS 2 and prostate size calculated at 104 g  Discussed with patient that there is no need to proceed with any prostate biopsy at this time, his PSA strongly correlates with his prostate size  Plan to follow-up in 6 months with repeat PSA        History of Present Illness     Rajendra Lee is a 73 y.o. male who presents today to the office for follow-up of nephrolithiasis, BPH with  "LUTS, prostate cancer screening.  He was last seen in the office in July 2024.  At that time it was noted that he had some asymmetry on the right side of his prostate and an increased PSA velocity.  He has been on finasteride for a few years now.  He had a multiparametric MRI of the prostate which demonstrated a PI-RADS 2 with 104 g prostate.    He also had some complaints of a weaker urinary stream at his last office visit.  He was increased to 0.8 mg of Flomax.  Today in the office he states that his urinary symptoms have overall improved.  He is satisfied with the way that they are.  We did briefly discuss surgical intervention and he will think about this in the future.  He denies any dysuria, hematuria, weakened urinary stream, hesitancy, feelings of incomplete bladder emptying, hesitancy suprapubic or flank pain.    He also has a history of nephrolithiasis.  He was complaining of left flank pain at his last office visit.  We reviewed his CT scan which did not demonstrate any ureteral calculi.  He has had to have intervention in the past for kidney stones.  Today in the office he states that he does not have any flank pain and he feels as though he has not been passing any kidney stones.    Laboratory     Lab Results   Component Value Date    BUN 12 07/24/2024    CREATININE 0.91 07/24/2024       No components found for: \"GFR\"    Lab Results   Component Value Date    GLUCOSE 88 10/21/2015    CALCIUM 9.7 07/24/2024     10/21/2015    K 4.0 07/24/2024    CO2 27 07/24/2024     07/24/2024       Lab Results   Component Value Date    WBC 6.18 12/29/2023    HGB 15.7 12/29/2023    HCT 47.5 12/29/2023    MCV 86 12/29/2023     12/29/2023       Lab Results   Component Value Date    PSA 1.510 07/24/2024    PSA 2.01 12/29/2023    PSA 1.3 11/03/2022       No results found for this or any previous visit (from the past 1 hour(s)).    Review of Systems     Review of Systems   Constitutional:  Negative for " chills and fever.   Respiratory: Negative.  Negative for cough and shortness of breath.    Cardiovascular: Negative.  Negative for chest pain.   Gastrointestinal: Negative.  Negative for abdominal distention, abdominal pain, nausea and vomiting.   Genitourinary:  Negative for decreased urine volume, difficulty urinating, dysuria, flank pain, frequency, hematuria, penile discharge, penile pain, penile swelling, scrotal swelling, testicular pain and urgency.   Skin: Negative.  Negative for rash.   Neurological: Negative.    Hematological:  Negative for adenopathy. Does not bruise/bleed easily.       AUA SYMPTOM SCORE      Flowsheet Row Most Recent Value   AUA SYMPTOM SCORE    How often have you had a sensation of not emptying your bladder completely after you finished urinating? 3 (P)     How often have you had to urinate again less than two hours after you finished urinating? 1 (P)     How often have you found you stopped and started again several times when you urinate? 1 (P)     How often have you found it difficult to postpone urination? 3 (P)     How often have you had a weak urinary stream? 3 (P)     How often have you had to push or strain to begin urination? 1 (P)     How many times did you most typically get up to urinate from the time you went to bed at night until the time you got up in the morning? 1 (P)     Quality of Life: If you were to spend the rest of your life with your urinary condition just the way it is now, how would you feel about that? 3 (P)     AUA SYMPTOM SCORE 13 (P)                    Allergies     Allergies   Allergen Reactions    Meperidine GI Intolerance    Lisinopril Cough     Other reaction(s): Cough       Physical Exam     Physical Exam  Vitals reviewed.   Constitutional:       Appearance: Normal appearance.   HENT:      Head: Normocephalic and atraumatic.   Eyes:      Pupils: Pupils are equal, round, and reactive to light.   Cardiovascular:      Rate and Rhythm: Normal rate.  "  Pulmonary:      Effort: Pulmonary effort is normal.   Musculoskeletal:      Cervical back: Normal range of motion.   Skin:     General: Skin is warm and dry.   Neurological:      General: No focal deficit present.      Mental Status: He is alert and oriented to person, place, and time.   Psychiatric:         Mood and Affect: Mood normal.         Behavior: Behavior normal.         Thought Content: Thought content normal.         Judgment: Judgment normal.         Vital Signs     Vitals:    08/13/24 1321   BP: 124/80   BP Location: Left arm   Patient Position: Sitting   Cuff Size: Adult   Pulse: 65   SpO2: 97%   Weight: 83 kg (183 lb)   Height: 5' 8\" (1.727 m)       Current Medications       Current Outpatient Medications:     Coenzyme Q10 (CO Q 10) 100 MG CAPS, Take by mouth, Disp: , Rfl:     finasteride (PROSCAR) 5 mg tablet, Take 1 tablet (5 mg total) by mouth daily, Disp: 90 tablet, Rfl: 3    losartan (Cozaar) 50 mg tablet, Take 1 tablet (50 mg total) by mouth daily, Disp: 90 tablet, Rfl: 3    mometasone (ELOCON) 0.1 % cream, Apply topically daily, Disp: 45 g, Rfl: 2    rosuvastatin (CRESTOR) 20 MG tablet, Take 1 tablet by mouth once daily, Disp: 90 tablet, Rfl: 1    tamsulosin (FLOMAX) 0.4 mg, Take 2 capsules (0.8 mg total) by mouth daily with dinner, Disp: 200 capsule, Rfl: 1    Active Problems     Patient Active Problem List   Diagnosis    Ureterolithiasis    Lumbar disc disease    Gastroesophageal reflux disease without esophagitis    Nephrolithiasis    Ureteral calculus    HTN (hypertension)    Benign nodular prostatic hyperplasia with lower urinary tract symptoms    Chronic pain of both ankles    Cutaneous skin tags    Eczema of both external ears    Enlarged prostate    Hemangioma of skin    Hyperlipidemia    Insomnia    Left inguinal hernia    Chronic pain syndrome    Postlaminectomy syndrome, lumbar    Paraesophageal hernia    Internal derangement of right shoulder    Low back pain    Asthma    Allergic " rhinitis    Multiple pigmented nevi    Family history of diabetes mellitus in mother    Status post total shoulder arthroplasty, right    Full thickness tear of right subscapularis tendon    Single unprovoked seizure (HCC)    Abrasion of right knee    Elevated TSH    Primary osteoarthritis of left shoulder    Screening for prostate cancer       Past Medical History     Past Medical History:   Diagnosis Date    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     Asthma     Chronic pain     back    GERD (gastroesophageal reflux disease)     Hiatal hernia 1994    Hyperlipidemia     Hypertension     Kidney stone     r stent- suppose to be removed Fri 7/13    PONV (postoperative nausea and vomiting)     Shortness of breath        Surgical History     Past Surgical History:   Procedure Laterality Date    ABDOMINAL ADHESION SURGERY N/A 7/11/2018    Procedure: LYSIS ADHESIONS EXTENSIVE;  Surgeon: Audi Lara MD;  [facility];  Service: General    ABDOMINAL SURGERY      BACK SURGERY      x2    CARPAL TUNNEL RELEASE      COLONOSCOPY      CYSTOSCOPY  08/07/2020    CYSTOSCOPY W/ URETERAL STENT REMOVAL  01/26/2018    ESOPHAGOGASTRODUODENOSCOPY N/A 7/11/2018    Procedure: ESOPHAGOGASTRODUODENOSCOPY (EGD) INTRAOPERATIVE ;  Surgeon: Audi Lara MD;  [facility];  Service: General    FL INJECTION RIGHT SHOULDER (ARTHROGRAM)  5/13/2022    HALLUX VALGUS CORRECTION Bilateral     1st MTP joint     HERNIA REPAIR      HIATAL HERNIA REPAIR  1994    INCISIONAL HERNIA REPAIR N/A 7/11/2018    Procedure: REPAIR HERNIA INCISIONAL X2;  Surgeon: Audi Lara MD;  [facility];  Service: General    KNEE SURGERY Bilateral     LITHOTRIPSY      PERIPHERAL NEUROPLASTY OF FINGER / HAND / ARM      left middle finger    HI ARTHROPLASTY GLENOHUMERAL JOINT TOTAL SHOULDER Right 6/1/2021    Procedure: TOTAL SHOULDER ARTHROPLASTY ANATOMIC, biceps tenodesis;  Surgeon: Josue Preston MD;  [facility];  Service: Orthopedics     RI CYSTO/URETERO W/LITHOTRIPSY &INDWELL STENT INSRT Left 1/3/2018    Procedure: CYSTOSCOPY , RETROGRADE PYELOGRAM AND INSERTION STENT URETERAL, FULGERATION;  Surgeon: Conner Adames MD;  [facility];  Service: Urology    RI CYSTO/URETERO W/LITHOTRIPSY &INDWELL STENT INSRT Right 6/8/2018    Procedure: CYSTOSCOPY URETEROSCOPY WITH LITHOTRIPSY HOLMIUM LASER, RETROGRADE PYELOGRAM AND INSERTION STENT URETERAL;  Surgeon: Seth Figueroa MD;  [facility];  Service: Urology    RI LAPS RPR PARAESPHGL HRNA INCL FUNDPLSTY W/MESH N/A 7/11/2018    Procedure: REPAIR RECURRENT HERNIA PARAESOPHAGEAL  LAPAROSCOPIC;TOUPE FUNDIPLICATION;  Surgeon: Audi Lara MD;  [facility];  Service: General    RI LITHOTRIPSY XTRCORP SHOCK WAVE Left 1/18/2018    Procedure: ESWL;  Surgeon: Seth Figueroa MD;  [facility];  Service: Urology    ROTATOR CUFF REPAIR Right     SHOULDER ARTHROSCOPY Bilateral     SHOULDER SURGERY         Family History     Family History   Problem Relation Age of Onset    Diabetes Mother     Hypertension Mother     Brain cancer Mother     Hypertension Sister     Nephrolithiasis Sister     COPD Father     Cancer Maternal Grandmother     Aortic aneurysm Maternal Uncle         x3 maternal uncles        Social History     Social History     Social History     Tobacco Use   Smoking Status Never   Smokeless Tobacco Never       Past Surgical History:   Procedure Laterality Date    ABDOMINAL ADHESION SURGERY N/A 7/11/2018    Procedure: LYSIS ADHESIONS EXTENSIVE;  Surgeon: Audi Lara MD;  [facility];  Service: General    ABDOMINAL SURGERY      BACK SURGERY      x2    CARPAL TUNNEL RELEASE      COLONOSCOPY      CYSTOSCOPY  08/07/2020    CYSTOSCOPY W/ URETERAL STENT REMOVAL  01/26/2018    ESOPHAGOGASTRODUODENOSCOPY N/A 7/11/2018    Procedure: ESOPHAGOGASTRODUODENOSCOPY (EGD) INTRAOPERATIVE ;  Surgeon: Audi Lara MD;  [facility];  Service: General     FL INJECTION RIGHT SHOULDER (ARTHROGRAM)  5/13/2022    HALLUX VALGUS CORRECTION Bilateral     1st MTP joint     HERNIA REPAIR      HIATAL HERNIA REPAIR  1994    INCISIONAL HERNIA REPAIR N/A 7/11/2018    Procedure: REPAIR HERNIA INCISIONAL X2;  Surgeon: Audi Lara MD;  [facility];  Service: General    KNEE SURGERY Bilateral     LITHOTRIPSY      PERIPHERAL NEUROPLASTY OF FINGER / HAND / ARM      left middle finger    TX ARTHROPLASTY GLENOHUMERAL JOINT TOTAL SHOULDER Right 6/1/2021    Procedure: TOTAL SHOULDER ARTHROPLASTY ANATOMIC, biceps tenodesis;  Surgeon: Josue Preston MD;  [facility];  Service: Orthopedics    TX CYSTO/URETERO W/LITHOTRIPSY &INDWELL STENT INSRT Left 1/3/2018    Procedure: CYSTOSCOPY , RETROGRADE PYELOGRAM AND INSERTION STENT URETERAL, FULGERATION;  Surgeon: Conner Adames MD;  [facility];  Service: Urology    TX CYSTO/URETERO W/LITHOTRIPSY &INDWELL STENT INSRT Right 6/8/2018    Procedure: CYSTOSCOPY URETEROSCOPY WITH LITHOTRIPSY HOLMIUM LASER, RETROGRADE PYELOGRAM AND INSERTION STENT URETERAL;  Surgeon: Seth Figueroa MD;  [facility];  Service: Urology    TX LAPS RPR PARAESPHGL HRNA INCL FUNDPLSTY W/MESH N/A 7/11/2018    Procedure: REPAIR RECURRENT HERNIA PARAESOPHAGEAL  LAPAROSCOPIC;TOUPE FUNDIPLICATION;  Surgeon: Audi Lara MD;  [facility];  Service: General    TX LITHOTRIPSY XTRCORP SHOCK WAVE Left 1/18/2018    Procedure: ESWL;  Surgeon: Seth Figueroa MD;  [facility];  Service: Urology    ROTATOR CUFF REPAIR Right     SHOULDER ARTHROSCOPY Bilateral     SHOULDER SURGERY           The following portions of the patient's history were reviewed and updated as appropriate: allergies, current medications, past family history, past medical history, past social history, past surgical history and problem list    Please note :  Voice dictation software has been used to create this document.  There may be inadvertent  transcription errors.    ORTIZ Pina

## 2024-08-13 NOTE — ASSESSMENT & PLAN NOTE
Currently utilizing Flomax 0.8 mg and finasteride 5 mg  Today in the office he states that since increasing the Flomax he has noticed an improvement in his urinary symptoms  Discussed with him that if symptoms start to worsen we can do further workup in the office for bladder outlet obstruction with a cystoscopy  On most recent MRI of the prostate it is noted that he has a 104 g prostate based off of prostate size if he were interested in surgery in the future he would most likely be a good candidate for HoLEP  Plan to follow-up in 6 months with PVR and AUA

## 2024-08-13 NOTE — ASSESSMENT & PLAN NOTE
CT scan from 7/27/2024 demonstrated multiple bilateral renal calculi measuring up to 8 mm, no hydronephrosis noted  Continue with diet and lifestyle modifications such as increasing water and fluid intake to 2 to 3 L of water daily  Instructed patient that if he is having any kidney stone event/concern for passage he can call our office and we would be happy to order repeat imaging  He has undergone several procedures in the past for kidney stone removal

## 2024-09-12 PROBLEM — Z12.5 SCREENING FOR PROSTATE CANCER: Status: RESOLVED | Noted: 2024-08-13 | Resolved: 2024-09-12

## 2024-10-03 ENCOUNTER — OFFICE VISIT (OUTPATIENT)
Dept: OBGYN CLINIC | Facility: CLINIC | Age: 74
End: 2024-10-03
Payer: MEDICARE

## 2024-10-03 ENCOUNTER — RA CDI HCC (OUTPATIENT)
Dept: OTHER | Facility: HOSPITAL | Age: 74
End: 2024-10-03

## 2024-10-03 ENCOUNTER — APPOINTMENT (OUTPATIENT)
Dept: RADIOLOGY | Facility: CLINIC | Age: 74
End: 2024-10-03
Payer: MEDICARE

## 2024-10-03 VITALS
HEIGHT: 68 IN | DIASTOLIC BLOOD PRESSURE: 80 MMHG | BODY MASS INDEX: 28.04 KG/M2 | WEIGHT: 185 LBS | SYSTOLIC BLOOD PRESSURE: 124 MMHG

## 2024-10-03 DIAGNOSIS — M25.561 RIGHT KNEE PAIN, UNSPECIFIED CHRONICITY: ICD-10-CM

## 2024-10-03 DIAGNOSIS — M23.91 INTERNAL DERANGEMENT OF RIGHT KNEE: ICD-10-CM

## 2024-10-03 DIAGNOSIS — M25.561 ACUTE PAIN OF RIGHT KNEE: Primary | ICD-10-CM

## 2024-10-03 PROCEDURE — 99213 OFFICE O/P EST LOW 20 MIN: CPT | Performed by: ORTHOPAEDIC SURGERY

## 2024-10-03 PROCEDURE — 73564 X-RAY EXAM KNEE 4 OR MORE: CPT

## 2024-10-03 NOTE — PROGRESS NOTES
Assessment:     1. Acute pain of right knee    2. Internal derangement of right knee        Plan:     Problem List Items Addressed This Visit          Surgery/Wound/Pain    Acute pain of right knee - Primary    Relevant Orders    XR knee 4+ vw right injury    MRI arthrogram right knee    FL injection right knee (arthrogram)    Brace     Other Visit Diagnoses       Internal derangement of right knee        Relevant Orders    MRI arthrogram right knee    FL injection right knee (arthrogram)         Findings consistent with acute right knee pain following twisting injury, possible recurrent medial meniscal tear vs MCL sprain. X-ray and prognosis reviewed with patient. We recommend MRI arthrogram of right knee to evaluate for recurrent medial meniscal tear, referral placed. He will also be given hinged knee brace to wear with activity. Avoid pivoting or twisting motions, deep squats. Continue with ibuprofen and tylenol as needed for pain. If he does have recurrent tear then arthroscopy partial medial meniscectomy would be recommended, if just MCL sprain it just takes time to heal 6-8 weeks from onset. See back for MRI review. All patient's questions were answered to his satisfaction.  This note is created using dictation transcription.  It may contain typographical errors, grammatical errors, improperly dictated words, background noise and other errors.    Subjective:     Patient ID: Rajendra Lee is a 74 y.o. male.  Chief Complaint:  74 yr old male in for evaluation of right knee pain. Twist and fall injury 4 weeks ago. He was in his yard holding leash for his [place] h[place]josa when he stepped in hole and twisted knee and was being pulled opposite way by dog. He had immediate pain in medial aspect of knee after injury, noticed some mild swelling. He has tried to give it some time but pain has persisted. He is using knee brace which offers support while walking. Pain continues medial aspect of knee out, denies any  locking or carolina instability. He has been careful how he moves. He is using stationary bike for exercise. Pivoting or twisting motions do reproduce pain.  Using motrin and tylenol for pain control. History right knee arthroscopy years ago.     Allergy:  Allergies   Allergen Reactions    Meperidine GI Intolerance    Lisinopril Cough     Other reaction(s): Cough     Medications:  all current active meds have been reviewed  Past Medical History:  Past Medical History:   Diagnosis Date    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     Asthma     Chronic pain     back    GERD (gastroesophageal reflux disease)     Hiatal hernia 1994    Hyperlipidemia     Hypertension     Kidney stone     r stent- suppose to be removed Fri 7/13    PONV (postoperative nausea and vomiting)     Shortness of breath      Past Surgical History:  Past Surgical History:   Procedure Laterality Date    ABDOMINAL ADHESION SURGERY N/A 7/11/2018    Procedure: LYSIS ADHESIONS EXTENSIVE;  Surgeon: Audi Lara MD;  [facility];  Service: General    ABDOMINAL SURGERY      BACK SURGERY      x2    CARPAL TUNNEL RELEASE      COLONOSCOPY      CYSTOSCOPY  08/07/2020    CYSTOSCOPY W/ URETERAL STENT REMOVAL  01/26/2018    ESOPHAGOGASTRODUODENOSCOPY N/A 7/11/2018    Procedure: ESOPHAGOGASTRODUODENOSCOPY (EGD) INTRAOPERATIVE ;  Surgeon: Audi Lara MD;  [facility];  Service: General    FL INJECTION RIGHT SHOULDER (ARTHROGRAM)  5/13/2022    HALLUX VALGUS CORRECTION Bilateral     1st MTP joint     HERNIA REPAIR      HIATAL HERNIA REPAIR  1994    INCISIONAL HERNIA REPAIR N/A 7/11/2018    Procedure: REPAIR HERNIA INCISIONAL X2;  Surgeon: Audi Lara MD;  [facility];  Service: General    KNEE SURGERY Bilateral     LITHOTRIPSY      PERIPHERAL NEUROPLASTY OF FINGER / HAND / ARM      left middle finger    [place] ARTHROPLASTY GLENOHUMERAL JOINT TOTAL SHOULDER Right 6/1/2021    Procedure: TOTAL SHOULDER ARTHROPLASTY ANATOMIC, biceps tenodesis;   Surgeon: Josue Preston MD;  [facility];  Service: Orthopedics    MI CYSTO/URETERO W/LITHOTRIPSY &INDWELL STENT INSRT Left 1/3/2018    Procedure: CYSTOSCOPY , RETROGRADE PYELOGRAM AND INSERTION STENT URETERAL, FULGERATION;  Surgeon: Conner Adames MD;  [facility];  Service: Urology    MI CYSTO/URETERO W/LITHOTRIPSY &INDWELL STENT INSRT Right 6/8/2018    Procedure: CYSTOSCOPY URETEROSCOPY WITH LITHOTRIPSY HOLMIUM LASER, RETROGRADE PYELOGRAM AND INSERTION STENT URETERAL;  Surgeon: Seth Figueroa MD;  [facility];  Service: Urology    MI LAPS RPR PARAESPHGL HRNA INCL FUNDPLSTY W/MESH N/A 7/11/2018    Procedure: REPAIR RECURRENT HERNIA PARAESOPHAGEAL  LAPAROSCOPIC;TOUPE FUNDIPLICATION;  Surgeon: Audi Lara MD;  [facility];  Service: General    MI LITHOTRIPSY XTRCORP SHOCK WAVE Left 1/18/2018    Procedure: ESWL;  Surgeon: Seth Figueroa MD;  [facility];  Service: Urology    ROTATOR CUFF REPAIR Right     SHOULDER ARTHROSCOPY Bilateral     SHOULDER SURGERY       Family History:  Family History   Problem Relation Age of Onset    Diabetes Mother     Hypertension Mother     Brain cancer Mother     Hypertension Sister     Nephrolithiasis Sister     COPD Father     Cancer Maternal Grandmother     Aortic aneurysm Maternal Uncle         x3 maternal uncles      Social History:  Social History     Substance and Sexual Activity   Alcohol Use Yes    Alcohol/week: 1.0 standard drink of alcohol    Types: 1 Cans of beer per week    Comment: occasionally     Social History     Substance and Sexual Activity   Drug Use No     Social History     Tobacco Use   Smoking Status Never   Smokeless Tobacco Never     Review of Systems   Constitutional:  Negative for chills and fever.   HENT:  Negative for ear pain and sore throat.    Eyes:  Negative for pain and visual disturbance.   Respiratory:  Negative for cough and shortness of breath.    Cardiovascular:  Negative for chest pain  "and palpitations.   Gastrointestinal:  Negative for abdominal pain and vomiting.   Genitourinary:  Negative for dysuria and hematuria.   Musculoskeletal:  Positive for arthralgias (right knee) and gait problem (Antalgic). Negative for back pain and joint swelling.   Skin:  Negative for color change and rash.   Neurological:  Negative for seizures and syncope.   Psychiatric/Behavioral: Negative.     All other systems reviewed and are negative.        Objective:  BP Readings from Last 1 Encounters:   10/03/24 124/80      Wt Readings from Last 1 Encounters:   10/03/24 83.9 kg (185 lb)      BMI:   Estimated body mass index is 28.13 kg/m² as calculated from the following:    Height as of this encounter: 5' 8\" (1.727 m).    Weight as of this encounter: 83.9 kg (185 lb).  BSA:   Estimated body surface area is 1.98 meters squared as calculated from the following:    Height as of this encounter: 5' 8\" (1.727 m).    Weight as of this encounter: 83.9 kg (185 lb).   Physical Exam  Vitals and nursing note reviewed.   Constitutional:       Appearance: Normal appearance. He is well-developed.   HENT:      Head: Normocephalic and atraumatic.      Right Ear: External ear normal.      Left Ear: External ear normal.   Eyes:      Extraocular Movements: Extraocular movements intact.      Conjunctiva/sclera: Conjunctivae normal.   Pulmonary:      Effort: Pulmonary effort is normal.   Musculoskeletal:         General: Tenderness (right knee arthralgia) present.      Cervical back: Neck supple.      Right knee: No effusion.      Instability Tests: Medial Renetta test positive. Lateral Renetta test negative.   Skin:     General: Skin is warm and dry.   Neurological:      Mental Status: He is alert and oriented to person, place, and time.      Deep Tendon Reflexes: Reflexes are normal and symmetric.   Psychiatric:         Mood and Affect: Mood normal.         Behavior: Behavior normal.       Right Knee Exam     Muscle Strength   The " patient has normal right knee strength.    Tenderness   The patient is experiencing tenderness in the MCL (femoral insertion).    Range of Motion   Extension:  normal   Flexion:  normal     Tests   Renetta:  Medial - positive Lateral - negative  Varus: negative Valgus: negative (stable with pain)  Lachman:  Anterior - negative      Drawer:  Anterior - negative      Patellar apprehension: negative    Other   Erythema: absent  Scars: absent  Sensation: normal  Pulse: present  Swelling: none  Effusion: no effusion present    Comments:  Mild crepitation with motion of patellofemoral joint            I have personally reviewed pertinent films in PACS and my interpretation is x-ray right knee mild osteoarthritis with small marginal osteophytes.  No soft tissue calcification.    Scribe Attestation      I,:  Christopher Farah am acting as a scribe while in the presence of the attending physician.:       I,:  Ras Scott MD personally performed the services described in this documentation    as scribed in my presence.:

## 2024-10-09 ENCOUNTER — OFFICE VISIT (OUTPATIENT)
Dept: FAMILY MEDICINE CLINIC | Facility: HOSPITAL | Age: 74
End: 2024-10-09
Payer: MEDICARE

## 2024-10-09 ENCOUNTER — TELEPHONE (OUTPATIENT)
Age: 74
End: 2024-10-09

## 2024-10-09 VITALS
BODY MASS INDEX: 28.04 KG/M2 | HEART RATE: 53 BPM | HEIGHT: 68 IN | SYSTOLIC BLOOD PRESSURE: 126 MMHG | DIASTOLIC BLOOD PRESSURE: 70 MMHG | OXYGEN SATURATION: 97 % | WEIGHT: 185 LBS

## 2024-10-09 DIAGNOSIS — Z23 NEED FOR IMMUNIZATION AGAINST INFLUENZA: ICD-10-CM

## 2024-10-09 DIAGNOSIS — J45.20 MILD INTERMITTENT REACTIVE AIRWAY DISEASE WITHOUT COMPLICATION: ICD-10-CM

## 2024-10-09 DIAGNOSIS — F51.01 PRIMARY INSOMNIA: ICD-10-CM

## 2024-10-09 DIAGNOSIS — M96.1 POSTLAMINECTOMY SYNDROME, LUMBAR: ICD-10-CM

## 2024-10-09 DIAGNOSIS — I10 PRIMARY HYPERTENSION: Primary | ICD-10-CM

## 2024-10-09 DIAGNOSIS — J30.1 ALLERGIC RHINITIS DUE TO POLLEN, UNSPECIFIED SEASONALITY: ICD-10-CM

## 2024-10-09 DIAGNOSIS — N40.1 BENIGN NODULAR PROSTATIC HYPERPLASIA WITH LOWER URINARY TRACT SYMPTOMS: ICD-10-CM

## 2024-10-09 PROCEDURE — 99214 OFFICE O/P EST MOD 30 MIN: CPT | Performed by: INTERNAL MEDICINE

## 2024-10-09 PROCEDURE — 90662 IIV NO PRSV INCREASED AG IM: CPT

## 2024-10-09 PROCEDURE — G0008 ADMIN INFLUENZA VIRUS VAC: HCPCS

## 2024-10-09 RX ORDER — HYDROCODONE BITARTRATE AND ACETAMINOPHEN 5; 325 MG/1; MG/1
1 TABLET ORAL EVERY 6 HOURS PRN
Qty: 60 TABLET | Refills: 0 | Status: SHIPPED | OUTPATIENT
Start: 2024-10-09

## 2024-10-09 RX ORDER — ZOLPIDEM TARTRATE 5 MG/1
5 TABLET ORAL
Qty: 30 TABLET | Refills: 0 | Status: SHIPPED | OUTPATIENT
Start: 2024-10-09

## 2024-10-09 RX ORDER — ALBUTEROL SULFATE 90 UG/1
2 INHALANT RESPIRATORY (INHALATION) EVERY 6 HOURS PRN
Qty: 18 G | Refills: 1 | Status: SHIPPED | OUTPATIENT
Start: 2024-10-09 | End: 2024-11-08

## 2024-10-09 RX ORDER — FLUTICASONE PROPIONATE 50 MCG
1 SPRAY, SUSPENSION (ML) NASAL DAILY
Qty: 3 ML | Refills: 3 | Status: SHIPPED | OUTPATIENT
Start: 2024-10-09

## 2024-10-09 NOTE — PROGRESS NOTES
Assessment/Plan:     Diagnosis ICD-10-CM Associated Orders   1. Primary hypertension  I10       2. Postlaminectomy syndrome, lumbar  M96.1 Ambulatory referral to Spine & Pain Management     MRI lumbar spine wo contrast     HYDROcodone-acetaminophen (NORCO) 5-325 mg per tablet      3. Benign nodular prostatic hyperplasia with lower urinary tract symptoms  N40.1       4. Allergic rhinitis due to pollen, unspecified seasonality  J30.1 fluticasone (FLONASE) 50 mcg/act nasal spray      5. Mild intermittent reactive airway disease without complication  J45.20 albuterol (PROVENTIL HFA,VENTOLIN HFA) 90 mcg/act inhaler      6. Primary insomnia  F51.01 zolpidem (AMBIEN) 5 mg tablet          Problem List Items Addressed This Visit          Cardiovascular and Mediastinum    HTN (hypertension) - Primary     Well controlled   Working out at [facility]-   had some headache with sinuses with allergies            Respiratory    Allergic rhinitis    Relevant Medications    fluticasone (FLONASE) 50 mcg/act nasal spray       Genitourinary    Benign nodular prostatic hyperplasia with lower urinary tract symptoms     Seen by pa- doubled his tamsulosin            Surgery/Wound/Pain    Postlaminectomy syndrome, lumbar    Relevant Medications    HYDROcodone-acetaminophen (NORCO) 5-325 mg per tablet    Other Relevant Orders    Ambulatory referral to Spine & Pain Management    MRI lumbar spine wo contrast       Neurology/Sleep    Insomnia    Relevant Medications    zolpidem (AMBIEN) 5 mg tablet     Other Visit Diagnoses       Mild intermittent reactive airway disease without complication        Relevant Medications    albuterol (PROVENTIL HFA,VENTOLIN HFA) 90 mcg/act inhaler              No follow-ups on file.      Subjective:    Patient ID: Rajendra Lee is a 74 y.o. male    HPI    The following portions of the patient's history were reviewed and updated as appropriate: allergies, current medications and problem list.     Review of  "Systems      Objective:      Current Outpatient Medications:     albuterol (PROVENTIL HFA,VENTOLIN HFA) 90 mcg/act inhaler, Inhale 2 puffs every 6 (six) hours as needed for wheezing, Disp: 18 g, Rfl: 1    Coenzyme Q10 (CO Q 10) 100 MG CAPS, Take by mouth, Disp: , Rfl:     finasteride (PROSCAR) 5 mg tablet, Take 1 tablet (5 mg total) by mouth daily, Disp: 90 tablet, Rfl: 3    fluticasone (FLONASE) 50 mcg/act nasal spray, 1 spray into each nostril daily 3 month supply 3x18.2, Disp: 3 mL, Rfl: 3    HYDROcodone-acetaminophen (NORCO) 5-325 mg per tablet, Take 1 tablet by mouth every 6 (six) hours as needed for pain Max Daily Amount: 4 tablets, Disp: 60 tablet, Rfl: 0    losartan (Cozaar) 50 mg tablet, Take 1 tablet (50 mg total) by mouth daily, Disp: 90 tablet, Rfl: 3    mometasone (ELOCON) 0.1 % cream, Apply topically daily, Disp: 45 g, Rfl: 2    rosuvastatin (CRESTOR) 20 MG tablet, Take 1 tablet by mouth once daily, Disp: 90 tablet, Rfl: 1    tamsulosin (FLOMAX) 0.4 mg, Take 2 capsules (0.8 mg total) by mouth daily with dinner, Disp: 200 capsule, Rfl: 1    zolpidem (AMBIEN) 5 mg tablet, Take 1 tablet (5 mg total) by mouth daily at bedtime as needed for sleep, Disp: 30 tablet, Rfl: 0    Blood pressure 126/70, pulse (!) 53, height 5' 8\" (1.727 m), weight 83.9 kg (185 lb), SpO2 97%.     Physical Exam  Vitals and nursing note reviewed.   HENT:      Head: Normocephalic.      Right Ear: Tympanic membrane normal. There is no impacted cerumen.      Left Ear: Tympanic membrane normal. There is no impacted cerumen.      Nose: Congestion present.      Mouth/Throat:      Pharynx: No posterior oropharyngeal erythema.   Eyes:      General:         Right eye: No discharge.         Left eye: No discharge.   Cardiovascular:      Rate and Rhythm: Normal rate and regular rhythm.      Heart sounds: No murmur heard.  Pulmonary:      Effort: No respiratory distress.      Breath sounds: No wheezing.      Comments: Prolonged exp " phase  Abdominal:      General: There is no distension.      Palpations: Abdomen is soft.      Tenderness: There is no abdominal tenderness.   Musculoskeletal:         General: Tenderness present. No swelling.      Cervical back: Normal range of motion. No tenderness.      Right lower leg: No edema.      Left lower leg: No edema.      Comments: Lumbar stiffness- difficulty with getting out of chair   Skin:     Coloration: Skin is not jaundiced.      Findings: No rash.   Neurological:      Mental Status: He is alert and oriented to person, place, and time.      Motor: No weakness.      Gait: Gait normal.   Psychiatric:         Mood and Affect: Mood normal.         Thought Content: Thought content normal.         Judgment: Judgment normal.

## 2024-10-09 NOTE — TELEPHONE ENCOUNTER
Pharmacist called to confirmed date of last OV and dx for Hydrocodone-Acetaminophen. He stated since this was the first time this was being prescribed he just needed to confirm that info. I informed him that pt was seen today 10/9 and the dx is Postlaminectomy syndrome, lumbar. Pharmacist verbalized confirmation and will prepare Rx for pt.

## 2024-10-15 ENCOUNTER — HOSPITAL ENCOUNTER (OUTPATIENT)
Dept: RADIOLOGY | Facility: HOSPITAL | Age: 74
Discharge: HOME | End: 2024-10-15
Attending: ORTHOPAEDIC SURGERY
Payer: MEDICARE

## 2024-10-15 ENCOUNTER — HOSPITAL ENCOUNTER (OUTPATIENT)
Dept: MRI IMAGING | Facility: HOSPITAL | Age: 74
Discharge: HOME | End: 2024-10-15
Attending: ORTHOPAEDIC SURGERY
Payer: MEDICARE

## 2024-10-15 DIAGNOSIS — M25.561 ACUTE PAIN OF RIGHT KNEE: ICD-10-CM

## 2024-10-15 DIAGNOSIS — M23.91 INTERNAL DERANGEMENT OF RIGHT KNEE: ICD-10-CM

## 2024-10-15 PROCEDURE — 73722 MRI JOINT OF LWR EXTR W/DYE: CPT

## 2024-10-15 PROCEDURE — 77002 NEEDLE LOCALIZATION BY XRAY: CPT

## 2024-10-15 PROCEDURE — A9585 GADOBUTROL INJECTION: HCPCS | Performed by: ORTHOPAEDIC SURGERY

## 2024-10-15 PROCEDURE — 27369 NJX CNTRST KNE ARTHG/CT/MRI: CPT

## 2024-10-15 RX ORDER — GADOBUTROL 604.72 MG/ML
2 INJECTION INTRAVENOUS
Status: COMPLETED | OUTPATIENT
Start: 2024-10-15 | End: 2024-10-15

## 2024-10-15 RX ORDER — ROPIVACAINE HYDROCHLORIDE 2 MG/ML
10 INJECTION, SOLUTION EPIDURAL; INFILTRATION; PERINEURAL ONCE
Status: COMPLETED | OUTPATIENT
Start: 2024-10-15 | End: 2024-10-15

## 2024-10-15 RX ORDER — LIDOCAINE HYDROCHLORIDE 10 MG/ML
5 INJECTION, SOLUTION EPIDURAL; INFILTRATION; INTRACAUDAL; PERINEURAL
Status: COMPLETED | OUTPATIENT
Start: 2024-10-15 | End: 2024-10-15

## 2024-10-15 RX ADMIN — GADOBUTROL 0.2 ML: 604.72 INJECTION INTRAVENOUS at 11:40

## 2024-10-15 RX ADMIN — LIDOCAINE HYDROCHLORIDE 5 ML: 10 INJECTION, SOLUTION EPIDURAL; INFILTRATION; INTRACAUDAL; PERINEURAL at 10:31

## 2024-10-15 RX ADMIN — IOHEXOL 1 ML: 300 INJECTION, SOLUTION INTRAVENOUS at 10:29

## 2024-10-15 RX ADMIN — ROPIVACAINE HYDROCHLORIDE 2 ML: 2 INJECTION, SOLUTION EPIDURAL; INFILTRATION at 10:31

## 2024-10-22 ENCOUNTER — OFFICE VISIT (OUTPATIENT)
Dept: OBGYN CLINIC | Facility: CLINIC | Age: 74
End: 2024-10-22
Payer: MEDICARE

## 2024-10-22 VITALS
HEIGHT: 68 IN | BODY MASS INDEX: 28.04 KG/M2 | DIASTOLIC BLOOD PRESSURE: 72 MMHG | SYSTOLIC BLOOD PRESSURE: 122 MMHG | WEIGHT: 185 LBS

## 2024-10-22 DIAGNOSIS — M17.11 PRIMARY OSTEOARTHRITIS OF RIGHT KNEE: Primary | ICD-10-CM

## 2024-10-22 PROCEDURE — 20610 DRAIN/INJ JOINT/BURSA W/O US: CPT | Performed by: ORTHOPAEDIC SURGERY

## 2024-10-22 PROCEDURE — 99213 OFFICE O/P EST LOW 20 MIN: CPT | Performed by: ORTHOPAEDIC SURGERY

## 2024-10-22 RX ORDER — BETAMETHASONE SODIUM PHOSPHATE AND BETAMETHASONE ACETATE 3; 3 MG/ML; MG/ML
6 INJECTION, SUSPENSION INTRA-ARTICULAR; INTRALESIONAL; INTRAMUSCULAR; SOFT TISSUE
Status: COMPLETED | OUTPATIENT
Start: 2024-10-22 | End: 2024-10-22

## 2024-10-22 RX ORDER — LIDOCAINE HYDROCHLORIDE 10 MG/ML
7 INJECTION, SOLUTION EPIDURAL; INFILTRATION; INTRACAUDAL; PERINEURAL
Status: COMPLETED | OUTPATIENT
Start: 2024-10-22 | End: 2024-10-22

## 2024-10-22 RX ADMIN — BETAMETHASONE SODIUM PHOSPHATE AND BETAMETHASONE ACETATE 6 MG: 3; 3 INJECTION, SUSPENSION INTRA-ARTICULAR; INTRALESIONAL; INTRAMUSCULAR; SOFT TISSUE at 08:45

## 2024-10-22 RX ADMIN — LIDOCAINE HYDROCHLORIDE 7 ML: 10 INJECTION, SOLUTION EPIDURAL; INFILTRATION; INTRACAUDAL; PERINEURAL at 08:45

## 2024-10-22 NOTE — PROGRESS NOTES
Assessment:     1. Primary osteoarthritis of right knee        Plan:     Problem List Items Addressed This Visit          Musculoskeletal and Integument    Primary osteoarthritis of right knee - Primary     Findings consistent with acute right knee pain following twisting injury with osteoarthritis.  I reviewed patient's right knee arthrogram MRI and radiology report with him.  I informed patient that there is no distinct recurrent tear of the medial meniscus.  Patient does have arthritis in his knee which may have been irritated.  I recommended trial of cortisone injection, which patient agreed to proceed.  Patient tolerated the procedure well.  I advised patient to continue low impact exercises with stationary bike or swimming.  Use over-the-counter NSAID diclofenac and Aspercreme or Voltaren gel.  If the cortisone injection does not provide him with good pain relief, we could try joint supplement injection.  All patient's questions were answered to his satisfaction.  This note is created using dictation transcription.  It may contain typographical errors, grammatical errors, improperly dictated words, background noise and other errors.         Relevant Medications    lidocaine (PF) (XYLOCAINE-MPF) 1 % injection 7 mL (Completed)    betamethasone acetate-betamethasone sodium phosphate (CELESTONE) injection 6 mg (Completed)    Other Relevant Orders    Large joint arthrocentesis: R knee (Completed)      Subjective:     Patient ID: Rajendra Lee is a 74 y.o. male.  Chief Complaint:  74 yr old male follow-up right knee pain. Twist and fall injury 6 weeks ago. He was in his yard holding leash for his [employer] [employer] when he stepped in hole and twisted knee and was being pulled opposite way by dog. He had immediate pain in medial aspect of knee after injury, noticed some mild swelling. He has tried to give it some time but pain has persisted. He is using knee brace which offers support while walking. Pain continues  medial aspect of knee out, denies any locking or carolina instability. He has been careful how he moves. He is using stationary bike for exercise. Pivoting or twisting motions do reproduce pain.  Using diclofenac and tylenol for pain control. History right knee arthroscopy years ago.  Since last visit he is feeling more a aching pain sometimes 8 out of 10.  Symptom is intermittent.  Patient is here to review his right knee arthrogram MRI.    Allergy:  Allergies   Allergen Reactions    Meperidine GI Intolerance    Lisinopril Cough     Other reaction(s): Cough     Medications:  all current active meds have been reviewed  Past Medical History:  Past Medical History:   Diagnosis Date    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     Asthma     Chronic pain     back    GERD (gastroesophageal reflux disease)     Hiatal hernia 1994    Hyperlipidemia     Hypertension     Kidney stone     r stent- suppose to be removed Fri 7/13    PONV (postoperative nausea and vomiting)     Shortness of breath      Past Surgical History:  Past Surgical History:   Procedure Laterality Date    ABDOMINAL ADHESION SURGERY N/A 7/11/2018    Procedure: LYSIS ADHESIONS EXTENSIVE;  Surgeon: Audi Lara MD;  [facility];  Service: General    ABDOMINAL SURGERY      BACK SURGERY      x2    CARPAL TUNNEL RELEASE      COLONOSCOPY      CYSTOSCOPY  08/07/2020    CYSTOSCOPY W/ URETERAL STENT REMOVAL  01/26/2018    ESOPHAGOGASTRODUODENOSCOPY N/A 7/11/2018    Procedure: ESOPHAGOGASTRODUODENOSCOPY (EGD) INTRAOPERATIVE ;  Surgeon: Audi Lara MD;  [facility];  Service: General    FL INJECTION RIGHT KNEE (ARTHROGRAM)  10/15/2024    FL INJECTION RIGHT SHOULDER (ARTHROGRAM)  5/13/2022    HALLUX VALGUS CORRECTION Bilateral     1st MTP joint     HERNIA REPAIR      HIATAL HERNIA REPAIR  1994    INCISIONAL HERNIA REPAIR N/A 7/11/2018    Procedure: REPAIR HERNIA INCISIONAL X2;  Surgeon: Audi Lara MD;  [facility];  Service: General     KNEE SURGERY Bilateral     LITHOTRIPSY      PERIPHERAL NEUROPLASTY OF FINGER / HAND / ARM      left middle finger    ME ARTHROPLASTY GLENOHUMERAL JOINT TOTAL SHOULDER Right 6/1/2021    Procedure: TOTAL SHOULDER ARTHROPLASTY ANATOMIC, biceps tenodesis;  Surgeon: Josue Preston MD;  [facility];  Service: Orthopedics    ME CYSTO/URETERO W/LITHOTRIPSY &INDWELL STENT INSRT Left 1/3/2018    Procedure: CYSTOSCOPY , RETROGRADE PYELOGRAM AND INSERTION STENT URETERAL, FULGERATION;  Surgeon: Conner Adames MD;  [facility];  Service: Urology    ME CYSTO/URETERO W/LITHOTRIPSY &INDWELL STENT INSRT Right 6/8/2018    Procedure: CYSTOSCOPY URETEROSCOPY WITH LITHOTRIPSY HOLMIUM LASER, RETROGRADE PYELOGRAM AND INSERTION STENT URETERAL;  Surgeon: Seth Figueroa MD;  [facility];  Service: Urology    ME LAPS RPR PARAESPHGL HRNA INCL FUNDPLSTY W/MESH N/A 7/11/2018    Procedure: REPAIR RECURRENT HERNIA PARAESOPHAGEAL  LAPAROSCOPIC;TOUPE FUNDIPLICATION;  Surgeon: Audi Lara MD;  [facility];  Service: General    ME LITHOTRIPSY XTRCORP SHOCK WAVE Left 1/18/2018    Procedure: ESWL;  Surgeon: Seth Figueroa MD;  [facility];  Service: Urology    ROTATOR CUFF REPAIR Right     SHOULDER ARTHROSCOPY Bilateral     SHOULDER SURGERY       Family History:  Family History   Problem Relation Age of Onset    Diabetes Mother     Hypertension Mother     Brain cancer Mother     Hypertension Sister     Nephrolithiasis Sister     COPD Father     Cancer Maternal Grandmother     Aortic aneurysm Maternal Uncle         x3 maternal uncles      Social History:  Social History     Substance and Sexual Activity   Alcohol Use Yes    Alcohol/week: 1.0 standard drink of alcohol    Types: 1 Cans of beer per week    Comment: occasionally     Social History     Substance and Sexual Activity   Drug Use No     Social History     Tobacco Use   Smoking Status Never   Smokeless Tobacco Never     Review of Systems  "  Constitutional:  Negative for chills and fever.   HENT:  Negative for ear pain and sore throat.    Eyes:  Negative for pain and visual disturbance.   Respiratory:  Negative for cough and shortness of breath.    Cardiovascular:  Negative for chest pain and palpitations.   Gastrointestinal:  Negative for abdominal pain and vomiting.   Genitourinary:  Negative for dysuria and hematuria.   Musculoskeletal:  Positive for arthralgias (right knee). Negative for back pain, gait problem and joint swelling.   Skin:  Negative for color change and rash.   Neurological:  Negative for seizures and syncope.   Psychiatric/Behavioral: Negative.     All other systems reviewed and are negative.        Objective:  BP Readings from Last 1 Encounters:   10/22/24 122/72      Wt Readings from Last 1 Encounters:   10/22/24 83.9 kg (185 lb)      BMI:   Estimated body mass index is 28.13 kg/m² as calculated from the following:    Height as of this encounter: 5' 8\" (1.727 m).    Weight as of this encounter: 83.9 kg (185 lb).  BSA:   Estimated body surface area is 1.98 meters squared as calculated from the following:    Height as of this encounter: 5' 8\" (1.727 m).    Weight as of this encounter: 83.9 kg (185 lb).   Physical Exam  Vitals and nursing note reviewed.   Constitutional:       Appearance: Normal appearance. He is well-developed.   HENT:      Head: Normocephalic and atraumatic.      Right Ear: External ear normal.      Left Ear: External ear normal.   Eyes:      Extraocular Movements: Extraocular movements intact.      Conjunctiva/sclera: Conjunctivae normal.   Pulmonary:      Effort: Pulmonary effort is normal.   Musculoskeletal:      Cervical back: Neck supple.      Right knee: No effusion.      Instability Tests: Medial Renetta test positive. Lateral Renetta test negative.   Skin:     General: Skin is warm and dry.   Neurological:      Mental Status: He is alert and oriented to person, place, and time.      Deep Tendon " Reflexes: Reflexes are normal and symmetric.   Psychiatric:         Mood and Affect: Mood normal.         Behavior: Behavior normal.       Right Knee Exam     Muscle Strength   The patient has normal right knee strength.    Tenderness   The patient is experiencing tenderness in the MCL (femoral insertion).    Range of Motion   Extension:  normal   Flexion:  normal     Tests   Renetta:  Medial - positive Lateral - negative  Varus: negative Valgus: negative (stable with pain)  Patellar apprehension: negative    Other   Erythema: absent  Scars: absent  Sensation: normal  Pulse: present  Swelling: none  Effusion: no effusion present    Comments:  Mild crepitation with motion of patellofemoral joint            I have personally reviewed pertinent films in PACS and my interpretation is MRI arthrogram right knee show articular surface defect over the medial patellar facet.  Mild thinning of the articular surface over medial compartment.  No distinct medial meniscus tear noted.  There are some signal changes which may be related to prior surgery.    Large joint arthrocentesis: R knee  Universal Protocol:  Consent: Verbal consent obtained.  Risks and benefits: risks, benefits and alternatives were discussed  Consent given by: patient  Patient understanding: patient states understanding of the procedure being performed  Site marked: the operative site was marked  Supporting Documentation  Indications: pain   Procedure Details  Location: knee - R knee  Preparation: Patient was prepped and draped in the usual sterile fashion  Needle size: 22 G  Ultrasound guidance: no  Approach: anterolateral  Medications administered: 6 mg betamethasone acetate-betamethasone sodium phosphate 6 (3-3) mg/mL; 7 mL lidocaine (PF) 1 %    Patient tolerance: patient tolerated the procedure well with no immediate complications  Dressing:  Sterile dressing applied

## 2024-10-22 NOTE — ASSESSMENT & PLAN NOTE
Findings consistent with acute right knee pain following twisting injury with osteoarthritis.  I reviewed patient's right knee arthrogram MRI and radiology report with him.  I informed patient that there is no distinct recurrent tear of the medial meniscus.  Patient does have arthritis in his knee which may have been irritated.  I recommended trial of cortisone injection, which patient agreed to proceed.  Patient tolerated the procedure well.  I advised patient to continue low impact exercises with stationary bike or swimming.  Use over-the-counter NSAID diclofenac and Aspercreme or Voltaren gel.  If the cortisone injection does not provide him with good pain relief, we could try joint supplement injection.  All patient's questions were answered to his satisfaction.  This note is created using dictation transcription.  It may contain typographical errors, grammatical errors, improperly dictated words, background noise and other errors.

## 2024-11-19 ENCOUNTER — CONSULT (OUTPATIENT)
Dept: PAIN MEDICINE | Facility: CLINIC | Age: 74
End: 2024-11-19
Payer: MEDICARE

## 2024-11-19 VITALS
BODY MASS INDEX: 28.25 KG/M2 | TEMPERATURE: 97.7 F | SYSTOLIC BLOOD PRESSURE: 118 MMHG | WEIGHT: 186.38 LBS | HEIGHT: 68 IN | HEART RATE: 62 BPM | DIASTOLIC BLOOD PRESSURE: 80 MMHG

## 2024-11-19 DIAGNOSIS — Z98.1 HISTORY OF LUMBAR FUSION: ICD-10-CM

## 2024-11-19 DIAGNOSIS — M96.1 LUMBAR POSTLAMINECTOMY SYNDROME: ICD-10-CM

## 2024-11-19 DIAGNOSIS — M47.816 LUMBAR SPONDYLOSIS: Primary | ICD-10-CM

## 2024-11-19 DIAGNOSIS — M46.1 SACROILIITIS (HCC): ICD-10-CM

## 2024-11-19 PROCEDURE — 99204 OFFICE O/P NEW MOD 45 MIN: CPT | Performed by: PHYSICIAN ASSISTANT

## 2024-11-19 NOTE — PROGRESS NOTES
Assessment:  1. Lumbar spondylosis    2. Lumbar postlaminectomy syndrome    3. History of lumbar fusion    4. Sacroiliitis (HCC)        Plan:  While the patient was in the office today, I did have a thorough conversation regarding their chronic pain syndrome, medication management, and treatment plan options.    After discussing options, we feel it is medically reasonable and necessary to obtain a new MRI.  This will be done with and without contrast due to his history of lumbar surgery.  Renal function tests have been within normal limits.  Once we obtain the results from the MRI we will contact him to discuss potential treatment options.  Reassurance provided to the patient that there is no risk of radiation with an MRI.    He may be a candidate for sacroiliac joint injections.    Patient is not interested in spinal cord stimulation.    History of Present Illness:    The patient is a 74 y.o. male who presents for consultation in regards to Back Pain.  Patient previously seen by us in 2018.  He presents today for reevaluation of his chronic low back pain.  Pain has been present for 20 years approximately.  He has undergone lumbar laminectomy and lumbar fusion surgery in 2000 and 2009.  He has failed to respond to diagnostic medial branch blocks and had temporary relief with epidural steroid injections.  He has had partial relief with physical therapy and home exercises as well as ice and heat.  He feels however his pain has been gradually worsening over the past year.  His current pain is a 5 out of 10 described as an intermittent dull, aching type of pain.  There is intermittent numbness in the right lower extremity.  He has increased pain with prolonged sitting and prolonged standing.  Patient's PCP ordered a lumbar spine MRI however the patient states that he did not want to proceed with this due to possible radiation exposure, having had multiple MRIs and CT scans this year.    Review of Systems:    Review of  Systems   Musculoskeletal:  Positive for gait problem.        Joint stiffness.         Past Medical History:   Diagnosis Date    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     Asthma     Chronic pain     back    GERD (gastroesophageal reflux disease)     Hiatal hernia 1994    Hyperlipidemia     Hypertension     Kidney stone     r stent- suppose to be removed Fri 7/13    PONV (postoperative nausea and vomiting)     Shortness of breath        Past Surgical History:   Procedure Laterality Date    ABDOMINAL ADHESION SURGERY N/A 07/11/2018    Procedure: LYSIS ADHESIONS EXTENSIVE;  Surgeon: Audi Lara MD;  [facility];  Service: General    ABDOMINAL SURGERY      BACK SURGERY      x2    CARPAL TUNNEL RELEASE      COLONOSCOPY      CYSTOSCOPY  08/07/2020    CYSTOSCOPY W/ URETERAL STENT REMOVAL  01/26/2018    ESOPHAGOGASTRODUODENOSCOPY N/A 07/11/2018    Procedure: ESOPHAGOGASTRODUODENOSCOPY (EGD) INTRAOPERATIVE ;  Surgeon: Audi Lara MD;  [facility];  Service: General    FL INJECTION RIGHT KNEE (ARTHROGRAM)  10/15/2024    FL INJECTION RIGHT SHOULDER (ARTHROGRAM)  05/13/2022    HALLUX VALGUS CORRECTION Bilateral     1st MTP joint     HERNIA REPAIR      HIATAL HERNIA REPAIR  1994    INCISIONAL HERNIA REPAIR N/A 07/11/2018    Procedure: REPAIR HERNIA INCISIONAL X2;  Surgeon: Audi Lara MD;  [facility];  Service: General    JOINT REPLACEMENT  2021    KNEE SURGERY Bilateral     LITHOTRIPSY      PERIPHERAL NEUROPLASTY OF FINGER / HAND / ARM      left middle finger    PA ARTHROPLASTY GLENOHUMERAL JOINT TOTAL SHOULDER Right 06/01/2021    Procedure: TOTAL SHOULDER ARTHROPLASTY ANATOMIC, biceps tenodesis;  Surgeon: Josue Preston MD;  [facility];  Service: Orthopedics    PA CYSTO/URETERO W/LITHOTRIPSY &INDWELL STENT INSRT Left 01/03/2018    Procedure: CYSTOSCOPY , RETROGRADE PYELOGRAM AND INSERTION STENT URETERAL, FULGERATION;  Surgeon: Conner Adames MD;  [facility];   Service: Urology    KS CYSTO/URETERO W/LITHOTRIPSY &INDWELL STENT INSRT Right 06/08/2018    Procedure: CYSTOSCOPY URETEROSCOPY WITH LITHOTRIPSY HOLMIUM LASER, RETROGRADE PYELOGRAM AND INSERTION STENT URETERAL;  Surgeon: Seth Figueroa MD;  [facility];  Service: Urology    KS LAPS RPR PARAESPHGL HRNA INCL FUNDPLSTY W/MESH N/A 07/11/2018    Procedure: REPAIR RECURRENT HERNIA PARAESOPHAGEAL  LAPAROSCOPIC;TOUPE FUNDIPLICATION;  Surgeon: Audi Lara MD;  [facility];  Service: General    KS LITHOTRIPSY XTRCORP SHOCK WAVE Left 01/18/2018    Procedure: ESWL;  Surgeon: Seth Figueroa MD;  [facility];  Service: Urology    ROTATOR CUFF REPAIR Right     SHOULDER ARTHROSCOPY Bilateral     SHOULDER SURGERY      SPINE SURGERY         Family History   Problem Relation Age of Onset    Diabetes Mother     Hypertension Mother     Brain cancer Mother     Hypertension Sister     Nephrolithiasis Sister     COPD Father     Cancer Maternal Grandmother         Malignant brain tumor    Aortic aneurysm Maternal Uncle         x3 maternal uncles        Social History     Occupational History    Not on file   Tobacco Use    Smoking status: Never    Smokeless tobacco: Never   Vaping Use    Vaping status: Never Used   Substance and Sexual Activity    Alcohol use: Yes     Alcohol/week: 1.0 standard drink of alcohol     Types: 1 Cans of beer per week     Comment: occasionally    Drug use: No    Sexual activity: Not Currently     Partners: Female     Comment: flomax is helping          Current Outpatient Medications:     Coenzyme Q10 (CO Q 10) 100 MG CAPS, Take by mouth, Disp: , Rfl:     finasteride (PROSCAR) 5 mg tablet, Take 1 tablet (5 mg total) by mouth daily, Disp: 90 tablet, Rfl: 3    fluticasone (FLONASE) 50 mcg/act nasal spray, 1 spray into each nostril daily 3 month supply 3x18.2, Disp: 3 mL, Rfl: 3    HYDROcodone-acetaminophen (NORCO) 5-325 mg per tablet, Take 1 tablet by mouth every 6 (six) hours as  "needed for pain Max Daily Amount: 4 tablets, Disp: 60 tablet, Rfl: 0    losartan (Cozaar) 50 mg tablet, Take 1 tablet (50 mg total) by mouth daily, Disp: 90 tablet, Rfl: 3    mometasone (ELOCON) 0.1 % cream, Apply topically daily, Disp: 45 g, Rfl: 2    rosuvastatin (CRESTOR) 20 MG tablet, Take 1 tablet by mouth once daily, Disp: 90 tablet, Rfl: 1    tamsulosin (FLOMAX) 0.4 mg, Take 2 capsules (0.8 mg total) by mouth daily with dinner, Disp: 200 capsule, Rfl: 1    zolpidem (AMBIEN) 5 mg tablet, Take 1 tablet (5 mg total) by mouth daily at bedtime as needed for sleep, Disp: 30 tablet, Rfl: 0    Allergies   Allergen Reactions    Meperidine GI Intolerance    Lisinopril Cough     Other reaction(s): Cough       Physical Exam:    /80 (BP Location: Left arm, Patient Position: Sitting, Cuff Size: Large)   Pulse 62   Temp 97.7 °F (36.5 °C)   Ht 5' 8\" (1.727 m) Comment: Verbal  Wt 84.5 kg (186 lb 6 oz)   BMI 28.34 kg/m²     Constitutional: normal, well developed, well nourished, alert, in no distress and non-toxic and no overt pain behavior.  Eyes: anicteric  HEENT: grossly intact  Neck: supple, symmetric, trachea midline and no masses   Pulmonary:even and unlabored  Cardiovascular:No edema or pitting edema present  Skin:Normal without rashes or lesions and well hydrated  Psychiatric:Mood and affect appropriate  Neurologic:Cranial Nerves II-XII grossly intact  Musculoskeletal: lumbar scar from prior surgery, limited lumbar range of motion, stable gait    Imaging  MRI lumbar spine with and without contrast    (Results Pending)       Orders Placed This Encounter   Procedures    MRI lumbar spine with and without contrast     "

## 2024-12-21 ENCOUNTER — HOSPITAL ENCOUNTER (OUTPATIENT)
Dept: MRI IMAGING | Facility: HOSPITAL | Age: 74
Discharge: HOME | End: 2024-12-21
Payer: MEDICARE

## 2024-12-21 DIAGNOSIS — M96.1 LUMBAR POSTLAMINECTOMY SYNDROME: ICD-10-CM

## 2024-12-21 DIAGNOSIS — Z98.1 HISTORY OF LUMBAR FUSION: ICD-10-CM

## 2024-12-21 DIAGNOSIS — M47.816 LUMBAR SPONDYLOSIS: ICD-10-CM

## 2024-12-21 PROCEDURE — 72158 MRI LUMBAR SPINE W/O & W/DYE: CPT

## 2024-12-21 PROCEDURE — A9585 GADOBUTROL INJECTION: HCPCS | Performed by: PHYSICIAN ASSISTANT

## 2024-12-21 RX ORDER — GADOBUTROL 604.72 MG/ML
8.5 INJECTION INTRAVENOUS
Status: COMPLETED | OUTPATIENT
Start: 2024-12-21 | End: 2024-12-21

## 2024-12-21 RX ADMIN — GADOBUTROL 8.5 ML: 604.72 INJECTION INTRAVENOUS at 11:10

## 2024-12-22 DIAGNOSIS — E78.00 HYPERCHOLESTEROLEMIA: ICD-10-CM

## 2024-12-24 RX ORDER — ROSUVASTATIN CALCIUM 20 MG/1
20 TABLET, COATED ORAL DAILY
Qty: 90 TABLET | Refills: 1 | Status: SHIPPED | OUTPATIENT
Start: 2024-12-24

## 2025-01-02 ENCOUNTER — RESULTS FOLLOW-UP (OUTPATIENT)
Dept: PAIN MEDICINE | Facility: CLINIC | Age: 75
End: 2025-01-02

## 2025-01-02 NOTE — TELEPHONE ENCOUNTER
S/w pt, advised of above. Pt verbalized agreement with sij as discussed. Advised pt, the writer will make MG aware. Anticipate cb from [facility] surg coord to schedule as discussed. Pt verbalized understanding and appreciation.

## 2025-01-02 NOTE — TELEPHONE ENCOUNTER
----- Message from Evangelina Jamison PA-C sent at 1/1/2025  8:44 PM EST -----  Please let patient know his lumbar spine MRI shows stable post surgical changes at L5-S1, otherwise mild arthritic changes.  Can offer patient SIJ injections as previously discussed.

## 2025-01-03 ENCOUNTER — TELEPHONE (OUTPATIENT)
Dept: RADIOLOGY | Facility: CLINIC | Age: 75
End: 2025-01-03

## 2025-01-03 DIAGNOSIS — M46.1 SACROILIITIS (HCC): Primary | ICD-10-CM

## 2025-01-03 NOTE — TELEPHONE ENCOUNTER
PRE OP INSTRUCTIONS:           Hold medication  x _ full days prior, last dose on _             Patient advised to hold medication from Date till their appointment at that time instructions to restart will be given.    Patient stated verbal understanding. Aware that nursing WILL call to review hold dates as well    -If you are on prescription blood thinners, you may have to hold the medication for several days before the procedure.    Please call the office to discuss medication holds at [phone].  -Do not eat or drink ONE HOUR prior to your procedure. If you are diabetic, may follow regular breakfast/lunch schedule and take usual    diabetic medications.  -Lumbar( low back) procedure, please wear comfortable slacks/pants.  -Cervical (neck) procedure, please wear a shirt/blouse that is easy to remove. PATIENT INSTRUCTED TO STOP ALL NSAID'S 4 DAYS             PRIOR TO PROCEDURE EXCEPT FOR IBUPROFEN IT CAN BE TAKEN UP TO 24 HOURS PRIOR TO PROCEDURE.   -A  is required to take you home form your procedure.  -Continue all to take prescribed medication the day of your procedure, including blood pressure medications.  -If you are prescribe antibiotics, have an active infection or have an open wound, please contact the office at [phone].  -Please refrain from any vaccinations two days before and two days after injection.  -Insurance authorization received in not a guarantee of payment per your insurance company's authorization disclaimer and it is    your responsibility to verify your benefits.          -If you have any questions about the instructions, please call me at [phone]     Explained CHRISTIAN as an injection of steroids into an affected area to reduce swelling and provide relief. This procedure does not provide instant relief.   Allow 3-5 days for the steroid to begin to work. During this time, there may be changes in your pain, it may even get worse before it gets better.    This office will fu in 7  days to determine the effectiveness of the procedure. Be advised, the procedure may need to be repeated to provide adequate relief.   There is also a chance that the procedures will fail to relieve pain. There are few restrictions after the procedure. The day of the procedure, please go home and rest. Do not drive.   Ok to resume normal activities 24 - 48 hours after the procedure as tolerated. Do not submerge the site for 2 days after the procedure.    Be aware, steroids will raise blood sugar levels for ~ 2 weeks after the procedure.

## 2025-01-03 NOTE — TELEPHONE ENCOUNTER
Evangelina Jamison PA-C to Spine & Pain Surgery Coordinator [facility]       1/3/25  7:44 AM  Result Note  Yes, please cx the 1/8 visit and schedule 4 week follow up after procedure.  Me to Evangelina Jamison PA-C       1/3/25  7:31 AM  Do you want me to r/s the follow up with you on 1/8 he will not get injection till the end of the month. Do you want a follow up scheduled after the injection?  Evangelina Jamison PA-C to Spine & Pain Surgery Coordinator [facility]       1/3/25  6:58 AM  Result Note  Please schedule b/l SIJ.

## 2025-01-13 ENCOUNTER — CONSULT (OUTPATIENT)
Dept: FAMILY MEDICINE CLINIC | Facility: HOSPITAL | Age: 75
End: 2025-01-13
Payer: MEDICARE

## 2025-01-13 VITALS
OXYGEN SATURATION: 98 % | HEART RATE: 78 BPM | TEMPERATURE: 97.7 F | WEIGHT: 187.4 LBS | BODY MASS INDEX: 28.4 KG/M2 | HEIGHT: 68 IN | DIASTOLIC BLOOD PRESSURE: 78 MMHG | SYSTOLIC BLOOD PRESSURE: 124 MMHG

## 2025-01-13 DIAGNOSIS — F51.01 PRIMARY INSOMNIA: ICD-10-CM

## 2025-01-13 DIAGNOSIS — Z86.69 HISTORY OF CATARACT: ICD-10-CM

## 2025-01-13 DIAGNOSIS — Z01.818 PREOP EXAMINATION: Primary | ICD-10-CM

## 2025-01-13 PROCEDURE — 99213 OFFICE O/P EST LOW 20 MIN: CPT

## 2025-01-13 RX ORDER — ZOLPIDEM TARTRATE 5 MG/1
5 TABLET ORAL
Qty: 30 TABLET | Refills: 0 | Status: SHIPPED | OUTPATIENT
Start: 2025-01-13

## 2025-01-13 NOTE — PROGRESS NOTES
Pre-operative Clearance  Name: Rajendra Lee      : 1950      MRN: [id number]  Encounter Provider: Mignon Hand DO  Encounter Date: 2025   Encounter department: [facility] CARE [address]    Assessment & Plan  Preop examination  - Pt will undergo an elective Low Risk surgery on  & 25. He is RCRI class I risk  with 3.9% 30-day risk of death, MI, or cardiac arrest.     Plan:  - Pt optimized for procedure, may proceed  F/u for next visit with PCP           History of cataract         Primary insomnia    Orders:    zolpidem (AMBIEN) 5 mg tablet; Take 1 tablet (5 mg total) by mouth daily at bedtime as needed for sleep    Pre-operative Clearance:     Medication Instructions:   - ACE Inhibitors or ARBs: Continue this medication up to the evening before surgery/procedure, but do not take the morning of the day of surgery.  - Alpha-1 Adrenergic Blocker (ie, tamsulosin, doxazosin, alfusozin): Continue to take this medication on your normal schedule.  - Hyperlipidemia meds: Continue to take this medication on your normal schedule.  - Opioids: Continue to take this medication on your normal schedule.  - Sleep meds (zolpidem, zaleplon, eszopiclone): Continue taking medication up to the evening before procedure/surgery, but do not take this medication on the morning of procedure/surgery.       History of Present Illness     Patient does not take any blood thinners.  No history of stroke or MI.  No issues with anesthesia in the past.  No history of type 2 diabetes on insulin.  No other questions or concerns at this time, requests refill of ambien.        Review of Systems   Eyes:  Positive for visual disturbance.     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     Asthma     Chronic pain     back    GERD (gastroesophageal reflux disease)     Hiatal hernia     Hyperlipidemia     Hypertension     Kidney stone     r stent-  suppose to be removed Fri 7/13    PONV (postoperative nausea and vomiting)     Shortness of breath      Past Surgical History:   Procedure Laterality Date    ABDOMINAL ADHESION SURGERY N/A 07/11/2018    Procedure: LYSIS ADHESIONS EXTENSIVE;  Surgeon: Audi Lara MD;  Location: [facility];  Service: General    ABDOMINAL SURGERY      BACK SURGERY      x2    CARPAL TUNNEL RELEASE      COLONOSCOPY      CYSTOSCOPY  08/07/2020    CYSTOSCOPY W/ URETERAL STENT REMOVAL  01/26/2018    ESOPHAGOGASTRODUODENOSCOPY N/A 07/11/2018    Procedure: ESOPHAGOGASTRODUODENOSCOPY (EGD) INTRAOPERATIVE ;  Surgeon: Audi Lara MD;  [facility];  Service: General    FL INJECTION RIGHT KNEE (ARTHROGRAM)  10/15/2024    FL INJECTION RIGHT SHOULDER (ARTHROGRAM)  05/13/2022    HALLUX VALGUS CORRECTION Bilateral     1st MTP joint     HERNIA REPAIR      HIATAL HERNIA REPAIR  1994    INCISIONAL HERNIA REPAIR N/A 07/11/2018    Procedure: REPAIR HERNIA INCISIONAL X2;  Surgeon: Audi Lara MD;  [facility];  Service: General    JOINT REPLACEMENT  2021    KNEE SURGERY Bilateral     LITHOTRIPSY      PERIPHERAL NEUROPLASTY OF FINGER / HAND / ARM      left middle finger    MT ARTHROPLASTY GLENOHUMERAL JOINT TOTAL SHOULDER Right 06/01/2021    Procedure: TOTAL SHOULDER ARTHROPLASTY ANATOMIC, biceps tenodesis;  Surgeon: Josue Preston MD;  [facility];  Service: Orthopedics    MT CYSTO/URETERO W/LITHOTRIPSY &INDWELL STENT INSRT Left 01/03/2018    Procedure: CYSTOSCOPY , RETROGRADE PYELOGRAM AND INSERTION STENT URETERAL, FULGERATION;  Surgeon: Conner Adames MD;  [facility];  Service: Urology    MT CYSTO/URETERO W/LITHOTRIPSY &INDWELL STENT INSRT Right 06/08/2018    Procedure: CYSTOSCOPY URETEROSCOPY WITH LITHOTRIPSY HOLMIUM LASER, RETROGRADE PYELOGRAM AND INSERTION STENT URETERAL;  Surgeon: Seth Figueroa MD;  [facility];  Service: Urology    MT LAPS RPR PARAESPHGL HRNA INCL FUNDPLSTY W/MESH  N/A 07/11/2018    Procedure: REPAIR RECURRENT HERNIA PARAESOPHAGEAL  LAPAROSCOPIC;TOUPE FUNDIPLICATION;  Surgeon: Audi Lara MD;  [facility];  Service: General    CA LITHOTRIPSY XTRCORP SHOCK WAVE Left 01/18/2018    Procedure: ESWL;  Surgeon: Seth Figueroa MD;  [facility];  Service: Urology    ROTATOR CUFF REPAIR Right     SHOULDER ARTHROSCOPY Bilateral     SHOULDER SURGERY      SPINE SURGERY       Family History   Problem Relation Age of Onset    Diabetes Mother     Hypertension Mother     Brain cancer Mother     Hypertension Sister     Nephrolithiasis Sister     COPD Father     Cancer Maternal Grandmother         Malignant brain tumor    Aortic aneurysm Maternal Uncle         x3 maternal uncles      Social History     Tobacco Use    Smoking status: Never    Smokeless tobacco: Never   Vaping Use    Vaping status: Never Used   Substance and Sexual Activity    Alcohol use: Yes     Alcohol/week: 1.0 standard drink of alcohol     Types: 1 Cans of beer per week     Comment: occasionally    Drug use: No    Sexual activity: Not Currently     Partners: Female     Comment: flomax is helping      Current Outpatient Medications on File Prior to Visit   Medication Sig    Coenzyme Q10 (CO Q 10) 100 MG CAPS Take by mouth    finasteride (PROSCAR) 5 mg tablet Take 1 tablet (5 mg total) by mouth daily    fluticasone (FLONASE) 50 mcg/act nasal spray 1 spray into each nostril daily 3 month supply 3x18.2    HYDROcodone-acetaminophen (NORCO) 5-325 mg per tablet Take 1 tablet by mouth every 6 (six) hours as needed for pain Max Daily Amount: 4 tablets    losartan (Cozaar) 50 mg tablet Take 1 tablet (50 mg total) by mouth daily    mometasone (ELOCON) 0.1 % cream Apply topically daily    rosuvastatin (CRESTOR) 20 MG tablet Take 1 tablet by mouth once daily    tamsulosin (FLOMAX) 0.4 mg Take 2 capsules (0.8 mg total) by mouth daily with dinner    [DISCONTINUED] zolpidem (AMBIEN) 5 mg tablet Take 1 tablet (5  "mg total) by mouth daily at bedtime as needed for sleep     Allergies   Allergen Reactions    Meperidine GI Intolerance    Lisinopril Cough     Other reaction(s): Cough     Objective   /78   Pulse 78   Temp 97.7 °F (36.5 °C) (Tympanic)   Ht 5' 8\" (1.727 m)   Wt 85 kg (187 lb 6.4 oz)   SpO2 98%   BMI 28.49 kg/m²     Physical Exam  Constitutional:       General: He is not in acute distress.     Appearance: Normal appearance. He is not ill-appearing.   HENT:      Head: Normocephalic and atraumatic.   Cardiovascular:      Rate and Rhythm: Normal rate and regular rhythm.      Heart sounds: Normal heart sounds.   Pulmonary:      Effort: Pulmonary effort is normal.      Breath sounds: Normal breath sounds.   Neurological:      Mental Status: He is alert.   Psychiatric:         Mood and Affect: Mood normal.         Behavior: Behavior normal.           Mignon Hand, DO  "

## 2025-01-15 ENCOUNTER — TELEPHONE (OUTPATIENT)
Age: 75
End: 2025-01-15

## 2025-01-15 NOTE — TELEPHONE ENCOUNTER
[facility] Visit Note  Tamar Olivo 60 y o  male   MRN: [id number]    Assessment and Plan      Type 2 diabetes mellitus without complication, with long-term current use of insulin ([address] )  -patient's glucose readings at home have been controlled, fasting less than 126 consistently, hemoglobin A1c 7 4% down from 11 4 % on 09/11/2019    -continue Basaglar 25 units q h s   -metformin 1000 mg b i d   -if glucose readings continue to be controlled will think about an SGLT2 inhibitor at next visit with plans to decrease insulin therapy  -microalbumin/creatinine ratio labs pending    Deep vein thrombosis (DVT) of proximal vein of right lower extremity  -denies symptoms, taking Xarelto 20 mg q day, provoked DVT after long international flight  -will anticoagulate for 6 months, started on 09/12/2019, prescription until end of March    Essential hypertension  -patient has been checking blood pressure every day with readings less than 135/90  -continue amlodipine 10 mg, lisinopril 20 mg, both refilled    Hyperlipidemia, unspecified hyperlipidemia type  -patient would not like to start statin medication at this time, continue with lifestyle modifications    OMT/OPP:   Discussed a low carbohydrate diet, regular exercise, continue with lifestyle modifications as patient would like to decrease medications    Health Maintenance:    Continue proper diet/exercise as discussed  Colonoscopy referral next visit  Diabetic foot exam completed 02/12/2020    Schedule a follow-up appointment in 3 months  Chief Complaint:   Follow-up visit  Subjective     History of Present Illness:    56-year-old male presenting for follow-up on recent provoked DVT on Xarelto 20 mg q day, type 2 diabetes mellitus, hypertension, hyperlipidemia  Patient has been compliant with Xarelto with plans to take for 6 months  Patient has been compliant with proper low carb diet, exercise     Did bring in log that Patient's wife called stating that the patient received a new job and will need a TB time test done was curious if this could be done in office.     Also have paperwork they would like to drop off to have Dr. Toscano fill out in regard of their new job.    Please advise out to patient if they can be seen to complete the TB time test and when they can drop the paperwork off to get completed.   shows blood pressure and glucose readings  Would like to wait statin medication at this time  No other complaints, states that he is feeling much better  Did have 1 glucose reading of 69, understands symptoms to watch out for for hypo glycemic events  Does carry around glucose supplementation  Review of Systems   Constitutional: Negative for chills, diaphoresis, fever and unexpected weight change  HENT: Negative for congestion, sore throat, trouble swallowing and voice change  Respiratory: Negative for choking, shortness of breath and wheezing  Cardiovascular: Negative for chest pain, palpitations and leg swelling  Gastrointestinal: Negative for abdominal distention, constipation, diarrhea, nausea and vomiting  Genitourinary: Negative for difficulty urinating, dysuria and hematuria  Musculoskeletal: Negative for arthralgias and back pain  Neurological: Negative for dizziness, light-headedness and headaches  Hematological: Negative for adenopathy  Does not bruise/bleed easily  Psychiatric/Behavioral: Negative for agitation, behavioral problems and confusion           Current Outpatient Medications:     amLODIPine (NORVASC) 10 mg tablet, Take 1 tablet (10 mg total) by mouth daily, Disp: 90 tablet, Rfl: 2    Blood Glucose Monitoring Suppl (FREESTYLE LITE) KEIRA, by Other route daily Twice daily, Disp: 100 each, Rfl: 3    glucose blood (FREESTYLE LITE) test strip, Test BID as instructed, Disp: 200 each, Rfl: 2    insulin glargine (BASAGLAR KWIKPEN) 100 units/mL injection pen, Inject 25 Units under the skin daily, Disp: 13 pen, Rfl: 1    Insulin Pen Needle (B-D ULTRAFINE III SHORT PEN) 31G X 8 MM MISC, by Does not apply route daily, Disp: 100 each, Rfl: 1    Lancets (FREESTYLE) lancets, by Other route daily Test, Disp: 100 each, Rfl: 3    lisinopril (ZESTRIL) 10 mg tablet, Take 2 tablets (20 mg total) by mouth daily, Disp: 90 tablet, Rfl: 1    metFORMIN (GLUCOPHAGE) 500 mg tablet, Take 2 tablets (1,000 mg total) by mouth 2 (two) times a day with meals, Disp: 180 tablet, Rfl: 2    rivaroxaban (XARELTO) 20 mg tablet, Take 1 tablet (20 mg total) by mouth daily with breakfast This medication will be started after 21 days of taking 15 mg bid, Disp: 90 tablet, Rfl: 1  Past Medical History:   Diagnosis Date    [facility]     remote, diet controlled    Hypertension      History reviewed  No pertinent surgical history  Social History     Socioeconomic History    Marital status: /Civil Union     Spouse name: Not on file    Number of children: Not on file    Years of education: Not on file    Highest education level: Not on file   Occupational History    Not on file   Social Needs    Financial resource strain: Not on file    Food insecurity:     Worry: Not on file     Inability: Not on file    Transportation needs:     Medical: Not on file     Non-medical: Not on file   Tobacco Use    Smoking status: Former Smoker    Smokeless tobacco: Never Used   Substance and Sexual Activity    Alcohol use: Not Currently    Drug use: Not Currently    Sexual activity: Not on file   Lifestyle    Physical activity:     Days per week: Not on file     Minutes per session: Not on file    Stress: Not on file   Relationships    Social connections:     Talks on phone: Not on file     Gets together: Not on file     Attends [employer] service: Not on file     Active member of club or organization: Not on file     Attends meetings of clubs or organizations: Not on file     Relationship status: Not on file    Intimate partner violence:     Fear of current or ex partner: Not on file     Emotionally abused: Not on file     Physically abused: Not on file     Forced sexual activity: Not on file   Other Topics Concern    Not on file   Social History Narrative    Not on file     History reviewed  No pertinent family history    No Known Allergies    Objective     Vitals:    02/12/20 0758   BP: 150/90 Pulse: 84   Temp: 98 °F (36 7 °C)   SpO2: 99%   Weight: 107 kg (236 lb 12 4 oz)       Physical exam:     GENERAL: NAD, pleasant   HEENT:  NC/AT, PERRL, EOMI, no scleral icterus  CARDIAC:  RRR, +S1/S2, no S3/S4 heard, no m/g/r  PULMONARY:  CTA B/L, no wheezing/rales/rhonci, non-labored breathing  ABDOMEN:  Soft, NT/ND,no rebound/guarding/rigidity  Extremities:  No edema, cyanosis, or clubbing, half compression stocking on right side  NEUROLOGIC: Grossly intact, No focal deficits  SKIN:  No rashes or erythema noted on exposed skin  Psych: Normal affect    ==  PLEASE NOTE:  This encounter was completed utilizing the [employer] Direct Speech Voice Recognition Software  Grammatical errors, random word insertions, pronoun errors and incomplete sentences are occasional consequences of the system due to software limitations, ambient noise and hardware issues  These may be missed by proof reading prior to affixing electronic signature  Any questions or concerns about the content, text or information contained within the body of this dictation should be directly addressed to the physician for clarification  Please do not hesitate to call me directly if you have any any questions or concerns  DO Bri Chavez [address] Internal Medicine PGY-1    [address] [facility] [address], [address] [facility]  Office: [phone]  Fax: [phone]            Patient's shoes and socks removed  Right Foot/Ankle   Right Foot Inspection      Sensory       Monofilament testing: intact      Left Foot/Ankle  Left Foot Inspection                                           Sensory       Monofilament: intact

## 2025-01-24 ENCOUNTER — HOSPITAL ENCOUNTER (OUTPATIENT)
Dept: RADIOLOGY | Facility: CLINIC | Age: 75
Discharge: HOME | End: 2025-01-24
Payer: MEDICARE

## 2025-01-24 VITALS
SYSTOLIC BLOOD PRESSURE: 120 MMHG | TEMPERATURE: 97.3 F | RESPIRATION RATE: 16 BRPM | OXYGEN SATURATION: 97 % | DIASTOLIC BLOOD PRESSURE: 62 MMHG | HEART RATE: 70 BPM

## 2025-01-24 DIAGNOSIS — M46.1 SACROILIITIS (HCC): ICD-10-CM

## 2025-01-24 PROCEDURE — 27096 INJECT SACROILIAC JOINT: CPT | Performed by: ANESTHESIOLOGY

## 2025-01-24 RX ORDER — METHYLPREDNISOLONE ACETATE 80 MG/ML
80 INJECTION, SUSPENSION INTRA-ARTICULAR; INTRALESIONAL; INTRAMUSCULAR; PARENTERAL; SOFT TISSUE ONCE
Status: COMPLETED | OUTPATIENT
Start: 2025-01-24 | End: 2025-01-24

## 2025-01-24 RX ORDER — ROPIVACAINE HYDROCHLORIDE 2 MG/ML
2 INJECTION, SOLUTION EPIDURAL; INFILTRATION; PERINEURAL ONCE
Status: COMPLETED | OUTPATIENT
Start: 2025-01-24 | End: 2025-01-24

## 2025-01-24 RX ADMIN — IOHEXOL 0.4 ML: 300 INJECTION, SOLUTION INTRAVENOUS at 11:45

## 2025-01-24 RX ADMIN — METHYLPREDNISOLONE ACETATE 80 MG: 80 INJECTION, SUSPENSION INTRA-ARTICULAR; INTRALESIONAL; INTRAMUSCULAR; SOFT TISSUE at 11:46

## 2025-01-24 RX ADMIN — ROPIVACAINE HYDROCHLORIDE 2 ML: 2 INJECTION EPIDURAL; INFILTRATION; PERINEURAL at 11:46

## 2025-01-24 NOTE — DISCHARGE INSTRUCTIONS

## 2025-01-27 ENCOUNTER — CLINICAL SUPPORT (OUTPATIENT)
Dept: FAMILY MEDICINE CLINIC | Facility: HOSPITAL | Age: 75
End: 2025-01-27
Payer: MEDICARE

## 2025-01-27 DIAGNOSIS — N13.8 ENLARGED PROSTATE WITH URINARY OBSTRUCTION: ICD-10-CM

## 2025-01-27 DIAGNOSIS — N40.1 ENLARGED PROSTATE WITH URINARY OBSTRUCTION: ICD-10-CM

## 2025-01-27 DIAGNOSIS — Z11.1 ENCOUNTER FOR PPD TEST: Primary | ICD-10-CM

## 2025-01-27 PROCEDURE — 86580 TB INTRADERMAL TEST: CPT

## 2025-01-28 RX ORDER — TAMSULOSIN HYDROCHLORIDE 0.4 MG/1
0.8 CAPSULE ORAL
Qty: 200 CAPSULE | Refills: 1 | Status: SHIPPED | OUTPATIENT
Start: 2025-01-28

## 2025-01-29 ENCOUNTER — CLINICAL SUPPORT (OUTPATIENT)
Dept: FAMILY MEDICINE CLINIC | Facility: HOSPITAL | Age: 75
End: 2025-01-29

## 2025-01-29 DIAGNOSIS — Z11.1 SCREENING-PULMONARY TB: Primary | ICD-10-CM

## 2025-01-29 LAB
INDURATION: 0 MM
TB SKIN TEST: NEGATIVE

## 2025-01-29 PROCEDURE — NURSE

## 2025-02-07 ENCOUNTER — TELEPHONE (OUTPATIENT)
Dept: PAIN MEDICINE | Facility: CLINIC | Age: 75
End: 2025-02-07

## 2025-02-17 ENCOUNTER — OFFICE VISIT (OUTPATIENT)
Dept: PAIN MEDICINE | Facility: CLINIC | Age: 75
End: 2025-02-17
Payer: MEDICARE

## 2025-02-17 VITALS
WEIGHT: 181 LBS | BODY MASS INDEX: 27.43 KG/M2 | TEMPERATURE: 97.7 F | OXYGEN SATURATION: 96 % | HEIGHT: 68 IN | HEART RATE: 71 BPM

## 2025-02-17 DIAGNOSIS — M47.816 LUMBAR SPONDYLOSIS: Primary | ICD-10-CM

## 2025-02-17 DIAGNOSIS — Z98.1 HISTORY OF LUMBAR FUSION: ICD-10-CM

## 2025-02-17 DIAGNOSIS — M79.18 MYOFASCIAL PAIN SYNDROME: ICD-10-CM

## 2025-02-17 DIAGNOSIS — M96.1 POSTLAMINECTOMY SYNDROME, LUMBAR: ICD-10-CM

## 2025-02-17 PROCEDURE — 99214 OFFICE O/P EST MOD 30 MIN: CPT | Performed by: PHYSICIAN ASSISTANT

## 2025-02-17 PROCEDURE — G2211 COMPLEX E/M VISIT ADD ON: HCPCS | Performed by: PHYSICIAN ASSISTANT

## 2025-02-17 NOTE — PROGRESS NOTES
Assessment:  1. Lumbar spondylosis    2. History of lumbar fusion    3. Postlaminectomy syndrome, lumbar    4. Myofascial pain syndrome        Plan:  While the patient was in the office today, I did have a thorough conversation regarding their chronic pain syndrome, medication management, and treatment plan options.    Unfortunately the patient has failed to respond to the most recent SI joint injections.  In the past he did not sustain a positive response to lumbar medial branch blocks and did not notice any relief with epidural steroid injections.  Could consider trigger point injections and/or consultation with Dr. Marshall.    In the meantime, trial tizanidine 4 mg nightly as needed.  I advised the patient that they should not drive or operate machinery while on this medication until they see how it affects them, as it could cause lethargy and mental cloudiness. I advised the patient to call our office if they experience any side effects or issues with the medication changes. The patient verbalized an understanding.    Consider gentle chiropractic therapy with myofascial release techniques.      The patient will follow-up with us on a as needed basis.  The patient was advised to contact the office should their symptoms worsen in the interim. The patient was agreeable and verbalized an understanding.        History of Present Illness:    The patient is a 74 y.o. male last seen on 1/24/2025 who presents for a follow up office visit in regards to chronic low back pain secondary to lumbar postlaminectomy pain syndrome/history of lumbar fusion and lumbar spondylosis..  The patient currently reports low back pain that he rates a 5 out of 10 on today's visit and describes as an intermittent dull and aching pain.  Patient denies any lower extremity radicular pain, paresthesias or weakness.  At times his pain interferes with his daily living activities.  He has not been attending physical therapy.  He attended  chiropractic therapy years ago.  Patient most recently underwent bilateral SI joint injections with no relief.  In the past he has failed to respond to lumbar CHRISTIAN's and did not sustain a positive response with medial branch blocks.    I have personally reviewed and/or updated the patient's past medical history, past surgical history, family history, social history, current medications, allergies, and vital signs today.       Review of Systems:    Review of Systems   Respiratory:  Negative for shortness of breath.    Cardiovascular:  Negative for chest pain.   Gastrointestinal:  Negative for constipation, diarrhea, nausea and vomiting.   Musculoskeletal:  Positive for gait problem. Negative for arthralgias, joint swelling and myalgias.   Skin:  Negative for rash.   Neurological:  Negative for dizziness, seizures and weakness.   All other systems reviewed and are negative.        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    • Asthma    • Chronic pain     back   • GERD (gastroesophageal reflux disease)    • Hiatal hernia 1994   • Hyperlipidemia    • Hypertension    • Kidney stone     r stent- suppose to be removed Fri 7/13   • PONV (postoperative nausea and vomiting)    • Shortness of breath        Past Surgical History:   Procedure Laterality Date   • ABDOMINAL ADHESION SURGERY N/A 07/11/2018    Procedure: LYSIS ADHESIONS EXTENSIVE;  Surgeon: Audi Lara MD;  Location:  MAIN OR;  Service: General   • ABDOMINAL SURGERY     • BACK SURGERY      x2   • CARPAL TUNNEL RELEASE     • COLONOSCOPY     • CYSTOSCOPY  08/07/2020   • CYSTOSCOPY W/ URETERAL STENT REMOVAL  01/26/2018   • ESOPHAGOGASTRODUODENOSCOPY N/A 07/11/2018    Procedure: ESOPHAGOGASTRODUODENOSCOPY (EGD) INTRAOPERATIVE ;  Surgeon: Audi Lara MD;  Location:  MAIN OR;  Service: General   • FL INJECTION RIGHT KNEE (ARTHROGRAM)  10/15/2024   • FL INJECTION RIGHT SHOULDER (ARTHROGRAM)  05/13/2022   • HALLUX VALGUS  CORRECTION Bilateral     1st MTP joint    • HERNIA REPAIR     • HIATAL HERNIA REPAIR  1994   • INCISIONAL HERNIA REPAIR N/A 07/11/2018    Procedure: REPAIR HERNIA INCISIONAL X2;  Surgeon: Audi Lara MD;  Location: [facility];  Service: General   • JOINT REPLACEMENT  2021   • KNEE SURGERY Bilateral    • LITHOTRIPSY     • PERIPHERAL NEUROPLASTY OF FINGER / HAND / ARM      left middle finger   • GA ARTHROPLASTY GLENOHUMERAL JOINT TOTAL SHOULDER Right 06/01/2021    Procedure: TOTAL SHOULDER ARTHROPLASTY ANATOMIC, biceps tenodesis;  Surgeon: Josue Perston MD;  Location: [facility];  Service: Orthopedics   • GA CYSTO/URETERO W/LITHOTRIPSY &INDWELL STENT INSRT Left 01/03/2018    Procedure: CYSTOSCOPY , RETROGRADE PYELOGRAM AND INSERTION STENT URETERAL, FULGERATION;  Surgeon: Conner Adames MD;  Location[facility];  Service: Urology   • GA CYSTO/URETERO W/LITHOTRIPSY &INDWELL STENT INSRT Right 06/08/2018    Procedure: CYSTOSCOPY URETEROSCOPY WITH LITHOTRIPSY HOLMIUM LASER, RETROGRADE PYELOGRAM AND INSERTION STENT URETERAL;  Surgeon: Seth Figueroa MD;  Location[facility];  Service: Urology   • GA LAPS RPR PARAESPHGL HRNA INCL FUNDPLSTY W/MESH N/A 07/11/2018    Procedure: REPAIR RECURRENT HERNIA PARAESOPHAGEAL  LAPAROSCOPIC;TOUPE FUNDIPLICATION;  Surgeon: Audi Lara MD;  [facility];  Service: General   • GA LITHOTRIPSY XTRCORP SHOCK WAVE Left 01/18/2018    Procedure: ESWL;  Surgeon: Seth Figueroa MD;  [facility]: [facility];  Service: Urology   • ROTATOR CUFF REPAIR Right    • SHOULDER ARTHROSCOPY Bilateral    • SHOULDER SURGERY     • SPINE SURGERY         Family History   Problem Relation Age of Onset   • Diabetes Mother    • Hypertension Mother    • Brain cancer Mother    • Hypertension Sister    • Nephrolithiasis Sister    • COPD Father    • Cancer Maternal Grandmother         Malignant brain tumor   • Aortic aneurysm Maternal Uncle         x3 maternal uncles        Social History  "    Occupational History   • Not on file   Tobacco Use   • Smoking status: Never   • Smokeless tobacco: Never   Vaping Use   • Vaping status: Never Used   Substance and Sexual Activity   • Alcohol use: Yes     Alcohol/week: 1.0 standard drink of alcohol     Types: 1 Cans of beer per week     Comment: occasionally   • Drug use: No   • Sexual activity: Not Currently     Partners: Female     Comment: flomax is helping          Current Outpatient Medications:   •  Coenzyme Q10 (CO Q 10) 100 MG CAPS, Take by mouth, Disp: , Rfl:   •  finasteride (PROSCAR) 5 mg tablet, Take 1 tablet (5 mg total) by mouth daily, Disp: 90 tablet, Rfl: 3  •  fluticasone (FLONASE) 50 mcg/act nasal spray, 1 spray into each nostril daily 3 month supply 3x18.2, Disp: 3 mL, Rfl: 3  •  HYDROcodone-acetaminophen (NORCO) 5-325 mg per tablet, Take 1 tablet by mouth every 6 (six) hours as needed for pain Max Daily Amount: 4 tablets, Disp: 60 tablet, Rfl: 0  •  losartan (Cozaar) 50 mg tablet, Take 1 tablet (50 mg total) by mouth daily, Disp: 90 tablet, Rfl: 3  •  mometasone (ELOCON) 0.1 % cream, Apply topically daily, Disp: 45 g, Rfl: 2  •  rosuvastatin (CRESTOR) 20 MG tablet, Take 1 tablet by mouth once daily, Disp: 90 tablet, Rfl: 1  •  tamsulosin (FLOMAX) 0.4 mg, Take 2 capsules (0.8 mg total) by mouth daily with dinner, Disp: 200 capsule, Rfl: 1  •  tiZANidine (ZANAFLEX) 4 mg tablet, Take 1 tablet (4 mg total) by mouth daily at bedtime, Disp: 30 tablet, Rfl: 2  •  zolpidem (AMBIEN) 5 mg tablet, Take 1 tablet (5 mg total) by mouth daily at bedtime as needed for sleep, Disp: 30 tablet, Rfl: 0    Allergies   Allergen Reactions   • Meperidine GI Intolerance   • Lisinopril Cough     Other reaction(s): Cough       Physical Exam:    Pulse 71   Temp 97.7 °F (36.5 °C)   Ht 5' 8\" (1.727 m)   Wt 82.1 kg (181 lb)   SpO2 96%   BMI 27.52 kg/m²     Constitutional:normal, well developed, well nourished, alert, in no distress and non-toxic and no overt pain " behavior.  Eyes:anicteric  HEENT:grossly intact  Neck:supple, symmetric, trachea midline and no masses   Pulmonary:even and unlabored  Cardiovascular:No edema or pitting edema present  Skin:Normal without rashes or lesions and well hydrated  Psychiatric:Mood and affect appropriate  Neurologic:Cranial Nerves II-XII grossly intact  Musculoskeletal: Gait is slow but stable without the use of assistive devices, lumbar scar from prior surgery well-healed.      Imaging  No orders to display         No orders of the defined types were placed in this encounter.

## 2025-03-04 ENCOUNTER — TELEPHONE (OUTPATIENT)
Dept: FAMILY MEDICINE CLINIC | Facility: HOSPITAL | Age: 75
End: 2025-03-04

## 2025-03-04 DIAGNOSIS — F51.01 PRIMARY INSOMNIA: ICD-10-CM

## 2025-03-04 RX ORDER — ZOLPIDEM TARTRATE 10 MG/1
10 TABLET ORAL
Qty: 30 TABLET | Refills: 0 | Status: SHIPPED | OUTPATIENT
Start: 2025-03-04

## 2025-03-04 NOTE — TELEPHONE ENCOUNTER
Patient called the RX Refill Line. Message is being forwarded to the office.     Patient is requesting Ambien be changed to 10 mg daily as needed and sent to  [facility]  [place] - [address] [facility]. Also asked to have refills on prescription. Patient is currently out of [facility].

## 2025-03-13 NOTE — TELEPHONE ENCOUNTER
01/03/22  Screened by: Isaac Vallejo    Referring Provider Fly    Pre- Screening: There is no height or weight on file to calculate BMI  Has patient been referred for a routine screening Colonoscopy? yes  Is the patient between 39-70 years old? yes      Previous Colonoscopy yes   If yes:    Date: 01/09/2012    Facility: [facility]    Reason: screening      SCHEDULING STAFF: If the patient is between 45yrs-49yrs, please advise patient to confirm benefits/coverage with their insurance company for a routine screening colonoscopy, some insurance carriers will only cover at [facility] or [facility] [place]  If the patient is over 66years old, please schedule an office visit  Does the patient want to see a Gastroenterologist prior to their procedure OR are they having any GI symptoms? no    Has the patient been hospitalized or had abdominal surgery in the past 6 months? no    Does the patient use supplemental oxygen? no    Does the patient take Coumadin, Lovenox, Plavix, Elliquis, Xarelto, or other blood thinning medication? no    Has the patient had a stroke, cardiac event, or stent placed in the past year? no    SCHEDULING STAFF: If patient answers NO to above questions, then schedule procedure  If patient answers YES to above questions, then schedule office appointment  If patient is between 45yrs - 49yrs, please advise patient that we will have to confirm benefits & coverage with their insurance company for a routine screening colonoscopy  Patient scheduled for Colonoscopy with Dr. Hi at [facility] on 4/25/25. MAC sedation. Patient is aware that [facility] will call with times 1-2 days prior. Golytely split dose prep instructions were reviewed in detail with the patient and given to the patient in the office today. Patient instructed to call our office if prep instructions have not been received within two weeks of scheduled procedure. Dr. Hi will do the pre-op.    Prescriptions for prep: prescription sent to pharmacy.    Recommended to speak with insurance regarding potential cost associated with procedure.     DX:   Crohn's    Hold the following medication:  iron

## 2025-04-08 DIAGNOSIS — N40.1 ENLARGED PROSTATE WITH URINARY OBSTRUCTION: ICD-10-CM

## 2025-04-08 DIAGNOSIS — Z83.3 FAMILY HISTORY OF DIABETES MELLITUS IN MOTHER: ICD-10-CM

## 2025-04-08 DIAGNOSIS — N13.8 ENLARGED PROSTATE WITH URINARY OBSTRUCTION: ICD-10-CM

## 2025-04-08 RX ORDER — LOSARTAN POTASSIUM 50 MG/1
50 TABLET ORAL DAILY
Qty: 90 TABLET | Refills: 0 | Status: SHIPPED | OUTPATIENT
Start: 2025-04-08

## 2025-04-08 RX ORDER — FINASTERIDE 5 MG/1
5 TABLET, FILM COATED ORAL DAILY
Qty: 90 TABLET | Refills: 0 | Status: SHIPPED | OUTPATIENT
Start: 2025-04-08

## 2025-04-23 ENCOUNTER — RA CDI HCC (OUTPATIENT)
Dept: OTHER | Facility: HOSPITAL | Age: 75
End: 2025-04-23

## 2025-04-29 ENCOUNTER — OFFICE VISIT (OUTPATIENT)
Dept: FAMILY MEDICINE CLINIC | Facility: HOSPITAL | Age: 75
End: 2025-04-29
Payer: MEDICARE

## 2025-04-29 VITALS
DIASTOLIC BLOOD PRESSURE: 70 MMHG | BODY MASS INDEX: 27.37 KG/M2 | OXYGEN SATURATION: 95 % | HEIGHT: 68 IN | WEIGHT: 180.6 LBS | SYSTOLIC BLOOD PRESSURE: 122 MMHG | HEART RATE: 64 BPM

## 2025-04-29 DIAGNOSIS — M96.1 POSTLAMINECTOMY SYNDROME, LUMBAR: ICD-10-CM

## 2025-04-29 DIAGNOSIS — Z00.00 MEDICARE ANNUAL WELLNESS VISIT, SUBSEQUENT: Primary | ICD-10-CM

## 2025-04-29 PROBLEM — R56.9 SINGLE UNPROVOKED SEIZURE (HCC): Status: RESOLVED | Noted: 2023-11-19 | Resolved: 2025-04-29

## 2025-04-29 PROCEDURE — G0439 PPPS, SUBSEQ VISIT: HCPCS | Performed by: INTERNAL MEDICINE

## 2025-04-29 RX ORDER — HYDROCODONE BITARTRATE AND ACETAMINOPHEN 5; 325 MG/1; MG/1
1 TABLET ORAL EVERY 6 HOURS PRN
Qty: 60 TABLET | Refills: 0 | Status: SHIPPED | OUTPATIENT
Start: 2025-04-29

## 2025-04-29 NOTE — PATIENT INSTRUCTIONS
Medicare Preventive Visit Patient Instructions  Thank you for completing your Welcome to Medicare Visit or Medicare Annual Wellness Visit today. Your next wellness visit will be due in one year (4/30/2026).  The screening/preventive services that you may require over the next 5-10 years are detailed below. Some tests may not apply to you based off risk factors and/or age. Screening tests ordered at today's visit but not completed yet may show as past due. Also, please note that scanned in results may not display below.  Preventive Screenings:  Service Recommendations Previous Testing/Comments   Colorectal Cancer Screening  Colonoscopy    Fecal Occult Blood Test (FOBT)/Fecal Immunochemical Test (FIT)  Fecal DNA/Cologuard Test  Flexible Sigmoidoscopy Age: 45-75 years old   Colonoscopy: every 10 years (May be performed more frequently if at higher risk)  OR  FOBT/FIT: every 1 year  OR  Cologuard: every 3 years  OR  Sigmoidoscopy: every 5 years  Screening may be recommended earlier than age 45 if at higher risk for colorectal cancer. Also, an individualized decision between you and your healthcare provider will decide whether screening between the ages of 76-85 would be appropriate. Colonoscopy: 01/26/2022  FOBT/FIT: Not on file  Cologuard: Not on file  Sigmoidoscopy: Not on file    Screening Current     Prostate Cancer Screening Individualized decision between patient and health care provider in men between ages of 55-69   Medicare will cover every 12 months beginning on the day after your 50th birthday PSA: 1.510 ng/mL     Screening Not Indicated     Hepatitis C Screening Once for adults born between 1945 and 1965  More frequently in patients at high risk for Hepatitis C Hep C Antibody: 10/15/2021    Screening Current   Diabetes Screening 1-2 times per year if you're at risk for diabetes or have pre-diabetes Fasting glucose: 95 mg/dL (7/24/2024)  A1C: 5.4 % (5/7/2021)  Screening Current   Cholesterol Screening Once  every 5 years if you don't have a lipid disorder. May order more often based on risk factors. Lipid panel: 12/29/2023  Screening Not Indicated  History Lipid Disorder      Other Preventive Screenings Covered by Medicare:  Abdominal Aortic Aneurysm (AAA) Screening: covered once if your at risk. You're considered to be at risk if you have a family history of AAA or a male between the age of 65-75 who smoking at least 100 cigarettes in your lifetime.  Lung Cancer Screening: covers low dose CT scan once per year if you meet all of the following conditions: (1) Age 55-77; (2) No signs or symptoms of lung cancer; (3) Current smoker or have quit smoking within the last 15 years; (4) You have a tobacco smoking history of at least 20 pack years (packs per day x number of years you smoked); (5) You get a written order from a healthcare provider.  Glaucoma Screening: covered annually if you're considered high risk: (1) You have diabetes OR (2) Family history of glaucoma OR (3)  aged 50 and older OR (4)  American aged 65 and older  Osteoporosis Screening: covered every 2 years if you meet one of the following conditions: (1) Have a vertebral abnormality; (2) On glucocorticoid therapy for more than 3 months; (3) Have primary hyperparathyroidism; (4) On osteoporosis medications and need to assess response to drug therapy.  HIV Screening: covered annually if you're between the age of 15-65. Also covered annually if you are younger than 15 and older than 65 with risk factors for HIV infection. For pregnant patients, it is covered up to 3 times per pregnancy.    Immunizations:  Immunization Recommendations   Influenza Vaccine Annual influenza vaccination during flu season is recommended for all persons aged >= 6 months who do not have contraindications   Pneumococcal Vaccine   * Pneumococcal conjugate vaccine = PCV13 (Prevnar 13), PCV15 (Vaxneuvance), PCV20 (Prevnar 20)  * Pneumococcal polysaccharide vaccine  = PPSV23 (Pneumovax) Adults 19-65 yo with certain risk factors or if 65+ yo  If never received any pneumonia vaccine: recommend Prevnar 20 (PCV20)  Give PCV20 if previously received 1 dose of PCV13 or PPSV23   Hepatitis B Vaccine 3 dose series if at intermediate or high risk (ex: diabetes, end stage renal disease, liver disease)   Respiratory syncytial virus (RSV) Vaccine - COVERED BY MEDICARE PART D  * RSVPreF3 (Arexvy) CDC recommends that adults 60 years of age and older may receive a single dose of RSV vaccine using shared clinical decision-making (SCDM)   Tetanus (Td) Vaccine - COST NOT COVERED BY MEDICARE PART B Following completion of primary series, a booster dose should be given every 10 years to maintain immunity against tetanus. Td may also be given as tetanus wound prophylaxis.   Tdap Vaccine - COST NOT COVERED BY MEDICARE PART B Recommended at least once for all adults. For pregnant patients, recommended with each pregnancy.   Shingles Vaccine (Shingrix) - COST NOT COVERED BY MEDICARE PART B  2 shot series recommended in those 19 years and older who have or will have weakened immune systems or those 50 years and older     Health Maintenance Due:      Topic Date Due   • Hepatitis C Screening  Completed     Immunizations Due:      Topic Date Due   • COVID-19 Vaccine (3 - 2024-25 season) 09/01/2024     Advance Directives   What are advance directives?  Advance directives are legal documents that state your wishes and plans for medical care. These plans are made ahead of time in case you lose your ability to make decisions for yourself. Advance directives can apply to any medical decision, such as the treatments you want, and if you want to donate organs.   What are the types of advance directives?  There are many types of advance directives, and each state has rules about how to use them. You may choose a combination of any of the following:  Living will:  This is a written record of the treatment you want.  You can also choose which treatments you do not want, which to limit, and which to stop at a certain time. This includes surgery, medicine, IV fluid, and tube feedings.   Durable power of  for healthcare (DPAHC):  This is a written record that states who you want to make healthcare choices for you when you are unable to make them for yourself. This person, called a proxy, is usually a family member or a friend. You may choose more than 1 proxy.  Do not resuscitate (DNR) order:  A DNR order is used in case your heart stops beating or you stop breathing. It is a request not to have certain forms of treatment, such as CPR. A DNR order may be included in other types of advance directives.  Medical directive:  This covers the care that you want if you are in a coma, near death, or unable to make decisions for yourself. You can list the treatments you want for each condition. Treatment may include pain medicine, surgery, blood transfusions, dialysis, IV or tube feedings, and a ventilator (breathing machine).  Values history:  This document has questions about your views, beliefs, and how you feel and think about life. This information can help others choose the care that you would choose.  Why are advance directives important?  An advance directive helps you control your care. Although spoken wishes may be used, it is better to have your wishes written down. Spoken wishes can be misunderstood, or not followed. Treatments may be given even if you do not want them. An advance directive may make it easier for your family to make difficult choices about your care.   Weight Management   Why it is important to manage your weight:  Being overweight increases your risk of health conditions such as heart disease, high blood pressure, type 2 diabetes, and certain types of cancer. It can also increase your risk for osteoarthritis, sleep apnea, and other respiratory problems. Aim for a slow, steady weight loss. Even a small  amount of weight loss can lower your risk of health problems.  How to lose weight safely:  A safe and healthy way to lose weight is to eat fewer calories and get regular exercise. You can lose up about 1 pound a week by decreasing the number of calories you eat by 500 calories each day.   Healthy meal plan for weight management:  A healthy meal plan includes a variety of foods, contains fewer calories, and helps you stay healthy. A healthy meal plan includes the following:  Eat whole-grain foods more often.  A healthy meal plan should contain fiber. Fiber is the part of grains, fruits, and vegetables that is not broken down by your body. Whole-grain foods are healthy and provide extra fiber in your diet. Some examples of whole-grain foods are whole-wheat breads and pastas, oatmeal, brown rice, and bulgur.  Eat a variety of vegetables every day.  Include dark, leafy greens such as spinach, kale, jean carlos greens, and mustard greens. Eat yellow and orange vegetables such as carrots, sweet potatoes, and winter squash.   Eat a variety of fruits every day.  Choose fresh or canned fruit (canned in its own juice or light syrup) instead of juice. Fruit juice has very little or no fiber.  Eat low-fat dairy foods.  Drink fat-free (skim) milk or 1% milk. Eat fat-free yogurt and low-fat cottage cheese. Try low-fat cheeses such as mozzarella and other reduced-fat cheeses.  Choose meat and other protein foods that are low in fat.  Choose beans or other legumes such as split peas or lentils. Choose fish, skinless poultry (chicken or turkey), or lean cuts of red meat (beef or pork). Before you cook meat or poultry, cut off any visible fat.   Use less fat and oil.  Try baking foods instead of frying them. Add less fat, such as margarine, sour cream, regular salad dressing and mayonnaise to foods. Eat fewer high-fat foods. Some examples of high-fat foods include french fries, doughnuts, ice cream, and cakes.  Eat fewer sweets.  Limit  foods and drinks that are high in sugar. This includes candy, cookies, regular soda, and sweetened drinks.  Exercise:  Exercise at least 30 minutes per day on most days of the week. Some examples of exercise include walking, biking, dancing, and swimming. You can also fit in more physical activity by taking the stairs instead of the elevator or parking farther away from stores. Ask your healthcare provider about the best exercise plan for you.   Narcotic (Opioid) Safety    Use narcotics safely:  Take prescribed narcotics exactly as directed  Do not give narcotics to others or take narcotics that belong to someone else  Do not mix narcotics without medicines or alcohol  Do not drive or operate heavy machinery after you take the narcotic  Monitor for side effects and notify your healthcare provider if you experienced side effects such as nausea, sleepiness, itching, or trouble thinking clearly.    Manage constipation:    Constipation is the most common side effect of narcotic medicine. Constipation is when you have hard, dry bowel movements, or you go longer than usual between bowel movements. Tell your healthcare provider about all changes in your bowel movements while you are taking narcotics. He or she may recommend laxative medicine to help you have a bowel movement. He or she may also change the kind of narcotic you are taking, or change when you take it. The following are more ways you can prevent or relieve constipation:    Drink liquids as directed.  You may need to drink extra liquids to help soften and move your bowels. Ask how much liquid to drink each day and which liquids are best for you.  Eat high-fiber foods.  This may help decrease constipation by adding bulk to your bowel movements. High-fiber foods include fruits, vegetables, whole-grain breads and cereals, and beans. Your healthcare provider or dietitian can help you create a high-fiber meal plan. Your provider may also recommend a fiber supplement  if you cannot get enough fiber from food.  Exercise regularly.  Regular physical activity can help stimulate your intestines. Walking is a good exercise to prevent or relieve constipation. Ask which exercises are best for you.  Schedule a time each day to have a bowel movement.  This may help train your body to have regular bowel movements. Bend forward while you are on the toilet to help move the bowel movement out. Sit on the toilet for at least 10 minutes, even if you do not have a bowel movement.    Store narcotics safely:   Store narcotics where others cannot easily get them.  Keep them in a locked cabinet or secure area. Do not  keep them in a purse or other bag you carry with you. A person may be looking for something else and find the narcotics.  Make sure narcotics are stored out of the reach of children.  A child can easily overdose on narcotics. Narcotics may look like candy to a small child.    The best way to dispose of narcotics:      The laws vary by country and area. In the [place], the best way is to return the narcotics through a take-back program. This program is offered by the [place] Drug Enforcement Agency (SURESH). The following are options for using the program:  Take the narcotics to a SURESH collection site.  The site is often a law enforcement center. Call your local law enforcement center for scheduled take-back days in your area. You will be given information on where to go if the collection site is in a different location.  Take the narcotics to an approved pharmacy or hospital.  A pharmacy or hospital may be set up as a collection site. You will need to ask if it is a SURESH collection site if you were not directed there. A pharmacy or doctor's office may not be able to take back narcotics unless it is a SURESH site.  Use a mail-back system.  This means you are given containers to put the narcotics into. You will then mail them in the containers.  Use a take-back drop box.  This is a place to  leave the narcotics at any time. People and animals will not be able to get into the box. Your local law enforcement agency can tell you where to find a drop box in your area.    Other ways to manage pain:   Ask your healthcare provider about non-narcotic medicines to control pain.  Nonprescription medicines include NSAIDs (such as ibuprofen) and acetaminophen. Prescription medicines include muscle relaxers, antidepressants, and steroids.  Pain may be managed without any medicines.  Some ways to relieve pain include massage, aromatherapy, or meditation. Physical or occupational therapy may also help.    For more information:   Drug Enforcement Administration  [address] [place] [address]  Phone: 4- [phone] - [phone]  Web Address: https://www.deadiversion.[facility]oj.gov/drug_disposal/    US Food and Drug Administration  [address] [place] [address]  Phone: 7- 467 - 760-4745  Web Address: http://www.fda.gov     © Copyright [employer] 2018 Information is for End User's use only and may not be sold, redistributed or otherwise used for commercial purposes. All illustrations and images included in CareNotes® are the copyrighted property of A.D.A.M., Inc. or [employer]

## 2025-04-29 NOTE — PROGRESS NOTES
Name: Rajendra Lee      : 1950      MRN: [id number]  Encounter Provider: Caitie Toscano DO  Encounter Date: 2025   Encounter department: [facility] [address]  :  Assessment & Plan  Medicare annual wellness visit, subsequent           Depression Screening and Follow-up Plan: Patient was screened for depression during today's encounter. They screened negative with a PHQ-2 score of 0.        Preventive health issues were discussed with patient, and age appropriate screening tests were ordered as noted in patient's After Visit Summary. Personalized health advice and appropriate referrals for health education or preventive services given if needed, as noted in patient's After Visit Summary.    History of Present Illness     Here for medicare wellness  Gets 2 hours in pm then goes to bed and gets 5-6 hours- will renew  ambien - uses prn   Also  needs prn use of norco refilled- in bed on left side but has daily back pain- had seen pain management and had seen PA- had mri and then had  MRI- had sacroiliac injections       Patient Care Team:  Caitie Toscano DO as PCP - General  MD Solomon Tian DO Erin Fly, DO    Review of Systems   Constitutional:  Negative for fatigue.   Musculoskeletal:  Positive for back pain.        Worsening pain with even small amount of physical labor   All other systems reviewed and are negative.    Medical History Reviewed by provider this encounter:  Tobacco  Allergies  Meds  Problems  Med Hx  Surg Hx  Fam Hx       Annual Wellness Visit Questionnaire   Rajendra is here for his Subsequent Wellness visit.     Health Risk Assessment:   Patient rates overall health as fair. Patient feels that their physical health rating is same. Patient is satisfied with their life. Eyesight was rated as slightly better. Hearing was rated as slightly worse. Patient feels that their emotional and mental health rating is same. Patients states they are never, rarely  angry. Patient states they are sometimes unusually tired/fatigued. Pain experienced in the last 7 days has been some. Patient's pain rating has been 6/10. Patient states that he has experienced no weight loss or gain in last 6 months.     Depression Screening:   PHQ-2 Score: 0      Home Safety:  Patient has trouble with stairs inside or outside of their home. Patient has working smoke alarms and has working carbon monoxide detector. Home safety hazards include: none.     Nutrition:   Current diet is Regular.     Medications:   Patient is not currently taking any over-the-counter supplements. Patient is able to manage medications.     Activities of Daily Living (ADLs)/Instrumental Activities of Daily Living (IADLs):   Walk and transfer into and out of bed and chair?: Yes  Dress and groom yourself?: Yes    Bathe or shower yourself?: Yes    Feed yourself? Yes  Do your laundry/housekeeping?: Yes  Manage your money, pay your bills and track your expenses?: Yes  Make your own meals?: Yes    Do your own shopping?: Yes    Previous Hospitalizations:   Any hospitalizations or ED visits within the last 12 months?: No      Advance Care Planning:   Living will: Yes    Durable POA for healthcare: Yes    Advanced directive: Yes    Advanced directive counseling given: Yes    End of Life Decisions reviewed with patient: Yes    Provider agrees with end of life decisions: Yes      Comments: Will bring in copy    Preventive Screenings      Cardiovascular Screening:    General: Screening Not Indicated and History Lipid Disorder      Diabetes Screening:     General: Screening Current      Colorectal Cancer Screening:     General: Screening Current      Prostate Cancer Screening:    General: Screening Not Indicated and Risks and Benefits Discussed      Osteoporosis Screening:    General: Risks and Benefits Discussed      Abdominal Aortic Aneurysm (AAA) Screening:        General: Risks and Benefits Discussed      Lung Cancer Screening:      General: Screening Not Indicated      Hepatitis C Screening:    General: Screening Current      Preventive Screening Comments: Works out at [facility] a few days per week    Immunizations:  - Immunizations due: Zoster (Shingrix)    Screening, Brief Intervention, and Referral to Treatment (SBIRT)     Screening      AUDIT-C Screenin) How often did you have a drink containing alcohol in the past year? 2 to 4 times a month  2) How many drinks did you have on a typical day when you were drinking in the past year? 1 to 2  3) How often did you have 6 or more drinks on one occasion in the past year? never    AUDIT-C Score: 2  Interpretation: Score 0-3 (male): Negative screen for alcohol misuse    Single Item Drug Screening:  How often have you used an illegal drug (including marijuana) or a prescription medication for non-medical reasons in the past year? never    Single Item Drug Screen Score: 0  Interpretation: Negative screen for possible drug use disorder    Review of Current Opioid Use    Opioid Risk Tool (ORT) Interpretation: Complete Opioid Risk Tool (ORT)    Social Drivers of Health     Financial Resource Strain: Low Risk  (10/21/2022)    Overall Financial Resource Strain (CARDIA)     Difficulty of Paying Living Expenses: Not very hard   Food Insecurity: No Food Insecurity (2025)    Hunger Vital Sign     Worried About Running Out of Food in the Last Year: Never true     Ran Out of Food in the Last Year: Never true   Transportation Needs: No Transportation Needs (2025)    PRAPARE - Transportation     Lack of Transportation (Medical): No     Lack of Transportation (Non-Medical): No   Housing Stability: Low Risk  (2025)    Housing Stability Vital Sign     Unable to Pay for Housing in the Last Year: No     Number of Times Moved in the Last Year: 0     Homeless in the Last Year: No   Utilities: Not At Risk (2025)    [facility] Utilities     Threatened with loss of utilities: No     No results  "found.    Objective   /70   Pulse 64   Ht 5' 8\" (1.727 m)   Wt 81.9 kg (180 lb 9.6 oz)   SpO2 95%   BMI 27.46 kg/m²     Physical Exam  Vitals and nursing note reviewed.   Constitutional:       General: He is not in acute distress.  HENT:      Head: Normocephalic.      Right Ear: Tympanic membrane normal. There is no impacted cerumen.      Left Ear: Tympanic membrane normal. There is no impacted cerumen.      Nose: No congestion.      Mouth/Throat:      Mouth: Mucous membranes are moist.      Pharynx: No posterior oropharyngeal erythema.   Eyes:      General: No scleral icterus.        Right eye: No discharge.         Left eye: No discharge.      Extraocular Movements: Extraocular movements intact.      Pupils: Pupils are equal, round, and reactive to light.   Cardiovascular:      Rate and Rhythm: Normal rate and regular rhythm.      Heart sounds: No murmur heard.  Pulmonary:      Breath sounds: No wheezing or rhonchi.   Abdominal:      Palpations: Abdomen is soft. There is no mass.      Tenderness: There is no abdominal tenderness.   Musculoskeletal:         General: No tenderness.      Cervical back: No rigidity or tenderness.      Right lower leg: No edema.      Left lower leg: No edema.   Skin:     Findings: No rash.   Neurological:      Mental Status: He is alert and oriented to person, place, and time.      Motor: No weakness.   Psychiatric:         Mood and Affect: Mood normal.         Thought Content: Thought content normal.         Judgment: Judgment normal.         "

## 2025-05-01 DIAGNOSIS — F51.01 PRIMARY INSOMNIA: ICD-10-CM

## 2025-05-01 RX ORDER — ZOLPIDEM TARTRATE 10 MG/1
10 TABLET ORAL
Qty: 30 TABLET | Refills: 0 | Status: SHIPPED | OUTPATIENT
Start: 2025-05-01

## 2025-05-01 NOTE — TELEPHONE ENCOUNTER
Reason for call:   [x] Refill   [] Prior Auth  [] Other:     Office:   [x] PCP/Provider - Everton,Caitie  [] Specialty/Provider -     Medication: Zolpidem    Dose/Frequency: 10 mg HS PRN    Quantity: 30    Pharmacy: [facility] [place] [facility]     Local Pharmacy   Does the patient have enough for 3 days?   [x] Yes   [] No - Send as HP to POD    Mail Away Pharmacy   Does the patient have enough for 10 days?   [] Yes   [] No - Send as HP to POD

## 2025-05-20 NOTE — PATIENT INSTRUCTIONS
Follow up in the office with Dr Lizett Vargas if symptoms persist and patient desires shoulder replacement (M6) obeys commands

## 2025-07-28 DIAGNOSIS — N40.1 ENLARGED PROSTATE WITH URINARY OBSTRUCTION: ICD-10-CM

## 2025-07-28 DIAGNOSIS — N13.8 ENLARGED PROSTATE WITH URINARY OBSTRUCTION: ICD-10-CM

## 2025-07-28 DIAGNOSIS — E78.00 HYPERCHOLESTEROLEMIA: ICD-10-CM

## 2025-07-28 DIAGNOSIS — Z83.3 FAMILY HISTORY OF DIABETES MELLITUS IN MOTHER: ICD-10-CM

## 2025-07-29 RX ORDER — FINASTERIDE 5 MG/1
5 TABLET, FILM COATED ORAL DAILY
Qty: 90 TABLET | Refills: 0 | Status: SHIPPED | OUTPATIENT
Start: 2025-07-29

## 2025-07-29 RX ORDER — ROSUVASTATIN CALCIUM 20 MG/1
20 TABLET, COATED ORAL DAILY
Qty: 30 TABLET | Refills: 0 | Status: SHIPPED | OUTPATIENT
Start: 2025-07-29

## 2025-07-29 RX ORDER — LOSARTAN POTASSIUM 50 MG/1
50 TABLET ORAL DAILY
Qty: 90 TABLET | Refills: 0 | Status: SHIPPED | OUTPATIENT
Start: 2025-07-29

## 2025-08-04 DIAGNOSIS — N40.1 ENLARGED PROSTATE WITH URINARY OBSTRUCTION: ICD-10-CM

## 2025-08-04 DIAGNOSIS — N13.8 ENLARGED PROSTATE WITH URINARY OBSTRUCTION: ICD-10-CM

## 2025-08-04 RX ORDER — TAMSULOSIN HYDROCHLORIDE 0.4 MG/1
0.8 CAPSULE ORAL
Qty: 180 CAPSULE | Refills: 1 | Status: SHIPPED | OUTPATIENT
Start: 2025-08-04

## (undated) DEVICE — GLOVE SRG BIOGEL ECLIPSE 8

## (undated) DEVICE — SCD SEQUENTIAL COMPRESSION COMFORT SLEEVE MEDIUM KNEE LENGTH: Brand: KENDALL SCD

## (undated) DEVICE — CHLORAPREP HI-LITE 26ML ORANGE

## (undated) DEVICE — DRESSING MEPILEX AG BORDER 4 X 8 IN

## (undated) DEVICE — INTENDED FOR TISSUE SEPARATION, AND OTHER PROCEDURES THAT REQUIRE A SHARP SURGICAL BLADE TO PUNCTURE OR CUT.: Brand: BARD-PARKER SAFETY BLADES SIZE 10, STERILE

## (undated) DEVICE — GUIDEWIRE STRGHT TIP 0.035 IN  SOLO PLUS

## (undated) DEVICE — INTENDED FOR TISSUE SEPARATION, AND OTHER PROCEDURES THAT REQUIRE A SHARP SURGICAL BLADE TO PUNCTURE OR CUT.: Brand: BARD-PARKER SAFETY BLADES SIZE 15, STERILE

## (undated) DEVICE — GLOVE INDICATOR PI UNDERGLOVE SZ 6.5 BLUE

## (undated) DEVICE — PENCIL ELECTROSURG E-Z CLEAN -0035H

## (undated) DEVICE — GLOVE INDICATOR PI UNDERGLOVE SZ 7.5 BLUE

## (undated) DEVICE — HEAVY DUTY TABLE COVER: Brand: CONVERTORS

## (undated) DEVICE — STAPLER ENDO HERNIA 4.0 X 12MM

## (undated) DEVICE — SUT 2 ORTHOCORD 36 IN W/O NEEDLES

## (undated) DEVICE — SPONGE STICK WITH PVP-I: Brand: KENDALL

## (undated) DEVICE — PDS II VLT 0 107CM AG ST3: Brand: ENDOLOOP

## (undated) DEVICE — HARMONIC ACE 5MM DIAMETER SHEARS 36CM SHAFT LENGTH + ADAPTIVE TISSUE TECHNOLOGY FOR USE WITH GENERATOR G11: Brand: HARMONIC ACE

## (undated) DEVICE — IRRIG ENDO FLO TUBING

## (undated) DEVICE — ASTOUND STANDARD SURGICAL GOWN, XXL: Brand: CONVERTORS

## (undated) DEVICE — SPONGE PVP SCRUB WING STERILE

## (undated) DEVICE — TUBING SUCTION 5MM X 12 FT

## (undated) DEVICE — KIT STABILIZATION SHOULDER MARCO

## (undated) DEVICE — NEEDLE 25G X 1 1/2

## (undated) DEVICE — PMI DISPOSABLE PUNCTURE CLOSURE DEVICE / SUTURE GRASPER: Brand: PMI

## (undated) DEVICE — ALLENTOWN LAP CHOLE APP PACK: Brand: CARDINAL HEALTH

## (undated) DEVICE — SUT ETHIBOND 0 SH/SH 36 IN X524H

## (undated) DEVICE — HOOD: Brand: FLYTE, SURGICOOL

## (undated) DEVICE — 2000CC GUARDIAN II: Brand: GUARDIAN

## (undated) DEVICE — GLOVE INDICATOR PI UNDERGLOVE SZ 8 BLUE

## (undated) DEVICE — DUAL CUT SAGITTAL BLADE

## (undated) DEVICE — LASER FIBER HOLMIUM 272MICRON

## (undated) DEVICE — ENDOPATH 5MM CURVED SCISSORS WITH MONOPOLAR CAUTERY: Brand: ENDOPATH

## (undated) DEVICE — REM POLYHESIVE ADULT PATIENT RETURN ELECTRODE: Brand: VALLEYLAB

## (undated) DEVICE — DRAPE EQUIPMENT RF WAND

## (undated) DEVICE — SPECIMEN CONTAINER STERILE PEEL PACK

## (undated) DEVICE — SUT VICRYL 0 UR-6 27 IN J603H

## (undated) DEVICE — SUT VICRYL PLUS 2-0 CTB-1 27 IN VCPB259H

## (undated) DEVICE — SURGICAL CLIPPER BLADE GENERAL USE

## (undated) DEVICE — BLUE HEAT SCOPE WARMER

## (undated) DEVICE — SUT VICRYL PLUS 0 CTB-1 27 IN VCPB260H

## (undated) DEVICE — TUBING SMOKE EVAC W/FILTRATION DEVICE PLUMEPORT ACTIV

## (undated) DEVICE — GLOVE INDICATOR PI UNDERGLOVE SZ 7 BLUE

## (undated) DEVICE — CATH URETERAL 5FR X 70 CM FLEX TIP POLYUR BARD

## (undated) DEVICE — SYRINGE 10ML LL

## (undated) DEVICE — CYSTOSCOPY PACK: Brand: CONVERTORS

## (undated) DEVICE — IMPERVIOUS STOCKINETTE: Brand: DEROYAL

## (undated) DEVICE — STRL PENROSE DRAIN 18" X 1/4": Brand: CARDINAL HEALTH

## (undated) DEVICE — INTENT TO BE USED WITH SUTURE MATERIAL FOR TISSUE CLOSURE: Brand: RICHARD-ALLAN®  NEEDLE 1/2 CIRCLE REVERSE CUTTING

## (undated) DEVICE — SUT MONOCRYL 4-0 PS-2 27 IN Y426H

## (undated) DEVICE — GUIDE PIN 2.5 X 220MM AEQUALIS PERFORM PLUS

## (undated) DEVICE — CATH URET .038 10FR 50CM DUAL LUMEN

## (undated) DEVICE — THE SIMPULSE SOLO SYSTEM WITH ULTREX RETRACTABLE SPLASH SHIELD TIP: Brand: SIMPULSE SOLO

## (undated) DEVICE — GLOVE SRG BIOGEL 7.5

## (undated) DEVICE — SHEATH URETERAL ACCESS 12/14FR 35CM PROXIS

## (undated) DEVICE — ENDOPATH 5MM ENDOSCOPIC BLUNT TIP DISSECTORS (12 POUCHES CONTAINING 3 DISSECTORS EACH): Brand: ENDOPATH

## (undated) DEVICE — 3000CC GUARDIAN II: Brand: GUARDIAN

## (undated) DEVICE — PROXIMATE PLUS MD MULTI-DIRECTIONAL RELEASE SKIN STAPLERS CONTAINS 35 STAINLESS STEEL STAPLES APPROXIMATE CLOSED DIMENSIONS: 6.9MM X 3.9MM WIDE: Brand: PROXIMATE

## (undated) DEVICE — GLOVE SRG BIOGEL 7

## (undated) DEVICE — PACK MAJOR ORTHO W/SPLITS PBDS

## (undated) DEVICE — GLOVE SRG BIOGEL ECLIPSE 7.5

## (undated) DEVICE — STANDARD SURGICAL GOWN, L: Brand: CONVERTORS

## (undated) DEVICE — PACK TUR

## (undated) DEVICE — PAD GROUNDING ADULT

## (undated) DEVICE — SURGICAL GOWN, XL SMARTSLEEVE: Brand: CONVERTORS

## (undated) DEVICE — 3M™ IOBAN™ 2 ANTIMICROBIAL INCISE DRAPE 6650EZ: Brand: IOBAN™ 2

## (undated) DEVICE — ENDOPATH XCEL UNIVERSAL TROCAR STABLILITY SLEEVES: Brand: ENDOPATH XCEL

## (undated) DEVICE — ROCKER SWITCH PENCIL HOLSTER: Brand: VALLEYLAB

## (undated) DEVICE — ENDOPATH XCEL BLUNT TIP TROCARS WITH SMOOTH SLEEVES: Brand: ENDOPATH XCEL

## (undated) DEVICE — INTENDED FOR TISSUE SEPARATION, AND OTHER PROCEDURES THAT REQUIRE A SHARP SURGICAL BLADE TO PUNCTURE OR CUT.: Brand: BARD-PARKER SAFETY BLADES SIZE 11, STERILE

## (undated) DEVICE — LIGAMAX 5 MM ENDOSCOPIC MULTIPLE CLIP APPLIER: Brand: LIGAMAX

## (undated) DEVICE — ENDOPATH XCEL BLADELESS TROCARS WITH STABILITY SLEEVES: Brand: ENDOPATH XCEL

## (undated) DEVICE — CAPIT SIMPLICITI  W/PERFROM PLUS GLENOID

## (undated) DEVICE — VIAL DECANTER

## (undated) DEVICE — UROCATCH BAG

## (undated) DEVICE — ARTHROSCOPY FLOOR MAT

## (undated) DEVICE — GUIDEWIRE ANGLED TIP 0.035 IN SOLO PLUS

## (undated) DEVICE — 60 ML SYRINGE,REGULAR TIP: Brand: MONOJECT

## (undated) DEVICE — GLOVE SRG BIOGEL 6.5

## (undated) DEVICE — CONMED ACCESSORY ELECTRODE, FLAT BLADE WITH EXTENDED INSULATION: Brand: CONMED

## (undated) DEVICE — TRAY FOLEY 16FR URIMETER SURESTEP

## (undated) DEVICE — SUT 2 ORTHOCORD MO7

## (undated) DEVICE — GUIDE PIN 3 X 75MM STRL

## (undated) DEVICE — CEMENT MIXING CARTRIDGE PRISM II